# Patient Record
Sex: FEMALE | Race: BLACK OR AFRICAN AMERICAN | NOT HISPANIC OR LATINO | Employment: OTHER | URBAN - METROPOLITAN AREA
[De-identification: names, ages, dates, MRNs, and addresses within clinical notes are randomized per-mention and may not be internally consistent; named-entity substitution may affect disease eponyms.]

---

## 2017-01-09 ENCOUNTER — OFFICE VISIT (OUTPATIENT)
Dept: FAMILY MEDICINE | Facility: CLINIC | Age: 47
End: 2017-01-09
Payer: MEDICARE

## 2017-01-09 VITALS
HEIGHT: 67 IN | WEIGHT: 240.88 LBS | HEART RATE: 78 BPM | RESPIRATION RATE: 18 BRPM | BODY MASS INDEX: 37.81 KG/M2 | DIASTOLIC BLOOD PRESSURE: 92 MMHG | TEMPERATURE: 98 F | SYSTOLIC BLOOD PRESSURE: 128 MMHG

## 2017-01-09 DIAGNOSIS — G89.29 CHRONIC MIDLINE LOW BACK PAIN WITHOUT SCIATICA: ICD-10-CM

## 2017-01-09 DIAGNOSIS — E11.9 TYPE 2 DIABETES MELLITUS WITHOUT COMPLICATION, WITHOUT LONG-TERM CURRENT USE OF INSULIN: Primary | ICD-10-CM

## 2017-01-09 DIAGNOSIS — E66.9 OBESITY (BMI 30-39.9): ICD-10-CM

## 2017-01-09 DIAGNOSIS — M54.50 CHRONIC MIDLINE LOW BACK PAIN WITHOUT SCIATICA: ICD-10-CM

## 2017-01-09 DIAGNOSIS — I10 ESSENTIAL HYPERTENSION: ICD-10-CM

## 2017-01-09 DIAGNOSIS — F32.A DEPRESSION, UNSPECIFIED DEPRESSION TYPE: ICD-10-CM

## 2017-01-09 PROCEDURE — 99214 OFFICE O/P EST MOD 30 MIN: CPT | Mod: S$PBB,,, | Performed by: FAMILY MEDICINE

## 2017-01-09 PROCEDURE — 99213 OFFICE O/P EST LOW 20 MIN: CPT | Mod: PBBFAC,PO | Performed by: FAMILY MEDICINE

## 2017-01-09 PROCEDURE — 99999 PR PBB SHADOW E&M-EST. PATIENT-LVL III: CPT | Mod: PBBFAC,,, | Performed by: FAMILY MEDICINE

## 2017-01-09 RX ORDER — HYDROCODONE BITARTRATE AND ACETAMINOPHEN 7.5; 325 MG/1; MG/1
1 TABLET ORAL EVERY 12 HOURS PRN
Qty: 60 TABLET | Refills: 0 | Status: SHIPPED | OUTPATIENT
Start: 2017-02-26 | End: 2017-03-09 | Stop reason: SDUPTHER

## 2017-01-09 RX ORDER — DULOXETIN HYDROCHLORIDE 60 MG/1
60 CAPSULE, DELAYED RELEASE ORAL DAILY
Qty: 90 CAPSULE | Refills: 1 | Status: SHIPPED | OUTPATIENT
Start: 2017-01-09 | End: 2017-10-18

## 2017-01-09 RX ORDER — HYDROCODONE BITARTRATE AND ACETAMINOPHEN 7.5; 325 MG/1; MG/1
1 TABLET ORAL EVERY 12 HOURS PRN
Qty: 60 TABLET | Refills: 0 | Status: SHIPPED | OUTPATIENT
Start: 2017-01-26 | End: 2017-01-09 | Stop reason: SDUPTHER

## 2017-01-09 NOTE — MR AVS SNAPSHOT
[facility]  [address]  [place] [address]  Phone: [phone]  Fax: [phone]                  Liza Mesa   2017 9:00 AM   Office Visit    Description:  Female : 1970   Provider:  Rayo Arrieta MD   Department:  [facility]           Reason for Visit     Follow-up     Diabetes     Medication Problem                To Do List           Future Appointments        Provider Department Dept Phone    2017 3:00 PM Meem Bolden MD [place] [facility] [phone]      Goals (5 Years of Data)     None      Follow-Up and Disposition     Return in about 2 months (around 3/9/2017).       These Medications        Disp Refills Start End    duloxetine (CYMBALTA) 60 MG capsule 90 capsule 1 2017    Take 1 capsule (60 mg total) by mouth once daily. - Oral    Pharmacy: [facility] [address] [place] [address] Ph #: [phone]       hydrocodone-acetaminophen 7.5-325mg (NORCO) 7.5-325 mg per tablet 60 tablet 0 2017     Take 1 tablet by mouth every 12 (twelve) hours as needed for Pain. - Oral    Pharmacy: [facility] [address] [place] [address] Ph #: [phone]       Notes to Pharmacy: Fill the 7.5 mg instead of 5mg.      [facility] On Call     [facility] On Call Nurse Care Line -  Assistance  Registered nurses in the [facility] provide clinical advisement, health education, appointment booking, and other advisory services.  Call for this free service at [phone].             Medications           Message regarding Medications     Verify the changes and/or additions to your medication regime listed below are the same as discussed with your clinician today.  If any of these changes or additions are incorrect, please notify your healthcare provider.        START taking these NEW medications         "Refills    duloxetine (CYMBALTA) 60 MG capsule 1    Sig: Take 1 capsule (60 mg total) by mouth once daily.    Class: Normal    Route: Oral      CHANGE how you are taking these medications     Start Taking Instead of    hydrocodone-acetaminophen 7.5-325mg (NORCO) 7.5-325 mg per tablet hydrocodone-acetaminophen 7.5-325mg (NORCO) 7.5-325 mg per tablet    Dosage:  Take 1 tablet by mouth every 12 (twelve) hours as needed for Pain. Dosage:  Take 1 tablet by mouth every 6 (six) hours as needed for Pain.    Reason for Change:  Reorder     Starting on: 2/26/2017       STOP taking these medications     buPROPion (WELLBUTRIN SR) 150 MG TBSR 12 hr tablet Take 1 tablet (150 mg total) by mouth 2 (two) times daily.           Verify that the below list of medications is an accurate representation of the medications you are currently taking.  If none reported, the list may be blank. If incorrect, please contact your healthcare provider. Carry this list with you in case of emergency.           Current Medications     ACCU-CHEK FASTCLIX Misc USE TO TEST BLOOD GLUCOSE TWICE DAILY    ACCU-CHEK JEFFERSON Misc USE TO TEST BLOOD SUGAR BID    ACCU-CHEK SMARTVIEW TEST STRIP Strp USE TO TEST BLOOD SUGAR TWICE DAILY    acetaZOLAMIDE (DIAMOX) 250 MG tablet Take 2 tablets (500 mg total) by mouth 2 (two) times daily.    albuterol (ACCUNEB) 0.63 mg/3 mL Nebu Take 3 mLs (0.63 mg total) by nebulization every 6 (six) hours as needed.    albuterol (PROVENTIL HFA) 90 mcg/actuation inhaler Inhale 2 puffs into the lungs every 6 (six) hours as needed.    amlodipine (NORVASC) 5 MG tablet Take 1 tablet (5 mg total) by mouth once daily.    BD ULTRA-FINE JEFFERSON PEN NEEDLES 32 gauge x 5/32" Ndle Uses 1 daily, on Victoza    blood-glucose meter kit Please supply patient with a blood glucose meter, strips and lancets that is covered by her insurance. Patient tests blood sugar twice daily.    chlorzoxazone (PARAFON FORTE) 500 mg Tab Take 1 tablet (500 mg total) by mouth " "4 (four) times daily as needed.    fexofenadine (ALLEGRA) 180 MG tablet Take 1 tablet (180 mg total) by mouth once daily.    fluticasone-salmeterol 250-50 mcg/dose (ADVAIR) 250-50 mcg/dose diskus inhaler Inhale 1 puff into the lungs 2 (two) times daily.    glimepiride (AMARYL) 4 MG tablet Take 1 tablet (4 mg total) by mouth daily with breakfast.    hydrocodone-acetaminophen 7.5-325mg (NORCO) 7.5-325 mg per tablet Starting on Feb 26, 2017. Take 1 tablet by mouth every 12 (twelve) hours as needed for Pain.    hydrocortisone (ANUSOL-HC) 25 mg suppository Place 1 suppository (25 mg total) rectally 2 (two) times daily as needed.    lancets 30 gauge Misc 1 lancet by Misc.(Non-Drug; Combo Route) route 2 (two) times daily. RELI ON CONFIRM LANCETS 30guage TO CHECK BLOOD SUGAR TWICE DAILY: Dx Code: E11.65    liraglutide 0.6 mg/0.1 mL, 18 mg/3 mL, subq PNIJ (VICTOZA 3-AYALA) 0.6 mg/0.1 mL (18 mg/3 mL) PnIj Inject 1.8 mg into the skin once daily.    metformin (GLUCOPHAGE) 1000 MG tablet Take 1 tablet (1,000 mg total) by mouth 2 (two) times daily with meals.    oxybutynin (DITROPAN) 5 MG Tab Take 1 tablet (5 mg total) by mouth 2 (two) times daily.    promethazine-codeine 6.25-10 mg/5 ml (PHENERGAN WITH CODEINE) 6.25-10 mg/5 mL syrup TAKE 5 MLS BY MOUTH EVERY 6 HOURS AS NEEDED FOR COUGH    simvastatin (ZOCOR) 10 MG tablet Take 1 tablet (10 mg total) by mouth every morning.    duloxetine (CYMBALTA) 60 MG capsule Take 1 capsule (60 mg total) by mouth once daily.           Clinical Reference Information           Vital Signs - Last Recorded  Most recent update: 1/9/2017  9:26 AM by Tere Antonio LPN    BP Pulse Temp Resp Ht Wt    (!) 128/92 (BP Location: Left arm, Patient Position: Sitting) 78 98 °F (36.7 °C) (Oral) 18 5' 7" (1.702 m) 109.2 kg (240 lb 13.6 oz)    LMP BMI             01/07/2017 37.72 kg/m2         Blood Pressure          Most Recent Value    BP  (!)  128/92      Allergies as of 1/9/2017     No Known Allergies    "   Immunizations Administered on Date of Encounter - 1/9/2017     None      [facility] Sign-Up     Activating your [facility] account is as easy as 1-2-3!     1) Visit [email or web address], select Sign Up Now, enter this activation code and your date of birth, then select Next.  Activation code not generated  Current Patient Portal Status: Account disabled      2) Create a username and password to use when you visit [facility] in the future and select a security question in case you lose your password and select Next.    3) Enter your e-mail address and click Sign Up!    Additional Information  If you have questions, please e-mail [email or web address] or call [phone] to talk to our [facility] staff. Remember, [facility] is NOT to be used for urgent needs. For medical emergencies, dial 911.

## 2017-01-11 ENCOUNTER — OFFICE VISIT (OUTPATIENT)
Dept: ALLERGY | Facility: CLINIC | Age: 47
End: 2017-01-11
Payer: MEDICARE

## 2017-01-11 ENCOUNTER — LAB VISIT (OUTPATIENT)
Dept: LAB | Facility: HOSPITAL | Age: 47
End: 2017-01-11
Attending: ALLERGY & IMMUNOLOGY
Payer: MEDICARE

## 2017-01-11 VITALS
DIASTOLIC BLOOD PRESSURE: 76 MMHG | BODY MASS INDEX: 38.2 KG/M2 | SYSTOLIC BLOOD PRESSURE: 138 MMHG | HEIGHT: 67 IN | WEIGHT: 243.38 LBS

## 2017-01-11 DIAGNOSIS — R05.3 CHRONIC COUGH: ICD-10-CM

## 2017-01-11 DIAGNOSIS — J31.0 RHINITIS, CHRONIC: Primary | ICD-10-CM

## 2017-01-11 DIAGNOSIS — J45.40 MODERATE PERSISTENT ASTHMA WITHOUT COMPLICATION: ICD-10-CM

## 2017-01-11 DIAGNOSIS — I10 ESSENTIAL HYPERTENSION: ICD-10-CM

## 2017-01-11 DIAGNOSIS — J31.0 RHINITIS, CHRONIC: ICD-10-CM

## 2017-01-11 LAB — IGE SERPL-ACNC: 43 IU/ML

## 2017-01-11 PROCEDURE — 86003 ALLG SPEC IGE CRUDE XTRC EA: CPT | Mod: 59

## 2017-01-11 PROCEDURE — 82785 ASSAY OF IGE: CPT

## 2017-01-11 PROCEDURE — 99999 PR PBB SHADOW E&M-EST. PATIENT-LVL II: CPT | Mod: PBBFAC,,, | Performed by: ALLERGY & IMMUNOLOGY

## 2017-01-11 PROCEDURE — 86003 ALLG SPEC IGE CRUDE XTRC EA: CPT

## 2017-01-11 PROCEDURE — 99204 OFFICE O/P NEW MOD 45 MIN: CPT | Mod: S$PBB,,, | Performed by: ALLERGY & IMMUNOLOGY

## 2017-01-11 NOTE — PROGRESS NOTES
Subjective:       Patient ID: Liza Spain Cousin is a 46 y.o. female.    Chief Complaint:  Allergies (pcp referred)      HPI Comments: 47 yo woman presents for consult from Dr Arrieta for asthma. She states she has adult asthma/COPD. She is on Advair BID and albuterol neb usually BID. She is also on an OTC nose spray not sure what and allegra daily. She has stuffy nose all the time, no smell, runny nose, occ sneeze, itchy runny eyes, cough and chest tightness. She is worse in spring and summer. Worse outside then but some buildings she is worse inside. no time of day worse. No other triggers she can tel. She had bad exacerbation in November and PCP advised allergy testing. She has many other medical issues including DM, HTN, cholesterol, see history. Never had sinus or other ENT surgery. No eczema. No known food, insect or latex allergy.       Environmental History: see history section for home environment  Review of Systems   Constitutional: Negative for appetite change, chills, fatigue and fever.   HENT: Positive for congestion, postnasal drip, rhinorrhea, sinus pressure and sneezing. Negative for ear discharge, ear pain, facial swelling, nosebleeds, sore throat, trouble swallowing and voice change.    Eyes: Negative for discharge, redness, itching and visual disturbance.   Respiratory: Positive for cough, chest tightness, shortness of breath and wheezing. Negative for choking.    Cardiovascular: Negative for chest pain, palpitations and leg swelling.   Gastrointestinal: Negative for abdominal distention, abdominal pain, constipation, diarrhea, nausea and vomiting.   Genitourinary: Negative for difficulty urinating.   Musculoskeletal: Negative for arthralgias, gait problem, joint swelling and myalgias.   Skin: Negative for color change and rash.   Neurological: Positive for headaches. Negative for dizziness, syncope, weakness and light-headedness.   Hematological: Negative for adenopathy. Does not bruise/bleed easily.    Psychiatric/Behavioral: Negative for agitation, behavioral problems, confusion and sleep disturbance. The patient is not nervous/anxious.         Objective:    Physical Exam   Constitutional: She is oriented to person, place, and time. She appears well-developed and well-nourished. No distress.   HENT:   Head: Normocephalic and atraumatic.   Right Ear: Hearing, tympanic membrane, external ear and ear canal normal.   Left Ear: Hearing, tympanic membrane, external ear and ear canal normal.   Nose: No mucosal edema, rhinorrhea, sinus tenderness or septal deviation. No epistaxis. Right sinus exhibits no maxillary sinus tenderness and no frontal sinus tenderness. Left sinus exhibits no maxillary sinus tenderness and no frontal sinus tenderness.   Mouth/Throat: Uvula is midline, oropharynx is clear and moist and mucous membranes are normal. No uvula swelling.   Eyes: Conjunctivae are normal. Right eye exhibits no discharge. Left eye exhibits no discharge.   Neck: Normal range of motion. No thyromegaly present.   Cardiovascular: Normal rate, regular rhythm and normal heart sounds.    No murmur heard.  Pulmonary/Chest: Effort normal and breath sounds normal. No respiratory distress. She has no wheezes.   Abdominal: Soft. She exhibits no distension. There is no tenderness.   Musculoskeletal: Normal range of motion. She exhibits no edema or tenderness.   Lymphadenopathy:     She has no cervical adenopathy.   Neurological: She is alert and oriented to person, place, and time.   Skin: Skin is warm and dry. No rash noted. No erythema.   Psychiatric: She has a normal mood and affect. Her behavior is normal. Judgment and thought content normal.   Nursing note and vitals reviewed.      Laboratory:   none performed   Assessment:       1. Rhinitis, chronic    2. Moderate persistent asthma without complication    3. Chronic cough    4. Essential hypertension         Plan:       1. Immunocaps today  2. Continue Advair 250 BID, may  need to increase to 500 but also may need pulmonary eval  3. Continue allegra daily, check nasal spray and let us know name,. Needs to be on nasal steroid daily  4. Phone review

## 2017-01-11 NOTE — LETTER
January 11, 2017      Rayo Arrieta MD  [address]  [place] [address]           [place] [facility]  [address] [facility] [address] [place] [address]  Phone: [phone]          Patient: Liza Mesa   MR Number: [id number]   YOB: 1970   Date of Visit: 1/11/2017       Dear Dr. Rayo Arrieta:    Thank you for referring Liza Mesa to me for evaluation. Attached you will find relevant portions of my assessment and plan of care.    If you have questions, please do not hesitate to call me. I look forward to following Liza Mesa along with you.    Sincerely,    Meme Bolden MD    Enclosure  CC:  No Recipients    If you would like to receive this communication electronically, please contact [email or web address] or [phone] to request more information on [employer] access.    For providers and/or their staff who would like to refer a patient to [facility], please contact us through our one-stop-shop provider referral line, [facility] , at [phone].    If you feel you have received this communication in error or would no longer like to receive these types of communications, please e-mail [email or web address]

## 2017-01-13 LAB
A ALTERNATA IGE QN: <0.35 KU/L
A FUMIGATUS IGE QN: <0.35 KU/L
ALLERGEN MAPLE/SYCAMORE IGE: <0.35 KU/L
ALLERGEN PENICILLIUM IGE: <0.35 KU/L
ALLERGEN WALNUT TREE IGE: <0.35 KU/L
ALLERGEN WHEAT IGE: <0.35 KU/L
ALLERGEN WHITE PINE TREE IGE: <0.35 KU/L
ALLERGEN WILLOW IGE: <0.35 KU/L
BAHIA GRASS IGE QN: <0.35 KU/L
BALD CYPRESS IGE QN: <0.35 KU/L
BERMUDA GRASS IGE QN: <0.35 KU/L
C GLOBOSUM IGE QN: <0.35 KU/L
C HERBARUM IGE QN: <0.35 KU/L
C LUNATA IGE QN: <0.35 KU/L
CAT DANDER IGE QN: <0.35 KU/L
COMMON RAGWEED IGE QN: <0.35 KU/L
COTTONWOOD IGE QN: <0.35 KU/L
COW MILK IGE QN: <0.35 KU/L
D FARINAE IGE QN: <0.35 KU/L
D PTERONYSS IGE QN: <0.35 KU/L
DEPRECATED A ALTERNATA IGE RAST QL: NORMAL
DEPRECATED A FUMIGATUS IGE RAST QL: NORMAL
DEPRECATED BAHIA GRASS IGE RAST QL: NORMAL
DEPRECATED BALD CYPRESS IGE RAST QL: NORMAL
DEPRECATED BERMUDA GRASS IGE RAST QL: NORMAL
DEPRECATED C GLOBOSUM IGE RAST QL: NORMAL
DEPRECATED C HERBARUM IGE RAST QL: NORMAL
DEPRECATED C LUNATA IGE RAST QL: NORMAL
DEPRECATED CAT DANDER IGE RAST QL: NORMAL
DEPRECATED COMMON RAGWEED IGE RAST QL: NORMAL
DEPRECATED COTTONWOOD IGE RAST QL: NORMAL
DEPRECATED COW MILK IGE RAST QL: NORMAL
DEPRECATED D FARINAE IGE RAST QL: NORMAL
DEPRECATED D PTERONYSS IGE RAST QL: NORMAL
DEPRECATED DOG DANDER IGE RAST QL: NORMAL
DEPRECATED EGG WHITE IGE RAST QL: NORMAL
DEPRECATED ENGL PLANTAIN IGE RAST QL: NORMAL
DEPRECATED HORSE DANDER IGE RAST QL: NORMAL
DEPRECATED JOHNSON GRASS IGE RAST QL: NORMAL
DEPRECATED MARSH ELDER IGE RAST QL: NORMAL
DEPRECATED MUGWORT IGE RAST QL: NORMAL
DEPRECATED PECAN/HICK TREE IGE RAST QL: NORMAL
DEPRECATED ROACH IGE RAST QL: NORMAL
DEPRECATED S ROSTRATA IGE RAST QL: NORMAL
DEPRECATED SALTWORT IGE RAST QL: NORMAL
DEPRECATED SILVER BIRCH IGE RAST QL: NORMAL
DEPRECATED SOYBEAN IGE RAST QL: NORMAL
DEPRECATED TIMOTHY IGE RAST QL: NORMAL
DEPRECATED WHITE OAK IGE RAST QL: NORMAL
DOG DANDER IGE QN: <0.35 KU/L
EGG WHITE IGE QN: <0.35 KU/L
ENGL PLANTAIN IGE QN: <0.35 KU/L
HORSE DANDER IGE QN: <0.35 KU/L
JOHNSON GRASS IGE QN: <0.35 KU/L
MAPLE/SYCAMORE CLASS: NORMAL
MARSH ELDER IGE QN: <0.35 KU/L
MUGWORT IGE QN: <0.35 KU/L
PECAN/HICK TREE IGE QN: <0.35 KU/L
PENICILLIUM CLASS: NORMAL
ROACH IGE QN: <0.35 KU/L
S ROSTRATA IGE QN: <0.35 KU/L
SALTWORT IGE QN: <0.35 KU/L
SILVER BIRCH IGE QN: <0.35 KU/L
SOYBEAN IGE QN: <0.35 KU/L
TIMOTHY IGE QN: <0.35 KU/L
WALNUT TREE CLASS: NORMAL
WHEAT CLASS: NORMAL
WHITE OAK IGE QN: <0.35 KU/L
WHITE PINE CLASS: NORMAL
WILLOW CLASS: NORMAL

## 2017-01-18 ENCOUNTER — TELEPHONE (OUTPATIENT)
Dept: ALLERGY | Facility: CLINIC | Age: 47
End: 2017-01-18

## 2017-01-18 NOTE — TELEPHONE ENCOUNTER
"Spoke to pt, Told her Dr Bolden said:     "Please let her know all allergy tests are negative. She should still be on nasal steroid for congestion daily. If does not help may need to consider CT scan sinuses to eval.  Also may want to consider pulmonary eval for asthma."        Pt said "okay", told her to let me know if she wants to do the test and we would schedule. Pt said "thank you."  "

## 2017-01-18 NOTE — TELEPHONE ENCOUNTER
Please let her know all allergy tests are negative. She should still be on nasal steroid for congestion daily. If does not help may need to consider CT scan sinuses to eval.  Also may want to consider pulmonary eval for asthma

## 2017-01-25 RX ORDER — PROMETHAZINE HYDROCHLORIDE AND DEXTROMETHORPHAN HYDROBROMIDE 6.25; 15 MG/5ML; MG/5ML
SYRUP ORAL
Qty: 240 ML | Refills: 0 | Status: SHIPPED | OUTPATIENT
Start: 2017-01-25 | End: 2017-10-03 | Stop reason: ALTCHOICE

## 2017-02-06 ENCOUNTER — TELEPHONE (OUTPATIENT)
Dept: FAMILY MEDICINE | Facility: CLINIC | Age: 47
End: 2017-02-06

## 2017-02-06 NOTE — TELEPHONE ENCOUNTER
Tried to reach [facility]. Number just rings. No answering device to leave msg. Face-to-face appt is required to eval for medical necessity. Will advise [facility] if they call back.

## 2017-02-06 NOTE — TELEPHONE ENCOUNTER
----- Message from RT Kailash sent at 2/2/2017  2:26 PM CST -----  Contact: [facility]YA, [phone] [facility]  AYA,  [facility], requesting to check the status of a fax that was sent to your office for the pt's back brace and Lt wrist brace, thanks.

## 2017-02-20 DIAGNOSIS — K64.1 SECOND DEGREE HEMORRHOIDS: ICD-10-CM

## 2017-02-20 RX ORDER — PROMETHAZINE HYDROCHLORIDE AND CODEINE PHOSPHATE 6.25; 1 MG/5ML; MG/5ML
SOLUTION ORAL
Qty: 240 ML | Refills: 0 | Status: SHIPPED | OUTPATIENT
Start: 2017-02-20 | End: 2017-03-09 | Stop reason: SDUPTHER

## 2017-02-20 RX ORDER — HYDROCORTISONE ACETATE 25 MG/1
25 SUPPOSITORY RECTAL 2 TIMES DAILY PRN
Qty: 24 SUPPOSITORY | Refills: 2 | Status: SHIPPED | OUTPATIENT
Start: 2017-02-20 | End: 2020-05-28 | Stop reason: SDUPTHER

## 2017-02-20 NOTE — TELEPHONE ENCOUNTER
Pt requested refill on suppositories due to rectal bleeding from constipation. Please advise.  Pt has appt 03/09/2017.

## 2017-02-20 NOTE — TELEPHONE ENCOUNTER
----- Message from Lashell Desai sent at 2/20/2017 10:20 AM CST -----  Contact: PAtient  Patient needs a refill of her cough medicine, Promethazine with Hydrocodone and her suppositories sent [facility] on [address]. Patient has been having coughing spells. Any questions call patient at [phone].

## 2017-02-22 ENCOUNTER — PATIENT OUTREACH (OUTPATIENT)
Dept: ADMINISTRATIVE | Facility: HOSPITAL | Age: 47
End: 2017-02-22

## 2017-02-22 NOTE — LETTER
February 22, 2017    Liza Mesa  [address], [place] [address]             [facility]  [address]  [facility] [address]  Phone: [phone] Dear Mrs. Mesa:    [facility] is committed to your overall health.  To help you get the most out of each of your visits, we will review your information to make sure you are up to date on all of your recommended tests and/or procedures.      Dr. Rayo Arrieta has found that you may be due for some labs for your diabetes, your annual diabetic eye exam, pap smear, and possibly flu and pneumonia immunizations.     If you have had any of the above done at another facility, please bring the records or information with you so that your record at [facility] will be complete.    If you are currently taking medication, please bring it with you to your appointment for review.    If you have any questions or concerns, please don't hesitate to call.    Thank you for letting us care for you,  Zamzam Buchanan LPN Clinical Care Coordinator  [facility]  [phone]

## 2017-02-27 ENCOUNTER — TELEPHONE (OUTPATIENT)
Dept: FAMILY MEDICINE | Facility: CLINIC | Age: 47
End: 2017-02-27

## 2017-02-27 NOTE — TELEPHONE ENCOUNTER
----- Message from Gisel Marquez sent at 2/27/2017 11:28 AM CST -----  Patient states  That liraglutide 0.6 mg/0.1 mL, 18 mg/3 mL, subq PNIJ (VICTOZA 3-AYALA) 0.6 mg/0.1 mL (18 mg/3 mL) PnIj isnt lasting her the entire month please call [phone] (home)

## 2017-03-03 ENCOUNTER — TELEPHONE (OUTPATIENT)
Dept: FAMILY MEDICINE | Facility: CLINIC | Age: 47
End: 2017-03-03

## 2017-03-03 NOTE — TELEPHONE ENCOUNTER
----- Message from Aubrey Agustin sent at 3/2/2017  4:53 PM CST -----  Contact: Patient  Patient states that her insurance doesn't cover the enema that was called into her pharmacy. Also, she would like to speak with a nurse regarding her insulin. Please call the patient back at [phone]. Thanks.

## 2017-03-03 NOTE — TELEPHONE ENCOUNTER
Spoke to pt re: suppositories no longer covered under insurance needs prior auth. Pharmacy faxing.  Clarified 3 sohail victoza order with pharmacy, they will fill. Spoke to pt and advised. She verbalized understanding.

## 2017-03-09 ENCOUNTER — OFFICE VISIT (OUTPATIENT)
Dept: FAMILY MEDICINE | Facility: CLINIC | Age: 47
End: 2017-03-09
Payer: MEDICARE

## 2017-03-09 VITALS
SYSTOLIC BLOOD PRESSURE: 130 MMHG | RESPIRATION RATE: 16 BRPM | HEIGHT: 67 IN | TEMPERATURE: 98 F | HEART RATE: 84 BPM | WEIGHT: 234 LBS | BODY MASS INDEX: 36.73 KG/M2 | DIASTOLIC BLOOD PRESSURE: 80 MMHG

## 2017-03-09 DIAGNOSIS — M54.50 CHRONIC MIDLINE LOW BACK PAIN WITHOUT SCIATICA: ICD-10-CM

## 2017-03-09 DIAGNOSIS — E11.9 TYPE 2 DIABETES MELLITUS WITHOUT COMPLICATION, WITHOUT LONG-TERM CURRENT USE OF INSULIN: Primary | ICD-10-CM

## 2017-03-09 DIAGNOSIS — J45.41 MODERATE PERSISTENT ASTHMA WITH ACUTE EXACERBATION: ICD-10-CM

## 2017-03-09 DIAGNOSIS — G89.29 CHRONIC MIDLINE LOW BACK PAIN WITHOUT SCIATICA: ICD-10-CM

## 2017-03-09 PROCEDURE — 99214 OFFICE O/P EST MOD 30 MIN: CPT | Mod: S$PBB,,, | Performed by: FAMILY MEDICINE

## 2017-03-09 PROCEDURE — 99999 PR PBB SHADOW E&M-EST. PATIENT-LVL III: CPT | Mod: PBBFAC,,, | Performed by: FAMILY MEDICINE

## 2017-03-09 PROCEDURE — 99213 OFFICE O/P EST LOW 20 MIN: CPT | Mod: PBBFAC,PO | Performed by: FAMILY MEDICINE

## 2017-03-09 RX ORDER — HYDROCODONE BITARTRATE AND ACETAMINOPHEN 7.5; 325 MG/1; MG/1
1 TABLET ORAL EVERY 12 HOURS PRN
Qty: 60 TABLET | Refills: 0 | Status: SHIPPED | OUTPATIENT
Start: 2017-05-26 | End: 2017-06-12 | Stop reason: SDUPTHER

## 2017-03-09 RX ORDER — MUPIROCIN 20 MG/G
OINTMENT TOPICAL 3 TIMES DAILY
Qty: 15 G | Refills: 0 | Status: SHIPPED | OUTPATIENT
Start: 2017-03-09 | End: 2017-03-19

## 2017-03-09 RX ORDER — HYDROCODONE BITARTRATE AND ACETAMINOPHEN 7.5; 325 MG/1; MG/1
1 TABLET ORAL EVERY 12 HOURS PRN
Qty: 60 TABLET | Refills: 0 | Status: SHIPPED | OUTPATIENT
Start: 2017-04-26 | End: 2017-03-09 | Stop reason: SDUPTHER

## 2017-03-09 RX ORDER — PROMETHAZINE HYDROCHLORIDE AND CODEINE PHOSPHATE 6.25; 1 MG/5ML; MG/5ML
5 SOLUTION ORAL EVERY 6 HOURS PRN
Qty: 240 ML | Refills: 2 | Status: SHIPPED | OUTPATIENT
Start: 2017-03-09 | End: 2017-05-10 | Stop reason: SDUPTHER

## 2017-03-09 RX ORDER — HYDROCODONE BITARTRATE AND ACETAMINOPHEN 7.5; 325 MG/1; MG/1
1 TABLET ORAL EVERY 12 HOURS PRN
Qty: 60 TABLET | Refills: 0 | Status: SHIPPED | OUTPATIENT
Start: 2017-03-26 | End: 2017-03-09 | Stop reason: SDUPTHER
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
## 2017-03-09 NOTE — MR AVS SNAPSHOT
[facility]  [address]  [place] [address]  Phone: [phone]  Fax: [phone]                  Liza Mesa   3/9/2017 9:30 AM   Office Visit    Description:  Female : 1970   Provider:  Rayo Arrieta MD   Department:  [facility]           Reason for Visit     Follow-up           Diagnoses this Visit        Comments    Type 2 diabetes mellitus without complication, without long-term current use of insulin    -  Primary     Moderate persistent asthma with acute exacerbation         Chronic midline low back pain without sciatica                To Do List           Goals (5 Years of Data)     None      Follow-Up and Disposition     Return in about 3 months (around 2017).       These Medications        Disp Refills Start End    hydrocodone-acetaminophen 7.5-325mg (NORCO) 7.5-325 mg per tablet 60 tablet 0 2017     Take 1 tablet by mouth every 12 (twelve) hours as needed for Pain. - Oral    Pharmacy: [facility] [address] AT [facility] Ph #: [phone]       promethazine-codeine 6.25-10 mg/5 ml (PHENERGAN WITH CODEINE) 6.25-10 mg/5 mL syrup 240 mL 2 3/9/2017     Take 5 mLs by mouth every 6 (six) hours as needed. AS NEEDED FOR COUGH - Oral    Pharmacy: [facility] [address] Ph #: [phone]       mupirocin (BACTROBAN) 2 % ointment 15 g 0 3/9/2017 3/19/2017    Apply topically 3 (three) times daily. - Topical (Top)    Pharmacy: [facility] [address] Ph #: [phone]         [facility]     [facility] On [facility] Nurse Care Line -  Assistance  Registered nurses in the [facility] On Call Center provide clinical advisement, health education, appointment booking, and other advisory services.  Call for this free service at [phone].       "       Medications           Message regarding Medications     Verify the changes and/or additions to your medication regime listed below are the same as discussed with your clinician today.  If any of these changes or additions are incorrect, please notify your healthcare provider.        START taking these NEW medications        Refills    mupirocin (BACTROBAN) 2 % ointment 0    Sig: Apply topically 3 (three) times daily.    Class: Normal    Route: Topical (Top)      CHANGE how you are taking these medications     Start Taking Instead of    promethazine-codeine 6.25-10 mg/5 ml (PHENERGAN WITH CODEINE) 6.25-10 mg/5 mL syrup promethazine-codeine 6.25-10 mg/5 ml (PHENERGAN WITH CODEINE) 6.25-10 mg/5 mL syrup    Dosage:  Take 5 mLs by mouth every 6 (six) hours as needed. AS NEEDED FOR COUGH Dosage:  TAKE 5 ML BY MOUTH EVERY 6 HOURS AS NEEDED FOR COUGH    Reason for Change:  Reorder            Verify that the below list of medications is an accurate representation of the medications you are currently taking.  If none reported, the list may be blank. If incorrect, please contact your healthcare provider. Carry this list with you in case of emergency.           Current Medications     ACCU-CHEK FASTCLIX Misc USE TO TEST BLOOD GLUCOSE TWICE DAILY    ACCU-CHEK JEFFERSON Misc USE TO TEST BLOOD SUGAR BID    ACCU-CHEK SMARTVIEW TEST STRIP Strp USE TO TEST BLOOD SUGAR TWICE DAILY    acetaZOLAMIDE (DIAMOX) 250 MG tablet Take 2 tablets (500 mg total) by mouth 2 (two) times daily.    albuterol (ACCUNEB) 0.63 mg/3 mL Nebu Take 3 mLs (0.63 mg total) by nebulization every 6 (six) hours as needed.    albuterol (PROVENTIL HFA) 90 mcg/actuation inhaler Inhale 2 puffs into the lungs every 6 (six) hours as needed.    amlodipine (NORVASC) 5 MG tablet Take 1 tablet (5 mg total) by mouth once daily.    BD ULTRA-FINE JEFFERSON PEN NEEDLES 32 gauge x 5/32" Ndle Uses 1 daily, on Victoza    blood-glucose meter kit Please supply patient with a blood " glucose meter, strips and lancets that is covered by her insurance. Patient tests blood sugar twice daily.    chlorzoxazone (PARAFON FORTE) 500 mg Tab Take 1 tablet (500 mg total) by mouth 4 (four) times daily as needed.    duloxetine (CYMBALTA) 60 MG capsule Take 1 capsule (60 mg total) by mouth once daily.    fexofenadine (ALLEGRA) 180 MG tablet Take 1 tablet (180 mg total) by mouth once daily.    fluticasone-salmeterol 250-50 mcg/dose (ADVAIR) 250-50 mcg/dose diskus inhaler Inhale 1 puff into the lungs 2 (two) times daily.    hydrocodone-acetaminophen 7.5-325mg (NORCO) 7.5-325 mg per tablet Starting on May 26, 2017. Take 1 tablet by mouth every 12 (twelve) hours as needed for Pain.    hydrocortisone (ANUSOL-HC) 25 mg suppository Place 1 suppository (25 mg total) rectally 2 (two) times daily as needed.    lancets 30 gauge Misc 1 lancet by Misc.(Non-Drug; Combo Route) route 2 (two) times daily. RELI ON CONFIRM LANCETS 30guage TO CHECK BLOOD SUGAR TWICE DAILY: Dx Code: E11.65    liraglutide 0.6 mg/0.1 mL, 18 mg/3 mL, subq PNIJ (VICTOZA 3-AYALA) 0.6 mg/0.1 mL (18 mg/3 mL) PnIj Inject 1.8 mg into the skin once daily.    metformin (GLUCOPHAGE) 1000 MG tablet Take 1 tablet (1,000 mg total) by mouth 2 (two) times daily with meals.    oxybutynin (DITROPAN) 5 MG Tab Take 1 tablet (5 mg total) by mouth 2 (two) times daily.    promethazine-codeine 6.25-10 mg/5 ml (PHENERGAN WITH CODEINE) 6.25-10 mg/5 mL syrup Take 5 mLs by mouth every 6 (six) hours as needed. AS NEEDED FOR COUGH    promethazine-dextromethorphan (PROMETHAZINE-DM) 6.25-15 mg/5 mL Syrp TAKE 5 ML BY MOUTH TWICE DAILY AS NEEDED    simvastatin (ZOCOR) 10 MG tablet Take 1 tablet (10 mg total) by mouth every morning.    glimepiride (AMARYL) 4 MG tablet Take 1 tablet (4 mg total) by mouth daily with breakfast.    mupirocin (BACTROBAN) 2 % ointment Apply topically 3 (three) times daily.           Clinical Reference Information           Your Vitals Were     BP Pulse  "Temp Resp Height Weight    130/80 (BP Location: Right arm, Patient Position: Sitting) 84 98.4 °F (36.9 °C) (Oral) 16 5' 7" (1.702 m) 106.1 kg (234 lb 0.3 oz)    Last Period BMI             03/01/2017 36.65 kg/m2         Blood Pressure          Most Recent Value    BP  130/80      Allergies as of 3/9/2017     No Known Allergies      Immunizations Administered on Date of Encounter - 3/9/2017     None      Instructions    See your eye doctor. Make sure they send us a copy of the exam.        Language Assistance Services     ATTENTION: Language assistance services are available, free of charge. Please call [phone].      ATENCIÓN: Si habla yuan, tiene a carlos disposición servicios gratuitos de asistencia lingüística. Llame al [phone].     TOM Ý: N?u b?n nói Ti?ng Vi?t, có các d?ch v? h? tr? ngôn ng? mi?n phí dành cho b?n. G?i s? [phone].         [facility] complies with applicable Federal civil rights laws and does not discriminate on the basis of race, color, national origin, age, disability, or sex.        "

## 2017-05-09 DIAGNOSIS — R39.15 URGENCY OF URINATION: ICD-10-CM

## 2017-05-09 RX ORDER — OXYBUTYNIN CHLORIDE 5 MG/1
5 TABLET ORAL 2 TIMES DAILY
Qty: 180 TABLET | Refills: 3 | Status: SHIPPED | OUTPATIENT
Start: 2017-05-09 | End: 2017-05-10 | Stop reason: SDUPTHER

## 2017-05-09 NOTE — TELEPHONE ENCOUNTER
----- Message from Roya Damon sent at 5/9/2017 12:22 PM CDT -----  Contact: self  Needs refill on bladder meds.  Please call back at [phone] (home)     [facility] [address] [place] [address] [address] [place] [address] AT [facility]  [address] [place] [address]  Phone: [phone] Fax: [phone]

## 2017-05-09 NOTE — TELEPHONE ENCOUNTER
Pt requesting oxybutynin 5 mg tab, BID, # 60    Last office visit- 03/09/17  Last refill- 03/09/17    [facility]   Please advise*

## 2017-05-10 DIAGNOSIS — R39.15 URGENCY OF URINATION: ICD-10-CM

## 2017-05-10 RX ORDER — ZOLPIDEM TARTRATE 10 MG/1
10 TABLET ORAL NIGHTLY PRN
Refills: 1 | COMMUNITY
Start: 2017-05-03

## 2017-05-10 RX ORDER — OXYBUTYNIN CHLORIDE 5 MG/1
5 TABLET ORAL 2 TIMES DAILY
Qty: 180 TABLET | Refills: 3 | Status: SHIPPED | OUTPATIENT
Start: 2017-05-10 | End: 2017-06-12 | Stop reason: SDUPTHER

## 2017-05-10 RX ORDER — PROMETHAZINE HYDROCHLORIDE AND CODEINE PHOSPHATE 6.25; 1 MG/5ML; MG/5ML
5 SOLUTION ORAL EVERY 6 HOURS PRN
Qty: 240 ML | Refills: 2 | Status: SHIPPED | OUTPATIENT
Start: 2017-05-10 | End: 2017-06-12 | Stop reason: SDUPTHER

## 2017-05-10 NOTE — TELEPHONE ENCOUNTER
----- Message from Roya Damon sent at 5/10/2017  3:30 PM CDT -----  Contact: self  Out of cough meds, uncomfortable sleeping, would like a refill.  Please call back at [phone] (home)       [facility] [id number] [place] [address] [address] [place] [address] AT [facility]  [address] [facility] [address]  Phone: [phone] Fax: [phone]       This is an Initial Medicare Annual Wellness Exam (AWV) (Performed 12 months after IPPE or effective date of Medicare Part B enrollment, Once in a lifetime)    I have reviewed the patient's medical history in detail and updated the computerized patient record. History     Past Medical History   Diagnosis Date    Asbestosis ([facility] [address]     Benign essential hypertension     Hyperplasia of prostate with urinary obstruction     Memory loss     Pure hypercholesterolemia       Past Surgical History   Procedure Laterality Date    Hx appendectomy      Hx cholecystectomy  7/8/1997    Hx other surgical  10/1999     repair of abdominal aortic aneursym     Current Outpatient Prescriptions   Medication Sig Dispense Refill    amLODIPine (NORVASC) 5 mg tablet take 1 tablet by mouth once daily 30 Tab 2    levothyroxine (SYNTHROID) 25 mcg tablet take 1 tablet by mouth every morning ON AN EMPTY STOMACH 30 Tab 2    FLUZONE HIGH-DOSE 2015-16, PF, syrg injection   0     Allergies   Allergen Reactions    Avelox [Moxifloxacin] Rash    Clindamycin Rash    Penicillin V Potassium Rash     Family History   Problem Relation Age of Onset    No Known Problems Mother     No Known Problems Father      Social History   Substance Use Topics    Smoking status: Former Smoker    Smokeless tobacco: Never Used    Alcohol use No     Patient Active Problem List   Diagnosis Code    Memory loss R41.3    Hyperplasia of prostate with urinary obstruction N40.1, N13.8    Asbestosis ([facility] [address] J61    Benign essential hypertension I10    Pure hypercholesterolemia E78.00    Acquired hypothyroidism E03.9         Depression Risk Factor Screening:     PHQ 2 / 9, over the last two weeks 6/3/2016   Little interest or pleasure in doing things Not at all   Feeling down, depressed or hopeless Not at all   Total Score PHQ 2 0     Alcohol Risk Factor Screening: On any occasion during the past 3 months, have you had more than 4 drinks containing alcohol? No    Do you average more than 14 drinks per week? No    Functional Ability and Level of Safety:     Hearing Loss   mild-to-moderate    Activities of Daily Living   Partial assistance. Requires assistance with: bathing and hygiene, dressing and food preparation    Fall Risk     Fall Risk Assessment, last 12 mths 6/3/2016   Able to walk? Yes   Fall in past 12 months? No   Fall with injury? -   Number of falls in past 12 months -   Fall Risk Score -     Abuse Screen   Patient is not abused    Review of Systems   A comprehensive review of systems was negative except for that written in the HPI. Physical Examination     No exam data present    Evaluation of Cognitive Function:  Mood/affect:  neutral  Appearance: age appropriate and slightly unkept  Family member/caregiver input: son    There were no vitals taken for this visit. General appearance: alert, cooperative, no distress, appears stated age  Neck: supple, symmetrical, trachea midline, no adenopathy, thyroid: not enlarged, symmetric, no tenderness/mass/nodules, no carotid bruit and no JVD  Back: symmetric, no curvature. ROM normal. No CVA tenderness. Lungs: clear to auscultation bilaterally  Chest wall: no tenderness  Heart: regular rate and rhythm, S1, S2 normal, no murmur, click, rub or gallop  Abdomen: soft, non-tender. Bowel sounds normal. No masses,  no organomegaly  Extremities: extremities normal, atraumatic, no cyanosis or edema  MMSE 18/30    Patient Care Team:  Victoria Alvarez MD as PCP - General (Internal Medicine)    Advice/Referrals/Counseling   Education and counseling provided:  Are appropriate based on today's review and evaluation    Assessment/Plan       ICD-10-CM ICD-9-CM    1. Memory loss R41.3 780.93 CBC WITH AUTOMATED DIFF      METABOLIC PANEL, COMPREHENSIVE      TSH 3RD GENERATION   2. Benign essential hypertension I10 401.1 CBC WITH AUTOMATED DIFF      METABOLIC PANEL, COMPREHENSIVE      TSH 3RD GENERATION   3.  Pure hypercholesterolemia E78.00 272.0 CBC WITH AUTOMATED DIFF      METABOLIC PANEL, COMPREHENSIVE      TSH 3RD GENERATION   4. Acquired hypothyroidism E03.9 244.9 CBC WITH AUTOMATED DIFF      METABOLIC PANEL, COMPREHENSIVE      TSH 3RD GENERATION   .

## 2017-05-18 ENCOUNTER — PATIENT OUTREACH (OUTPATIENT)
Dept: ADMINISTRATIVE | Facility: HOSPITAL | Age: 47
End: 2017-05-18

## 2017-05-18 NOTE — LETTER
May 18, 2017    Liza Mesa  [address] [facility] [place] [address]             [facility]  [address]  [facility] [address]  Phone: [phone] Dear Mrs. Mesa:    [facility] is committed to your overall health.  To help you get the most out of each of your visits, we will review your information to make sure you are up to date on all of your recommended tests and/or procedures.      Dr. Rayo Arrieta has found that you may be due for some labs for your diabetes, your annual diabetic eye exam, pap smear, and possibly a pneumonia immunization.     Medicare does not cover all immunizations to be given in the clinic.  Check your benefits to ensure that you do not need to receive your immunizations at the pharmacy.    If you have had any of the above done at another facility, please bring the records or information with you so that your record at [facility] will be complete.  If you would like to schedule any of these, please contact me.    If you are currently taking medication, please bring it with you to your appointment for review.    If you have any questions or concerns, please don't hesitate to call.    Thank you for letting us care for you,  Zamzam Buchanan LPN Clinical Care Coordinator  [facility]  [phone]

## 2017-05-30 ENCOUNTER — HOSPITAL ENCOUNTER (EMERGENCY)
Facility: HOSPITAL | Age: 47
Discharge: HOME | End: 2017-05-30
Attending: EMERGENCY MEDICINE
Payer: MEDICARE

## 2017-05-30 VITALS
WEIGHT: 234 LBS | OXYGEN SATURATION: 100 % | RESPIRATION RATE: 18 BRPM | TEMPERATURE: 99 F | SYSTOLIC BLOOD PRESSURE: 147 MMHG | HEART RATE: 107 BPM | BODY MASS INDEX: 36.65 KG/M2 | DIASTOLIC BLOOD PRESSURE: 80 MMHG

## 2017-05-30 DIAGNOSIS — M67.40 GANGLION CYST: ICD-10-CM

## 2017-05-30 DIAGNOSIS — S60.222A CONTUSION OF LEFT HAND, INITIAL ENCOUNTER: Primary | ICD-10-CM

## 2017-05-30 PROCEDURE — 25000003 PHARM REV CODE 250: Performed by: EMERGENCY MEDICINE

## 2017-05-30 PROCEDURE — 99283 EMERGENCY DEPT VISIT LOW MDM: CPT

## 2017-05-30 RX ORDER — HYDROCODONE BITARTRATE AND ACETAMINOPHEN 5; 325 MG/1; MG/1
1 TABLET ORAL
Status: COMPLETED | OUTPATIENT
Start: 2017-05-30 | End: 2017-05-30

## 2017-05-30 RX ADMIN — HYDROCODONE BITARTRATE AND ACETAMINOPHEN 1 TABLET: 5; 325 TABLET ORAL at 06:05

## 2017-05-30 NOTE — ED NOTES
Left hand wrapped with ace wrap per MD order. Given written and verbal DC instructions questions answered per MD aware to follow up with PCP encouraged to return if needed.
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
## 2017-06-12 ENCOUNTER — OFFICE VISIT (OUTPATIENT)
Dept: FAMILY MEDICINE | Facility: CLINIC | Age: 47
End: 2017-06-12
Payer: MEDICARE

## 2017-06-12 VITALS
WEIGHT: 226.94 LBS | HEIGHT: 67 IN | BODY MASS INDEX: 35.62 KG/M2 | SYSTOLIC BLOOD PRESSURE: 130 MMHG | TEMPERATURE: 98 F | DIASTOLIC BLOOD PRESSURE: 72 MMHG

## 2017-06-12 DIAGNOSIS — J45.41 MODERATE PERSISTENT ASTHMA WITH ACUTE EXACERBATION: ICD-10-CM

## 2017-06-12 DIAGNOSIS — E66.9 OBESITY (BMI 30-39.9): ICD-10-CM

## 2017-06-12 DIAGNOSIS — R39.15 URGENCY OF URINATION: ICD-10-CM

## 2017-06-12 DIAGNOSIS — E11.9 TYPE 2 DIABETES MELLITUS WITHOUT COMPLICATION, WITHOUT LONG-TERM CURRENT USE OF INSULIN: ICD-10-CM

## 2017-06-12 DIAGNOSIS — M54.50 CHRONIC MIDLINE LOW BACK PAIN WITHOUT SCIATICA: ICD-10-CM

## 2017-06-12 DIAGNOSIS — G89.29 CHRONIC MIDLINE LOW BACK PAIN WITHOUT SCIATICA: ICD-10-CM

## 2017-06-12 DIAGNOSIS — I10 ESSENTIAL HYPERTENSION: Primary | ICD-10-CM

## 2017-06-12 PROCEDURE — 3046F HEMOGLOBIN A1C LEVEL >9.0%: CPT | Mod: ,,, | Performed by: FAMILY MEDICINE

## 2017-06-12 PROCEDURE — 90670 PCV13 VACCINE IM: CPT | Mod: PBBFAC,PO

## 2017-06-12 PROCEDURE — 99213 OFFICE O/P EST LOW 20 MIN: CPT | Mod: PBBFAC,PO,25 | Performed by: FAMILY MEDICINE

## 2017-06-12 PROCEDURE — 99214 OFFICE O/P EST MOD 30 MIN: CPT | Mod: S$PBB,,, | Performed by: FAMILY MEDICINE

## 2017-06-12 PROCEDURE — 99999 PR PBB SHADOW E&M-EST. PATIENT-LVL III: CPT | Mod: PBBFAC,,, | Performed by: FAMILY MEDICINE

## 2017-06-12 RX ORDER — PROMETHAZINE HYDROCHLORIDE AND CODEINE PHOSPHATE 6.25; 1 MG/5ML; MG/5ML
5 SOLUTION ORAL EVERY 6 HOURS PRN
Qty: 240 ML | Refills: 2 | Status: SHIPPED | OUTPATIENT
Start: 2017-06-12 | End: 2017-10-03 | Stop reason: SDUPTHER

## 2017-06-12 RX ORDER — HYDROCODONE BITARTRATE AND ACETAMINOPHEN 7.5; 325 MG/1; MG/1
1 TABLET ORAL EVERY 12 HOURS PRN
Qty: 60 TABLET | Refills: 0 | Status: SHIPPED | OUTPATIENT
Start: 2017-06-26 | End: 2017-06-12 | Stop reason: SDUPTHER

## 2017-06-12 RX ORDER — OXYBUTYNIN CHLORIDE 5 MG/1
5 TABLET ORAL 2 TIMES DAILY
Qty: 180 TABLET | Refills: 3 | Status: SHIPPED | OUTPATIENT
Start: 2017-06-12 | End: 2019-01-28

## 2017-06-12 RX ORDER — HYDROCODONE BITARTRATE AND ACETAMINOPHEN 7.5; 325 MG/1; MG/1
1 TABLET ORAL EVERY 12 HOURS PRN
Qty: 60 TABLET | Refills: 0 | Status: SHIPPED | OUTPATIENT
Start: 2017-07-26 | End: 2017-06-12 | Stop reason: SDUPTHER

## 2017-06-12 RX ORDER — HYDROCODONE BITARTRATE AND ACETAMINOPHEN 7.5; 325 MG/1; MG/1
1 TABLET ORAL EVERY 12 HOURS PRN
Qty: 60 TABLET | Refills: 0 | Status: SHIPPED | OUTPATIENT
Start: 2017-08-26 | End: 2017-09-07 | Stop reason: SDUPTHER

## 2017-06-12 RX ORDER — METFORMIN HYDROCHLORIDE 500 MG/1
1000 TABLET, EXTENDED RELEASE ORAL 2 TIMES DAILY
Qty: 360 TABLET | Refills: 11 | Status: SHIPPED | OUTPATIENT
Start: 2017-06-12 | End: 2019-01-28 | Stop reason: SDUPTHER

## 2017-06-12 NOTE — PROGRESS NOTES
"Subjective:       Patient ID: Liza Spain Cousin is a 46 y.o. female.    Chief Complaint: Follow-up (3 mo med f/u)    She is here for follow-up of diabetes, hypertension, asthma, chronic pain.  She has low back pain and takes 1 or 2 Lortab a day.  She will be due for refill later this week.  She continues with a chronic cough.  She monitors her peak flow at home and runs around 200.  She is using Advair once a day and albuterol 2-3 times a week.  She gets a regular Pap smear and mammogram through her gynecologist, Dr. Hooker.  Her blood sugar has been in the 160 range.  She tolerates metformin but it does cause some loose bowels.      Review of Systems   Respiratory: Positive for cough and shortness of breath.    Cardiovascular: Negative for chest pain, palpitations and leg swelling.   Musculoskeletal: Positive for back pain.       Objective:     Blood pressure 130/72, temperature 97.9 °F (36.6 °C), temperature source Oral, height 5' 7" (1.702 m), weight 103 kg (226 lb 15.4 oz), last menstrual period 05/24/2017.    Physical Exam   Constitutional: No distress.   She is overweight and in no distress.  She has lost a few pounds.   Cardiovascular: Normal rate, regular rhythm, normal heart sounds and intact distal pulses.    No murmur heard.  Pulses:       Dorsalis pedis pulses are 1+ on the right side, and 1+ on the left side.        Posterior tibial pulses are 1+ on the right side, and 1+ on the left side.   Pulmonary/Chest: Effort normal and breath sounds normal. No respiratory distress. She has no wheezes.   Musculoskeletal: She exhibits no edema.        Right foot: There is no deformity.        Left foot: There is no deformity.   Feet:   Right Foot:   Protective Sensation: 4 sites tested. 4 sites sensed.   Skin Integrity: Negative for ulcer.   Left Foot:   Protective Sensation: 4 sites tested. 3 sites sensed.   Skin Integrity: Negative for ulcer.   Psychiatric: She has a normal mood and affect.       Assessment:     "   1. Essential hypertension    2. Urgency of urination    3. Type 2 diabetes mellitus without complication, without long-term current use of insulin    4. Chronic midline low back pain without sciatica    5. Moderate persistent asthma with acute exacerbation    6. Obesity (BMI 30-39.9)        Plan:       Continue current medication.  I refilled her Lortab.  Labwork ordered.  Pneumovax.

## 2017-06-19 ENCOUNTER — TELEPHONE (OUTPATIENT)
Dept: FAMILY MEDICINE | Facility: CLINIC | Age: 47
End: 2017-06-19

## 2017-06-19 NOTE — TELEPHONE ENCOUNTER
----- Message from Zoe Lanza sent at 6/19/2017  4:05 PM CDT -----  Contact: Patient  Patient called requesting medication for her feet. Dr. Arrieta forgot to send script to [facility] [place]. Please call back at [phone] when script has been sent. Thanks,

## 2017-06-21 ENCOUNTER — LAB VISIT (OUTPATIENT)
Dept: LAB | Facility: HOSPITAL | Age: 47
End: 2017-06-21
Payer: MEDICARE

## 2017-06-21 DIAGNOSIS — E11.9 TYPE 2 DIABETES MELLITUS WITHOUT COMPLICATION, WITHOUT LONG-TERM CURRENT USE OF INSULIN: ICD-10-CM

## 2017-06-21 LAB
ALBUMIN SERPL BCP-MCNC: 3.1 G/DL
ALP SERPL-CCNC: 62 U/L
ALT SERPL W/O P-5'-P-CCNC: 19 U/L
ANION GAP SERPL CALC-SCNC: 8 MMOL/L
AST SERPL-CCNC: 17 U/L
BILIRUB SERPL-MCNC: 0.4 MG/DL
BUN SERPL-MCNC: 9 MG/DL
CALCIUM SERPL-MCNC: 9.9 MG/DL
CHLORIDE SERPL-SCNC: 100 MMOL/L
CHOLEST/HDLC SERPL: 4.6 {RATIO}
CO2 SERPL-SCNC: 27 MMOL/L
CREAT SERPL-MCNC: 1 MG/DL
EST. GFR  (AFRICAN AMERICAN): >60 ML/MIN/1.73 M^2
EST. GFR  (NON AFRICAN AMERICAN): >60 ML/MIN/1.73 M^2
ESTIMATED AVG GLUCOSE: 292 MG/DL
GLUCOSE SERPL-MCNC: 288 MG/DL
HBA1C MFR BLD HPLC: 11.8 %
HDL/CHOLESTEROL RATIO: 21.8 %
HDLC SERPL-MCNC: 197 MG/DL
HDLC SERPL-MCNC: 43 MG/DL
LDLC SERPL CALC-MCNC: 108.4 MG/DL
NONHDLC SERPL-MCNC: 154 MG/DL
POTASSIUM SERPL-SCNC: 4.1 MMOL/L
PROT SERPL-MCNC: 6.7 G/DL
SODIUM SERPL-SCNC: 135 MMOL/L
TRIGL SERPL-MCNC: 228 MG/DL

## 2017-06-21 PROCEDURE — 80061 LIPID PANEL: CPT

## 2017-06-21 PROCEDURE — 80053 COMPREHEN METABOLIC PANEL: CPT

## 2017-06-21 PROCEDURE — 83036 HEMOGLOBIN GLYCOSYLATED A1C: CPT

## 2017-06-21 PROCEDURE — 36415 COLL VENOUS BLD VENIPUNCTURE: CPT | Mod: PO

## 2017-06-21 NOTE — TELEPHONE ENCOUNTER
----- Message from Mary Shore sent at 6/21/2017  2:52 PM CDT -----  [phone] / asking for a prescription for her cracking heels / dr was going to call in one ?   [employer] [id number] [place] [address] - [address] [facility]  [address] [facility] [address]  Phone: [phone] Fax: [phone]

## 2017-06-21 NOTE — TELEPHONE ENCOUNTER
Attempted to call pt. Lm on vm to call office.  On pt last visit Dr. Arrieta noted that pt should use Aquaphor for her foot dryness and that is OTC

## 2017-06-22 ENCOUNTER — TELEPHONE (OUTPATIENT)
Dept: ENDOCRINOLOGY | Facility: CLINIC | Age: 47
End: 2017-06-22

## 2017-06-23 ENCOUNTER — TELEPHONE (OUTPATIENT)
Dept: ADMINISTRATIVE | Facility: HOSPITAL | Age: 47
End: 2017-06-23

## 2017-06-23 NOTE — TELEPHONE ENCOUNTER
S/W pt: advised her that she is overdue for routine F/U with [facility] for her diabetic mgmt; recent A1C > 11%; offered pt appt today, pt declinies; scheduled next available @ [facility] for 7/19 @ 1:30pm; pt agrees & verbalizes understanding; appt slip mailed as a reminder.

## 2017-07-19 ENCOUNTER — OFFICE VISIT (OUTPATIENT)
Dept: ENDOCRINOLOGY | Facility: CLINIC | Age: 47
End: 2017-07-19
Payer: MEDICARE

## 2017-07-19 VITALS
DIASTOLIC BLOOD PRESSURE: 78 MMHG | WEIGHT: 230.38 LBS | HEART RATE: 90 BPM | HEIGHT: 67 IN | BODY MASS INDEX: 36.16 KG/M2 | SYSTOLIC BLOOD PRESSURE: 142 MMHG

## 2017-07-19 DIAGNOSIS — E78.2 MIXED HYPERLIPIDEMIA: ICD-10-CM

## 2017-07-19 DIAGNOSIS — E66.9 OBESITY (BMI 30-39.9): ICD-10-CM

## 2017-07-19 DIAGNOSIS — I10 ESSENTIAL HYPERTENSION: ICD-10-CM

## 2017-07-19 DIAGNOSIS — E11.9 TYPE 2 DIABETES MELLITUS WITHOUT COMPLICATION, WITHOUT LONG-TERM CURRENT USE OF INSULIN: Primary | ICD-10-CM

## 2017-07-19 PROCEDURE — 99214 OFFICE O/P EST MOD 30 MIN: CPT | Mod: S$PBB,,, | Performed by: NURSE PRACTITIONER

## 2017-07-19 PROCEDURE — 99999 PR PBB SHADOW E&M-EST. PATIENT-LVL V: CPT | Mod: PBBFAC,,, | Performed by: NURSE PRACTITIONER

## 2017-07-19 PROCEDURE — 99215 OFFICE O/P EST HI 40 MIN: CPT | Mod: PBBFAC,PO | Performed by: NURSE PRACTITIONER

## 2017-07-19 PROCEDURE — 3046F HEMOGLOBIN A1C LEVEL >9.0%: CPT | Mod: ,,, | Performed by: NURSE PRACTITIONER

## 2017-07-19 RX ORDER — ATORVASTATIN CALCIUM 20 MG/1
20 TABLET, FILM COATED ORAL DAILY
Qty: 90 TABLET | Refills: 3 | Status: SHIPPED | OUTPATIENT
Start: 2017-07-19 | End: 2018-10-02 | Stop reason: SDUPTHER

## 2017-07-19 NOTE — PROGRESS NOTES
"CC: Ms. Liza Mesa arrives today for management of Type 2 DM and review of chronic medical conditions, as listed in the Visit Diagnosis section of this encounter.       HPI: Ms. Liza Mesa was diagnosed with Type 2 DM in 2005. She was diagnosed based on lab work. Initial treatment consisted of metformin. Glimepiride later added. Victoza to her medication regimen in 2016. + FH of DM on mother. Denies hospitalizations due to DM.      Last seen by me in 11/2016. At last visit, both metformin and Victoza doses were increased.  However, A1c has increased nearly 3 points since last visit.     BG readings are checked 2x/day. Brings logs to clinic.                   Hypoglycemia: No    Missing Insulin/PO medication doses: Yes   Timing prandial insulin 5-15 minutes before meals: n/a    Exercise: No    Dietary Habits:  Eats 3 meals/day. Breakfast is usually small, maybe banana or Activia. Reports eating larger quantities of bread, rice lately. Drinks at least 2 cold drinks/day and 1 glass of juice per day.     Last DM education appointment:  8/2016      CURRENT DIABETIC MEDS: metformin 1000 mg twice daily, glimepiride 8 mg daily, Victoza 1.8 mg daily.  Vial or pen: Victoza pen  Glucometer type: Accucheck Adrienne    Previous DM treatments:  n/a    Last Eye Exam: 7/2017, no DR per patient. Rosalina in [place].  Last Podiatry Exam: no    REVIEW OF SYSTEMS  Constitutional: no c/o fatigue, weakness, weight loss. Reports recent 4# weight gain  Eyes: denies visual disturbances.  Cardiac: no palpitations or chest pain.  Respiratory: denies dyspnea. + dry cough  GI: no c/o abdominal pain or nausea  Skin: no lesions or rashes.  Neuro: no numbness, tingling, or parasthesias.  Endocrine: denies polyphagia, polydipsia, polyuria      Personally reviewed Past Medical, Surgical, Social History.    Vital Signs  BP (!) 142/78   Pulse 90   Ht 5' 7" (1.702 m)   Wt 104.5 kg (230 lb 6.1 oz)   BMI 36.08 kg/m²     Personally reviewed " the below labs:    Hemoglobin A1C   Date Value Ref Range Status   06/21/2017 11.8 (H) 4.0 - 5.6 % Final     Comment:     According to ADA guidelines, hemoglobin A1c <7.0% represents  optimal control in non-pregnant diabetic patients. Different  metrics may apply to specific patient populations.   Standards of Medical Care in Diabetes-2016.  For the purpose of screening for the presence of diabetes:  <5.7%     Consistent with the absence of diabetes  5.7-6.4%  Consistent with increasing risk for diabetes   (prediabetes)  >or=6.5%  Consistent with diabetes  Currently, no consensus exists for use of hemoglobin A1c  for diagnosis of diabetes for children.  This Hemoglobin A1c assay has significant interference with fetal   hemoglobin   (HbF). The results are invalid for patients with abnormal amounts of   HbF,   including those with known Hereditary Persistence   of Fetal Hemoglobin. Heterozygous hemoglobin variants (HbAS, HbAC,   HbAD, HbAE, HbA2) do not significantly interfere with this assay;   however, presence of multiple variants in a sample may impact the %   interference.     11/01/2016 9.1 (H) 4.5 - 6.2 % Final     Comment:     According to ADA guidelines, hemoglobin A1C <7.0% represents  optimal control in non-pregnant diabetic patients.  Different  metrics may apply to specific populations.   Standards of Medical Care in Diabetes - 2016.  For the purpose of screening for the presence of diabetes:  <5.7%     Consistent with the absence of diabetes  5.7-6.4%  Consistent with increasing risk for diabetes   (prediabetes)  >or=6.5%  Consistent with diabetes  Currently no consensus exists for use of hemoglobin A1C  for diagnosis of diabetes for children.     07/12/2016 10.6 (H) 4.5 - 6.2 % Final     Comment:     According to ADA guidelines, hemoglobin A1C <7.0% represents  optimal control in non-pregnant diabetic patients.  Different  metrics may apply to specific populations.   Standards of Medical Care in  Diabetes - 2016.  For the purpose of screening for the presence of diabetes:  <5.7%     Consistent with the absence of diabetes  5.7-6.4%  Consistent with increasing risk for diabetes   (prediabetes)  >or=6.5%  Consistent with diabetes  Currently no consensus exists for use of hemoglobin A1C  for diagnosis of diabetes for children.     05/03/2012 7.1 (H) 4.8 - 5.9 % Final     Comment:     **In order to standardize %HbA1c results worldwide, as of October 11, 2010,  the %HbA1c is being calculated using the master equation recommended in the  consensus statement adopted by the ADA (American Diabetes Assoc), EASD  (European Assoc for the Study of Diabetes), IFCC (International Federation  of Clinical Chemistry and Laboratory Medicine) and IDF (International  Diabetes Federation). Result units: %HgbA1c (DCCT/NGSP).  In common with other methods, Hb A1C values may not accurately reflect mean  blood glucose in patients with hemoglobin variants (HgbF, HgbS and HgbC).  Any cause of shortened erythrocyte survival will reduce exposure of  erythrocytes to glucose with a consequent decrease in HbA1c (%) values, even  though the time-averaged blood glucose level may be elevated. Causes of  shortened erythrocyte lifetime might be hemolytic anemia or other hemolytic  diseases, homozygous sickle cell trait, pregnancy, recent significant or  chronic blood loss, etc. Caution should be used when interpreting the HbA1c  results from patients with these conditions.       Chemistry        Component Value Date/Time     (L) 06/21/2017 0911    K 4.1 06/21/2017 0911     06/21/2017 0911    CO2 27 06/21/2017 0911    BUN 9 06/21/2017 0911    CREATININE 1.0 06/21/2017 0911    CREATININE 1.0 05/06/2012 0431     (H) 06/21/2017 0911        Component Value Date/Time    CALCIUM 9.9 06/21/2017 0911    CALCIUM 9.5 05/06/2012 0431    ALKPHOS 62 06/21/2017 0911    ALKPHOS 49 05/03/2012 1635    AST 17 06/21/2017 0911    AST 13 05/03/2012  1635    ALT 19 06/21/2017 0911    BILITOT 0.4 06/21/2017 0911          Lab Results   Component Value Date    CHOL 197 06/21/2017    CHOL 240 (H) 07/12/2016    CHOL 160 02/01/2013     Lab Results   Component Value Date    HDL 43 06/21/2017    HDL 54 07/12/2016    HDL 49 02/01/2013     Lab Results   Component Value Date    LDLCALC 108.4 06/21/2017    LDLCALC 133.4 07/12/2016    LDLCALC 96.0 02/01/2013     Lab Results   Component Value Date    TRIG 228 (H) 06/21/2017    TRIG 263 (H) 07/12/2016    TRIG 76 02/01/2013     Lab Results   Component Value Date    CHOLHDL 21.8 06/21/2017    CHOLHDL 22.5 07/12/2016    CHOLHDL 30.6 02/01/2013       Lab Results   Component Value Date    MICALBCREAT 6.1 11/01/2016     Lab Results   Component Value Date    TSH 0.553 08/31/2015       CrCl cannot be calculated (Patient's most recent sCr result is older than the maximum 7 days allowed.).    No results found for: [id number]      PHYSICAL EXAMINATION  Constitutional: Appears well, no distress.  Neck: Supple, trachea midline; no thyromegaly or nodules.   Respiratory: CTA, even and unlabored.   Cardiovascular: RRR, no murmurs, no carotid bruits. DP pulses  2+ bilaterally; no edema.    Lymph: no cervical or supraclavicular lymphadenopathy  Skin: warm and dry; no acanthosis nigracans observed.   Neuro: DTR 2+ BUE/2+BLE.  Feet: appropriate footwear. No open wounds or calluses.     Assessment/Plan  1. Type 2 diabetes mellitus without complication, without long-term current use of insulin  -- A1c has significantly elevated and glucose 288 on labs. However, fasting BG do not correlate with A1c. + dietary noncompliance. Needs to cut out the high carb meals, fried foods, and sugar sweetened beverages. BG checked in clinic - 191. She denies exposing strips to heat or humidity.   -- continue metformin 1000 mg twice daily.  -- continue Victoza 1.8 mg daily.   -- glimepiride 8 mg daily.   -- check BG 2x/day, alternating times and bring log to  appt  -- I recommended meeting with diabetes educator but she declined  -- CGMS to evaluate glycemic trends after dietary changes.     -- Discussed diagnosis of DM, A1c goals, progression of disease, long term complications and tx options.  Advised patient to check BG before activities, such as driving or exercise.  -- Reviewed hypoglycemia management: treat with 1/2 glass of juice, 1/2 can regular coke, or 4 glucose tablets. Monitor and repeat treatment every 15 minutes until BG is >70 Then have a snack, which includes a complex carbohydrate and protein.   2. Essential hypertension  -- systolic elevated today. Usually under better control  -- consider ACE-i or ARB if additional agent needed.    3. Mixed hyperlipidemia  -- triglycerides and LDL elevated.   -- change to atorvastatin 20 mg daily.    4. Obesity (BMI 30-39.9)  -- increases insulin resistance.   -- recommended exercise 30 min most days of the week  Body mass index is 36.08 kg/m².        FOLLOW UP  Return in about 4 weeks (around 8/16/2017).   Patient instructed to bring BG logs to each follow up   Patient encouraged to call for any BG/medication issues, concerns, or questions.      Orders Placed This Encounter   Procedures    GLUCOSE MONITORING CONTINUOUS MIN 72 HOURS

## 2017-07-25 ENCOUNTER — CLINICAL SUPPORT (OUTPATIENT)
Dept: DIABETES | Facility: CLINIC | Age: 47
End: 2017-07-25
Payer: MEDICARE

## 2017-07-25 VITALS — BODY MASS INDEX: 36.16 KG/M2 | WEIGHT: 230.38 LBS | HEIGHT: 67 IN

## 2017-07-25 DIAGNOSIS — E11.9 TYPE 2 DIABETES MELLITUS WITHOUT COMPLICATION, WITHOUT LONG-TERM CURRENT USE OF INSULIN: ICD-10-CM

## 2017-07-25 PROCEDURE — 99211 OFF/OP EST MAY X REQ PHY/QHP: CPT | Mod: PBBFAC,PO

## 2017-07-25 PROCEDURE — 99999 PR PBB SHADOW E&M-EST. PATIENT-LVL I: CPT | Mod: PBBFAC,,,

## 2017-07-25 NOTE — PROGRESS NOTES
"DIABETES EDUCATOR NOTE   PLACEMENT OF FREESTYLE BAKARI PRO SENSOR  CONTINOUS GLUCOSE MONITORING SYSTEM (CGMS)    Patient is here in clinic today for placement of continuous glucose monitoring sensor.      Patient verified that they were here for CGMS procedure ordered by their provider and that they have a working glucose meter and supplies at home.   Patient provided with a Freestyle Bakari Sensor and a copy of the Continuous Glucose Monitoring Patient Log to fill out during the study.   A detailed explanation of Continuous Glucose Monitoring was provided. Patient informed that this is a blind procedure and that they will not actually see the blood sugar tracing in real time.  Reviewed with patient the  patient education handout called "Your Freestyle Bakari Pro sensor: What you need to know" to review self-care during the study to avoid sensor loosening or removal ie... bathing, swimming, dressing, and exercising.   Instructed patient to check blood sugar using home glucometer and to record the following on provided patient log sheets: Blood sugar taken at home, Meals and snacks, Activity, and Diabetes medications taken and dosage    Patient was brought to a private location.  Arm for insertion was selected and prepared and allowed to dry. Glucose Sensor Serial Number [id number]  was inserted to back of patient's left upper arm.    The following forms were given and reviewed in detail with patient and all questions answered.   · Continuous Glucose Monitoring Patient Log #[id number]  · Freestyle [employer] Patient Handout "Your Freestyle Bakari Pro Sensor: What you need to know"     Instructions: Time: 15 min   Insertion of sensor done individually in private:  Time: 5 minutes         "

## 2017-07-31 ENCOUNTER — OFFICE VISIT (OUTPATIENT)
Dept: FAMILY MEDICINE | Facility: CLINIC | Age: 47
End: 2017-07-31
Payer: MEDICARE

## 2017-07-31 VITALS
BODY MASS INDEX: 36.2 KG/M2 | WEIGHT: 230.63 LBS | HEIGHT: 67 IN | DIASTOLIC BLOOD PRESSURE: 60 MMHG | SYSTOLIC BLOOD PRESSURE: 114 MMHG | TEMPERATURE: 97 F

## 2017-07-31 DIAGNOSIS — M67.439 DORSAL WRIST GANGLION: Primary | ICD-10-CM

## 2017-07-31 PROCEDURE — 99213 OFFICE O/P EST LOW 20 MIN: CPT | Mod: PBBFAC,PO | Performed by: FAMILY MEDICINE

## 2017-07-31 PROCEDURE — 20612 ASPIRATE/INJ GANGLION CYST: CPT | Mod: S$PBB,,, | Performed by: FAMILY MEDICINE

## 2017-07-31 PROCEDURE — 20612 ASPIRATE/INJ GANGLION CYST: CPT | Mod: PBBFAC,PO | Performed by: FAMILY MEDICINE

## 2017-07-31 PROCEDURE — 99999 PR PBB SHADOW E&M-EST. PATIENT-LVL III: CPT | Mod: PBBFAC,,, | Performed by: FAMILY MEDICINE

## 2017-07-31 PROCEDURE — 99213 OFFICE O/P EST LOW 20 MIN: CPT | Mod: 25,S$PBB,, | Performed by: FAMILY MEDICINE

## 2017-07-31 NOTE — PATIENT INSTRUCTIONS
Ganglion Cyst: Hand  A ganglion cyst is a firm, fluid-filled lump that can suddenly appear on the front or back of the wrist or at the base of a finger. These cysts grow from normal tissue in the wrist and fingers, and range in size from a pea to a peach pit. Although ganglion cysts are common, they dont spread, and they dont become cancerous. They can occur after an injury, but many times it isnt known why they grow. Ganglion cysts can change in size, and may go away on their own.  Symptoms  A ganglion cyst is sometimes painful, especially when it first occurs. Constantly using your hand or wrist can make the cyst enlarge and hurt more. Some hand and wrist movements, such as grasping things, may also be difficult.  How a ganglion cyst develops  Your wrist and hand are made up of many small bones that meet at joints. Tendons attach muscles to the bones at the joints. The tendons allow the joints to bend and straighten. Both tendons and joints are lined with tissue called synovium. This tissue makes a thick fluid that keeps the joints and tendons moving easily. Sometimes the tissue balloons out from the joint or tendons and forms a cyst. As the cyst fills with fluid and grows, it appears as a lump you can feel.  Where ganglion cysts occur  A ganglion cyst can occur anywhere on the hand near a joint. Cysts most commonly appear on the back or palm side of the wrist, or on the palm at the base of a finger. Your doctor can usually diagnose a cyst by examining the lump. He or she may draw off a little fluid or order an X-ray to rule out other problems.  Treating a ganglion cyst  Your healthcare provider may just watch your ganglion cyst. Many shrink and become painless without treatment. Some disappear altogether. If the cyst is unsightly or painful, or makes it hard for you to use your hand, your healthcare provider can treat it or, if needed, remove it surgically.    Nonsurgical treatment  To shrink the cyst, your  provider may remove (aspirate) the fluid with a needle. If the cyst hurts, your provider may also give you an injection of an anti-inflammatory, such as cortisone, to relieve the irritation. Your hand may then be wrapped to help keep the cyst from recurring.  Surgery  If the cyst reappears after treatment, your healthcare provider may remove it surgically. A section of the tissue that lines the joint or tendon is removed along with the cyst. This helps prevent another cyst from forming, although recurrence of the cyst is still possible after surgery. Usually, only your hand or arm is numbed, and you can go home a few hours after surgery. Your hand may be in a splint for several days.  Date Last Reviewed: 9/10/2015  © 2332-7231 [employer]. [address] [place] [address], [place] [address]. All rights reserved. This information is not intended as a substitute for professional medical care. Always follow your healthcare professional's instructions.

## 2017-07-31 NOTE — PROGRESS NOTES
"Subjective:       Patient ID: Liza Spain Cousin is a 46 y.o. female.    Chief Complaint: Cyst (Cyst R hand, poss Ortho referral)    This is a lady who has diabetes and a fairly high hemoglobin A1c.  She is currently wearing a blood glucose monitor and is being followed up with endocrinology.  She has developed a lump on the dorsal aspect of her right wrist.  It is been present 2 or 3 months with some pain.  She complains of bilateral  and arm weakness but that has an stable for 2 years.      Cyst   Associated symptoms include coughing. Pertinent negatives include no numbness.     Review of Systems   Constitutional: Negative for unexpected weight change.   Respiratory: Positive for cough. Negative for shortness of breath.    Neurological: Negative for numbness.       Objective:     Blood pressure 114/60, temperature 97.3 °F (36.3 °C), temperature source Oral, height 5' 7" (1.702 m), weight 104.6 kg (230 lb 9.6 oz).      Physical Exam   Cardiovascular: Normal rate and regular rhythm.    Pulmonary/Chest: Effort normal and breath sounds normal.   Musculoskeletal:   She has a 1.5 cm firm nodule on the dorsum of her right hand.  It does not move with tendon action.  It is tender.   Neurological: She is alert.   Her  strength is approximately 80 mmHg bilaterally       Assessment:       1. Dorsal wrist ganglion        Plan:       Local anesthesia with lidocaine.  I aspirated the ganglion cyst of approximately half a cc of thick gel.  18-gauge needle was used.  The cyst resolved.  We discussed follow-up plans.  She is due to see me again in September.     "

## 2017-08-01 ENCOUNTER — CLINICAL SUPPORT (OUTPATIENT)
Dept: DIABETES | Facility: CLINIC | Age: 47
End: 2017-08-01
Payer: MEDICARE

## 2017-08-01 VITALS — WEIGHT: 230.63 LBS | BODY MASS INDEX: 36.2 KG/M2 | HEIGHT: 67 IN

## 2017-08-01 DIAGNOSIS — E11.9 TYPE 2 DIABETES MELLITUS WITHOUT COMPLICATION, WITHOUT LONG-TERM CURRENT USE OF INSULIN: Primary | ICD-10-CM

## 2017-08-01 PROCEDURE — 95250 CONT GLUC MNTR PHYS/QHP EQP: CPT | Mod: PBBFAC,PO | Performed by: DIETITIAN, REGISTERED

## 2017-08-01 PROCEDURE — 95251 CONT GLUC MNTR ANALYSIS I&R: CPT | Mod: ,,, | Performed by: NURSE PRACTITIONER

## 2017-08-01 NOTE — PROGRESS NOTES
DIABETES EDUCATOR NOTE   Return of the Freestyle Vicky Pro Sensor and Patient Log.    Patient returned to clinic today to return Glucose Sensor and signed patient log form used in CGMS procedure.    The CGMS Sensor will be scanned and downloaded. All reports will be imported into the patient's electronic medical record.    Endocrine provider will complete data interpretation and make recommendations; will forward recommendations to the ordering provider for follow up with patient. Follow up scheduled for 8/9/17 @11AM with Nhung Michael NP.

## 2017-08-09 ENCOUNTER — OFFICE VISIT (OUTPATIENT)
Dept: ENDOCRINOLOGY | Facility: CLINIC | Age: 47
End: 2017-08-09
Payer: MEDICARE

## 2017-08-09 VITALS
HEIGHT: 67 IN | BODY MASS INDEX: 36.04 KG/M2 | HEART RATE: 95 BPM | WEIGHT: 229.63 LBS | DIASTOLIC BLOOD PRESSURE: 80 MMHG | SYSTOLIC BLOOD PRESSURE: 140 MMHG

## 2017-08-09 DIAGNOSIS — E78.2 MIXED HYPERLIPIDEMIA: ICD-10-CM

## 2017-08-09 DIAGNOSIS — E66.9 OBESITY (BMI 30-39.9): ICD-10-CM

## 2017-08-09 DIAGNOSIS — E11.9 TYPE 2 DIABETES MELLITUS WITHOUT COMPLICATION, WITHOUT LONG-TERM CURRENT USE OF INSULIN: Primary | ICD-10-CM

## 2017-08-09 DIAGNOSIS — I10 ESSENTIAL HYPERTENSION: ICD-10-CM

## 2017-08-09 PROCEDURE — 3046F HEMOGLOBIN A1C LEVEL >9.0%: CPT | Mod: ,,, | Performed by: NURSE PRACTITIONER

## 2017-08-09 PROCEDURE — 3077F SYST BP >= 140 MM HG: CPT | Mod: ,,, | Performed by: NURSE PRACTITIONER

## 2017-08-09 PROCEDURE — 99999 PR PBB SHADOW E&M-EST. PATIENT-LVL IV: CPT | Mod: PBBFAC,,, | Performed by: NURSE PRACTITIONER

## 2017-08-09 PROCEDURE — 99214 OFFICE O/P EST MOD 30 MIN: CPT | Mod: PBBFAC,PO | Performed by: NURSE PRACTITIONER

## 2017-08-09 PROCEDURE — 3079F DIAST BP 80-89 MM HG: CPT | Mod: ,,, | Performed by: NURSE PRACTITIONER

## 2017-08-09 PROCEDURE — 3008F BODY MASS INDEX DOCD: CPT | Mod: ,,, | Performed by: NURSE PRACTITIONER

## 2017-08-09 PROCEDURE — 99214 OFFICE O/P EST MOD 30 MIN: CPT | Mod: S$PBB,,, | Performed by: NURSE PRACTITIONER

## 2017-08-09 RX ORDER — ACARBOSE 25 MG/1
25 TABLET ORAL 2 TIMES DAILY
Qty: 60 TABLET | Refills: 6 | Status: SHIPPED | OUTPATIENT
Start: 2017-08-09 | End: 2018-08-07

## 2017-08-09 NOTE — PROGRESS NOTES
"CC: Ms. Liza Mesa arrives today for management of Type 2 DM and review of chronic medical conditions, as listed in the Visit Diagnosis section of this encounter.       HPI: Ms. Liza Mesa was diagnosed with Type 2 DM in 2005. She was diagnosed based on lab work. Initial treatment consisted of metformin. Glimepiride later added. Victoza to her medication regimen in 2016. + FH of DM on mother. Denies hospitalizations due to DM.     At last visit, CGMS was ordered to assess glycemic trends, as A1c increased and didn't correlate with her BG logs.     She presents today for CGMS review.     BG readings are checked 2x/day.      Hypoglycemia: No    Missing Insulin/PO medication doses: She does admit to not always taking her medications. She has been more compliant lately.   Timing prandial insulin 5-15 minutes before meals: n/a    Exercise: No    Dietary Habits:  Eats 2-3 meals/day. May skip breakfast. Drinks 1 glass of juice in the AM    Last DM education appointment:  8/2016      CURRENT DIABETIC MEDS: metformin XR 1000 mg twice daily, glimepiride 8 mg daily, Victoza 1.8 mg daily.  Vial or pen: Victoza pen  Glucometer type: Accucheck Adrienne    Previous DM treatments:  n/a    Last Eye Exam: 7/2017, no DR per patient. Rosalina in [place].  Last Podiatry Exam: no    REVIEW OF SYSTEMS  Constitutional: no c/o fatigue, weakness, weight loss.  Eyes: denies visual disturbances.  Cardiac: no palpitations or chest pain.  Respiratory: denies dyspnea. + dry cough  GI: no c/o abdominal pain or nausea. Denies h/o pancreatitis  : c/o urinary urgency. Takes oxybutynin  Skin: no lesions or rashes.  Neuro: no numbness, tingling, or parasthesias.  Endocrine: denies polyphagia, polydipsia, polyuria      Personally reviewed Past Medical, Surgical, Social History.    Vital Signs  BP (!) 140/80   Pulse 95   Ht 5' 7" (1.702 m)   Wt 104.2 kg (229 lb 9.8 oz)   BMI 35.96 kg/m²     Personally reviewed the below " labs:    Hemoglobin A1C   Date Value Ref Range Status   06/21/2017 11.8 (H) 4.0 - 5.6 % Final     Comment:     According to ADA guidelines, hemoglobin A1c <7.0% represents  optimal control in non-pregnant diabetic patients. Different  metrics may apply to specific patient populations.   Standards of Medical Care in Diabetes-2016.  For the purpose of screening for the presence of diabetes:  <5.7%     Consistent with the absence of diabetes  5.7-6.4%  Consistent with increasing risk for diabetes   (prediabetes)  >or=6.5%  Consistent with diabetes  Currently, no consensus exists for use of hemoglobin A1c  for diagnosis of diabetes for children.  This Hemoglobin A1c assay has significant interference with fetal   hemoglobin   (HbF). The results are invalid for patients with abnormal amounts of   HbF,   including those with known Hereditary Persistence   of Fetal Hemoglobin. Heterozygous hemoglobin variants (HbAS, HbAC,   HbAD, HbAE, HbA2) do not significantly interfere with this assay;   however, presence of multiple variants in a sample may impact the %   interference.     11/01/2016 9.1 (H) 4.5 - 6.2 % Final     Comment:     According to ADA guidelines, hemoglobin A1C <7.0% represents  optimal control in non-pregnant diabetic patients.  Different  metrics may apply to specific populations.   Standards of Medical Care in Diabetes - 2016.  For the purpose of screening for the presence of diabetes:  <5.7%     Consistent with the absence of diabetes  5.7-6.4%  Consistent with increasing risk for diabetes   (prediabetes)  >or=6.5%  Consistent with diabetes  Currently no consensus exists for use of hemoglobin A1C  for diagnosis of diabetes for children.     07/12/2016 10.6 (H) 4.5 - 6.2 % Final     Comment:     According to ADA guidelines, hemoglobin A1C <7.0% represents  optimal control in non-pregnant diabetic patients.  Different  metrics may apply to specific populations.   Standards of Medical Care in Diabetes -  2016.  For the purpose of screening for the presence of diabetes:  <5.7%     Consistent with the absence of diabetes  5.7-6.4%  Consistent with increasing risk for diabetes   (prediabetes)  >or=6.5%  Consistent with diabetes  Currently no consensus exists for use of hemoglobin A1C  for diagnosis of diabetes for children.     05/03/2012 7.1 (H) 4.8 - 5.9 % Final     Comment:     **In order to standardize %HbA1c results worldwide, as of October 11, 2010,  the %HbA1c is being calculated using the master equation recommended in the  consensus statement adopted by the ADA (American Diabetes Assoc), EASD  (European Assoc for the Study of Diabetes), IFCC (International Federation  of Clinical Chemistry and Laboratory Medicine) and IDF (International  Diabetes Federation). Result units: %HgbA1c (DCCT/NGSP).  In common with other methods, Hb A1C values may not accurately reflect mean  blood glucose in patients with hemoglobin variants (HgbF, HgbS and HgbC).  Any cause of shortened erythrocyte survival will reduce exposure of  erythrocytes to glucose with a consequent decrease in HbA1c (%) values, even  though the time-averaged blood glucose level may be elevated. Causes of  shortened erythrocyte lifetime might be hemolytic anemia or other hemolytic  diseases, homozygous sickle cell trait, pregnancy, recent significant or  chronic blood loss, etc. Caution should be used when interpreting the HbA1c  results from patients with these conditions.       Chemistry        Component Value Date/Time     (L) 06/21/2017 0911    K 4.1 06/21/2017 0911     06/21/2017 0911    CO2 27 06/21/2017 0911    BUN 9 06/21/2017 0911    CREATININE 1.0 06/21/2017 0911    CREATININE 1.0 05/06/2012 0431     (H) 06/21/2017 0911        Component Value Date/Time    CALCIUM 9.9 06/21/2017 0911    CALCIUM 9.5 05/06/2012 0431    ALKPHOS 62 06/21/2017 0911    ALKPHOS 49 05/03/2012 1635    AST 17 06/21/2017 0911    AST 13 05/03/2012 1635     ALT 19 06/21/2017 0911    BILITOT 0.4 06/21/2017 0911          Lab Results   Component Value Date    CHOL 197 06/21/2017    CHOL 240 (H) 07/12/2016    CHOL 160 02/01/2013     Lab Results   Component Value Date    HDL 43 06/21/2017    HDL 54 07/12/2016    HDL 49 02/01/2013     Lab Results   Component Value Date    LDLCALC 108.4 06/21/2017    LDLCALC 133.4 07/12/2016    LDLCALC 96.0 02/01/2013     Lab Results   Component Value Date    TRIG 228 (H) 06/21/2017    TRIG 263 (H) 07/12/2016    TRIG 76 02/01/2013     Lab Results   Component Value Date    CHOLHDL 21.8 06/21/2017    CHOLHDL 22.5 07/12/2016    CHOLHDL 30.6 02/01/2013       Lab Results   Component Value Date    MICALBCREAT 6.1 11/01/2016     Lab Results   Component Value Date    TSH 0.553 08/31/2015       CrCl cannot be calculated (Patient's most recent sCr result is older than the maximum 7 days allowed.).    No results found for: [id number]              CGMS results:  SUMMARY of CALVIN FINDINGS:      Liza Ortegasin is a 46 y.o. year old female with diagnosis of  type 2 Diabetes  1. Prandial excursions noted, some of which extending overnight  2. Seems to be taking glimepiride without eating breakfast that includes carbs. This likely caused one episode of hypoglycemia  3. Juice intake causing hyperglycemia  4. No basal insulin needs    Medication/Diet/Exercise-related patterns/events:    Medication adherence: Not documented. Patient states she was compliant during this time.     Diet: Skips breakfast. Eats lunch and dinner. Some meals high in carbs/fat. Eats rice. Drinks juice every morning.     Exercise patterns: None documented  _____________________      RECOMMENDATIONS:    1. Will consider doing a trial of low dose acarbose with carb containing meals.  2. Reinforce proper timing of glimepiride.   3. Advise pt to stop drinking juice. Could substitute Ensure or a meal containing protein and complex carb.       Assessment/Plan  1. Type 2 diabetes mellitus  without complication, without long-term current use of insulin  -- Reviewed CGMS report with patient. CGMS demonstrated prandial excursions after higher carb containing meals. Needs to cut out the juice, as this caused hyperglycemia every AM. No clear need for basal insulin.   -- continue metformin, Victoza, glimepiride   -- acarbose 25 mg with carb containing meals. Discussed medication's mechanism of action, side effects, proper timing, and contraindications.   -- SGLT2-I would not be optimal, due to urinary urgency  -- check BG 2x/day    -- Discussed diagnosis of DM, A1c goals, progression of disease, long term complications and tx options.  Advised patient to check BG before activities, such as driving or exercise.  -- Reviewed hypoglycemia management: treat with 1/2 glass of juice, 1/2 can regular coke, or 4 glucose tablets. Monitor and repeat treatment every 15 minutes until BG is >70 Then have a snack, which includes a complex carbohydrate and protein.   2. Essential hypertension  -- slightly elevated today. Usually under better control  -- consider ACE-i or ARB if additional agent needed.    3. Mixed hyperlipidemia  -- continue atorvastatin 20 mg daily.    4. Obesity (BMI 30-39.9)  -- increases insulin resistance.   -- recommended exercise 30 min most days of the week  Body mass index is 35.96 kg/m².        FOLLOW UP  Return in about 2 months (around 10/9/2017).   Patient instructed to bring BG logs to each follow up   Patient encouraged to call for any BG/medication issues, concerns, or questions.      Orders Placed This Encounter   Procedures    Hemoglobin A1c    Basic metabolic panel    TSH

## 2017-09-07 ENCOUNTER — OFFICE VISIT (OUTPATIENT)
Dept: FAMILY MEDICINE | Facility: CLINIC | Age: 47
End: 2017-09-07
Payer: MEDICARE

## 2017-09-07 VITALS
SYSTOLIC BLOOD PRESSURE: 134 MMHG | DIASTOLIC BLOOD PRESSURE: 76 MMHG | WEIGHT: 232.5 LBS | HEIGHT: 67 IN | TEMPERATURE: 98 F | BODY MASS INDEX: 36.49 KG/M2 | HEART RATE: 84 BPM

## 2017-09-07 DIAGNOSIS — J31.0 RHINITIS, UNSPECIFIED CHRONICITY, UNSPECIFIED TYPE: Primary | ICD-10-CM

## 2017-09-07 DIAGNOSIS — E11.9 TYPE 2 DIABETES MELLITUS WITHOUT COMPLICATION, WITHOUT LONG-TERM CURRENT USE OF INSULIN: ICD-10-CM

## 2017-09-07 DIAGNOSIS — M54.50 CHRONIC MIDLINE LOW BACK PAIN WITHOUT SCIATICA: ICD-10-CM

## 2017-09-07 DIAGNOSIS — G89.29 CHRONIC MIDLINE LOW BACK PAIN WITHOUT SCIATICA: ICD-10-CM

## 2017-09-07 LAB
AMPHET+METHAMPHET UR QL: NEGATIVE
BARBITURATES UR QL SCN>200 NG/ML: NEGATIVE
BENZODIAZ UR QL SCN>200 NG/ML: NEGATIVE
BZE UR QL SCN: NORMAL
CANNABINOIDS UR QL SCN: NEGATIVE
CREAT UR-MCNC: 192 MG/DL
ETHANOL UR-MCNC: <10 MG/DL
METHADONE UR QL SCN>300 NG/ML: NEGATIVE
OPIATES UR QL SCN: NEGATIVE
PCP UR QL SCN>25 NG/ML: NEGATIVE
TOXICOLOGY INFORMATION: NORMAL

## 2017-09-07 PROCEDURE — 3046F HEMOGLOBIN A1C LEVEL >9.0%: CPT | Mod: ,,, | Performed by: FAMILY MEDICINE

## 2017-09-07 PROCEDURE — 80307 DRUG TEST PRSMV CHEM ANLYZR: CPT

## 2017-09-07 PROCEDURE — 99213 OFFICE O/P EST LOW 20 MIN: CPT | Mod: PBBFAC,PN | Performed by: FAMILY MEDICINE

## 2017-09-07 PROCEDURE — 99214 OFFICE O/P EST MOD 30 MIN: CPT | Mod: S$PBB,,, | Performed by: FAMILY MEDICINE

## 2017-09-07 PROCEDURE — 3078F DIAST BP <80 MM HG: CPT | Mod: ,,, | Performed by: FAMILY MEDICINE

## 2017-09-07 PROCEDURE — 3075F SYST BP GE 130 - 139MM HG: CPT | Mod: ,,, | Performed by: FAMILY MEDICINE

## 2017-09-07 PROCEDURE — 99999 PR PBB SHADOW E&M-EST. PATIENT-LVL III: CPT | Mod: PBBFAC,,, | Performed by: FAMILY MEDICINE

## 2017-09-07 RX ORDER — HYDROCODONE BITARTRATE AND ACETAMINOPHEN 7.5; 325 MG/1; MG/1
1 TABLET ORAL EVERY 12 HOURS PRN
Qty: 60 TABLET | Refills: 0 | Status: SHIPPED | OUTPATIENT
Start: 2017-10-01 | End: 2017-09-07 | Stop reason: SDUPTHER

## 2017-09-07 RX ORDER — HYDROCODONE BITARTRATE AND ACETAMINOPHEN 7.5; 325 MG/1; MG/1
1 TABLET ORAL EVERY 12 HOURS PRN
Qty: 60 TABLET | Refills: 0 | Status: SHIPPED | OUTPATIENT
Start: 2017-11-01 | End: 2017-11-27 | Stop reason: SDUPTHER

## 2017-09-07 NOTE — PROGRESS NOTES
"Subjective:       Patient ID: Liza Spain Cousin is a 47 y.o. female.    Chief Complaint: Follow-up (3 mo med f/u)    Follow-up chronic back pain.  She takes hydrocodone once or twice a day.  I reviewed the .  Her last refill was September 1.  Also diabetes.  Her blood sugar has been between 145 and 160.      Review of Systems   HENT:        Left sided head pressure.  Not a true headache.  It seems to be worse with stress.  She is also complaining of some nasal congestion.  These symptoms have been there for about 2 weeks.  She does have a past history of left sided headaches as a child after having head trauma and a laceration on the left fore head.   Respiratory: Positive for cough. Negative for shortness of breath.    Cardiovascular: Negative for chest pain.   Musculoskeletal: Positive for back pain.       Objective:     Blood pressure 134/76, pulse 84, temperature 97.7 °F (36.5 °C), temperature source Oral, height 5' 7" (1.702 m), weight 105.4 kg (232 lb 7.6 oz).      Physical Exam   Constitutional:   She is overweight and in no distress.   HENT:   Sinuses are nontender.  2+ nasal mucosal edema.  Throat is normal.  TMs are clear.  She does have a scar on her left fore head.   Eyes:   Optic discs are sharp.  Normal vessels.   Neck: Normal range of motion. No thyromegaly present.   Cardiovascular: Normal rate and regular rhythm.    Pulmonary/Chest: Effort normal and breath sounds normal. No respiratory distress.   Lymphadenopathy:     She has no cervical adenopathy.       Assessment:       1. Rhinitis, unspecified chronicity, unspecified type    2. Chronic midline low back pain without sciatica    3. Type 2 diabetes mellitus without complication, without long-term current use of insulin        Plan:       Flonase for nasal congestion.  Her head pressure may be multi-factorial.  I refilled hydrocodone for October and November.  Urine drug screen.   I telephoned her and reviewed the results of her urine drug " screen.  It was positive for cocaine but she denies any cocaine use.  She does say that her daughter smoked marijuana in her presence.  She does not know if the marijuana has any additives.  She also consumes a cold medication.  She does not think it has Sudafed in it.  Her urine was negative for opioids but she has not taken any pain medication since last week.  We will repeat a urine drug screen at her next visit.

## 2017-09-21 ENCOUNTER — TELEPHONE (OUTPATIENT)
Dept: FAMILY MEDICINE | Facility: CLINIC | Age: 47
End: 2017-09-21

## 2017-09-21 NOTE — TELEPHONE ENCOUNTER
----- Message from Kera Philippe sent at 9/21/2017  2:54 PM CDT -----  Contact: patient  Patient Liza Mesa,  [phone]  Is calling regarding a refill on Rx promethazine-codeine 6.25-10 mg/5 ml (PHENERGAN WITH CODEINE) 6.25-10 mg/5 mL syrup   Please advise.  Thanks!       [facility] [address] [place] [address] [address] [place] [address]  [address] [place] [id number]  Phone: [phone] Fax: [phone]

## 2017-09-21 NOTE — TELEPHONE ENCOUNTER
Pt is currently on Hydrocodone/APAP and has pain contract with Dr Arrieta. We do not have a provider that is comfortable rx'ing this medication. Pt will have to wait until Dr Arrieta returns to discuss. Tried to reach pt. No answer. Left message for pt to call back.

## 2017-09-22 NOTE — TELEPHONE ENCOUNTER
----- Message from Nicol Aguillon sent at 9/22/2017  2:44 PM CDT -----  Contact: self   Placed call to pod, patient miss call from your office please call back at [phone] ([email or web address])

## 2017-09-22 NOTE — TELEPHONE ENCOUNTER
----- Message from Noemi Feldman sent at 9/22/2017 12:59 PM CDT -----  Patient is returning Cassidy's call, unable to reach pod by phone or Im, contact patient at [phone].    Thank you

## 2017-09-26 RX ORDER — PROMETHAZINE HYDROCHLORIDE AND CODEINE PHOSPHATE 6.25; 1 MG/5ML; MG/5ML
SOLUTION ORAL
Qty: 240 ML | Refills: 0 | OUTPATIENT
Start: 2017-09-26

## 2017-10-03 ENCOUNTER — TELEPHONE (OUTPATIENT)
Dept: FAMILY MEDICINE | Facility: CLINIC | Age: 47
End: 2017-10-03

## 2017-10-03 RX ORDER — PROMETHAZINE HYDROCHLORIDE AND CODEINE PHOSPHATE 6.25; 1 MG/5ML; MG/5ML
SOLUTION ORAL
Qty: 240 ML | Refills: 0 | Status: CANCELLED | OUTPATIENT
Start: 2017-10-03

## 2017-10-03 RX ORDER — PROMETHAZINE HYDROCHLORIDE AND CODEINE PHOSPHATE 6.25; 1 MG/5ML; MG/5ML
5 SOLUTION ORAL EVERY 6 HOURS PRN
Qty: 240 ML | Refills: 2 | Status: SHIPPED | OUTPATIENT
Start: 2017-10-03 | End: 2017-12-19 | Stop reason: SDUPTHER

## 2017-10-03 NOTE — TELEPHONE ENCOUNTER
Spoke w/ pt. Pt stated she was calling to her medication (promethazine HCL/ Codeine 6.25 mg-10mg/5ml) refill now the dr. arrieta is back. PT states she has been coughing up a storm.     Pt has a pain contract w/ dr. Arrieta.     Please advise.

## 2017-10-03 NOTE — TELEPHONE ENCOUNTER
----- Message from Scotty Collins sent at 10/3/2017  7:17 AM CDT -----  Contact: same  Patient called in and is requesting a refill on her Rx for Phenergan w/Codeine 6.25/10 mg/5 ml syrup.  Also, patient would like a call back from nurse.        [facility] [address] [place] [id number]  Phone: [phone] Fax: [phone]    Patient call back number is [phone]

## 2017-10-11 ENCOUNTER — LAB VISIT (OUTPATIENT)
Dept: LAB | Facility: HOSPITAL | Age: 47
End: 2017-10-11
Payer: MEDICARE

## 2017-10-11 DIAGNOSIS — E11.9 TYPE 2 DIABETES MELLITUS WITHOUT COMPLICATION, WITHOUT LONG-TERM CURRENT USE OF INSULIN: ICD-10-CM

## 2017-10-11 LAB
ANION GAP SERPL CALC-SCNC: 9 MMOL/L
BUN SERPL-MCNC: 9 MG/DL
CALCIUM SERPL-MCNC: 9.4 MG/DL
CHLORIDE SERPL-SCNC: 102 MMOL/L
CO2 SERPL-SCNC: 28 MMOL/L
CREAT SERPL-MCNC: 0.9 MG/DL
EST. GFR  (AFRICAN AMERICAN): >60 ML/MIN/1.73 M^2
EST. GFR  (NON AFRICAN AMERICAN): >60 ML/MIN/1.73 M^2
ESTIMATED AVG GLUCOSE: 229 MG/DL
GLUCOSE SERPL-MCNC: 167 MG/DL
HBA1C MFR BLD HPLC: 9.6 %
POTASSIUM SERPL-SCNC: 4.2 MMOL/L
SODIUM SERPL-SCNC: 139 MMOL/L
TSH SERPL DL<=0.005 MIU/L-ACNC: 0.92 UIU/ML

## 2017-10-11 PROCEDURE — 80048 BASIC METABOLIC PNL TOTAL CA: CPT

## 2017-10-11 PROCEDURE — 84443 ASSAY THYROID STIM HORMONE: CPT

## 2017-10-11 PROCEDURE — 83036 HEMOGLOBIN GLYCOSYLATED A1C: CPT

## 2017-10-11 PROCEDURE — 36415 COLL VENOUS BLD VENIPUNCTURE: CPT | Mod: PO

## 2017-10-17 ENCOUNTER — TELEPHONE (OUTPATIENT)
Dept: FAMILY MEDICINE | Facility: CLINIC | Age: 47
End: 2017-10-17

## 2017-10-17 NOTE — TELEPHONE ENCOUNTER
Spoke w/ pt. She is going to see Dr Hooker this week and have him order her mammo for Nov. as he has ordered the lest couple. Dr Arrieta ordered a tdap at her last visit and we did not have it. She would like to get this tomorrow when she comes in to see TEMITOPE Michael. Nurse appt scheduled. Pt will come in at 115pm tomorrow.

## 2017-10-17 NOTE — TELEPHONE ENCOUNTER
----- Message from Stevenmelina Jarrod sent at 10/17/2017 12:40 PM CDT -----  Contact: Patient  Patient states that she is due for her yearly mammogram in November and is requesting for an order to be put in the system so she can schedule.  Can you please call her back at [phone].  Thank you

## 2017-10-18 ENCOUNTER — CLINICAL SUPPORT (OUTPATIENT)
Dept: FAMILY MEDICINE | Facility: CLINIC | Age: 47
End: 2017-10-18
Payer: MEDICARE

## 2017-10-18 ENCOUNTER — OFFICE VISIT (OUTPATIENT)
Dept: ENDOCRINOLOGY | Facility: CLINIC | Age: 47
End: 2017-10-18
Payer: MEDICARE

## 2017-10-18 VITALS
HEIGHT: 67 IN | WEIGHT: 235.44 LBS | HEART RATE: 94 BPM | BODY MASS INDEX: 36.95 KG/M2 | SYSTOLIC BLOOD PRESSURE: 122 MMHG | DIASTOLIC BLOOD PRESSURE: 82 MMHG

## 2017-10-18 DIAGNOSIS — E66.9 OBESITY (BMI 30-39.9): ICD-10-CM

## 2017-10-18 DIAGNOSIS — E11.9 TYPE 2 DIABETES MELLITUS WITHOUT COMPLICATION, WITHOUT LONG-TERM CURRENT USE OF INSULIN: Primary | ICD-10-CM

## 2017-10-18 DIAGNOSIS — I10 ESSENTIAL HYPERTENSION: ICD-10-CM

## 2017-10-18 DIAGNOSIS — E78.2 MIXED HYPERLIPIDEMIA: ICD-10-CM

## 2017-10-18 PROCEDURE — 99999 PR PBB SHADOW E&M-EST. PATIENT-LVL V: CPT | Mod: PBBFAC,,, | Performed by: NURSE PRACTITIONER

## 2017-10-18 PROCEDURE — 90715 TDAP VACCINE 7 YRS/> IM: CPT | Mod: PBBFAC,PN

## 2017-10-18 PROCEDURE — 99215 OFFICE O/P EST HI 40 MIN: CPT | Mod: PBBFAC,PN,25 | Performed by: NURSE PRACTITIONER

## 2017-10-18 PROCEDURE — 90471 IMMUNIZATION ADMIN: CPT | Mod: PBBFAC,PN

## 2017-10-18 PROCEDURE — 99214 OFFICE O/P EST MOD 30 MIN: CPT | Mod: S$PBB,,, | Performed by: NURSE PRACTITIONER

## 2017-10-18 RX ORDER — DIAZEPAM 2 MG/1
2 TABLET ORAL
COMMUNITY
End: 2019-01-28

## 2017-10-18 NOTE — PROGRESS NOTES
CC: Ms. Liza Mesa arrives today for management of Type 2 DM and review of chronic medical conditions, as listed in the Visit Diagnosis section of this encounter.       HPI: Ms. Liza Mesa was diagnosed with Type 2 DM in 2005. She was diagnosed based on lab work. Initial treatment consisted of metformin. Glimepiride later added. Victoza to her medication regimen in 2016. + FH of DM on mother. Denies hospitalizations due to DM.     At last visit, CGMS results were discussed with pt. Acarbose was added. She admits to not always taking this.   CGMS results:  SUMMARY of KEY FINDINGS:    1. Prandial excursions noted, some of which extending overnight  2. Seems to be taking glimepiride without eating breakfast that includes carbs. This likely caused one episode of hypoglycemia  3. Juice intake causing hyperglycemia  4. No basal insulin needs      BG readings are checked 1x/day.                  Hypoglycemia: No    Missing Insulin/PO medication doses: Yes, missing acarbose often.   Timing prandial insulin 5-15 minutes before meals: n/a    Exercise: stationary bike ~1 hour/day    Dietary Habits: Eats 2-3 meals/day. May skip either breakfast or lunch. She stopped drinking juice.     Last DM education appointment:  8/2016      CURRENT DIABETIC MEDS: metformin XR 1000 mg twice daily, glimepiride 8 mg daily, Victoza 1.8 mg daily, acarbose 25 mg with carb containing meals.   Glucometer type: Accucheck Adrienne    Previous DM treatments:  n/a    Last Eye Exam: 7/2017, no DR per patient. Rosalina in [place].  Last Podiatry Exam: no    REVIEW OF SYSTEMS  Constitutional: no c/o fatigue, weakness, weight loss. + weight gain.  Eyes: denies visual disturbances.  Cardiac: no palpitations or chest pain.  Respiratory: denies cough, dyspnea.  GI: no c/o abdominal pain or nausea. Denies h/o pancreatitis  : reports urinary urgency has improved. Takes oxybutynin  Skin: no lesions or rashes. Reports acne on her upper back. Used  "OTC wash and it has improved.   Neuro: no numbness, tingling, or parasthesias.  Endocrine: denies polyphagia, polydipsia, polyuria      Personally reviewed Past Medical, Surgical, Social History.    Vital Signs  /82   Pulse 94   Ht 5' 7" (1.702 m)   Wt 106.8 kg (235 lb 7.2 oz)   BMI 36.88 kg/m²     Personally reviewed the below labs:    Hemoglobin A1C   Date Value Ref Range Status   10/11/2017 9.6 (H) 4.0 - 5.6 % Final     Comment:     According to ADA guidelines, hemoglobin A1c <7.0% represents  optimal control in non-pregnant diabetic patients. Different  metrics may apply to specific patient populations.   Standards of Medical Care in Diabetes-2016.  For the purpose of screening for the presence of diabetes:  <5.7%     Consistent with the absence of diabetes  5.7-6.4%  Consistent with increasing risk for diabetes   (prediabetes)  >or=6.5%  Consistent with diabetes  Currently, no consensus exists for use of hemoglobin A1c  for diagnosis of diabetes for children.  This Hemoglobin A1c assay has significant interference with fetal   hemoglobin   (HbF). The results are invalid for patients with abnormal amounts of   HbF,   including those with known Hereditary Persistence   of Fetal Hemoglobin. Heterozygous hemoglobin variants (HbAS, HbAC,   HbAD, HbAE, HbA2) do not significantly interfere with this assay;   however, presence of multiple variants in a sample may impact the %   interference.     06/21/2017 11.8 (H) 4.0 - 5.6 % Final     Comment:     According to ADA guidelines, hemoglobin A1c <7.0% represents  optimal control in non-pregnant diabetic patients. Different  metrics may apply to specific patient populations.   Standards of Medical Care in Diabetes-2016.  For the purpose of screening for the presence of diabetes:  <5.7%     Consistent with the absence of diabetes  5.7-6.4%  Consistent with increasing risk for diabetes   (prediabetes)  >or=6.5%  Consistent with diabetes  Currently, no consensus " exists for use of hemoglobin A1c  for diagnosis of diabetes for children.  This Hemoglobin A1c assay has significant interference with fetal   hemoglobin   (HbF). The results are invalid for patients with abnormal amounts of   HbF,   including those with known Hereditary Persistence   of Fetal Hemoglobin. Heterozygous hemoglobin variants (HbAS, HbAC,   HbAD, HbAE, HbA2) do not significantly interfere with this assay;   however, presence of multiple variants in a sample may impact the %   interference.     11/01/2016 9.1 (H) 4.5 - 6.2 % Final     Comment:     According to ADA guidelines, hemoglobin A1C <7.0% represents  optimal control in non-pregnant diabetic patients.  Different  metrics may apply to specific populations.   Standards of Medical Care in Diabetes - 2016.  For the purpose of screening for the presence of diabetes:  <5.7%     Consistent with the absence of diabetes  5.7-6.4%  Consistent with increasing risk for diabetes   (prediabetes)  >or=6.5%  Consistent with diabetes  Currently no consensus exists for use of hemoglobin A1C  for diagnosis of diabetes for children.     05/03/2012 7.1 (H) 4.8 - 5.9 % Final     Comment:     **In order to standardize %HbA1c results worldwide, as of October 11, 2010,  the %HbA1c is being calculated using the master equation recommended in the  consensus statement adopted by the ADA (American Diabetes Assoc), EASD  (European Assoc for the Study of Diabetes), IFCC (International Federation  of Clinical Chemistry and Laboratory Medicine) and IDF (International  Diabetes Federation). Result units: %HgbA1c (DCCT/NGSP).  In common with other methods, Hb A1C values may not accurately reflect mean  blood glucose in patients with hemoglobin variants (HgbF, HgbS and HgbC).  Any cause of shortened erythrocyte survival will reduce exposure of  erythrocytes to glucose with a consequent decrease in HbA1c (%) values, even  though the time-averaged blood glucose level may be elevated.  Causes of  shortened erythrocyte lifetime might be hemolytic anemia or other hemolytic  diseases, homozygous sickle cell trait, pregnancy, recent significant or  chronic blood loss, etc. Caution should be used when interpreting the HbA1c  results from patients with these conditions.       Chemistry        Component Value Date/Time     10/11/2017 0819    K 4.2 10/11/2017 0819     10/11/2017 0819    CO2 28 10/11/2017 0819    BUN 9 10/11/2017 0819    CREATININE 0.9 10/11/2017 0819    CREATININE 1.0 05/06/2012 0431     (H) 10/11/2017 0819        Component Value Date/Time    CALCIUM 9.4 10/11/2017 0819    CALCIUM 9.5 05/06/2012 0431    ALKPHOS 62 06/21/2017 0911    ALKPHOS 49 05/03/2012 1635    AST 17 06/21/2017 0911    AST 13 05/03/2012 1635    ALT 19 06/21/2017 0911    BILITOT 0.4 06/21/2017 0911          Lab Results   Component Value Date    CHOL 197 06/21/2017    CHOL 240 (H) 07/12/2016    CHOL 160 02/01/2013     Lab Results   Component Value Date    HDL 43 06/21/2017    HDL 54 07/12/2016    HDL 49 02/01/2013     Lab Results   Component Value Date    LDLCALC 108.4 06/21/2017    LDLCALC 133.4 07/12/2016    LDLCALC 96.0 02/01/2013     Lab Results   Component Value Date    TRIG 228 (H) 06/21/2017    TRIG 263 (H) 07/12/2016    TRIG 76 02/01/2013     Lab Results   Component Value Date    CHOLHDL 21.8 06/21/2017    CHOLHDL 22.5 07/12/2016    CHOLHDL 30.6 02/01/2013       Lab Results   Component Value Date    MICALBCREAT 6.1 11/01/2016     Lab Results   Component Value Date    TSH 0.919 10/11/2017       CrCl cannot be calculated (Unknown ideal weight.).    No results found for: [id number]      PHYSICAL EXAMINATION  Constitutional: Appears well, no distress.  Neck: Supple, trachea midline; no thyromegaly or nodules.   Respiratory: CTA, even and unlabored.   Cardiovascular: RRR, no murmurs, no carotid bruits. DP pulses  2+ bilaterally; no edema.    Lymph: no cervical or supraclavicular  lymphadenopathy  Skin: warm and dry; no acanthosis nigracans observed.   Neuro: DTR 2+ BUE/2+BLE.  Feet: appropriate footwear.      Assessment/Plan  1. Type 2 diabetes mellitus without complication, without long-term current use of insulin  -- A1c has decreased over 2 points but remains elevated. When she is compliant, glucoses improved. Will check fructosamine in the future since logs don't correlate with A1c.   -- will continue to evaluate need for basal insulin but per CGM, this was not an evident need.   -- I advised her to take acarbose with first bite of carb containing meals  -- continue metformin, Victoza, glimepiride   -- SGLT2-I would not be optimal, due to urinary urgency  -- check BG 2x/day    -- Discussed diagnosis of DM, A1c goals, progression of disease, long term complications and tx options.  Advised patient to check BG before activities, such as driving or exercise.  -- Reviewed hypoglycemia management: treat with 1/2 glass of juice, 1/2 can regular coke, or 4 glucose tablets. Monitor and repeat treatment every 15 minutes until BG is >70 Then have a snack, which includes a complex carbohydrate and protein.   2. Essential hypertension  -- acceptable  -- consider ACE-i or ARB if additional agent needed.    3. Mixed hyperlipidemia  -- TRG elevated. Continue to improve glycemic control  -- continue atorvastatin 20 mg daily.    4. Obesity (BMI 30-39.9)  -- increases insulin resistance.   -- recommended exercise 30 min most days of the week  Body mass index is 36.88 kg/m².        FOLLOW UP  Return in about 3 months (around 1/18/2018).   Patient instructed to bring BG logs to each follow up   Patient encouraged to call for any BG/medication issues, concerns, or questions.      Orders Placed This Encounter   Procedures    Hemoglobin A1c    Basic metabolic panel    Microalbumin/creatinine urine ratio    Fructosamine

## 2017-10-20 ENCOUNTER — TELEPHONE (OUTPATIENT)
Dept: FAMILY MEDICINE | Facility: CLINIC | Age: 47
End: 2017-10-20

## 2017-10-20 NOTE — TELEPHONE ENCOUNTER
Spoke w/ pt. Informed pt about not being able to order a refill, due to provider not ordering the medication. Pt verbalized understanding.

## 2017-10-20 NOTE — TELEPHONE ENCOUNTER
----- Message from Yecenia Murphy sent at 10/20/2017 10:58 AM CDT -----  Contact: [phone]  Patient requesting a refill on ambien.    Patient will be using   [facility] [id number] [place] [address] [address] [place] [address] AT [facility]  [address] [facility] [address]  Phone: [phone] Fax: [phone]    Please call patient at [phone]. Thanks!

## 2017-10-24 RX ORDER — BLOOD SUGAR DIAGNOSTIC
STRIP MISCELLANEOUS
Qty: 100 STRIP | Refills: 0 | Status: SHIPPED | OUTPATIENT
Start: 2017-10-24 | End: 2017-12-01 | Stop reason: SDUPTHER

## 2017-10-30 NOTE — TELEPHONE ENCOUNTER
Spoke w/ pt. Informed pt about refill sent on 10-24-17. Pt verbalized understanding, but stated that when she called the pharmacy, they said it wasn't ready. Informed pt that I will call pharmacy and see what going on, and will call her back.

## 2017-10-30 NOTE — TELEPHONE ENCOUNTER
Spoke w/ pravin at [facility], he stated that the meds are ready for  she just needs to update her insurance. Will call pt and advise.

## 2017-10-30 NOTE — TELEPHONE ENCOUNTER
Spoke w/ pt. Informed pt about the need update her insurance information. Pt stated that her insurance has not changed, and that she will call the pharmacy and see what is going on.

## 2017-10-30 NOTE — TELEPHONE ENCOUNTER
----- Message from Chyna Spencer sent at 10/30/2017  8:29 AM CDT -----  Contact: patient  Patient calling to speak with the Nurse about her strips. Please advise.  Call back   Thanks!

## 2017-11-13 ENCOUNTER — PATIENT OUTREACH (OUTPATIENT)
Dept: ADMINISTRATIVE | Facility: HOSPITAL | Age: 47
End: 2017-11-13

## 2017-11-13 NOTE — LETTER
November 13, 2017    Liza Mesa  [address] [facility] [place] [address]             [facility]  [address]  [facility] [address]  Phone: [phone] Dear Mrs. Mesa:    [facility] is committed to your overall health.  To help you get the most out of each of your visits, we will review your information to make sure you are up to date on all of your recommended tests and/or procedures.      Dr. Rayo Arrieta has found that your chart shows you may be due for pneumonia and flu immunizations.     Medicare does not cover all immunizations to be given in the clinic.  Check your benefits to ensure that you do not need to receive your immunizations at the pharmacy.    If you have had any of the above done at another facility, please bring the records or information with you so that your record at [facility] will be complete.  If you would like to schedule any of these, please contact me.    If you are currently taking medication, please bring it with you to your appointment for review.    If you have any questions or concerns, please don't hesitate to call.    Thank you for letting us care for you,  Zamzam Buchanan LPN Clinical Care Coordinator  [facility]  [phone]

## 2017-11-20 ENCOUNTER — HOSPITAL ENCOUNTER (OUTPATIENT)
Dept: RADIOLOGY | Facility: HOSPITAL | Age: 47
Discharge: HOME | End: 2017-11-20
Attending: OBSTETRICS & GYNECOLOGY
Payer: MEDICARE

## 2017-11-20 DIAGNOSIS — E11.9 TYPE 2 DIABETES MELLITUS WITHOUT COMPLICATION: ICD-10-CM

## 2017-11-20 DIAGNOSIS — Z12.39 BREAST CANCER SCREENING: ICD-10-CM

## 2017-11-20 DIAGNOSIS — Z13.820 ENCOUNTER FOR IMAGING TO ASSESS OSTEOPOROSIS CHANGE: ICD-10-CM

## 2017-11-20 PROCEDURE — 77067 SCR MAMMO BI INCL CAD: CPT | Mod: 26,,, | Performed by: RADIOLOGY

## 2017-11-20 PROCEDURE — 77063 BREAST TOMOSYNTHESIS BI: CPT | Mod: 26,,, | Performed by: RADIOLOGY

## 2017-11-20 PROCEDURE — 77067 SCR MAMMO BI INCL CAD: CPT | Mod: TC

## 2017-11-20 PROCEDURE — 77080 DXA BONE DENSITY AXIAL: CPT | Mod: TC,PO

## 2017-11-20 PROCEDURE — 77080 DXA BONE DENSITY AXIAL: CPT | Mod: 26,,, | Performed by: RADIOLOGY

## 2017-11-20 RX ORDER — GLIMEPIRIDE 4 MG/1
TABLET ORAL
Qty: 180 TABLET | Refills: 0 | Status: SHIPPED | OUTPATIENT
Start: 2017-11-20 | End: 2018-03-19 | Stop reason: SDUPTHER

## 2017-11-27 ENCOUNTER — OFFICE VISIT (OUTPATIENT)
Dept: FAMILY MEDICINE | Facility: CLINIC | Age: 47
End: 2017-11-27
Payer: MEDICARE

## 2017-11-27 VITALS
BODY MASS INDEX: 38.7 KG/M2 | SYSTOLIC BLOOD PRESSURE: 146 MMHG | HEIGHT: 67 IN | WEIGHT: 246.56 LBS | TEMPERATURE: 98 F | DIASTOLIC BLOOD PRESSURE: 90 MMHG

## 2017-11-27 DIAGNOSIS — E66.9 OBESITY (BMI 30-39.9): ICD-10-CM

## 2017-11-27 DIAGNOSIS — E11.9 TYPE 2 DIABETES MELLITUS WITHOUT COMPLICATION, WITHOUT LONG-TERM CURRENT USE OF INSULIN: ICD-10-CM

## 2017-11-27 DIAGNOSIS — J30.0 CHRONIC VASOMOTOR RHINITIS: Primary | ICD-10-CM

## 2017-11-27 DIAGNOSIS — G89.29 OTHER CHRONIC PAIN: ICD-10-CM

## 2017-11-27 DIAGNOSIS — G91.2 NORMAL PRESSURE HYDROCEPHALUS: ICD-10-CM

## 2017-11-27 LAB
AMPHET+METHAMPHET UR QL: NEGATIVE
BARBITURATES UR QL SCN>200 NG/ML: NEGATIVE
BENZODIAZ UR QL SCN>200 NG/ML: NEGATIVE
BZE UR QL SCN: NEGATIVE
CANNABINOIDS UR QL SCN: NEGATIVE
CREAT UR-MCNC: 91 MG/DL
ETHANOL UR-MCNC: <10 MG/DL
METHADONE UR QL SCN>300 NG/ML: NEGATIVE
OPIATES UR QL SCN: NORMAL
PCP UR QL SCN>25 NG/ML: NEGATIVE
TOXICOLOGY INFORMATION: NORMAL

## 2017-11-27 PROCEDURE — 99213 OFFICE O/P EST LOW 20 MIN: CPT | Mod: PBBFAC,PN | Performed by: FAMILY MEDICINE

## 2017-11-27 PROCEDURE — 80307 DRUG TEST PRSMV CHEM ANLYZR: CPT

## 2017-11-27 PROCEDURE — 99214 OFFICE O/P EST MOD 30 MIN: CPT | Mod: S$PBB,,, | Performed by: FAMILY MEDICINE

## 2017-11-27 PROCEDURE — 99999 PR PBB SHADOW E&M-EST. PATIENT-LVL III: CPT | Mod: PBBFAC,,, | Performed by: FAMILY MEDICINE

## 2017-11-27 RX ORDER — HYDROCODONE BITARTRATE AND ACETAMINOPHEN 7.5; 325 MG/1; MG/1
1 TABLET ORAL EVERY 12 HOURS PRN
Qty: 60 TABLET | Refills: 0 | Status: SHIPPED | OUTPATIENT
Start: 2018-02-01 | End: 2018-01-29 | Stop reason: SDUPTHER

## 2017-11-27 RX ORDER — SUMATRIPTAN SUCCINATE 100 MG/1
100 TABLET ORAL ONCE AS NEEDED
Qty: 6 TABLET | Refills: 5 | Status: SHIPPED | OUTPATIENT
Start: 2017-11-27 | End: 2020-08-24 | Stop reason: SDUPTHER

## 2017-11-27 RX ORDER — ACETAZOLAMIDE 250 MG/1
250 TABLET ORAL 2 TIMES DAILY
Qty: 360 TABLET | Refills: 3
Start: 2017-11-27 | End: 2019-11-13 | Stop reason: SDUPTHER

## 2017-11-27 RX ORDER — FLUTICASONE PROPIONATE 50 MCG
2 SPRAY, SUSPENSION (ML) NASAL DAILY
Qty: 16 G | Refills: 12 | Status: SHIPPED | OUTPATIENT
Start: 2017-11-27 | End: 2018-11-27

## 2017-11-27 RX ORDER — HYDROCODONE BITARTRATE AND ACETAMINOPHEN 7.5; 325 MG/1; MG/1
1 TABLET ORAL EVERY 12 HOURS PRN
Qty: 60 TABLET | Refills: 0 | Status: SHIPPED | OUTPATIENT
Start: 2018-01-01 | End: 2017-11-27 | Stop reason: SDUPTHER

## 2017-11-27 RX ORDER — HYDROCODONE BITARTRATE AND ACETAMINOPHEN 7.5; 325 MG/1; MG/1
1 TABLET ORAL EVERY 12 HOURS PRN
Qty: 60 TABLET | Refills: 0 | Status: SHIPPED | OUTPATIENT
Start: 2017-12-01 | End: 2017-11-27 | Stop reason: SDUPTHER
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
## 2017-11-28 ENCOUNTER — TELEPHONE (OUTPATIENT)
Dept: FAMILY MEDICINE | Facility: CLINIC | Age: 47
End: 2017-11-28

## 2017-11-28 NOTE — TELEPHONE ENCOUNTER
----- Message from Noemi Feldman sent at 11/28/2017  7:43 AM CST -----  Contact: Patient  Patient is requesting a call back concerning her appointment yesterday, she states she was given no information, contact patient at [phone].    Thank you

## 2017-11-28 NOTE — TELEPHONE ENCOUNTER
Spoke w/ pt. She wanted to know when to f/u. Advised she will be due for f/u in 3 mo. She will call back to cassidy that appt. Pt is going to bring all meds to next OV to update med list.

## 2017-11-30 ENCOUNTER — TELEPHONE (OUTPATIENT)
Dept: FAMILY MEDICINE | Facility: CLINIC | Age: 47
End: 2017-11-30

## 2017-11-30 NOTE — TELEPHONE ENCOUNTER
Spoke w/ pt and advised that we have been in contact via fax w/ [facility] and are awaiting her Podiatry notes so Dr Arrieta can fill out the paperwork. [facility] will be in touch with her once they have what they need from us. Pt agreed.

## 2017-11-30 NOTE — TELEPHONE ENCOUNTER
----- Message from Billy Boles sent at 11/30/2017 12:26 PM CST -----  Contact: pt  Pt is calling to see if her paperwork is ready for   Call Back#[phone]  Thanks

## 2017-12-01 DIAGNOSIS — E11.9 TYPE 2 DIABETES MELLITUS WITHOUT COMPLICATION, WITHOUT LONG-TERM CURRENT USE OF INSULIN: ICD-10-CM

## 2017-12-04 RX ORDER — LIRAGLUTIDE 6 MG/ML
1.8 INJECTION SUBCUTANEOUS DAILY
Qty: 27 ML | Refills: 3 | Status: SHIPPED | OUTPATIENT
Start: 2017-12-04 | End: 2019-01-04 | Stop reason: SDUPTHER

## 2017-12-04 RX ORDER — ALBUTEROL SULFATE 90 UG/1
AEROSOL, METERED RESPIRATORY (INHALATION)
Qty: 54 G | Refills: 3 | Status: SHIPPED | OUTPATIENT
Start: 2017-12-04 | End: 2019-01-28 | Stop reason: SDUPTHER

## 2017-12-04 RX ORDER — PEN NEEDLE, DIABETIC 31 GX5/16"
NEEDLE, DISPOSABLE MISCELLANEOUS
Qty: 100 EACH | Refills: 3 | Status: SHIPPED | OUTPATIENT
Start: 2017-12-04 | End: 2018-01-23 | Stop reason: SDUPTHER

## 2017-12-04 RX ORDER — FLUTICASONE PROPIONATE AND SALMETEROL 50; 250 UG/1; UG/1
POWDER RESPIRATORY (INHALATION)
Qty: 60 EACH | Refills: 3 | Status: SHIPPED | OUTPATIENT
Start: 2017-12-04 | End: 2019-01-28 | Stop reason: SDUPTHER

## 2017-12-04 RX ORDER — BLOOD SUGAR DIAGNOSTIC
STRIP MISCELLANEOUS
Qty: 100 STRIP | Refills: 3 | Status: SHIPPED | OUTPATIENT
Start: 2017-12-04 | End: 2018-08-07 | Stop reason: SDUPTHER

## 2017-12-13 RX ORDER — ALBUTEROL SULFATE 0.63 MG/3ML
1 SOLUTION RESPIRATORY (INHALATION) EVERY 6 HOURS PRN
Qty: 180 ML | Refills: 3 | Status: SHIPPED | OUTPATIENT
Start: 2017-12-13 | End: 2017-12-13 | Stop reason: SDUPTHER

## 2017-12-13 RX ORDER — ALBUTEROL SULFATE 0.63 MG/3ML
1 SOLUTION RESPIRATORY (INHALATION) EVERY 6 HOURS PRN
Qty: 180 ML | Refills: 3 | Status: SHIPPED | OUTPATIENT
Start: 2017-12-13 | End: 2019-11-13 | Stop reason: SDUPTHER

## 2017-12-13 NOTE — TELEPHONE ENCOUNTER
----- Message from Chyna Tj sent at 12/13/2017  8:58 AM CST -----  Contact: Olivia urbina/ Mukul Gilmna calling to request Albuterol solution for the patient's nebulizer. Please advise.  Call back    [facility] [address] [facility] [address] [place] [address]  [address] [place] [address]  Phone: [phone] Fax: [phone]

## 2017-12-19 RX ORDER — PROMETHAZINE HYDROCHLORIDE AND CODEINE PHOSPHATE 6.25; 1 MG/5ML; MG/5ML
SOLUTION ORAL
Qty: 240 ML | Refills: 0 | Status: SHIPPED | OUTPATIENT
Start: 2017-12-19 | End: 2018-01-29 | Stop reason: SDUPTHER

## 2017-12-29 ENCOUNTER — TELEPHONE (OUTPATIENT)
Dept: FAMILY MEDICINE | Facility: CLINIC | Age: 47
End: 2017-12-29

## 2017-12-29 NOTE — TELEPHONE ENCOUNTER
Attempted to contact pharmacy, rec'd no answer after being on hold for 10 minutes. Will need to contact at a later time. Forms sent twice. refaxed again.

## 2017-12-29 NOTE — TELEPHONE ENCOUNTER
----- Message from Yecenia Murphy sent at 12/29/2017  3:50 PM CST -----  Contact: levar/brad/[phone]   levar/brad/[phone], requesting a call from nurse want to know was medical necessity form received

## 2018-01-15 ENCOUNTER — LAB VISIT (OUTPATIENT)
Dept: LAB | Facility: HOSPITAL | Age: 48
End: 2018-01-15
Attending: NURSE PRACTITIONER
Payer: MEDICARE

## 2018-01-15 DIAGNOSIS — E11.9 TYPE 2 DIABETES MELLITUS WITHOUT COMPLICATION, WITHOUT LONG-TERM CURRENT USE OF INSULIN: ICD-10-CM

## 2018-01-15 LAB
ANION GAP SERPL CALC-SCNC: 7 MMOL/L
BUN SERPL-MCNC: 10 MG/DL
CALCIUM SERPL-MCNC: 9.3 MG/DL
CHLORIDE SERPL-SCNC: 98 MMOL/L
CO2 SERPL-SCNC: 30 MMOL/L
CREAT SERPL-MCNC: 0.8 MG/DL
EST. GFR  (AFRICAN AMERICAN): >60 ML/MIN/1.73 M^2
EST. GFR  (NON AFRICAN AMERICAN): >60 ML/MIN/1.73 M^2
ESTIMATED AVG GLUCOSE: 189 MG/DL
GLUCOSE SERPL-MCNC: 204 MG/DL
HBA1C MFR BLD HPLC: 8.2 %
POTASSIUM SERPL-SCNC: 4.1 MMOL/L
SODIUM SERPL-SCNC: 135 MMOL/L

## 2018-01-15 PROCEDURE — 82985 ASSAY OF GLYCATED PROTEIN: CPT

## 2018-01-15 PROCEDURE — 83036 HEMOGLOBIN GLYCOSYLATED A1C: CPT

## 2018-01-15 PROCEDURE — 80048 BASIC METABOLIC PNL TOTAL CA: CPT

## 2018-01-17 LAB — FRUCTOSAMINE SERPL-SCNC: 277 UMOL /L (ref 0–285)

## 2018-01-23 ENCOUNTER — OFFICE VISIT (OUTPATIENT)
Dept: ENDOCRINOLOGY | Facility: CLINIC | Age: 48
End: 2018-01-23
Payer: MEDICARE

## 2018-01-23 VITALS
HEART RATE: 87 BPM | BODY MASS INDEX: 39.12 KG/M2 | HEIGHT: 67 IN | DIASTOLIC BLOOD PRESSURE: 88 MMHG | WEIGHT: 249.25 LBS | SYSTOLIC BLOOD PRESSURE: 132 MMHG

## 2018-01-23 DIAGNOSIS — L98.9 SKIN LESION: ICD-10-CM

## 2018-01-23 DIAGNOSIS — I10 ESSENTIAL HYPERTENSION: ICD-10-CM

## 2018-01-23 DIAGNOSIS — E66.9 OBESITY (BMI 30-39.9): ICD-10-CM

## 2018-01-23 DIAGNOSIS — F32.A DEPRESSION, UNSPECIFIED DEPRESSION TYPE: ICD-10-CM

## 2018-01-23 DIAGNOSIS — E11.9 TYPE 2 DIABETES MELLITUS WITHOUT COMPLICATION, WITHOUT LONG-TERM CURRENT USE OF INSULIN: Primary | ICD-10-CM

## 2018-01-23 DIAGNOSIS — E78.2 MIXED HYPERLIPIDEMIA: ICD-10-CM

## 2018-01-23 PROCEDURE — 99214 OFFICE O/P EST MOD 30 MIN: CPT | Mod: S$PBB,,, | Performed by: NURSE PRACTITIONER

## 2018-01-23 PROCEDURE — 99999 PR PBB SHADOW E&M-EST. PATIENT-LVL V: CPT | Mod: PBBFAC,,, | Performed by: NURSE PRACTITIONER

## 2018-01-23 PROCEDURE — 99215 OFFICE O/P EST HI 40 MIN: CPT | Mod: PBBFAC,PN | Performed by: NURSE PRACTITIONER

## 2018-01-23 RX ORDER — INSULIN GLARGINE 100 [IU]/ML
10 INJECTION, SOLUTION SUBCUTANEOUS NIGHTLY
Qty: 15 ML | Refills: 6 | Status: SHIPPED | OUTPATIENT
Start: 2018-01-23 | End: 2018-01-24 | Stop reason: CLARIF

## 2018-01-23 RX ORDER — PEN NEEDLE, DIABETIC 31 GX5/16"
NEEDLE, DISPOSABLE MISCELLANEOUS
Qty: 200 EACH | Refills: 3 | Status: SHIPPED | OUTPATIENT
Start: 2018-01-23 | End: 2018-08-07 | Stop reason: SDUPTHER

## 2018-01-23 NOTE — PATIENT INSTRUCTIONS
Keep unopened insulin pens in the fridge. Once you open a pen, it is good to use at room temp for 28 days.       Hypoglycemia (Low Blood Sugar)     Fast-acting sugar includes a cup of nonfat milk.     Too little sugar (glucose) in your blood is called hypoglycemia or low blood sugar. Low blood sugar usually means anything lower than 70 mg/dL. Talk with your healthcare provider about your target range and what level is too low for you. Diabetes itself doesnt cause low blood sugar. But some of the treatments for diabetes, such as pills or insulin, may raise your risk for it. Low blood sugar may cause you to pass out or have a seizure. So always treat low blood sugar right away, but don't overeat.  Special note: Always carry a source of fast-acting sugar and a snack in case of hypoglycemia.   What you may notice  If you have low blood sugar, you may have one or more of these symptoms:  · Shakiness or dizziness  · Cold, clammy skin or sweating  · Feelings of hunger  · Headache  · Nervousness  · A hard, fast heartbeat  · Weakness  · Confusion or irritability  · Blurred vision  · Having nightmares or waking up confused or sweating  · Numbness or tingling in the lips or tongue  What you should do  Here are tips to follow if you have hypoglycemia:   · First check your blood sugar. If it is too low (out of your target range), eat or drink 15 to 20 grams of fast-acting sugar. This may be 3 to 4 glucose tablets, 4 ounces (half a cup) of fruit juice or regular (nondiet) soda, 8 ounces (1 cup) of fat-free milk, or 1 tablespoon of honey. Dont take more than this, or your blood sugar may go too high.  · Wait 15 minutes. Then recheck your blood sugar if you can.  · If your blood sugar is still too low, repeat the steps above and check your blood sugar again. If your blood sugar still has not returned to your target range, contact your healthcare provider or seek emergency care.  · Once your blood sugar returns to target range,  eat a snack or meal.  Preventing low blood sugar  Things you can do include the following:   · If your condition needs a strict treatment plan, eat your meals and snacks at the same times each day. Dont skip meals!  · If your treatment plan lets you change when you eat and what you eat, learn how to change the time and dose of your rapid-acting insulin to match this.   · Ask your healthcare provider if it is safe for you to drink alcohol. Never drink on an empty stomach.  · Take your medicine at the prescribed times.  · Always carry a source of fast-acting sugar and a snack when youre away from home.  Other things to do  Additional tips include the following:  · Carry a medical ID card, a compact USB drive, or wear a medical alert bracelet or necklace. It should say that you have diabetes. It should also say what to do if you pass out or have a seizure.  · Make sure your family, friends, and coworkers know the signs of low blood sugar. Tell them what to do if your blood sugar falls very low and you cant treat yourself.  · Keep a glucagon emergency kit handy. Be sure your family, friends, and coworkers know how and when to use it. Check it regularly and replace the glucagon before it expires.  · Talk with your health care team about other things you can do to prevent low blood sugar.     If you have unexplained hypoglycemia or hypoglycemia several times, call your healthcare provider.   Date Last Reviewed: 5/1/2016  © 6548-4811 [employer]. [address] [place] [address], [place] [address]. All rights reserved. This information is not intended as a substitute for professional medical care. Always follow your healthcare professional's instructions.

## 2018-01-23 NOTE — PROGRESS NOTES
CC: Ms. Liza Mesa arrives today for management of Type 2 DM and review of chronic medical conditions, as listed in the Visit Diagnosis section of this encounter.       HPI: Ms. Liza Mesa was diagnosed with Type 2 DM in 2005. She was diagnosed based on lab work. Initial treatment consisted of metformin. Glimepiride later added. Victoza to her medication regimen in 2016. + FH of DM on mother. Denies hospitalizations due to DM.   August 2017 CGMS revealed:  1. Prandial excursions noted, some of which extending overnight  2. Seems to be taking glimepiride without eating breakfast that includes carbs. This likely caused one episode of hypoglycemia  3. Juice intake causing hyperglycemia  4. No basal insulin needs at this time      BG readings are checked 1x/day. Pt reports most readings are fasting.               Hypoglycemia: No    Missing Insulin/PO medication doses: Rare    Exercise: No, has not had access to stationary bike since she has been staying with her mother.    Dietary Habits: She reports that she is binge eating on leftovers even when not hungry. Also binging overnight. Eats 2-3 meals/day. May skip breakfast. Has cut out Cokes but drinks occasional juice.     Last DM education appointment:  8/2016    She states that she is taking something for depression but can't recall what it is.      CURRENT DIABETIC MEDS: metformin XR 1000 mg twice daily, glimepiride 8 mg daily, Victoza 1.8 mg daily, acarbose 25 mg with carb containing meals (usually BID).   Glucometer type: Accucheck Adrienne    Previous DM treatments:  n/a    Last Eye Exam: 7/2017, no DR per patient. Rosalina in [place].  Last Podiatry Exam: no    REVIEW OF SYSTEMS  Constitutional: no c/o fatigue, weakness, weight loss. + 14 # weight gain.   Eyes: denies visual disturbances.  Cardiac: no palpitations or chest pain.  Respiratory: denies cough, dyspnea.  GI: no c/o abdominal pain or nausea. Denies h/o pancreatitis  : reports urinary  "urgency has improved. No longer with incontinence. Takes oxybutynin  Skin: no rashes. + recent abscess under breast, which she has taken antibiotics. Reports nodule under L breast that developed 2 days ago. No associated symptoms. No drainage.   Psych: reports depression, lost interest in things she used to enjoy, denies HI/SI. + binge eating  Neuro: no numbness, tingling, or parasthesias.  Endocrine: denies polyphagia, polydipsia, polyuria      Personally reviewed Past Medical, Surgical, Social History.    Vital Signs  /88   Pulse 87   Ht 5' 7" (1.702 m)   Wt 113 kg (249 lb 3.7 oz)   BMI 39.03 kg/m²     Personally reviewed the below labs:    Hemoglobin A1C   Date Value Ref Range Status   01/15/2018 8.2 (H) 4.0 - 5.6 % Final     Comment:     According to ADA guidelines, hemoglobin A1c <7.0% represents  optimal control in non-pregnant diabetic patients. Different  metrics may apply to specific patient populations.   Standards of Medical Care in Diabetes-2016.  For the purpose of screening for the presence of diabetes:  <5.7%     Consistent with the absence of diabetes  5.7-6.4%  Consistent with increasing risk for diabetes   (prediabetes)  >or=6.5%  Consistent with diabetes  Currently, no consensus exists for use of hemoglobin A1c  for diagnosis of diabetes for children.  This Hemoglobin A1c assay has significant interference with fetal   hemoglobin   (HbF). The results are invalid for patients with abnormal amounts of   HbF,   including those with known Hereditary Persistence   of Fetal Hemoglobin. Heterozygous hemoglobin variants (HbAS, HbAC,   HbAD, HbAE, HbA2) do not significantly interfere with this assay;   however, presence of multiple variants in a sample may impact the %   interference.     10/11/2017 9.6 (H) 4.0 - 5.6 % Final     Comment:     According to ADA guidelines, hemoglobin A1c <7.0% represents  optimal control in non-pregnant diabetic patients. Different  metrics may apply to specific " patient populations.   Standards of Medical Care in Diabetes-2016.  For the purpose of screening for the presence of diabetes:  <5.7%     Consistent with the absence of diabetes  5.7-6.4%  Consistent with increasing risk for diabetes   (prediabetes)  >or=6.5%  Consistent with diabetes  Currently, no consensus exists for use of hemoglobin A1c  for diagnosis of diabetes for children.  This Hemoglobin A1c assay has significant interference with fetal   hemoglobin   (HbF). The results are invalid for patients with abnormal amounts of   HbF,   including those with known Hereditary Persistence   of Fetal Hemoglobin. Heterozygous hemoglobin variants (HbAS, HbAC,   HbAD, HbAE, HbA2) do not significantly interfere with this assay;   however, presence of multiple variants in a sample may impact the %   interference.     06/21/2017 11.8 (H) 4.0 - 5.6 % Final     Comment:     According to ADA guidelines, hemoglobin A1c <7.0% represents  optimal control in non-pregnant diabetic patients. Different  metrics may apply to specific patient populations.   Standards of Medical Care in Diabetes-2016.  For the purpose of screening for the presence of diabetes:  <5.7%     Consistent with the absence of diabetes  5.7-6.4%  Consistent with increasing risk for diabetes   (prediabetes)  >or=6.5%  Consistent with diabetes  Currently, no consensus exists for use of hemoglobin A1c  for diagnosis of diabetes for children.  This Hemoglobin A1c assay has significant interference with fetal   hemoglobin   (HbF). The results are invalid for patients with abnormal amounts of   HbF,   including those with known Hereditary Persistence   of Fetal Hemoglobin. Heterozygous hemoglobin variants (HbAS, HbAC,   HbAD, HbAE, HbA2) do not significantly interfere with this assay;   however, presence of multiple variants in a sample may impact the %   interference.     05/03/2012 7.1 (H) 4.8 - 5.9 % Final     Comment:     **In order to standardize %HbA1c results  worldwide, as of October 11, 2010,  the %HbA1c is being calculated using the master equation recommended in the  consensus statement adopted by the ADA (American Diabetes Assoc), EASD  (European Assoc for the Study of Diabetes), IFCC (International Federation  of Clinical Chemistry and Laboratory Medicine) and IDF (International  Diabetes Federation). Result units: %HgbA1c (DCCT/NGSP).  In common with other methods, Hb A1C values may not accurately reflect mean  blood glucose in patients with hemoglobin variants (HgbF, HgbS and HgbC).  Any cause of shortened erythrocyte survival will reduce exposure of  erythrocytes to glucose with a consequent decrease in HbA1c (%) values, even  though the time-averaged blood glucose level may be elevated. Causes of  shortened erythrocyte lifetime might be hemolytic anemia or other hemolytic  diseases, homozygous sickle cell trait, pregnancy, recent significant or  chronic blood loss, etc. Caution should be used when interpreting the HbA1c  results from patients with these conditions.       Chemistry        Component Value Date/Time     (L) 01/15/2018 0848    K 4.1 01/15/2018 0848    CL 98 01/15/2018 0848    CO2 30 (H) 01/15/2018 0848    BUN 10 01/15/2018 0848    CREATININE 0.8 01/15/2018 0848    CREATININE 1.0 05/06/2012 0431     (H) 01/15/2018 0848        Component Value Date/Time    CALCIUM 9.3 01/15/2018 0848    CALCIUM 9.5 05/06/2012 0431    ALKPHOS 62 06/21/2017 0911    ALKPHOS 49 05/03/2012 1635    AST 17 06/21/2017 0911    AST 13 05/03/2012 1635    ALT 19 06/21/2017 0911    BILITOT 0.4 06/21/2017 0911          Lab Results   Component Value Date    CHOL 197 06/21/2017    CHOL 240 (H) 07/12/2016    CHOL 160 02/01/2013     Lab Results   Component Value Date    HDL 43 06/21/2017    HDL 54 07/12/2016    HDL 49 02/01/2013     Lab Results   Component Value Date    LDLCALC 108.4 06/21/2017    LDLCALC 133.4 07/12/2016    LDLCALC 96.0 02/01/2013     Lab Results    Component Value Date    TRIG 228 (H) 06/21/2017    TRIG 263 (H) 07/12/2016    TRIG 76 02/01/2013     Lab Results   Component Value Date    CHOLHDL 21.8 06/21/2017    CHOLHDL 22.5 07/12/2016    CHOLHDL 30.6 02/01/2013       Lab Results   Component Value Date    MICALBCREAT 3.3 01/15/2018     Lab Results   Component Value Date    TSH 0.919 10/11/2017       CrCl cannot be calculated (Patient's most recent lab result is older than the maximum 7 days allowed.).    No results found for: [id number]      PHYSICAL EXAMINATION  Constitutional: Appears well, no distress.  Neck: Supple, trachea midline; no thyromegaly or nodules.   Respiratory: CTA, even and unlabored.   Cardiovascular: RRR, no murmurs, no carotid bruits. DP pulses  2+ bilaterally; no edema.    Lymph: no cervical or supraclavicular lymphadenopathy  Skin: warm and dry; no acanthosis nigracans observed. + hyperpigmented nodule noted to L upper abdomen. No drainage, no erythema. Non-tender upon palpation.   Neuro: DTR 2+ BUE/2+BLE.  Feet: appropriate footwear.      Assessment/Plan  1. Type 2 diabetes mellitus without complication, without long-term current use of insulin  -- A1c has decreased but remains elevated. + dietary indiscretion, possibly due to depression.  Fasting glucoses now elevated.   -- start Lantus 10 units QHS. Discussed pen use/storage, proper timing, administration technique.   -- continue metformin, Victoza, glimepiride, acarbose  -- SGLT2-I would not be ideal, due to past urinary urgency/incontinence  -- check BG 2x/day    -- Discussed diagnosis of DM, A1c goals, progression of disease, long term complications and tx options.  Advised patient to check BG before activities, such as driving or exercise.  -- Reviewed hypoglycemia management: treat with 1/2 glass of juice, 1/2 can regular coke, or 4 glucose tablets. Monitor and repeat treatment every 15 minutes until BG is >70 Then have a snack, which includes a complex carbohydrate and  protein.   2. Essential hypertension  -- diastolic elevated  -- consider ACE-i or ARB if persistent   3. Mixed hyperlipidemia  -- TRG elevated. Continue to improve glycemic control  -- continue atorvastatin 20 mg daily.    4. Obesity (BMI 30-39.9)  -- increases insulin resistance.   -- recommended exercise 30 min most days of the week  Body mass index is 39.03 kg/m².   5. Depression -- likely needs med adjustment but she is unsure which antidepressant she is taking.   -- I asked her to bring medication to appt next week with Dr. Arrieta.     6. Skin lesion -- will notify Dr. Arrieta for possible I&D       FOLLOW UP  Follow-up in about 3 months (around 4/23/2018).   Patient instructed to bring BG logs to each follow up   Patient encouraged to call for any BG/medication issues, concerns, or questions.      Orders Placed This Encounter   Procedures    Hemoglobin A1c    Comprehensive metabolic panel

## 2018-01-24 ENCOUNTER — TELEPHONE (OUTPATIENT)
Dept: ENDOCRINOLOGY | Facility: CLINIC | Age: 48
End: 2018-01-24

## 2018-01-24 RX ORDER — INSULIN GLARGINE 100 [IU]/ML
18 INJECTION, SOLUTION SUBCUTANEOUS NIGHTLY
COMMUNITY
End: 2018-12-07 | Stop reason: SDUPTHER

## 2018-01-29 ENCOUNTER — OFFICE VISIT (OUTPATIENT)
Dept: FAMILY MEDICINE | Facility: CLINIC | Age: 48
End: 2018-01-29
Payer: MEDICARE

## 2018-01-29 VITALS
DIASTOLIC BLOOD PRESSURE: 84 MMHG | HEART RATE: 68 BPM | TEMPERATURE: 98 F | BODY MASS INDEX: 39.19 KG/M2 | WEIGHT: 249.69 LBS | SYSTOLIC BLOOD PRESSURE: 136 MMHG | HEIGHT: 67 IN

## 2018-01-29 DIAGNOSIS — J45.41 MODERATE PERSISTENT ASTHMA WITH ACUTE EXACERBATION: ICD-10-CM

## 2018-01-29 DIAGNOSIS — M54.50 CHRONIC MIDLINE LOW BACK PAIN WITHOUT SCIATICA: Primary | ICD-10-CM

## 2018-01-29 DIAGNOSIS — I10 ESSENTIAL HYPERTENSION: ICD-10-CM

## 2018-01-29 DIAGNOSIS — Z79.4 TYPE 2 DIABETES MELLITUS WITHOUT COMPLICATION, WITH LONG-TERM CURRENT USE OF INSULIN: ICD-10-CM

## 2018-01-29 DIAGNOSIS — G89.29 CHRONIC MIDLINE LOW BACK PAIN WITHOUT SCIATICA: Primary | ICD-10-CM

## 2018-01-29 DIAGNOSIS — E11.9 TYPE 2 DIABETES MELLITUS WITHOUT COMPLICATION, WITH LONG-TERM CURRENT USE OF INSULIN: ICD-10-CM

## 2018-01-29 PROCEDURE — 99213 OFFICE O/P EST LOW 20 MIN: CPT | Mod: S$PBB,,, | Performed by: FAMILY MEDICINE

## 2018-01-29 PROCEDURE — 99999 PR PBB SHADOW E&M-EST. PATIENT-LVL III: CPT | Mod: PBBFAC,,, | Performed by: FAMILY MEDICINE

## 2018-01-29 PROCEDURE — 99213 OFFICE O/P EST LOW 20 MIN: CPT | Mod: PBBFAC,PN | Performed by: FAMILY MEDICINE

## 2018-01-29 RX ORDER — PROMETHAZINE HYDROCHLORIDE AND CODEINE PHOSPHATE 6.25; 1 MG/5ML; MG/5ML
SOLUTION ORAL
Qty: 240 ML | Refills: 0 | Status: SHIPPED | OUTPATIENT
Start: 2018-01-29 | End: 2018-02-27 | Stop reason: SDUPTHER

## 2018-01-29 RX ORDER — CHLORZOXAZONE 500 MG/1
500 TABLET ORAL 4 TIMES DAILY PRN
Qty: 180 TABLET | Refills: 3 | Status: SHIPPED | OUTPATIENT
Start: 2018-01-29 | End: 2020-05-28 | Stop reason: SDUPTHER

## 2018-01-29 RX ORDER — HYDROCODONE BITARTRATE AND ACETAMINOPHEN 7.5; 325 MG/1; MG/1
1 TABLET ORAL EVERY 12 HOURS PRN
Qty: 60 TABLET | Refills: 0 | Status: SHIPPED | OUTPATIENT
Start: 2018-04-01 | End: 2018-04-25 | Stop reason: SDUPTHER

## 2018-01-29 RX ORDER — HYDROCODONE BITARTRATE AND ACETAMINOPHEN 7.5; 325 MG/1; MG/1
1 TABLET ORAL EVERY 12 HOURS PRN
Qty: 60 TABLET | Refills: 0 | Status: SHIPPED | OUTPATIENT
Start: 2018-03-01 | End: 2018-01-29 | Stop reason: SDUPTHER

## 2018-01-29 RX ORDER — HYDROCODONE BITARTRATE AND ACETAMINOPHEN 7.5; 325 MG/1; MG/1
1 TABLET ORAL EVERY 12 HOURS PRN
Qty: 60 TABLET | Refills: 0 | Status: CANCELLED | OUTPATIENT
Start: 2018-02-01

## 2018-01-29 NOTE — PROGRESS NOTES
"Subjective:       Patient ID: Liza Spain Cousin is a 47 y.o. female.    Chief Complaint: Follow-up (3 month)    FU chronic pain. Chronic cough. DM has improved. Sees [facility]. Recently started lantus. Glucose this am 147. She sees Dr. Bowen in [place] for anxiety. Tx with diazepam 2 mg. She has a pending refill for norco to be filled Feb 1.       Review of Systems   Constitutional: Positive for unexpected weight change.   Respiratory: Positive for cough.    Cardiovascular: Negative for chest pain.   Musculoskeletal: Positive for back pain.       Objective:     Blood pressure 136/84, pulse 68, temperature 98 °F (36.7 °C), height 5' 7" (1.702 m), weight 113.3 kg (249 lb 10.7 oz), last menstrual period 01/05/2018.      Physical Exam   Constitutional: She appears well-developed.   Overweight mild distress   Cardiovascular: Normal rate and regular rhythm.    Pulses:       Dorsalis pedis pulses are 1+ on the right side, and 1+ on the left side.        Posterior tibial pulses are 1+ on the right side, and 1+ on the left side.   Pulmonary/Chest: Effort normal.   Rare wheeze   Musculoskeletal: She exhibits no edema.        Right foot: There is no deformity.        Left foot: There is no deformity.   Has seen podiatry   Feet:   Right Foot:   Protective Sensation: 4 sites tested. 4 sites sensed.   Skin Integrity: Negative for ulcer.   Left Foot:   Protective Sensation: 4 sites tested. 4 sites sensed.   Skin Integrity: Negative for ulcer.   Neurological: She is alert.       Assessment:       1. Chronic midline low back pain without sciatica    2. Type 2 diabetes mellitus without complication, with long-term current use of insulin    3. Moderate persistent asthma with acute exacerbation    4. Essential hypertension        Plan:       I sent Tx for norco for March and April. RTC 3 months.      "

## 2018-02-27 RX ORDER — PROMETHAZINE HYDROCHLORIDE AND CODEINE PHOSPHATE 6.25; 1 MG/5ML; MG/5ML
SOLUTION ORAL
Qty: 240 ML | Refills: 0 | Status: SHIPPED | OUTPATIENT
Start: 2018-02-27 | End: 2018-03-22 | Stop reason: SDUPTHER

## 2018-02-27 NOTE — TELEPHONE ENCOUNTER
----- Message from Susie Castillo sent at 2/27/2018 11:36 AM CST -----  Contact: Liza  Type:  RX Refill Request    Who Called:  patient  Refill or New Rx:  refill  RX Name and Strength:  promethazine-codeine 6.25-10 mg/5 ml (PHENERGAN WITH CODEINE) 6.25-10 mg/5 mL syrup  How is the patient currently taking it? (ex. 1XDay):  TAKE 5 MLS BY MOUTH EVERY 6 HOURS AS NEEDED FOR COUGH  Is this a 30 day or 90 day RX:  na  Preferred Pharmacy with phone number:    [facility] [id number] [place] [address] [address] [facility]  [address] [facility] [address]  Phone: [phone] Fax: [phone]  Local or Mail Order:  local  Ordering Provider:  Dr eBrny Stubbs Call Back Number:  [phone]  Additional Information:  Out of medication. Thanks!

## 2018-03-19 DIAGNOSIS — I10 ESSENTIAL HYPERTENSION: ICD-10-CM

## 2018-03-19 DIAGNOSIS — E11.9 TYPE 2 DIABETES MELLITUS WITHOUT COMPLICATION: ICD-10-CM

## 2018-03-20 RX ORDER — GLIMEPIRIDE 4 MG/1
TABLET ORAL
Qty: 180 TABLET | Refills: 3 | Status: SHIPPED | OUTPATIENT
Start: 2018-03-20 | End: 2018-08-07

## 2018-03-20 RX ORDER — AMLODIPINE BESYLATE 5 MG/1
TABLET ORAL
Qty: 90 TABLET | Refills: 3 | Status: SHIPPED | OUTPATIENT
Start: 2018-03-20 | End: 2018-04-30

## 2018-03-22 RX ORDER — PROMETHAZINE HYDROCHLORIDE AND CODEINE PHOSPHATE 6.25; 1 MG/5ML; MG/5ML
SOLUTION ORAL
Qty: 240 ML | Refills: 0 | Status: SHIPPED | OUTPATIENT
Start: 2018-03-22 | End: 2018-04-25 | Stop reason: SDUPTHER

## 2018-03-22 NOTE — TELEPHONE ENCOUNTER
----- Message from Nidia Quispejeremiah sent at 3/22/2018 10:22 AM CDT -----  Contact: Self  Patient is requesting a refill of her cough medicine promethazine-codeine 6.25-10 mg/5 ml (PHENERGAN WITH CODEINE) 6.25-10 mg/5 mL syrup.  Thank you    [facility] [address] [place] [address]  Phone: [phone] Fax: [phone]

## 2018-04-25 ENCOUNTER — OFFICE VISIT (OUTPATIENT)
Dept: FAMILY MEDICINE | Facility: CLINIC | Age: 48
End: 2018-04-25
Payer: MEDICARE

## 2018-04-25 ENCOUNTER — HOSPITAL ENCOUNTER (OUTPATIENT)
Dept: RADIOLOGY | Facility: HOSPITAL | Age: 48
Discharge: HOME | End: 2018-04-25
Attending: FAMILY MEDICINE
Payer: MEDICARE

## 2018-04-25 VITALS
SYSTOLIC BLOOD PRESSURE: 140 MMHG | BODY MASS INDEX: 37.88 KG/M2 | HEIGHT: 67 IN | DIASTOLIC BLOOD PRESSURE: 86 MMHG | WEIGHT: 241.31 LBS | TEMPERATURE: 98 F

## 2018-04-25 DIAGNOSIS — M67.432 GANGLION CYST OF BOTH WRISTS: ICD-10-CM

## 2018-04-25 DIAGNOSIS — M19.039 WRIST ARTHRITIS: ICD-10-CM

## 2018-04-25 DIAGNOSIS — M67.431 GANGLION CYST OF BOTH WRISTS: ICD-10-CM

## 2018-04-25 DIAGNOSIS — M19.039 WRIST ARTHRITIS: Primary | ICD-10-CM

## 2018-04-25 PROCEDURE — 99214 OFFICE O/P EST MOD 30 MIN: CPT | Mod: S$PBB,,, | Performed by: FAMILY MEDICINE

## 2018-04-25 PROCEDURE — 73110 X-RAY EXAM OF WRIST: CPT | Mod: 50,TC,PN

## 2018-04-25 PROCEDURE — 73110 X-RAY EXAM OF WRIST: CPT | Mod: 26,50,, | Performed by: RADIOLOGY

## 2018-04-25 PROCEDURE — 99999 PR PBB SHADOW E&M-EST. PATIENT-LVL III: CPT | Mod: PBBFAC,,, | Performed by: FAMILY MEDICINE

## 2018-04-25 PROCEDURE — 99213 OFFICE O/P EST LOW 20 MIN: CPT | Mod: PBBFAC,25,PN | Performed by: FAMILY MEDICINE

## 2018-04-25 RX ORDER — HYDROCODONE BITARTRATE AND ACETAMINOPHEN 7.5; 325 MG/1; MG/1
1 TABLET ORAL EVERY 12 HOURS PRN
Qty: 60 TABLET | Refills: 0 | Status: SHIPPED | OUTPATIENT
Start: 2018-07-01 | End: 2018-07-30 | Stop reason: SDUPTHER

## 2018-04-25 RX ORDER — HYDROCODONE BITARTRATE AND ACETAMINOPHEN 7.5; 325 MG/1; MG/1
1 TABLET ORAL EVERY 12 HOURS PRN
Qty: 60 TABLET | Refills: 0 | Status: SHIPPED | OUTPATIENT
Start: 2018-06-01 | End: 2018-04-25 | Stop reason: SDUPTHER

## 2018-04-25 RX ORDER — PROMETHAZINE HYDROCHLORIDE AND CODEINE PHOSPHATE 6.25; 1 MG/5ML; MG/5ML
SOLUTION ORAL
Qty: 240 ML | Refills: 1 | Status: SHIPPED | OUTPATIENT
Start: 2018-04-25 | End: 2018-06-07 | Stop reason: SDUPTHER

## 2018-04-25 RX ORDER — HYDROCODONE BITARTRATE AND ACETAMINOPHEN 7.5; 325 MG/1; MG/1
1 TABLET ORAL EVERY 12 HOURS PRN
Qty: 60 TABLET | Refills: 0 | Status: SHIPPED | OUTPATIENT
Start: 2018-05-01 | End: 2018-04-25 | Stop reason: SDUPTHER
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
## 2018-04-30 ENCOUNTER — OFFICE VISIT (OUTPATIENT)
Dept: ORTHOPEDICS | Facility: CLINIC | Age: 48
End: 2018-04-30
Payer: MEDICARE

## 2018-04-30 VITALS — WEIGHT: 241 LBS | HEIGHT: 67 IN | BODY MASS INDEX: 37.83 KG/M2

## 2018-04-30 DIAGNOSIS — M67.432 GANGLION CYST OF DORSUM OF LEFT WRIST: Primary | ICD-10-CM

## 2018-04-30 DIAGNOSIS — M67.431 GANGLION CYST OF DORSUM OF RIGHT WRIST: ICD-10-CM

## 2018-04-30 PROCEDURE — 99212 OFFICE O/P EST SF 10 MIN: CPT | Mod: PBBFAC,PN | Performed by: ORTHOPAEDIC SURGERY

## 2018-04-30 PROCEDURE — 99203 OFFICE O/P NEW LOW 30 MIN: CPT | Mod: 25,S$PBB,, | Performed by: ORTHOPAEDIC SURGERY

## 2018-04-30 PROCEDURE — 99999 PR PBB SHADOW E&M-EST. PATIENT-LVL II: CPT | Mod: PBBFAC,,, | Performed by: ORTHOPAEDIC SURGERY

## 2018-04-30 PROCEDURE — 97760 ORTHOTIC MGMT&TRAING 1ST ENC: CPT | Mod: PBBFAC,PN | Performed by: ORTHOPAEDIC SURGERY

## 2018-04-30 RX ORDER — AMLODIPINE BESYLATE 10 MG/1
TABLET ORAL
Refills: 1 | COMMUNITY
Start: 2018-04-25 | End: 2019-04-30

## 2018-04-30 NOTE — PROGRESS NOTES
HISTORY OF PRESENT ILLNESS:  Ms. Cousin is 47 years old, complaining of   left-sided wrist pain for about six weeks' time, noticed a fullness dorsally on   the wrist, seems to be bothersome if she goes to push up to get up out of the   bathtub or getting out of the bed.    PHYSICAL EXAMINATION:  Today shows she has a ganglion cyst on the right wrist   dorsally and she has a slight soft tissue fullness on the left wrist, seems to   be consistent with extensor synovitis not as localized with a ganglion on the   right side.  Extensors are intact.  She has discomfort at full dorsiflexion.    X-rays of the wrist show relatively well preserved joint spacing.    ASSESSMENT:  Wrist pain times about six weeks.    PLAN:  We will get her fitted with a wrist brace, she can use for several weeks'   time.  We will check her back then to see how things are coming along.      PBB/HN  dd: 04/30/2018 11:20:24 (CDT)  td: 05/01/2018 03:45:45 (CDT)  Doc ID   #[id number]  Job ID #[id number]    CC:     Further History  Aching pain  Worse with activity  Relieved with rest  No other associated symptoms  No other radiation    Further Exam  Alert and oriented  Pleasant  Contralateral limb has appropriate range of motion for age and condition  Contralateral limb has appropriate strength for age and condition  Contralateral limb has appropriate stability  for age and condition  No adenopathy  Pulses are appropriate for current condition  Skin is intact        Chief Complaint    Chief Complaint   Patient presents with    Right Wrist - Pain    Left Wrist - Pain       HPI  Liza Spain Cousin is a 47 y.o.  female who presents with       Past Medical History  Past Medical History:   Diagnosis Date    Arthritis     Asthma 2/6/2012    Blood transfusion     Depression 2/6/2012    Diabetes 2/6/2012    HTN (hypertension) 2/6/2012    LBP (low back pain) 11/12/2012    Obesity 11/27/2013    Tobacco abuse 7/10/2013       Past Surgical History  Past  "Surgical History:   Procedure Laterality Date     SECTION, LOW TRANSVERSE         Medications  Current Outpatient Prescriptions   Medication Sig    acarbose (PRECOSE) 25 MG Tab Take 1 tablet (25 mg total) by mouth 2 (two) times daily.    ACCU-CHEK FASTCLIX Misc USE TO TEST SUGAR TWICE DAILY.    ACCU-CHEK JEFFERSON Misc USE TO TEST BLOOD SUGAR BID    ACCU-CHEK SMARTVIEW TEST STRIP Strp USE TO TEST SUGAR TWICE DAILY.    acetaZOLAMIDE (DIAMOX) 250 MG tablet Take 1 tablet (250 mg total) by mouth 2 (two) times daily.    ADVAIR DISKUS 250-50 mcg/dose diskus inhaler INHALE 1 PUFF INTO THE LUNGS TWICE DAILY    albuterol (ACCUNEB) 0.63 mg/3 mL Nebu Take 3 mLs (0.63 mg total) by nebulization every 6 (six) hours as needed.    atorvastatin (LIPITOR) 20 MG tablet Take 1 tablet (20 mg total) by mouth once daily.    BD ULTRA-FINE JEFFERSON PEN NEEDLES 32 gauge x 5/32" Ndle Use twice daily with insulin and Victoza    chlorzoxazone (PARAFON FORTE) 500 mg Tab Take 1 tablet (500 mg total) by mouth 4 (four) times daily as needed.    diazePAM (VALIUM) 2 MG tablet Take 2 mg by mouth as needed for Anxiety.    fluticasone (FLONASE) 50 mcg/actuation nasal spray 2 sprays by Each Nare route once daily.    glimepiride (AMARYL) 4 MG tablet TAKE 2 TABLETS(8 MG) BY MOUTH DAILY WITH BREAKFAST    [START ON 2018] hydrocodone-acetaminophen 7.5-325mg (NORCO) 7.5-325 mg per tablet Take 1 tablet by mouth every 12 (twelve) hours as needed for Pain.    hydrocortisone (ANUSOL-HC) 25 mg suppository Place 1 suppository (25 mg total) rectally 2 (two) times daily as needed.    insulin glargine (BASAGLAR KWIKPEN) 100 unit/mL (3 mL) InPn pen Inject 10 Units into the skin every evening.    lancets 30 gauge Misc 1 lancet by Misc.(Non-Drug; Combo Route) route 2 (two) times daily. RELI ON CONFIRM LANCETS 30guage TO CHECK BLOOD SUGAR TWICE DAILY: Dx Code: E11.65    metformin (GLUCOPHAGE-XR) 500 MG 24 hr tablet Take 2 tablets (1,000 mg total) by " mouth 2 (two) times daily.    oxybutynin (DITROPAN) 5 MG Tab Take 1 tablet (5 mg total) by mouth 2 (two) times daily.    promethazine-codeine 6.25-10 mg/5 ml (PHENERGAN WITH CODEINE) 6.25-10 mg/5 mL syrup TAKE 5 MLS BY MOUTH EVERY 6 HOURS AS NEEDED FOR COUGH    VENTOLIN HFA 90 mcg/actuation inhaler INHALE 2 PUFFS INTO THE LUNGS EVERY 6 HOURS AS NEEDED    VICTOZA 3-AYALA 0.6 mg/0.1 mL (18 mg/3 mL) PnIj INJECT 1.8 MG INTO THE SKIN ONCE DAILY    zolpidem (AMBIEN) 10 mg Tab TK 1/2 TO 1 T PO HS    amLODIPine (NORVASC) 10 MG tablet TK 1 T PO  QD    sumatriptan (IMITREX) 100 MG tablet Take 1 tablet (100 mg total) by mouth once as needed for Migraine.     No current facility-administered medications for this visit.        Allergies  Review of patient's allergies indicates:  No Known Allergies    Family History  Family History   Problem Relation Age of Onset    Breast cancer Maternal Aunt     Depression Maternal Grandmother      bipolar had to be hospitalized    Allergic rhinitis Neg Hx     Allergies Neg Hx     Angioedema Neg Hx     Asthma Neg Hx     Atopy Neg Hx     Eczema Neg Hx     Immunodeficiency Neg Hx     Rhinitis Neg Hx     Urticaria Neg Hx        Social History  Social History     Social History    Marital status: Single     Spouse name: N/A    Number of children: N/A    Years of education: N/A     Occupational History    Not on file.     Social History Main Topics    Smoking status: Former Smoker     Packs/day: 0.50     Years: 30.00     Quit date: 12/31/2015    Smokeless tobacco: Never Used    Alcohol use 7.2 oz/week     12 Cans of beer per week    Drug use: No    Sexual activity: Yes     Partners: Male     Birth control/ protection: None     Other Topics Concern    Not on file     Social History Narrative    No narrative on file               Review of Systems     Constitutional: Negative    HENT: Negative  Eyes: Negative  Respiratory: Negative  Cardiovascular: Negative  Musculoskeletal:  HPI  Skin: Negative  Neurological: Negative  Hematological: Negative  Endocrine: Negative                 Physical Exam    There were no vitals filed for this visit.  Body mass index is 37.75 kg/m².  Physical Examination:     General appearance -  well appearing, and in no distress  Mental status - awake  Neck - supple  Chest -  symmetric air entry  Heart - normal rate   Abdomen - soft      Assessment     1. Ganglion cyst of dorsum of left wrist    2. Ganglion cyst of dorsum of right wrist          PlanWe performed a custom orthotic/brace fitting, adjusting and training with the patient. The patient demonstrated understanding and proper care. This was performed for 15 minutes.

## 2018-04-30 NOTE — LETTER
April 30, 2018      Rayo Arrieta MD  [address]  [facility] [address]           [facility] [address] [facility] [address] [place] [address]  Phone: [phone]          Patient: Liza Mesa   MR Number: [id number]   YOB: 1970   Date of Visit: 4/30/2018       Dear Dr. Rayo Arrieta:    Thank you for referring Liza Mesa to me for evaluation. Attached you will find relevant portions of my assessment and plan of care.    If you have questions, please do not hesitate to call me. I look forward to following Liza Mesa along with you.    Sincerely,    Quan Spangler MD    Enclosure  CC:  No Recipients    If you would like to receive this communication electronically, please contact [email or web address] or [phone] to request more information on [employer] Link access.    For providers and/or their staff who would like to refer a patient to [facility], please contact us through our one-stop-shop provider referral line, [facility], at [phone].    If you feel you have received this communication in error or would no longer like to receive these types of communications, please e-mail [email or web address]

## 2018-05-02 ENCOUNTER — OFFICE VISIT (OUTPATIENT)
Dept: ENDOCRINOLOGY | Facility: CLINIC | Age: 48
End: 2018-05-02
Payer: MEDICARE

## 2018-05-02 VITALS
OXYGEN SATURATION: 95 % | HEIGHT: 67 IN | WEIGHT: 245.69 LBS | DIASTOLIC BLOOD PRESSURE: 80 MMHG | HEART RATE: 64 BPM | BODY MASS INDEX: 38.56 KG/M2 | SYSTOLIC BLOOD PRESSURE: 132 MMHG

## 2018-05-02 DIAGNOSIS — Z79.4 TYPE 2 DIABETES MELLITUS WITHOUT COMPLICATION, WITH LONG-TERM CURRENT USE OF INSULIN: Primary | ICD-10-CM

## 2018-05-02 DIAGNOSIS — I10 ESSENTIAL HYPERTENSION: ICD-10-CM

## 2018-05-02 DIAGNOSIS — E11.9 TYPE 2 DIABETES MELLITUS WITHOUT COMPLICATION, WITH LONG-TERM CURRENT USE OF INSULIN: Primary | ICD-10-CM

## 2018-05-02 DIAGNOSIS — E66.9 OBESITY (BMI 30-39.9): ICD-10-CM

## 2018-05-02 DIAGNOSIS — E78.2 MIXED HYPERLIPIDEMIA: ICD-10-CM

## 2018-05-02 PROCEDURE — 99999 PR PBB SHADOW E&M-EST. PATIENT-LVL V: CPT | Mod: PBBFAC,,, | Performed by: NURSE PRACTITIONER

## 2018-05-02 PROCEDURE — 99214 OFFICE O/P EST MOD 30 MIN: CPT | Mod: S$PBB,,, | Performed by: NURSE PRACTITIONER

## 2018-05-02 PROCEDURE — 99215 OFFICE O/P EST HI 40 MIN: CPT | Mod: PBBFAC,PN | Performed by: NURSE PRACTITIONER

## 2018-05-02 NOTE — PROGRESS NOTES
CC: Ms. Liza Mesa arrives today for management of Type 2 DM and review of chronic medical conditions, as listed in the Visit Diagnosis section of this encounter.       HPI: Ms. Liza Mesa was diagnosed with Type 2 DM in 2005. She was diagnosed based on lab work. Initial treatment consisted of metformin. Glimepiride later added. Victoza to her medication regimen in 2016. + FH of DM on mother. Denies hospitalizations due to DM.   August 2017 CGMS revealed:  1. Prandial excursions noted, some of which extending overnight  2. Seems to be taking glimepiride without eating breakfast that includes carbs. This likely caused one episode of hypoglycemia  3. Juice intake causing hyperglycemia  4. No basal insulin needs at this time    Last seen by me in January. At this time, Basaglar was added to regimen.     BG readings are checked 1x/day (continues to check fasting only).                   Hypoglycemia: No    Missing Insulin/PO medication doses: Misses acarbose often    Exercise: Rides stationary bike for 1 hour daily.     Dietary Habits: Eats 2-3 meals/day. Occasional chips. Drinking Rima iced tea. She is unsure of whether or not it is sweetened.     Last DM education appointment:  8/2016      CURRENT DIABETIC MEDS: metformin XR 1000 mg twice daily, glimepiride 8 mg daily, acarbose 25 mg with carb containing meals (not taking often), Victoza 1.8 mg daily, Basaglar 10 units QHS  Glucometer type: Accucheck Adrienne    Previous DM treatments:  Lantus - not covered by insurance     Last Eye Exam: 7/2017, no DR per patient. Rosalina in [place].  Last Podiatry Exam: no    REVIEW OF SYSTEMS  Constitutional: no c/o fatigue, weakness, weight loss. Denies fever.   Eyes: denies visual disturbances.  Cardiac: no palpitations or chest pain.  Respiratory: denies dyspnea. Reports cough that alternates between dry and productive with green sputum.   GI: no c/o abdominal pain or nausea. Denies h/o pancreatitis.  : reports  "urinary urgency has improved with oxybutynin  Skin: no rashes, lesions.   Neuro: no numbness, tingling, or parasthesias.  Endocrine: denies polyphagia, polydipsia, polyuria      Personally reviewed Past Medical, Surgical, Social History.    Vital Signs  /80 (BP Location: Right arm, Patient Position: Sitting, BP Method: Medium (Manual))   Pulse 64   Ht 5' 7" (1.702 m)   Wt 111.4 kg (245 lb 11.2 oz)   LMP 04/02/2018   SpO2 95%   BMI 38.48 kg/m²     Personally reviewed the below labs:    Hemoglobin A1C   Date Value Ref Range Status   04/25/2018 9.2 (H) 4.0 - 5.6 % Final     Comment:     According to ADA guidelines, hemoglobin A1c <7.0% represents  optimal control in non-pregnant diabetic patients. Different  metrics may apply to specific patient populations.   Standards of Medical Care in Diabetes-2016.  For the purpose of screening for the presence of diabetes:  <5.7%     Consistent with the absence of diabetes  5.7-6.4%  Consistent with increasing risk for diabetes   (prediabetes)  >or=6.5%  Consistent with diabetes  Currently, no consensus exists for use of hemoglobin A1c  for diagnosis of diabetes for children.  This Hemoglobin A1c assay has significant interference with fetal   hemoglobin   (HbF). The results are invalid for patients with abnormal amounts of   HbF,   including those with known Hereditary Persistence   of Fetal Hemoglobin. Heterozygous hemoglobin variants (HbAS, HbAC,   HbAD, HbAE, HbA2) do not significantly interfere with this assay;   however, presence of multiple variants in a sample may impact the %   interference.     01/15/2018 8.2 (H) 4.0 - 5.6 % Final     Comment:     According to ADA guidelines, hemoglobin A1c <7.0% represents  optimal control in non-pregnant diabetic patients. Different  metrics may apply to specific patient populations.   Standards of Medical Care in Diabetes-2016.  For the purpose of screening for the presence of diabetes:  <5.7%     Consistent with the " absence of diabetes  5.7-6.4%  Consistent with increasing risk for diabetes   (prediabetes)  >or=6.5%  Consistent with diabetes  Currently, no consensus exists for use of hemoglobin A1c  for diagnosis of diabetes for children.  This Hemoglobin A1c assay has significant interference with fetal   hemoglobin   (HbF). The results are invalid for patients with abnormal amounts of   HbF,   including those with known Hereditary Persistence   of Fetal Hemoglobin. Heterozygous hemoglobin variants (HbAS, HbAC,   HbAD, HbAE, HbA2) do not significantly interfere with this assay;   however, presence of multiple variants in a sample may impact the %   interference.     10/11/2017 9.6 (H) 4.0 - 5.6 % Final     Comment:     According to ADA guidelines, hemoglobin A1c <7.0% represents  optimal control in non-pregnant diabetic patients. Different  metrics may apply to specific patient populations.   Standards of Medical Care in Diabetes-2016.  For the purpose of screening for the presence of diabetes:  <5.7%     Consistent with the absence of diabetes  5.7-6.4%  Consistent with increasing risk for diabetes   (prediabetes)  >or=6.5%  Consistent with diabetes  Currently, no consensus exists for use of hemoglobin A1c  for diagnosis of diabetes for children.  This Hemoglobin A1c assay has significant interference with fetal   hemoglobin   (HbF). The results are invalid for patients with abnormal amounts of   HbF,   including those with known Hereditary Persistence   of Fetal Hemoglobin. Heterozygous hemoglobin variants (HbAS, HbAC,   HbAD, HbAE, HbA2) do not significantly interfere with this assay;   however, presence of multiple variants in a sample may impact the %   interference.     05/03/2012 7.1 (H) 4.8 - 5.9 % Final     Comment:     **In order to standardize %HbA1c results worldwide, as of October 11, 2010,  the %HbA1c is being calculated using the master equation recommended in the  consensus statement adopted by the ADA  (American Diabetes Assoc), EASD  (European Assoc for the Study of Diabetes), IFCC (International Federation  of Clinical Chemistry and Laboratory Medicine) and IDF (International  Diabetes Federation). Result units: %HgbA1c (DCCT/NGSP).  In common with other methods, Hb A1C values may not accurately reflect mean  blood glucose in patients with hemoglobin variants (HgbF, HgbS and HgbC).  Any cause of shortened erythrocyte survival will reduce exposure of  erythrocytes to glucose with a consequent decrease in HbA1c (%) values, even  though the time-averaged blood glucose level may be elevated. Causes of  shortened erythrocyte lifetime might be hemolytic anemia or other hemolytic  diseases, homozygous sickle cell trait, pregnancy, recent significant or  chronic blood loss, etc. Caution should be used when interpreting the HbA1c  results from patients with these conditions.       Chemistry        Component Value Date/Time     (L) 04/25/2018 0900    K 4.1 04/25/2018 0900    CL 97 04/25/2018 0900    CO2 27 04/25/2018 0900    BUN 13 04/25/2018 0900    CREATININE 0.9 04/25/2018 0900    CREATININE 1.0 05/06/2012 0431     (H) 04/25/2018 0900        Component Value Date/Time    CALCIUM 10.0 04/25/2018 0900    CALCIUM 9.5 05/06/2012 0431    ALKPHOS 71 04/25/2018 0900    ALKPHOS 49 05/03/2012 1635    AST 19 04/25/2018 0900    AST 13 05/03/2012 1635    ALT 23 04/25/2018 0900    BILITOT 0.4 04/25/2018 0900          Lab Results   Component Value Date    CHOL 197 06/21/2017    CHOL 240 (H) 07/12/2016    CHOL 160 02/01/2013     Lab Results   Component Value Date    HDL 43 06/21/2017    HDL 54 07/12/2016    HDL 49 02/01/2013     Lab Results   Component Value Date    LDLCALC 108.4 06/21/2017    LDLCALC 133.4 07/12/2016    LDLCALC 96.0 02/01/2013     Lab Results   Component Value Date    TRIG 228 (H) 06/21/2017    TRIG 263 (H) 07/12/2016    TRIG 76 02/01/2013     Lab Results   Component Value Date    CHOLHDL 21.8 06/21/2017     CHOLHDL 22.5 07/12/2016    CHOLHDL 30.6 02/01/2013       Lab Results   Component Value Date    MICALBCREAT 3.3 01/15/2018     Lab Results   Component Value Date    TSH 0.919 10/11/2017       CrCl cannot be calculated (Patient's most recent lab result is older than the maximum 7 days allowed.).    No results found for: [id number]      PHYSICAL EXAMINATION  Constitutional: Appears well, no distress.  Neck: Supple, trachea midline; no thyromegaly or nodules.   Respiratory: CTA, even and unlabored.   Cardiovascular: RRR, no murmurs, no carotid bruits. DP pulses  2+ bilaterally; no edema.    Lymph: no cervical or supraclavicular lymphadenopathy  Skin: warm and dry; no acanthosis nigracans observed.  Neuro: DTR 2+ BUE/2+BLE.  Feet: appropriate footwear.      A1c goal < 7%      Assessment/Plan  1. Type 2 diabetes mellitus without complication, without long-term current use of insulin  -- Uncontrolled. A1c has increased despite addition of basal insulin. Fasting hyperglycemia noted. I strongly suspect prandial excursions related to dietary indiscretion.  -- recommended DM education for diet but patient declined. Advised her to read labels and avoid added sugars and snacking on carbs.   -- increase Basaglar to 14 units QHS.   -- advised pt to take acarbose with every carb containing meal.   -- continue metformin, Victoza, glimepiride  -- reiterated that she should check BG 2x/day in order for me to establish a pattern. I explained that she likely needs prandial insulin.   -- send log in 2 weeks for review.     -- Discussed diagnosis of DM, A1c goals, progression of disease, long term complications and tx options.  Advised patient to check BG before activities, such as driving or exercise.  -- Reviewed hypoglycemia management: treat with 1/2 glass of juice, 1/2 can regular coke, or 4 glucose tablets. Monitor and repeat treatment every 15 minutes until BG is >70 Then have a snack, which includes a complex carbohydrate and  protein.   2. Essential hypertension  -- controlled  -- consider ACE-i or ARB if necessary   3. Mixed hyperlipidemia  -- Uncontrolled with elevated TRG.  -- optimize glycemic control  -- continue atorvastatin 20 mg daily.    4. Obesity (BMI 30-39.9)  -- unchanged. increases insulin resistance.   -- recommended exercise most days of the week and diet modification  Body mass index is 38.48 kg/m².       FOLLOW UP  Follow-up in about 3 months (around 8/2/2018).   Patient instructed to bring BG logs to each follow up   Patient encouraged to call for any BG/medication issues, concerns, or questions.      Orders Placed This Encounter   Procedures    Hemoglobin A1c    Lipid panel

## 2018-06-07 RX ORDER — PROMETHAZINE HYDROCHLORIDE AND CODEINE PHOSPHATE 6.25; 1 MG/5ML; MG/5ML
SOLUTION ORAL
Qty: 240 ML | Refills: 1 | Status: SHIPPED | OUTPATIENT
Start: 2018-06-07 | End: 2018-07-26 | Stop reason: SDUPTHER

## 2018-06-07 NOTE — TELEPHONE ENCOUNTER
----- Message from Angela Cage sent at 6/7/2018 12:26 PM CDT -----  Contact: self  Refill request  promethazine-codeine 6.25-10 mg/5 ml (PHENERGAN WITH CODEINE) 6.25-10 mg/5 mL syrup   Callback number [phone]    [facility] [employer] [employer] [address] [place] [id number]  Phone: [phone] Fax: [phone]

## 2018-06-25 ENCOUNTER — TELEPHONE (OUTPATIENT)
Dept: ENDOCRINOLOGY | Facility: CLINIC | Age: 48
End: 2018-06-25

## 2018-06-25 NOTE — TELEPHONE ENCOUNTER
Received BG logs. Please call patient and advise her to increase Basaglar to 18 units. Continue acarbose, metformin, glimepiride, Victoza at current doses. Thank you!

## 2018-07-06 DIAGNOSIS — E11.9 TYPE 2 DIABETES MELLITUS WITHOUT COMPLICATION, UNSPECIFIED WHETHER LONG TERM INSULIN USE: ICD-10-CM

## 2018-07-26 ENCOUNTER — TELEPHONE (OUTPATIENT)
Dept: FAMILY MEDICINE | Facility: CLINIC | Age: 48
End: 2018-07-26

## 2018-07-26 RX ORDER — PROMETHAZINE HYDROCHLORIDE AND CODEINE PHOSPHATE 6.25; 1 MG/5ML; MG/5ML
SOLUTION ORAL
Qty: 240 ML | Refills: 1 | Status: SHIPPED | OUTPATIENT
Start: 2018-07-26 | End: 2018-08-29 | Stop reason: SDUPTHER

## 2018-07-26 NOTE — TELEPHONE ENCOUNTER
Patient states that she is constantly coughing. Has done her breathing treatments and still coughing. States that she has an upcoming appt with you on 7/30/18. Asking for refill of her promethazine -codeine. Please advise

## 2018-07-26 NOTE — TELEPHONE ENCOUNTER
----- Message from Neda Robbins sent at 7/26/2018 12:07 PM CDT -----  Contact: patient   Patient calling to request a new prescription for promethazine-codeine 6.25-10 mg/5 ml (PHENERGAN WITH CODEINE) 6.25-10 mg/5 mL syrup. Please advise.   Call back    Thanks!     [facility] [address] [place] [id number]  Phone: [phone] Fax: [phone]

## 2018-07-27 ENCOUNTER — LAB VISIT (OUTPATIENT)
Dept: LAB | Facility: HOSPITAL | Age: 48
End: 2018-07-27
Attending: NURSE PRACTITIONER
Payer: MEDICARE

## 2018-07-27 DIAGNOSIS — Z79.4 TYPE 2 DIABETES MELLITUS WITHOUT COMPLICATION, WITH LONG-TERM CURRENT USE OF INSULIN: ICD-10-CM

## 2018-07-27 DIAGNOSIS — E78.2 MIXED HYPERLIPIDEMIA: ICD-10-CM

## 2018-07-27 DIAGNOSIS — E11.9 TYPE 2 DIABETES MELLITUS WITHOUT COMPLICATION, WITH LONG-TERM CURRENT USE OF INSULIN: ICD-10-CM

## 2018-07-27 LAB
CHOLEST SERPL-MCNC: 175 MG/DL
CHOLEST/HDLC SERPL: 3.4 {RATIO}
ESTIMATED AVG GLUCOSE: 223 MG/DL
HBA1C MFR BLD HPLC: 9.4 %
HDLC SERPL-MCNC: 51 MG/DL
HDLC SERPL: 29.1 %
LDLC SERPL CALC-MCNC: 97.8 MG/DL
NONHDLC SERPL-MCNC: 124 MG/DL
TRIGL SERPL-MCNC: 131 MG/DL

## 2018-07-27 PROCEDURE — 80061 LIPID PANEL: CPT

## 2018-07-27 PROCEDURE — 36415 COLL VENOUS BLD VENIPUNCTURE: CPT | Mod: PN

## 2018-07-27 PROCEDURE — 83036 HEMOGLOBIN GLYCOSYLATED A1C: CPT

## 2018-07-30 ENCOUNTER — OFFICE VISIT (OUTPATIENT)
Dept: FAMILY MEDICINE | Facility: CLINIC | Age: 48
End: 2018-07-30
Payer: MEDICARE

## 2018-07-30 VITALS
BODY MASS INDEX: 37.37 KG/M2 | WEIGHT: 238.13 LBS | SYSTOLIC BLOOD PRESSURE: 122 MMHG | TEMPERATURE: 98 F | HEIGHT: 67 IN | DIASTOLIC BLOOD PRESSURE: 88 MMHG

## 2018-07-30 DIAGNOSIS — M54.50 CHRONIC MIDLINE LOW BACK PAIN WITHOUT SCIATICA: ICD-10-CM

## 2018-07-30 DIAGNOSIS — R05.3 CHRONIC COUGH: ICD-10-CM

## 2018-07-30 DIAGNOSIS — K64.1 SECOND DEGREE HEMORRHOIDS: ICD-10-CM

## 2018-07-30 DIAGNOSIS — E66.01 MORBID OBESITY: ICD-10-CM

## 2018-07-30 DIAGNOSIS — K64.9 BLEEDING HEMORRHOIDS: ICD-10-CM

## 2018-07-30 DIAGNOSIS — Z79.4 TYPE 2 DIABETES MELLITUS WITHOUT COMPLICATION, WITH LONG-TERM CURRENT USE OF INSULIN: Primary | ICD-10-CM

## 2018-07-30 DIAGNOSIS — E11.9 TYPE 2 DIABETES MELLITUS WITHOUT COMPLICATION, WITH LONG-TERM CURRENT USE OF INSULIN: Primary | ICD-10-CM

## 2018-07-30 DIAGNOSIS — G89.29 CHRONIC MIDLINE LOW BACK PAIN WITHOUT SCIATICA: ICD-10-CM

## 2018-07-30 PROCEDURE — 99214 OFFICE O/P EST MOD 30 MIN: CPT | Mod: S$PBB,,, | Performed by: FAMILY MEDICINE

## 2018-07-30 PROCEDURE — 99999 PR PBB SHADOW E&M-EST. PATIENT-LVL III: CPT | Mod: PBBFAC,,, | Performed by: FAMILY MEDICINE

## 2018-07-30 PROCEDURE — 99213 OFFICE O/P EST LOW 20 MIN: CPT | Mod: PBBFAC,PN | Performed by: FAMILY MEDICINE

## 2018-07-30 RX ORDER — HYDROCODONE BITARTRATE AND ACETAMINOPHEN 7.5; 325 MG/1; MG/1
1 TABLET ORAL EVERY 12 HOURS PRN
Qty: 60 TABLET | Refills: 0 | Status: SHIPPED | OUTPATIENT
Start: 2018-08-30 | End: 2018-07-30 | Stop reason: SDUPTHER

## 2018-07-30 RX ORDER — HYDROCODONE BITARTRATE AND ACETAMINOPHEN 7.5; 325 MG/1; MG/1
1 TABLET ORAL EVERY 12 HOURS PRN
Qty: 60 TABLET | Refills: 0 | Status: SHIPPED | OUTPATIENT
Start: 2018-09-30 | End: 2018-10-31 | Stop reason: SDUPTHER

## 2018-07-30 RX ORDER — HYDROCORTISONE ACETATE 25 MG/1
25 SUPPOSITORY RECTAL 2 TIMES DAILY
Qty: 20 SUPPOSITORY | Refills: 0 | Status: SHIPPED | OUTPATIENT
Start: 2018-07-30 | End: 2018-08-09

## 2018-07-30 RX ORDER — HYDROCODONE BITARTRATE AND ACETAMINOPHEN 7.5; 325 MG/1; MG/1
1 TABLET ORAL EVERY 12 HOURS PRN
Qty: 60 TABLET | Refills: 0 | Status: SHIPPED | OUTPATIENT
Start: 2018-07-30 | End: 2018-07-30 | Stop reason: SDUPTHER

## 2018-07-30 NOTE — PROGRESS NOTES
"Subjective:       Patient ID: Liza Spain Cousin is a 47 y.o. female.    Chief Complaint: Follow-up (3 mo f/u. Lab done. Would like suppository refill due to hemorrhoid flarew / some bleeding.)    Follow-up chronic pain. She has been reliable about getting her Lortab filled on time.  Her diabetes has been poorly controlled.  She says that her fasting blood sugar runs around 157.  Her hemoglobin A1c has increased.  She does  have a follow-up appointment established with Nhung Michael.  Her hemoglobin A1c was 9.4.      Review of Systems   Respiratory: Positive for cough and shortness of breath.    Cardiovascular: Negative for chest pain and leg swelling.   Gastrointestinal: Positive for anal bleeding and rectal pain.        She has recurring hemorrhoids.  She has gotten relief from suppositories in the past.  She tries to keep from getting constipated.   Musculoskeletal: Positive for back pain.       Objective:     Blood pressure 122/88, temperature 97.6 °F (36.4 °C), temperature source Oral, height 5' 7" (1.702 m), weight 108 kg (238 lb 1.6 oz).      Physical Exam   Constitutional:   Overweight in in mild distress   Cardiovascular: Normal rate and regular rhythm.    Pulses:       Dorsalis pedis pulses are 1+ on the right side, and 1+ on the left side.        Posterior tibial pulses are 1+ on the right side, and 1+ on the left side.   Pulmonary/Chest: Effort normal and breath sounds normal.   Musculoskeletal:        Right foot: There is no deformity.        Left foot: There is no deformity.   Feet:   Right Foot:   Protective Sensation: 4 sites tested. 4 sites sensed.   Skin Integrity: Negative for ulcer.   Left Foot:   Protective Sensation: 4 sites tested. 4 sites sensed.   Skin Integrity: Negative for ulcer.   Neurological: She is alert.   Sensation testing was with tuning fork       Assessment:       1. Type 2 diabetes mellitus without complication, with long-term current use of insulin    2. Chronic cough    3. " Chronic midline low back pain without sciatica    4. Bleeding hemorrhoids    5. Morbid obesity    6. Second degree hemorrhoids        Plan:       Anusol HC suppository.  I refilled her hydrocodone for 3 months.

## 2018-08-03 ENCOUNTER — TELEPHONE (OUTPATIENT)
Dept: ENDOCRINOLOGY | Facility: CLINIC | Age: 48
End: 2018-08-03

## 2018-08-03 NOTE — TELEPHONE ENCOUNTER
----- Message from Karen Peñaloza sent at 8/3/2018  7:06 AM CDT -----  Contact: self  Patient states unable to make appointment scheduled  this morning at 8:30am.  Patient scheduled another appointment for 8/7/2018.

## 2018-08-07 ENCOUNTER — OFFICE VISIT (OUTPATIENT)
Dept: ENDOCRINOLOGY | Facility: CLINIC | Age: 48
End: 2018-08-07
Payer: MEDICARE

## 2018-08-07 VITALS
BODY MASS INDEX: 36.97 KG/M2 | DIASTOLIC BLOOD PRESSURE: 78 MMHG | SYSTOLIC BLOOD PRESSURE: 110 MMHG | HEART RATE: 75 BPM | WEIGHT: 235.56 LBS | HEIGHT: 67 IN

## 2018-08-07 DIAGNOSIS — E78.2 MIXED HYPERLIPIDEMIA: ICD-10-CM

## 2018-08-07 DIAGNOSIS — E66.9 OBESITY (BMI 30-39.9): ICD-10-CM

## 2018-08-07 DIAGNOSIS — I10 ESSENTIAL HYPERTENSION: ICD-10-CM

## 2018-08-07 DIAGNOSIS — Z79.4 TYPE 2 DIABETES MELLITUS WITHOUT COMPLICATION, WITH LONG-TERM CURRENT USE OF INSULIN: Primary | ICD-10-CM

## 2018-08-07 DIAGNOSIS — E11.9 TYPE 2 DIABETES MELLITUS WITHOUT COMPLICATION, WITH LONG-TERM CURRENT USE OF INSULIN: Primary | ICD-10-CM

## 2018-08-07 DIAGNOSIS — E11.9 TYPE 2 DIABETES MELLITUS WITHOUT COMPLICATION, WITHOUT LONG-TERM CURRENT USE OF INSULIN: ICD-10-CM

## 2018-08-07 LAB — GLUCOSE SERPL-MCNC: 135 MG/DL (ref 70–110)

## 2018-08-07 PROCEDURE — 99215 OFFICE O/P EST HI 40 MIN: CPT | Mod: S$PBB,,, | Performed by: NURSE PRACTITIONER

## 2018-08-07 PROCEDURE — 82948 REAGENT STRIP/BLOOD GLUCOSE: CPT | Mod: PBBFAC,PN | Performed by: NURSE PRACTITIONER

## 2018-08-07 PROCEDURE — 99215 OFFICE O/P EST HI 40 MIN: CPT | Mod: PBBFAC,PN | Performed by: NURSE PRACTITIONER

## 2018-08-07 PROCEDURE — 99999 PR PBB SHADOW E&M-EST. PATIENT-LVL V: CPT | Mod: PBBFAC,,, | Performed by: NURSE PRACTITIONER

## 2018-08-07 RX ORDER — INSULIN ASPART 100 [IU]/ML
5 INJECTION, SOLUTION INTRAVENOUS; SUBCUTANEOUS
Qty: 15 ML | Refills: 6 | Status: SHIPPED | OUTPATIENT
Start: 2018-08-07 | End: 2018-08-17

## 2018-08-07 RX ORDER — PEN NEEDLE, DIABETIC 31 GX5/16"
NEEDLE, DISPOSABLE MISCELLANEOUS
Qty: 450 EACH | Refills: 3
Start: 2018-08-07 | End: 2019-04-30 | Stop reason: SDUPTHER

## 2018-08-07 RX ORDER — BLOOD-GLUCOSE CONTROL, NORMAL
1 EACH MISCELLANEOUS 4 TIMES DAILY
Qty: 400 EACH | Refills: 3 | Status: SHIPPED | OUTPATIENT
Start: 2018-08-07 | End: 2020-05-28

## 2018-08-07 NOTE — PROGRESS NOTES
CC: Ms. Liza Mesa arrives today for management of Type 2 DM and review of chronic medical conditions, as listed in the Visit Diagnosis section of this encounter.       HPI: Ms. Liza Mesa was diagnosed with Type 2 DM in 2005. She was diagnosed based on lab work. Initial treatment consisted of metformin. Glimepiride later added. Victoza to her medication regimen in 2016. + FH of DM on mother. Denies hospitalizations due to DM.     Last seen by me in May.  At this time, Basaglar dose was increased.     Ongoing issue of not checking BG twice daily, as repeatedly recommended.     BG readings are checked 1x/day (continues to check fasting only).               Hypoglycemia: No    Missing Insulin/PO medication doses: Yes, misses Basaglar and might take in AM. She thinks she is taking acarbose with breakfast only.      Exercise: Rides stationary bike or walks.     Dietary Habits: Eats 2-3 meals/day. Eating fruit only for breakfast. Occasional snacking on chips. Drinks occasional soda.      Last DM education appointment:  8/2016      CURRENT DIABETIC MEDS: metformin XR 1000 mg twice daily, glimepiride 8 mg daily, acarbose 25 mg with breakfast only, Victoza 1.8 mg daily, Basaglar 14 units QHS  Glucometer type: Accucheck Adrienne    Previous DM treatments:  Lantus - not covered by insurance     Last Eye Exam: 7/2018, no DR per patient. Rosalina in [place]. She can't recall provider's name  Last Podiatry Exam: no    REVIEW OF SYSTEMS  Constitutional: no c/o fatigue, weakness. + 10 # intentional weight loss.   Eyes: denies visual disturbances.  Cardiac: no palpitations or chest pain.  Respiratory: denies dyspnea. Reports dry cough that she attributes to asthma. Uses Advair.  GI: no c/o abdominal pain or nausea. Denies h/o pancreatitis.  Skin: no rashes, lesions.   Neuro: no numbness, tingling, or parasthesias.  Endocrine: denies polyphagia, polydipsia, polyuria.      Personally reviewed Past Medical, Surgical, Social  "History.    Vital Signs  /78   Pulse 75   Ht 5' 7" (1.702 m)   Wt 106.8 kg (235 lb 9 oz)   BMI 36.89 kg/m²     Personally reviewed the below labs:    Hemoglobin A1C   Date Value Ref Range Status   07/27/2018 9.4 (H) 4.0 - 5.6 % Final     Comment:     ADA Screening Guidelines:  5.7-6.4%  Consistent with prediabetes  >or=6.5%  Consistent with diabetes  High levels of fetal hemoglobin interfere with the HbA1C  assay. Heterozygous hemoglobin variants (HbS, HgC, etc)do  not significantly interfere with this assay.   However, presence of multiple variants may affect accuracy.     04/25/2018 9.2 (H) 4.0 - 5.6 % Final     Comment:     According to ADA guidelines, hemoglobin A1c <7.0% represents  optimal control in non-pregnant diabetic patients. Different  metrics may apply to specific patient populations.   Standards of Medical Care in Diabetes-2016.  For the purpose of screening for the presence of diabetes:  <5.7%     Consistent with the absence of diabetes  5.7-6.4%  Consistent with increasing risk for diabetes   (prediabetes)  >or=6.5%  Consistent with diabetes  Currently, no consensus exists for use of hemoglobin A1c  for diagnosis of diabetes for children.  This Hemoglobin A1c assay has significant interference with fetal   hemoglobin   (HbF). The results are invalid for patients with abnormal amounts of   HbF,   including those with known Hereditary Persistence   of Fetal Hemoglobin. Heterozygous hemoglobin variants (HbAS, HbAC,   HbAD, HbAE, HbA2) do not significantly interfere with this assay;   however, presence of multiple variants in a sample may impact the %   interference.     01/15/2018 8.2 (H) 4.0 - 5.6 % Final     Comment:     According to ADA guidelines, hemoglobin A1c <7.0% represents  optimal control in non-pregnant diabetic patients. Different  metrics may apply to specific patient populations.   Standards of Medical Care in Diabetes-2016.  For the purpose of screening for the presence of " diabetes:  <5.7%     Consistent with the absence of diabetes  5.7-6.4%  Consistent with increasing risk for diabetes   (prediabetes)  >or=6.5%  Consistent with diabetes  Currently, no consensus exists for use of hemoglobin A1c  for diagnosis of diabetes for children.  This Hemoglobin A1c assay has significant interference with fetal   hemoglobin   (HbF). The results are invalid for patients with abnormal amounts of   HbF,   including those with known Hereditary Persistence   of Fetal Hemoglobin. Heterozygous hemoglobin variants (HbAS, HbAC,   HbAD, HbAE, HbA2) do not significantly interfere with this assay;   however, presence of multiple variants in a sample may impact the %   interference.     05/03/2012 7.1 (H) 4.8 - 5.9 % Final     Comment:     **In order to standardize %HbA1c results worldwide, as of October 11, 2010,  the %HbA1c is being calculated using the master equation recommended in the  consensus statement adopted by the ADA (American Diabetes Assoc), EASD  (European Assoc for the Study of Diabetes), IFCC (International Federation  of Clinical Chemistry and Laboratory Medicine) and IDF (International  Diabetes Federation). Result units: %HgbA1c (DCCT/NGSP).  In common with other methods, Hb A1C values may not accurately reflect mean  blood glucose in patients with hemoglobin variants (HgbF, HgbS and HgbC).  Any cause of shortened erythrocyte survival will reduce exposure of  erythrocytes to glucose with a consequent decrease in HbA1c (%) values, even  though the time-averaged blood glucose level may be elevated. Causes of  shortened erythrocyte lifetime might be hemolytic anemia or other hemolytic  diseases, homozygous sickle cell trait, pregnancy, recent significant or  chronic blood loss, etc. Caution should be used when interpreting the HbA1c  results from patients with these conditions.       Chemistry        Component Value Date/Time     (L) 04/25/2018 0900    K 4.1 04/25/2018 0900    CL 97  04/25/2018 0900    CO2 27 04/25/2018 0900    BUN 13 04/25/2018 0900    CREATININE 0.9 04/25/2018 0900    CREATININE 1.0 05/06/2012 0431     (H) 04/25/2018 0900        Component Value Date/Time    CALCIUM 10.0 04/25/2018 0900    CALCIUM 9.5 05/06/2012 0431    ALKPHOS 71 04/25/2018 0900    ALKPHOS 49 05/03/2012 1635    AST 19 04/25/2018 0900    AST 13 05/03/2012 1635    ALT 23 04/25/2018 0900    BILITOT 0.4 04/25/2018 0900          Lab Results   Component Value Date    CHOL 175 07/27/2018    CHOL 197 06/21/2017    CHOL 240 (H) 07/12/2016     Lab Results   Component Value Date    HDL 51 07/27/2018    HDL 43 06/21/2017    HDL 54 07/12/2016     Lab Results   Component Value Date    LDLCALC 97.8 07/27/2018    LDLCALC 108.4 06/21/2017    LDLCALC 133.4 07/12/2016     Lab Results   Component Value Date    TRIG 131 07/27/2018    TRIG 228 (H) 06/21/2017    TRIG 263 (H) 07/12/2016     Lab Results   Component Value Date    CHOLHDL 29.1 07/27/2018    CHOLHDL 21.8 06/21/2017    CHOLHDL 22.5 07/12/2016       Lab Results   Component Value Date    MICALBCREAT 3.3 01/15/2018     Lab Results   Component Value Date    TSH 0.919 10/11/2017       CrCl cannot be calculated (Patient's most recent lab result is older than the maximum 7 days allowed.).    No results found for: [id number]     August 2017 CGMS revealed:  1. Prandial excursions noted, some of which extending overnight  2. Seems to be taking glimepiride without eating breakfast that includes carbs. This likely caused one episode of hypoglycemia  3. Juice intake causing hyperglycemia  4. No basal insulin needs at this time      PHYSICAL EXAMINATION  Constitutional: Appears well, no distress.  Neck: Supple, trachea midline; no thyromegaly or nodules.   Respiratory: CTA, even and unlabored.   Cardiovascular: RRR, no murmurs, no carotid bruits. DP pulses  2+ bilaterally; no edema.    Lymph: no cervical or supraclavicular lymphadenopathy  Skin: warm and dry; no acanthosis  nigracans observed.  Neuro: DTR 2+ BUE/2+BLE.  Feet: appropriate footwear.      A1c goal < 7%      Assessment/Plan  1. Type 2 diabetes mellitus without complication, without long-term current use of insulin  -- Worsening. A1c remains elevated. High dose glimepiride and acarbose do not seem to be effective. Past CGMS revealed prandial excursions, which I believe to be the cause of her elevated A1c.   -- Start Novolog conservatively at 5 units AC + correction scale, target 150, ISF 50. Discussed insulin action and proper timing.   -- Basaglar 15 units QHS  -- continue Victoza 1.8 mg daily for now.    -- discontinue glimepiride and acarbose.   -- intensive BG monitoring 4x/day. Send log in 1 week --- I highly anticipate insulin doses will need to be increased.   -- advised pt to include carb and protein with each meal. Has not been interested in returning to Diabetes Education.     -- Discussed diagnosis of DM, A1c goals, progression of disease, long term complications and tx options.  Advised patient to check BG before activities, such as driving or exercise.  -- Reviewed hypoglycemia management: treat with 1/2 glass of juice, 1/2 can regular coke, or 4 glucose tablets. Monitor and repeat treatment every 15 minutes until BG is >70 Then have a snack, which includes a complex carbohydrate and protein.   2. Essential hypertension  -- controlled  -- consider ACE-i or ARB if necessary   3. Mixed hyperlipidemia  -- Controlled  -- continue atorvastatin 20 mg daily.    4. Obesity (BMI 30-39.9)  -- improving  -- increases insulin resistance.   Body mass index is 36.89 kg/m².       Total Time and Counselin minutes, >50% time spent counseling as noted above in #1 A/P.       FOLLOW UP  Follow-up in about 4 months (around 2018).   Patient instructed to bring BG logs to each follow up   Patient encouraged to call for any BG/medication issues, concerns, or questions.      Orders Placed This Encounter   Procedures     Hemoglobin A1c    Comprehensive metabolic panel    POCT glucose

## 2018-08-17 DIAGNOSIS — Z79.4 TYPE 2 DIABETES MELLITUS WITHOUT COMPLICATION, WITH LONG-TERM CURRENT USE OF INSULIN: ICD-10-CM

## 2018-08-17 DIAGNOSIS — E11.9 TYPE 2 DIABETES MELLITUS WITHOUT COMPLICATION, WITH LONG-TERM CURRENT USE OF INSULIN: ICD-10-CM

## 2018-08-17 RX ORDER — INSULIN ASPART 100 [IU]/ML
6 INJECTION, SOLUTION INTRAVENOUS; SUBCUTANEOUS
Qty: 15 ML | Refills: 6
Start: 2018-08-17 | End: 2018-12-07

## 2018-08-17 NOTE — TELEPHONE ENCOUNTER
Received BG logs. Starting to show some improvement but needs slight adjustment. Please call patient and advise her to increase Basaglar to 18 units and Novolog to 6 units. Thank you!

## 2018-08-29 RX ORDER — PROMETHAZINE HYDROCHLORIDE AND CODEINE PHOSPHATE 6.25; 1 MG/5ML; MG/5ML
SOLUTION ORAL
Qty: 240 ML | Refills: 1 | Status: SHIPPED | OUTPATIENT
Start: 2018-08-29 | End: 2018-10-15 | Stop reason: SDUPTHER

## 2018-08-29 RX ORDER — PROMETHAZINE HYDROCHLORIDE AND CODEINE PHOSPHATE 6.25; 1 MG/5ML; MG/5ML
SOLUTION ORAL
Qty: 240 ML | Refills: 0 | Status: CANCELLED | OUTPATIENT
Start: 2018-08-29

## 2018-08-29 NOTE — TELEPHONE ENCOUNTER
----- Message from Maria Del Rosario Suarez sent at 8/29/2018 10:13 AM CDT -----  Type:  RX Refill Request    Who Called:  Liza  Refill or New Rx:  refill  RX Name and Strength:  promethazine-codeine 6.25-10 mg/5 ml   How is the patient currently taking it? (ex. 1XDay):  5 ml by mouth every 6 hours  Is this a 30 day or 90 day RX:  30  Preferred Pharmacy with phone number:    [facility] [id number] [place] [address] [address] [facility]  [address] [facility] [address]  Phone: [phone] Fax: [phone]  Local or Mail Order:  local  Ordering Provider:  Dr Berny Stubbs Call Back Number:  [phone]  Additional Information:  Thank you!

## 2018-08-29 NOTE — TELEPHONE ENCOUNTER
Patient advised still having bad cough previous rx helped a great deal,  but patient advised she's still not better order pend.

## 2018-10-02 DIAGNOSIS — E78.2 MIXED HYPERLIPIDEMIA: ICD-10-CM

## 2018-10-04 RX ORDER — ATORVASTATIN CALCIUM 20 MG/1
TABLET, FILM COATED ORAL
Qty: 90 TABLET | Refills: 0 | Status: SHIPPED | OUTPATIENT
Start: 2018-10-04 | End: 2019-04-25 | Stop reason: SDUPTHER

## 2018-10-15 RX ORDER — PROMETHAZINE HYDROCHLORIDE AND CODEINE PHOSPHATE 6.25; 1 MG/5ML; MG/5ML
SOLUTION ORAL
Qty: 240 ML | Refills: 1 | Status: SHIPPED | OUTPATIENT
Start: 2018-10-15 | End: 2018-11-07 | Stop reason: SDUPTHER

## 2018-10-15 NOTE — TELEPHONE ENCOUNTER
----- Message from Susie Hurt sent at 10/15/2018 11:10 AM CDT -----  Contact: self  Patient [phone] is calling/please send a refill for cough medication to [facility] [place]/please call patient     [facility] [address] [place], [place] - [address]50 [facility] AT [facility]  [address] [facility] [address]  Phone: [phone] Fax: [phone]

## 2018-10-15 NOTE — TELEPHONE ENCOUNTER
Patient states that she has a cough. It is dry and states that she is doing inhaler and breathing treatment. Asking for refill of cough medication. Please advise. See that she has hx of chronic cough

## 2018-10-24 ENCOUNTER — TELEPHONE (OUTPATIENT)
Dept: ENDOCRINOLOGY | Facility: CLINIC | Age: 48
End: 2018-10-24

## 2018-10-24 NOTE — TELEPHONE ENCOUNTER
----- Message from Devin Benavides sent at 10/24/2018 10:40 AM CDT -----  Contact: pt  She's calling in regards to adding orders for STD & HIV testing, pls call pt when orders have been added, [phone] (home)

## 2018-10-24 NOTE — TELEPHONE ENCOUNTER
Left message informing pt EDMAR Case is out on maternity leave and that PCP would be better informed to guide pt through required labs that pt is requesting

## 2018-10-31 ENCOUNTER — LAB VISIT (OUTPATIENT)
Dept: LAB | Facility: HOSPITAL | Age: 48
End: 2018-10-31
Attending: FAMILY MEDICINE
Payer: MEDICARE

## 2018-10-31 ENCOUNTER — OFFICE VISIT (OUTPATIENT)
Dept: FAMILY MEDICINE | Facility: CLINIC | Age: 48
End: 2018-10-31
Payer: MEDICARE

## 2018-10-31 VITALS
HEIGHT: 67 IN | SYSTOLIC BLOOD PRESSURE: 132 MMHG | HEART RATE: 78 BPM | TEMPERATURE: 98 F | WEIGHT: 219.25 LBS | DIASTOLIC BLOOD PRESSURE: 88 MMHG | BODY MASS INDEX: 34.41 KG/M2

## 2018-10-31 DIAGNOSIS — Z20.2 EXPOSURE TO SEXUALLY TRANSMITTED DISEASE (STD): ICD-10-CM

## 2018-10-31 DIAGNOSIS — E11.9 TYPE 2 DIABETES MELLITUS WITHOUT COMPLICATION, WITH LONG-TERM CURRENT USE OF INSULIN: ICD-10-CM

## 2018-10-31 DIAGNOSIS — J45.41 MODERATE PERSISTENT ASTHMA WITH ACUTE EXACERBATION: ICD-10-CM

## 2018-10-31 DIAGNOSIS — I10 ESSENTIAL HYPERTENSION: ICD-10-CM

## 2018-10-31 DIAGNOSIS — M54.50 CHRONIC MIDLINE LOW BACK PAIN WITHOUT SCIATICA: ICD-10-CM

## 2018-10-31 DIAGNOSIS — Z11.4 ENCOUNTER FOR SCREENING FOR HIV: ICD-10-CM

## 2018-10-31 DIAGNOSIS — Z20.2 EXPOSURE TO SEXUALLY TRANSMITTED DISEASE (STD): Primary | ICD-10-CM

## 2018-10-31 DIAGNOSIS — G89.29 CHRONIC MIDLINE LOW BACK PAIN WITHOUT SCIATICA: ICD-10-CM

## 2018-10-31 DIAGNOSIS — Z79.4 TYPE 2 DIABETES MELLITUS WITHOUT COMPLICATION, WITH LONG-TERM CURRENT USE OF INSULIN: ICD-10-CM

## 2018-10-31 PROCEDURE — 36415 COLL VENOUS BLD VENIPUNCTURE: CPT | Mod: PN

## 2018-10-31 PROCEDURE — 90686 IIV4 VACC NO PRSV 0.5 ML IM: CPT | Mod: PBBFAC,PN

## 2018-10-31 PROCEDURE — 99213 OFFICE O/P EST LOW 20 MIN: CPT | Mod: PBBFAC,PN | Performed by: FAMILY MEDICINE

## 2018-10-31 PROCEDURE — 86703 HIV-1/HIV-2 1 RESULT ANTBDY: CPT

## 2018-10-31 PROCEDURE — 87340 HEPATITIS B SURFACE AG IA: CPT

## 2018-10-31 PROCEDURE — 99214 OFFICE O/P EST MOD 30 MIN: CPT | Mod: S$PBB,,, | Performed by: FAMILY MEDICINE

## 2018-10-31 PROCEDURE — 99999 PR PBB SHADOW E&M-EST. PATIENT-LVL III: CPT | Mod: PBBFAC,,, | Performed by: FAMILY MEDICINE

## 2018-10-31 PROCEDURE — 86803 HEPATITIS C AB TEST: CPT

## 2018-10-31 PROCEDURE — 86592 SYPHILIS TEST NON-TREP QUAL: CPT

## 2018-10-31 RX ORDER — HYDROCODONE BITARTRATE AND ACETAMINOPHEN 7.5; 325 MG/1; MG/1
1 TABLET ORAL EVERY 12 HOURS PRN
Qty: 60 TABLET | Refills: 0 | Status: SHIPPED | OUTPATIENT
Start: 2018-11-30 | End: 2018-10-31 | Stop reason: SDUPTHER

## 2018-10-31 RX ORDER — HYDROCODONE BITARTRATE AND ACETAMINOPHEN 7.5; 325 MG/1; MG/1
1 TABLET ORAL EVERY 12 HOURS PRN
Qty: 60 TABLET | Refills: 0 | Status: SHIPPED | OUTPATIENT
Start: 2018-12-30 | End: 2019-01-28 | Stop reason: SDUPTHER

## 2018-10-31 RX ORDER — HYDROCODONE BITARTRATE AND ACETAMINOPHEN 7.5; 325 MG/1; MG/1
1 TABLET ORAL EVERY 12 HOURS PRN
Qty: 60 TABLET | Refills: 0 | Status: SHIPPED | OUTPATIENT
Start: 2018-10-31 | End: 2018-10-31 | Stop reason: SDUPTHER

## 2018-10-31 NOTE — PROGRESS NOTES
"Subjective:       Patient ID: Liza Spain Cousin is a 48 y.o. female.    Chief Complaint: Follow-up (DM f/u. Pt has lab for Nhung mai for next mo. Pt would like to also have HIV and STD testing.)    Follow-up chronic pain. She takes Norco 7.5 mg b.i.d..  It helps her manage her pain. She has diabetes.  She has a follow-up appointment with Nhung Michael next month.  She would like to be checked for sexually transmitted disease because previous exposure.  She has no symptoms from her diabetes.  She does come in on tingling in her hands.  Also on the tops of her knees.  She is seeing a mental health professional.  Dr. Alissa Bowen.  She was started on Adderall for compulsive eating disorder.  She has been taking it at least since February.  She is now losing some weight.  She has a chronic cough and persistent asthma.  She has had some cough induced syncope in the past.      Review of Systems   Constitutional: Positive for unexpected weight change. Negative for fever.   Respiratory: Positive for cough, shortness of breath and wheezing.    Cardiovascular: Negative for chest pain, palpitations and leg swelling.   Neurological: Positive for numbness.       Objective:     Blood pressure 132/88, pulse 78, temperature 98 °F (36.7 °C), temperature source Oral, height 5' 7" (1.702 m), weight 99.5 kg (219 lb 4 oz).      Physical Exam   Constitutional:   She is overweight and in mild distress frequent coughing.   Cardiovascular: Normal rate and regular rhythm.   Pulses:       Dorsalis pedis pulses are 1+ on the right side, and 1+ on the left side.        Posterior tibial pulses are 1+ on the right side, and 1+ on the left side.   No murmur and no carotid bruits.   Pulmonary/Chest: Effort normal and breath sounds normal. No respiratory distress. She has no wheezes.   Musculoskeletal: She exhibits no edema.        Right foot: There is no deformity.        Left foot: There is no deformity.   Feet:   Right Foot:   Protective " Sensation: 4 sites tested. 4 sites sensed.   Skin Integrity: Negative for ulcer.   Left Foot:   Protective Sensation: 4 sites tested. 4 sites sensed.   Skin Integrity: Negative for ulcer.   Neurological: She is alert.   Tinel's and Phalen's are negative.  Sensation to light touch intact. Ft sensation is intact to vibration.       Assessment:       1. Exposure to sexually transmitted disease (STD)    2. Encounter for screening for HIV     3. Chronic midline low back pain without sciatica    4. Type 2 diabetes mellitus without complication, with long-term current use of insulin    5. Moderate persistent asthma with acute exacerbation    6. Essential hypertension        Plan:       STD lab work ordered for today.  Follow-up with diabetes as planned.  I refilled her Norco for the next 3 months.  Flu shot today.

## 2018-11-01 LAB
HBV SURFACE AG SERPL QL IA: NEGATIVE
HCV AB SERPL QL IA: POSITIVE
HIV 1+2 AB+HIV1 P24 AG SERPL QL IA: NEGATIVE
RPR SER QL: NORMAL

## 2018-11-07 ENCOUNTER — TELEPHONE (OUTPATIENT)
Dept: FAMILY MEDICINE | Facility: CLINIC | Age: 48
End: 2018-11-07

## 2018-11-07 NOTE — TELEPHONE ENCOUNTER
----- Message from Soco Sher sent at 11/7/2018  3:35 PM CST -----  Type:  RX Refill Request    Who Called: Patient  Refill or New Rx:  refill  RX Name and Strength:  promethazine-codeine 6.25-10 mg/5 ml (PHENERGAN WITH CODEINE) 6.25-10 mg/5 mL syrup  How is the patient currently taking it? (ex. 1XDay):  As needed  Preferred Pharmacy with phone number:    [facility] [id number] [place] [address] [address]50 [facility]  [address] [place] [address]  Phone: [phone] Fax: [phone]    Local or Mail Order:  Local  Ordering Provider:  same  Best Call Back Number: [phone]    Additional Information:  Please call when completed.

## 2018-11-08 RX ORDER — PROMETHAZINE HYDROCHLORIDE AND CODEINE PHOSPHATE 6.25; 1 MG/5ML; MG/5ML
SOLUTION ORAL
Qty: 240 ML | Refills: 0 | Status: SHIPPED | OUTPATIENT
Start: 2018-11-08 | End: 2018-11-27 | Stop reason: SDUPTHER

## 2018-11-12 ENCOUNTER — TELEPHONE (OUTPATIENT)
Dept: FAMILY MEDICINE | Facility: CLINIC | Age: 48
End: 2018-11-12

## 2018-11-12 NOTE — TELEPHONE ENCOUNTER
----- Message from Sherlyn Perrin sent at 11/12/2018 12:33 PM CST -----  Contact: self  Patient need refill on following medication promethazine-codeine 6.25-10 mg/5 ml (PHENERGAN WITH CODEINE) 6.25-10 mg/5 mL syrup       [facility] [address] [place] [address] 2050 [facility] AT [facility]  2050 [facility] [address]  Phone: [phone] Fax: [phone]    Please call to advice [phone] (home)

## 2018-11-27 NOTE — TELEPHONE ENCOUNTER
----- Message from Mary Shore sent at 11/27/2018 10:28 AM CST -----  Please call pt at [phone] (home) request a refill for a constant cough /   promethazine-codeine 6.25-10 mg/5 ml   [facility] [address] [place] [address] - 2050 [facility] AT [facility]  2050 [facility] [address]  Phone: [phone] Fax: [phone]

## 2018-11-28 ENCOUNTER — TELEPHONE (OUTPATIENT)
Dept: FAMILY MEDICINE | Facility: CLINIC | Age: 48
End: 2018-11-28

## 2018-11-28 RX ORDER — PROMETHAZINE HYDROCHLORIDE AND CODEINE PHOSPHATE 6.25; 1 MG/5ML; MG/5ML
SOLUTION ORAL
Qty: 240 ML | Refills: 0 | Status: SHIPPED | OUTPATIENT
Start: 2018-11-28 | End: 2018-12-14 | Stop reason: SDUPTHER

## 2018-11-28 NOTE — TELEPHONE ENCOUNTER
----- Message from Sandra Casper sent at 11/28/2018  2:10 PM CST -----  Type: Needs Medical Advice    Who Called: self   Symptoms (please be specific):  NA   How long has patient had these symptoms:   Pharmacy name and phone #:  [facility] [place]   Best Call Back Number: [phone]  Additional Information: Patient called asking for the status for rx refill promethazine-codeine 6.25-10 mg/5 ml (PHENERGAN WITH CODEINE) 6.25-10 mg/5 mL syrup

## 2018-12-05 ENCOUNTER — LAB VISIT (OUTPATIENT)
Dept: LAB | Facility: HOSPITAL | Age: 48
End: 2018-12-05
Attending: NURSE PRACTITIONER
Payer: MEDICARE

## 2018-12-05 DIAGNOSIS — E11.9 TYPE 2 DIABETES MELLITUS WITHOUT COMPLICATION, WITH LONG-TERM CURRENT USE OF INSULIN: ICD-10-CM

## 2018-12-05 DIAGNOSIS — Z79.4 TYPE 2 DIABETES MELLITUS WITHOUT COMPLICATION, WITH LONG-TERM CURRENT USE OF INSULIN: ICD-10-CM

## 2018-12-05 LAB
ALBUMIN SERPL BCP-MCNC: 3.6 G/DL
ALP SERPL-CCNC: 69 U/L
ALT SERPL W/O P-5'-P-CCNC: 17 U/L
ANION GAP SERPL CALC-SCNC: 8 MMOL/L
AST SERPL-CCNC: 15 U/L
BILIRUB SERPL-MCNC: 0.7 MG/DL
BUN SERPL-MCNC: 10 MG/DL
CALCIUM SERPL-MCNC: 9.8 MG/DL
CHLORIDE SERPL-SCNC: 94 MMOL/L
CO2 SERPL-SCNC: 30 MMOL/L
CREAT SERPL-MCNC: 1 MG/DL
EST. GFR  (AFRICAN AMERICAN): >60 ML/MIN/1.73 M^2
EST. GFR  (NON AFRICAN AMERICAN): >60 ML/MIN/1.73 M^2
ESTIMATED AVG GLUCOSE: 329 MG/DL
GLUCOSE SERPL-MCNC: 288 MG/DL
HBA1C MFR BLD HPLC: 13.1 %
POTASSIUM SERPL-SCNC: 4.2 MMOL/L
PROT SERPL-MCNC: 6.8 G/DL
SODIUM SERPL-SCNC: 132 MMOL/L

## 2018-12-05 PROCEDURE — 83036 HEMOGLOBIN GLYCOSYLATED A1C: CPT

## 2018-12-05 PROCEDURE — 80053 COMPREHEN METABOLIC PANEL: CPT

## 2018-12-05 PROCEDURE — 36415 COLL VENOUS BLD VENIPUNCTURE: CPT | Mod: PN

## 2018-12-07 ENCOUNTER — OFFICE VISIT (OUTPATIENT)
Dept: ENDOCRINOLOGY | Facility: CLINIC | Age: 48
End: 2018-12-07
Payer: MEDICARE

## 2018-12-07 VITALS
WEIGHT: 208.56 LBS | SYSTOLIC BLOOD PRESSURE: 130 MMHG | BODY MASS INDEX: 32.73 KG/M2 | HEART RATE: 111 BPM | DIASTOLIC BLOOD PRESSURE: 80 MMHG | HEIGHT: 67 IN

## 2018-12-07 DIAGNOSIS — E11.65 TYPE 2 DIABETES MELLITUS WITH HYPERGLYCEMIA, WITH LONG-TERM CURRENT USE OF INSULIN: Primary | ICD-10-CM

## 2018-12-07 DIAGNOSIS — E11.9 TYPE 2 DIABETES MELLITUS WITHOUT COMPLICATION, WITH LONG-TERM CURRENT USE OF INSULIN: ICD-10-CM

## 2018-12-07 DIAGNOSIS — Z79.4 TYPE 2 DIABETES MELLITUS WITHOUT COMPLICATION, WITH LONG-TERM CURRENT USE OF INSULIN: ICD-10-CM

## 2018-12-07 DIAGNOSIS — I10 ESSENTIAL HYPERTENSION: ICD-10-CM

## 2018-12-07 DIAGNOSIS — E78.2 MIXED HYPERLIPIDEMIA: ICD-10-CM

## 2018-12-07 DIAGNOSIS — E66.9 OBESITY (BMI 30-39.9): ICD-10-CM

## 2018-12-07 DIAGNOSIS — Z79.4 TYPE 2 DIABETES MELLITUS WITH HYPERGLYCEMIA, WITH LONG-TERM CURRENT USE OF INSULIN: Primary | ICD-10-CM

## 2018-12-07 LAB — GLUCOSE SERPL-MCNC: 272 MG/DL (ref 70–110)

## 2018-12-07 PROCEDURE — 99215 OFFICE O/P EST HI 40 MIN: CPT | Mod: PBBFAC,PN | Performed by: NURSE PRACTITIONER

## 2018-12-07 PROCEDURE — 82962 GLUCOSE BLOOD TEST: CPT | Mod: PBBFAC,PN | Performed by: NURSE PRACTITIONER

## 2018-12-07 PROCEDURE — 99214 OFFICE O/P EST MOD 30 MIN: CPT | Mod: S$PBB,,, | Performed by: NURSE PRACTITIONER

## 2018-12-07 PROCEDURE — 99999 PR PBB SHADOW E&M-EST. PATIENT-LVL V: CPT | Mod: PBBFAC,,, | Performed by: NURSE PRACTITIONER

## 2018-12-07 RX ORDER — INSULIN GLARGINE 100 [IU]/ML
22 INJECTION, SOLUTION SUBCUTANEOUS NIGHTLY
Start: 2018-12-07 | End: 2019-01-17

## 2018-12-07 RX ORDER — DEXTROAMPHETAMINE SACCHARATE, AMPHETAMINE ASPARTATE MONOHYDRATE, DEXTROAMPHETAMINE SULFATE AND AMPHETAMINE SULFATE 7.5; 7.5; 7.5; 7.5 MG/1; MG/1; MG/1; MG/1
30 CAPSULE, EXTENDED RELEASE ORAL 2 TIMES DAILY
COMMUNITY
End: 2020-08-24

## 2018-12-07 RX ORDER — INSULIN ASPART 100 [IU]/ML
8 INJECTION, SOLUTION INTRAVENOUS; SUBCUTANEOUS
Qty: 15 ML | Refills: 6
Start: 2018-12-07 | End: 2019-07-31

## 2018-12-07 NOTE — PROGRESS NOTES
CC: Ms. Jose Mesa arrives today for management of Type 2 DM and review of chronic medical conditions, as listed in the Visit Diagnosis section of this encounter.       HPI: Ms. Jose Mesa was diagnosed with Type 2 DM in 2005. She was diagnosed based on lab work. Initial treatment consisted of metformin. Glimepiride later added. Victoza to her medication regimen in 2016. Insulin added in 2018. + FH of DM on mother. Denies hospitalizations due to DM.     Last seen by me in August. At this time, Novolog was added with meals.     Today, she reports that she is interested in Freestyle Vicky.    BG readings are checked 1x/day (continues to check fasting only).  Ongoing issue of not checking BG as frequently as recommended.           Hypoglycemia: No    Missing Insulin/PO medication doses: Yes, misses Basaglar, Victoza, and lunch Novolog.     Dietary Habits: Eats 2-3 meals/day. Eats only egg for breakfast. Rarely snacks but had Snickers today. Drinks 1-2 sodas/week.   B: egg  L: soup and sandwich  D: same     Last DM education appointment:  8/2016    She states that she is taking adderall for weight loss. This is being prescribed by a provider out of [place]?     CURRENT DIABETIC MEDS: metformin XR 1000 mg twice daily, Victoza 1.8 mg daily, Basaglar 18 units QHS, Novolog 6 units AC  Glucometer type: Accucheck Adrienne    Previous DM treatments:  Lantus - not covered by insurance   Glimepiride   acarbose    Last Eye Exam: 7/2018, no DR per patient. Rosalina in [place]. She can't recall provider's name  Last Podiatry Exam: no    REVIEW OF SYSTEMS  Constitutional: no c/o fatigue, weakness. + 27# weight loss.   Eyes: denies visual disturbances.  Cardiac: no palpitations or chest pain.  Respiratory: denies dyspnea, cough  GI: no c/o abdominal pain or nausea. Denies h/o pancreatitis. + occasional constipation.   Skin: no rashes, lesions.   Neuro: no numbness, tingling, or parasthesias.  Endocrine: denies polyphagia,  "polydipsia, polyuria. Reports dry mouth.       Personally reviewed Past Medical, Surgical, Social History.    Vital Signs  /80   Pulse (!) 111   Ht 5' 7" (1.702 m)   Wt 94.6 kg (208 lb 8.9 oz)   BMI 32.66 kg/m²     Personally reviewed the below labs:    Hemoglobin A1C   Date Value Ref Range Status   12/05/2018 13.1 (H) 4.0 - 5.6 % Final     Comment:     ADA Screening Guidelines:  5.7-6.4%  Consistent with prediabetes  >or=6.5%  Consistent with diabetes  High levels of fetal hemoglobin interfere with the HbA1C  assay. Heterozygous hemoglobin variants (HbS, HgC, etc)do  not significantly interfere with this assay.   However, presence of multiple variants may affect accuracy.     07/27/2018 9.4 (H) 4.0 - 5.6 % Final     Comment:     ADA Screening Guidelines:  5.7-6.4%  Consistent with prediabetes  >or=6.5%  Consistent with diabetes  High levels of fetal hemoglobin interfere with the HbA1C  assay. Heterozygous hemoglobin variants (HbS, HgC, etc)do  not significantly interfere with this assay.   However, presence of multiple variants may affect accuracy.     04/25/2018 9.2 (H) 4.0 - 5.6 % Final     Comment:     According to ADA guidelines, hemoglobin A1c <7.0% represents  optimal control in non-pregnant diabetic patients. Different  metrics may apply to specific patient populations.   Standards of Medical Care in Diabetes-2016.  For the purpose of screening for the presence of diabetes:  <5.7%     Consistent with the absence of diabetes  5.7-6.4%  Consistent with increasing risk for diabetes   (prediabetes)  >or=6.5%  Consistent with diabetes  Currently, no consensus exists for use of hemoglobin A1c  for diagnosis of diabetes for children.  This Hemoglobin A1c assay has significant interference with fetal   hemoglobin   (HbF). The results are invalid for patients with abnormal amounts of   HbF,   including those with known Hereditary Persistence   of Fetal Hemoglobin. Heterozygous hemoglobin variants (HbAS, " HbAC,   HbAD, HbAE, HbA2) do not significantly interfere with this assay;   however, presence of multiple variants in a sample may impact the %   interference.     05/03/2012 7.1 (H) 4.8 - 5.9 % Final     Comment:     **In order to standardize %HbA1c results worldwide, as of October 11, 2010,  the %HbA1c is being calculated using the master equation recommended in the  consensus statement adopted by the ADA (American Diabetes Assoc), EASD  (European Assoc for the Study of Diabetes), IFCC (International Federation  of Clinical Chemistry and Laboratory Medicine) and IDF (International  Diabetes Federation). Result units: %HgbA1c (DCCT/NGSP).  In common with other methods, Hb A1C values may not accurately reflect mean  blood glucose in patients with hemoglobin variants (HgbF, HgbS and HgbC).  Any cause of shortened erythrocyte survival will reduce exposure of  erythrocytes to glucose with a consequent decrease in HbA1c (%) values, even  though the time-averaged blood glucose level may be elevated. Causes of  shortened erythrocyte lifetime might be hemolytic anemia or other hemolytic  diseases, homozygous sickle cell trait, pregnancy, recent significant or  chronic blood loss, etc. Caution should be used when interpreting the HbA1c  results from patients with these conditions.       Chemistry        Component Value Date/Time     (L) 12/05/2018 0919    K 4.2 12/05/2018 0919    CL 94 (L) 12/05/2018 0919    CO2 30 (H) 12/05/2018 0919    BUN 10 12/05/2018 0919    CREATININE 1.0 12/05/2018 0919    CREATININE 1.0 05/06/2012 0431     (H) 12/05/2018 0919        Component Value Date/Time    CALCIUM 9.8 12/05/2018 0919    CALCIUM 9.5 05/06/2012 0431    ALKPHOS 69 12/05/2018 0919    ALKPHOS 49 05/03/2012 1635    AST 15 12/05/2018 0919    AST 13 05/03/2012 1635    ALT 17 12/05/2018 0919    BILITOT 0.7 12/05/2018 0919          Lab Results   Component Value Date    CHOL 175 07/27/2018    CHOL 197 06/21/2017    CHOL 240  (H) 07/12/2016     Lab Results   Component Value Date    HDL 51 07/27/2018    HDL 43 06/21/2017    HDL 54 07/12/2016     Lab Results   Component Value Date    LDLCALC 97.8 07/27/2018    LDLCALC 108.4 06/21/2017    LDLCALC 133.4 07/12/2016     Lab Results   Component Value Date    TRIG 131 07/27/2018    TRIG 228 (H) 06/21/2017    TRIG 263 (H) 07/12/2016     Lab Results   Component Value Date    CHOLHDL 29.1 07/27/2018    CHOLHDL 21.8 06/21/2017    CHOLHDL 22.5 07/12/2016       Lab Results   Component Value Date    MICALBCREAT 3.3 01/15/2018     Lab Results   Component Value Date    TSH 0.919 10/11/2017       Estimated Creatinine Clearance: 81.2 mL/min (based on SCr of 1 mg/dL).    No results found for: [id number]     August 2017 CGMS revealed:  1. Prandial excursions noted, some of which extending overnight  2. Seems to be taking glimepiride without eating breakfast that includes carbs. This likely caused one episode of hypoglycemia  3. Juice intake causing hyperglycemia  4. No basal insulin needs at this time      PHYSICAL EXAMINATION  Constitutional: Appears well, no distress.  Neck: Supple, trachea midline; no thyromegaly or nodules.   Respiratory: CTA, even and unlabored.   Cardiovascular: RRR, no murmurs, no carotid bruits. DP pulses  2+ bilaterally; no edema.    Lymph: no cervical or supraclavicular lymphadenopathy  Skin: warm and dry; no acanthosis nigracans observed.  Neuro: DTR 1+ BUE/1+BLE.  Feet: appropriate footwear.      A1c goal < 7%      Assessment/Plan  1. Type 2 diabetes mellitus with hyperglycemia, without long-term current use of insulin  -- Worsening. I suspect SMBG are not actually correct since A1c is >13%. Her Fasting BG on lab work was 288 mg/dL, which doesn't correlate with her log. Medication noncompliance worsening the situation. I am unable to adjust insulin doses without complete BG logs.   -- POCT glucose 272 mg/dL. .   -- increase Novolog to 8 units w lunch and dinner only since she  eats low carb breakfast. Correction scale, target 150, ISF 25. Discussed proper timing.   -- increase Basaglar to  22 units QHS  -- continue Victoza 1.8 mg daily.  -- continue metformin    -- intensive BG monitoring 4x/day.   -- discussed Medicare criteria for Freestyle Vicky, which she doesn't meet. If she checks BG 4x/day as recommended, she would qualify.       -- Discussed diagnosis of DM, A1c goals, progression of disease, long term complications and tx options.  Advised patient to check BG before activities, such as driving or exercise.  -- Reviewed hypoglycemia management: treat with 1/2 glass of juice, 1/2 can regular coke, or 4 glucose tablets. Monitor and repeat treatment every 15 minutes until BG is >70 Then have a snack, which includes a complex carbohydrate and protein.   2. Essential hypertension  -- controlled  -- consider ACE-i or ARB if necessary   3. Mixed hyperlipidemia  -- Controlled  -- continue atorvastatin 20 mg daily.    4. Obesity (BMI 30-39.9)  -- improving, however, uncontrolled diabetes may be contributing  -- increases insulin resistance.   Body mass index is 32.66 kg/m².           FOLLOW UP  Follow-up in about 4 weeks (around 1/4/2019).   Patient instructed to bring BG logs to each follow up   Patient encouraged to call for any BG/medication issues, concerns, or questions.      Orders Placed This Encounter   Procedures    Hemoglobin A1c    Basic metabolic panel    Microalbumin/creatinine urine ratio    POCT Glucose, Hand-Held Device       1/17/2019 Addendum: Pt now checking BG 4x/day and continues on Novolog with meals and Basaglar.

## 2018-12-10 ENCOUNTER — TELEPHONE (OUTPATIENT)
Dept: FAMILY MEDICINE | Facility: CLINIC | Age: 48
End: 2018-12-10

## 2018-12-10 NOTE — TELEPHONE ENCOUNTER
----- Message from Annamarie Pantoja sent at 12/10/2018  3:08 PM CST -----  Contact: Liza  Type: Needs Medical Advice    Who Called:  patient  Best Call Back Number: [phone]  Additional Information: patient requesting annual mammogram--checked in system & there isn't any orders listed--please contact patient when she can schedule the mammogram from orders--thank you

## 2018-12-14 ENCOUNTER — HOSPITAL ENCOUNTER (OUTPATIENT)
Dept: RADIOLOGY | Facility: HOSPITAL | Age: 48
Discharge: HOME | End: 2018-12-14
Attending: FAMILY MEDICINE
Payer: MEDICARE

## 2018-12-14 DIAGNOSIS — Z12.31 VISIT FOR SCREENING MAMMOGRAM: ICD-10-CM

## 2018-12-14 PROCEDURE — 77063 BREAST TOMOSYNTHESIS BI: CPT | Mod: TC,PO

## 2018-12-14 PROCEDURE — 77067 SCR MAMMO BI INCL CAD: CPT | Mod: TC,PO

## 2018-12-14 PROCEDURE — 77063 BREAST TOMOSYNTHESIS BI: CPT | Mod: 26,,, | Performed by: RADIOLOGY

## 2018-12-14 PROCEDURE — 77067 SCR MAMMO BI INCL CAD: CPT | Mod: 26,,, | Performed by: RADIOLOGY

## 2018-12-14 NOTE — TELEPHONE ENCOUNTER
----- Message from Brooke Faulkner sent at 12/14/2018  3:04 PM CST -----   Type:  RX Refill Request    Who Called: pt  Refill  RX Name and Strength: PHENERGAN WITH CODEINE) 6.25-10 mg/5 mL syrup  How is the patient currently taking it? (ex. 1XDay): twice a day  Two   Weeks of  med  Preferred Pharmacy with phone number:    [facility] [address] [facility] [address] [place] [address]  [address] [place] [address]  Phone: [phone] Fax: [phone]  Local /  Best Call Back Number:  [phone]

## 2018-12-15 RX ORDER — PROMETHAZINE HYDROCHLORIDE AND CODEINE PHOSPHATE 6.25; 1 MG/5ML; MG/5ML
SOLUTION ORAL
Qty: 240 ML | Refills: 0 | Status: SHIPPED | OUTPATIENT
Start: 2018-12-15 | End: 2019-01-02 | Stop reason: SDUPTHER

## 2019-01-02 RX ORDER — PROMETHAZINE HYDROCHLORIDE AND CODEINE PHOSPHATE 6.25; 1 MG/5ML; MG/5ML
SOLUTION ORAL
Qty: 240 ML | Refills: 0 | Status: SHIPPED | OUTPATIENT
Start: 2019-01-02 | End: 2019-01-28 | Stop reason: SDUPTHER

## 2019-01-02 NOTE — TELEPHONE ENCOUNTER
----- Message from Katerine Rodriguez sent at 1/2/2019 12:12 PM CST -----  Contact: self   Patient need a refill on her cough medicine, please call back at [phone] (home)            [facility] [address] [place] [address] [address] [place] [address] AT [facility]  [address] [place] [address]  Phone: [phone] Fax: [phone]

## 2019-01-04 ENCOUNTER — LAB VISIT (OUTPATIENT)
Dept: LAB | Facility: HOSPITAL | Age: 49
End: 2019-01-04
Payer: MEDICARE

## 2019-01-04 DIAGNOSIS — E11.9 TYPE 2 DIABETES MELLITUS WITHOUT COMPLICATION, WITHOUT LONG-TERM CURRENT USE OF INSULIN: ICD-10-CM

## 2019-01-04 DIAGNOSIS — Z79.4 TYPE 2 DIABETES MELLITUS WITH HYPERGLYCEMIA, WITH LONG-TERM CURRENT USE OF INSULIN: ICD-10-CM

## 2019-01-04 DIAGNOSIS — E11.65 TYPE 2 DIABETES MELLITUS WITH HYPERGLYCEMIA, WITH LONG-TERM CURRENT USE OF INSULIN: ICD-10-CM

## 2019-01-04 LAB
ANION GAP SERPL CALC-SCNC: 8 MMOL/L
BUN SERPL-MCNC: 9 MG/DL
CALCIUM SERPL-MCNC: 9.1 MG/DL
CHLORIDE SERPL-SCNC: 98 MMOL/L
CO2 SERPL-SCNC: 28 MMOL/L
CREAT SERPL-MCNC: 1 MG/DL
EST. GFR  (AFRICAN AMERICAN): >60 ML/MIN/1.73 M^2
EST. GFR  (NON AFRICAN AMERICAN): >60 ML/MIN/1.73 M^2
ESTIMATED AVG GLUCOSE: 266 MG/DL
GLUCOSE SERPL-MCNC: 149 MG/DL
HBA1C MFR BLD HPLC: 10.9 %
POTASSIUM SERPL-SCNC: 3.4 MMOL/L
SODIUM SERPL-SCNC: 134 MMOL/L

## 2019-01-04 PROCEDURE — 80048 BASIC METABOLIC PNL TOTAL CA: CPT

## 2019-01-04 PROCEDURE — 83036 HEMOGLOBIN GLYCOSYLATED A1C: CPT

## 2019-01-04 PROCEDURE — 36415 COLL VENOUS BLD VENIPUNCTURE: CPT | Mod: PN

## 2019-01-04 RX ORDER — LIRAGLUTIDE 6 MG/ML
1.8 INJECTION SUBCUTANEOUS DAILY
Qty: 27 ML | Refills: 3 | Status: SHIPPED | OUTPATIENT
Start: 2019-01-04 | End: 2020-11-13 | Stop reason: SDUPTHER

## 2019-01-07 ENCOUNTER — TELEPHONE (OUTPATIENT)
Dept: ENDOCRINOLOGY | Facility: CLINIC | Age: 49
End: 2019-01-07

## 2019-01-07 NOTE — TELEPHONE ENCOUNTER
Spoke with pt, advised to bring logs, states her meter reads good numbers, states she does not having readings in the 200's, will bring meter as well as logs to the appt on wed for comparison

## 2019-01-07 NOTE — TELEPHONE ENCOUNTER
Reviewed lab results. Please call patient and notify her that A1c remains very elevated at 10.9%.  That's an average glucose of 266 over the past 3 months. Urine test is normal. Please advise her to bring BG log to upcoming appt.

## 2019-01-17 ENCOUNTER — TELEPHONE (OUTPATIENT)
Dept: ENDOCRINOLOGY | Facility: CLINIC | Age: 49
End: 2019-01-17

## 2019-01-17 DIAGNOSIS — E11.65 TYPE 2 DIABETES MELLITUS WITH HYPERGLYCEMIA, WITH LONG-TERM CURRENT USE OF INSULIN: ICD-10-CM

## 2019-01-17 DIAGNOSIS — Z79.4 TYPE 2 DIABETES MELLITUS WITH HYPERGLYCEMIA, WITH LONG-TERM CURRENT USE OF INSULIN: ICD-10-CM

## 2019-01-17 RX ORDER — INSULIN GLARGINE 100 [IU]/ML
24 INJECTION, SOLUTION SUBCUTANEOUS NIGHTLY
Start: 2019-01-17 | End: 2019-04-30

## 2019-01-17 NOTE — TELEPHONE ENCOUNTER
Received BG logs. Please call patient and advise her to slightly increase Basaglar (grey pen) to 24 units. Continue same Novolog doses at mealtime. Thank you!    She now meets Medicare requirements for Vicky, if she is still interested. Will need to go through DME.

## 2019-01-18 NOTE — TELEPHONE ENCOUNTER
Spoke with pt, advised on dose increase of basaglar, advised paper work for nellie will be sent off Monday when  She returns  voiced understanding

## 2019-01-23 ENCOUNTER — TELEPHONE (OUTPATIENT)
Dept: ENDOCRINOLOGY | Facility: CLINIC | Age: 49
End: 2019-01-23

## 2019-01-23 NOTE — TELEPHONE ENCOUNTER
----- Message from Zoe Lanza sent at 1/23/2019  8:30 AM CST -----  Contact: Dennis  Type: Needs Medical Advice    Who Called: Dennis with [facility]  Symptoms (please be specific):    How long has patient had these symptoms:    Pharmacy name and phone #:    Best Call Back Number: [phone]  Additional Information: requesting [facility] order form patient insurance and demographic information fax [phone]

## 2019-01-28 ENCOUNTER — OFFICE VISIT (OUTPATIENT)
Dept: FAMILY MEDICINE | Facility: CLINIC | Age: 49
End: 2019-01-28
Payer: MEDICARE

## 2019-01-28 VITALS
TEMPERATURE: 97 F | RESPIRATION RATE: 17 BRPM | BODY MASS INDEX: 33.69 KG/M2 | SYSTOLIC BLOOD PRESSURE: 110 MMHG | OXYGEN SATURATION: 99 % | HEIGHT: 67 IN | HEART RATE: 97 BPM | WEIGHT: 214.63 LBS | DIASTOLIC BLOOD PRESSURE: 78 MMHG

## 2019-01-28 DIAGNOSIS — Z79.4 TYPE 2 DIABETES MELLITUS WITHOUT COMPLICATION, WITH LONG-TERM CURRENT USE OF INSULIN: ICD-10-CM

## 2019-01-28 DIAGNOSIS — E11.8 TYPE 2 DIABETES MELLITUS WITH COMPLICATION, WITH LONG-TERM CURRENT USE OF INSULIN: Primary | ICD-10-CM

## 2019-01-28 DIAGNOSIS — I10 ESSENTIAL HYPERTENSION: ICD-10-CM

## 2019-01-28 DIAGNOSIS — G89.29 CHRONIC MIDLINE LOW BACK PAIN WITHOUT SCIATICA: Primary | ICD-10-CM

## 2019-01-28 DIAGNOSIS — R05.3 CHRONIC COUGH: ICD-10-CM

## 2019-01-28 DIAGNOSIS — E11.9 TYPE 2 DIABETES MELLITUS WITHOUT COMPLICATION, WITH LONG-TERM CURRENT USE OF INSULIN: ICD-10-CM

## 2019-01-28 DIAGNOSIS — Z79.4 TYPE 2 DIABETES MELLITUS WITH COMPLICATION, WITH LONG-TERM CURRENT USE OF INSULIN: Primary | ICD-10-CM

## 2019-01-28 DIAGNOSIS — M54.50 CHRONIC MIDLINE LOW BACK PAIN WITHOUT SCIATICA: Primary | ICD-10-CM

## 2019-01-28 PROCEDURE — 99214 OFFICE O/P EST MOD 30 MIN: CPT | Mod: S$PBB,,, | Performed by: FAMILY MEDICINE

## 2019-01-28 PROCEDURE — 99999 PR PBB SHADOW E&M-EST. PATIENT-LVL IV: CPT | Mod: PBBFAC,,, | Performed by: FAMILY MEDICINE

## 2019-01-28 PROCEDURE — 99214 PR OFFICE/OUTPT VISIT, EST, LEVL IV, 30-39 MIN: ICD-10-PCS | Mod: S$PBB,,, | Performed by: FAMILY MEDICINE

## 2019-01-28 PROCEDURE — 99999 PR PBB SHADOW E&M-EST. PATIENT-LVL IV: ICD-10-PCS | Mod: PBBFAC,,, | Performed by: FAMILY MEDICINE

## 2019-01-28 PROCEDURE — 99214 OFFICE O/P EST MOD 30 MIN: CPT | Mod: PBBFAC,PN | Performed by: FAMILY MEDICINE

## 2019-01-28 RX ORDER — METRONIDAZOLE 250 MG/1
TABLET ORAL
COMMUNITY
Start: 2018-12-18 | End: 2019-04-30

## 2019-01-28 RX ORDER — ALBUTEROL SULFATE 90 UG/1
AEROSOL, METERED RESPIRATORY (INHALATION)
Qty: 54 G | Refills: 3 | Status: SHIPPED | OUTPATIENT
Start: 2019-01-28 | End: 2019-11-13 | Stop reason: SDUPTHER

## 2019-01-28 RX ORDER — HYDROCODONE BITARTRATE AND ACETAMINOPHEN 7.5; 325 MG/1; MG/1
1 TABLET ORAL EVERY 12 HOURS PRN
Qty: 60 TABLET | Refills: 0 | Status: SHIPPED | OUTPATIENT
Start: 2019-02-28 | End: 2019-01-28 | Stop reason: SDUPTHER

## 2019-01-28 RX ORDER — HYDROCHLOROTHIAZIDE 25 MG/1
TABLET ORAL
COMMUNITY
Start: 2019-01-02 | End: 2021-05-07

## 2019-01-28 RX ORDER — FLUTICASONE PROPIONATE AND SALMETEROL 250; 50 UG/1; UG/1
POWDER RESPIRATORY (INHALATION)
Qty: 60 EACH | Refills: 3 | Status: SHIPPED | OUTPATIENT
Start: 2019-01-28 | End: 2019-11-13 | Stop reason: SDUPTHER

## 2019-01-28 RX ORDER — PROMETHAZINE HYDROCHLORIDE AND CODEINE PHOSPHATE 6.25; 1 MG/5ML; MG/5ML
5 SOLUTION ORAL EVERY 6 HOURS PRN
Qty: 240 ML | Refills: 1 | Status: SHIPPED | OUTPATIENT
Start: 2019-01-28 | End: 2019-02-27 | Stop reason: SDUPTHER

## 2019-01-28 RX ORDER — METFORMIN HYDROCHLORIDE 500 MG/1
1000 TABLET, EXTENDED RELEASE ORAL 2 TIMES DAILY
Qty: 360 TABLET | Refills: 11 | Status: SHIPPED | OUTPATIENT
Start: 2019-01-28 | End: 2020-09-29

## 2019-01-28 RX ORDER — ALPRAZOLAM 1 MG/1
1 TABLET ORAL 2 TIMES DAILY PRN
COMMUNITY
Start: 2019-01-17

## 2019-01-28 RX ORDER — HYDROCODONE BITARTRATE AND ACETAMINOPHEN 7.5; 325 MG/1; MG/1
1 TABLET ORAL EVERY 12 HOURS PRN
Qty: 60 TABLET | Refills: 0 | Status: SHIPPED | OUTPATIENT
Start: 2019-01-30 | End: 2019-01-28 | Stop reason: SDUPTHER

## 2019-01-28 RX ORDER — HYDROCODONE BITARTRATE AND ACETAMINOPHEN 7.5; 325 MG/1; MG/1
1 TABLET ORAL EVERY 12 HOURS PRN
Qty: 60 TABLET | Refills: 0 | Status: SHIPPED | OUTPATIENT
Start: 2019-03-30 | End: 2019-04-29 | Stop reason: SDUPTHER

## 2019-01-28 NOTE — PROGRESS NOTES
"Subjective:       Patient ID: Jose Ortegasin is a 48 y.o. female.    Chief Complaint: Follow-up (3 moth follow up )    Follow-up chronic pain. She has back pain without radiation.  She takes Lortab twice a day.  She is due for refill in 2 days.  She also takes Hycodan syrup to control her chronic cough.  She says that her blood sugar at home has been better controlled.  She is hoping to get a transcutaneous monitor.  She plans to reschedule her appointment with Nhung Michael.  She slipped on the kitchen floor last week and fell onto her left side.  She has some left hip pain as well as some left upper arm pain.      Review of Systems   Constitutional: Negative for fever and unexpected weight change.        She has managed to lose some weight on Adderall.  She had a little bit of regression.   Respiratory: Positive for cough.         She has been using her Advair 2 or 3 times a week.   Cardiovascular: Negative for chest pain, palpitations and leg swelling.   Musculoskeletal: Positive for back pain.       Objective:     Blood pressure 110/78, pulse 97, temperature 97.4 °F (36.3 °C), temperature source Oral, resp. rate 17, height 5' 7" (1.702 m), weight 97.3 kg (214 lb 9.9 oz), last menstrual period 01/15/2019, SpO2 99 %.      Physical Exam   Musculoskeletal:   She is tender in the upper lumbar lower lumbar and sacroiliac areas.  Painful to bend forward about 70°.  Also pain was side been.  She has some mild left greater trochanteric tenderness.       Assessment:       1. Chronic midline low back pain without sciatica    2. Chronic cough    3. Essential hypertension        Plan:       I refilled 3 months of hydrocodone as well as a refill on cough syrup.  I reviewed her lab work with her.      "

## 2019-01-29 ENCOUNTER — TELEPHONE (OUTPATIENT)
Dept: ENDOCRINOLOGY | Facility: CLINIC | Age: 49
End: 2019-01-29

## 2019-01-29 NOTE — TELEPHONE ENCOUNTER
----- Message from Sandra Casper sent at 1/29/2019  1:44 PM CST -----  Type: Needs Medical Advice    Who Called:  [facility] - Tony  Symptoms (please be specific):  nA   How long has patient had these symptoms:  JESS  Pharmacy name and phone #:  [facility]   Best Call Back Number: [phone] gudelia Collins  Additional Information: The patient's clinical notes doesn't have electronic signature.

## 2019-02-01 DIAGNOSIS — E11.8 TYPE 2 DIABETES MELLITUS WITH COMPLICATION, WITH LONG-TERM CURRENT USE OF INSULIN: ICD-10-CM

## 2019-02-01 DIAGNOSIS — Z79.4 TYPE 2 DIABETES MELLITUS WITH COMPLICATION, WITH LONG-TERM CURRENT USE OF INSULIN: ICD-10-CM

## 2019-02-26 DIAGNOSIS — R39.15 URGENCY OF URINATION: ICD-10-CM

## 2019-02-26 RX ORDER — OXYBUTYNIN CHLORIDE 5 MG/1
TABLET ORAL
Qty: 180 TABLET | Refills: 0 | Status: SHIPPED | OUTPATIENT
Start: 2019-02-26 | End: 2021-04-07 | Stop reason: SDUPTHER

## 2019-02-26 RX ORDER — PROMETHAZINE HYDROCHLORIDE AND CODEINE PHOSPHATE 6.25; 1 MG/5ML; MG/5ML
5 SOLUTION ORAL EVERY 6 HOURS PRN
Qty: 240 ML | Refills: 1 | Status: CANCELLED | OUTPATIENT
Start: 2019-02-26

## 2019-02-26 NOTE — TELEPHONE ENCOUNTER
----- Message from Aniceto Erickson sent at 2/26/2019 12:45 PM CST -----  Contact: Patient  Type:  RX Refill Request    Who Called:  Patient  Refill or New Rx:  Refill  RX Name and Strength:  promethazine-codeine 6.25-10 mg/5 ml (PHENERGAN WITH CODEINE) 6.25-10 mg/5 mL syrup  How is the patient currently taking it? (ex. 1XDay):  Take 5 mLs by mouth every 6 (six) hours as needed. AS NEEDED FOR COUGH   Is this a 30 day or 90 day RX:  30  Preferred Pharmacy with phone number:    [facility] [id number] [place] [address] [address] [facility]  [address] [place] [address]  Phone: [phone] Fax: [phone]  Local or Mail Order:  Local  Ordering Provider:  Berny Stubbs Call Back Number:  [phone]

## 2019-02-27 RX ORDER — PROMETHAZINE HYDROCHLORIDE AND CODEINE PHOSPHATE 6.25; 1 MG/5ML; MG/5ML
5 SOLUTION ORAL EVERY 6 HOURS PRN
Qty: 240 ML | Refills: 1 | Status: SHIPPED | OUTPATIENT
Start: 2019-02-27 | End: 2019-03-07 | Stop reason: RX

## 2019-02-27 NOTE — TELEPHONE ENCOUNTER
----- Message from Chyna Tj sent at 2/27/2019 11:57 AM CST -----  Contact: Patient  Type:  RX Refill Request    Who Called:  Patient  Refill or New Rx:  Refill  RX Name and Strength:  promethazine-codeine 6.25-10 mg/5 ml (PHENERGAN WITH CODEINE) 6.25-10 mg/5 mL syrup  How is the patient currently taking it? (ex. 1XDay):  Take 5 mLs by mouth every 6 (six) hours as needed. AS NEEDED FOR COUGH - Oral  Is this a 30 day or 90 day RX: 240 mL   Preferred Pharmacy with phone number:    [facility] [address] [place] [address] [address] [place] [address]  [address] [place] [address]  Phone: [phone] Fax: [phone]  Local or Mail Order:  Local  Ordering Provider:  Dr Rayo Stbubs Call Back Number:  [phone]  Additional Information:  Calling to request a refill. Please advise.

## 2019-02-27 NOTE — TELEPHONE ENCOUNTER
Last seen on 1-    Pt states she still has a cough and that she has already gone through medications called in on the 28th of Jan. Please review and advise.

## 2019-02-27 NOTE — TELEPHONE ENCOUNTER
----- Message from Glo Carlin sent at 2/27/2019  9:28 AM CST -----  Contact: sanjay  Type: Needs Medical Advice    Who Called:  patient  Best Call Back Number: [phone]  Additional Information: calling about her refill request for promethazine-codeine 6.25-10 mg/5 ml (PHENERGAN WITH CODEINE) 6.25-10 mg/5 mL syrup that she sent yesterday. She wants to know why the medication has not been sent to the pharmacy yet. I did advise the patient that it may take up to 72 hours. Please give call back

## 2019-03-06 ENCOUNTER — TELEPHONE (OUTPATIENT)
Dept: FAMILY MEDICINE | Facility: CLINIC | Age: 49
End: 2019-03-06

## 2019-03-06 NOTE — TELEPHONE ENCOUNTER
Tried to reach [facility] to find out which alternatives are available. They are having phone problems and recording indicates unable to connect and to call back later.

## 2019-03-06 NOTE — TELEPHONE ENCOUNTER
----- Message from Elizabeth Wooten sent at 3/6/2019 11:47 AM CST -----  Contact: Olivia with [facility]  Type:  Pharmacy Calling to Clarify an RX    Name of Caller:  Olivia  Pharmacy Name:    [facility] [id number] [place] [address] [address] [facility]  [address] [facility] [address]  Phone: [phone] Fax: [phone]     Prescription Name:  promethazine-codeine 6.25-10 mg/5 ml (PHENERGAN WITH CODEINE) 6.25-10 mg/5 mL syrup  What do they need to clarify?:  Medication is on back order, needs a replacement  Best Call Back Number:  [phone]  Additional Information:  na

## 2019-03-07 RX ORDER — CODEINE PHOSPHATE AND GUAIFENESIN 10; 100 MG/5ML; MG/5ML
5 SOLUTION ORAL EVERY 8 HOURS PRN
Qty: 180 ML | Refills: 1 | Status: SHIPPED | OUTPATIENT
Start: 2019-03-07 | End: 2019-03-08

## 2019-03-07 NOTE — TELEPHONE ENCOUNTER
Spoke w/ Lauro. This Rx is on mfr backorder. The only thing they have equivalent is Cheratussin. Please review and advise if you would like to temporarily substitute.

## 2019-03-08 ENCOUNTER — TELEPHONE (OUTPATIENT)
Dept: FAMILY MEDICINE | Facility: CLINIC | Age: 49
End: 2019-03-08

## 2019-03-08 DIAGNOSIS — R05.3 CHRONIC COUGH: Primary | ICD-10-CM

## 2019-03-08 RX ORDER — PROMETHAZINE HYDROCHLORIDE AND CODEINE PHOSPHATE 6.25; 1 MG/5ML; MG/5ML
5 SOLUTION ORAL EVERY 4 HOURS PRN
Qty: 240 ML | Refills: 0 | Status: SHIPPED | OUTPATIENT
Start: 2019-03-08 | End: 2019-03-18

## 2019-03-08 RX ORDER — PROMETHAZINE HYDROCHLORIDE AND DEXTROMETHORPHAN HYDROBROMIDE 6.25; 15 MG/5ML; MG/5ML
5 SYRUP ORAL 2 TIMES DAILY PRN
Qty: 118 ML | Refills: 0 | OUTPATIENT
Start: 2019-03-08 | End: 2019-03-18

## 2019-03-08 NOTE — TELEPHONE ENCOUNTER
the following was sent in yesterday    Medication   guaifenesin-codeine 100-10 mg/5 ml (TUSSI-ORGANIDIN NR)  mg/5 mL syrup [[id number]]   Medication Detail      Disp Refills Start End MARKEL   guaifenesin-codeine 100-10 mg/5 ml (TUSSI-ORGANIDIN NR)  mg/5 mL syrup 180 mL 1 3/7/2019 3/17/2019 --   Sig - Route: Take 5 mLs by mouth every 8 (eight) hours as needed for Cough. - Oral   Sent to pharmacy as: guaifenesin-codeine 100-10 mg/5 ml (TUSSI-ORGANIDIN NR)  mg/5 mL syrup   Class: Normal   Order: [id number]   Date/Time Signed: 3/7/2019 16:33       E-Prescribing Status: Receipt confirmed by pharmacy (3/7/2019  4:33 PM CST)

## 2019-03-08 NOTE — TELEPHONE ENCOUNTER
----- Message from Noemi Feldman sent at 3/8/2019  1:36 PM CST -----  Type:  Patient Returning Call    Who Called:  Patient   Who Left Message for Patient:  Delta Erickson  Does the patient know what this is regarding?:  medication  Best Call Back Number:  [phone]  Additional Information:

## 2019-03-08 NOTE — TELEPHONE ENCOUNTER
Patient would like to remain on Promethazine-dm and has located in-stock at [facility] pharmacy. Order pended to pharmacy, Please advise

## 2019-03-08 NOTE — TELEPHONE ENCOUNTER
Pt does not want to take the cheratussin. Pt refuses to  script. Pt want the Promethazine with Codeine called in as she knows this work for her. Please review and advise.

## 2019-03-08 NOTE — TELEPHONE ENCOUNTER
Preventive Health Recommendations  Male Ages 18 - 25     Yearly exam:             See your health care provider every year in order to  o   Review health changes.   o   Discuss preventive care.    o   Review your medicines if your doctor has prescribed any.    You should be tested each year for STDs (sexually transmitted diseases).     Talk to your provider about cholesterol testing.      If you are at risk for diabetes, you should have a diabetes test (fasting glucose).    Shots: Get a flu shot each year. Get a tetanus shot every 10 years.     Nutrition:    Eat at least 5 servings of fruits and vegetables daily.     Eat whole-grain bread, whole-wheat pasta and brown rice instead of white grains and rice.     Talk to your provider about calcium and Vitamin D.     Lifestyle    Exercise for at least 150 minutes a week (30 minutes a day, 5 days a week). This will help you control your weight and prevent disease.     Limit alcohol to one drink per day.     No smoking.     Wear sunscreen to prevent skin cancer.     See your dentist every six months for an exam and cleaning.      ----- Message from Aminah Mary sent at 3/8/2019 10:25 AM CST -----  Contact: pt  ..Type: Needs Medical Advice    Who Called: pt  Best Call Back Number: [phone]  Additional Information: Pt would like to speak with a nurse regarding her medication(guaifenesin-codeine 100-10 mg/5 ml (TUSSI-ORGANIDIN NR)  mg/5 mL syrup), pt stated she is not familiar with medication. Pt called requesting for (Promethazine with Codine)  Please call to advise  Thanks

## 2019-03-25 RX ORDER — PROMETHAZINE HYDROCHLORIDE AND CODEINE PHOSPHATE 6.25; 1 MG/5ML; MG/5ML
5 SOLUTION ORAL EVERY 4 HOURS PRN
Qty: 1 BOTTLE | Refills: 0 | OUTPATIENT
Start: 2019-03-25 | End: 2019-04-04

## 2019-03-25 NOTE — TELEPHONE ENCOUNTER
This is typically not a long term medication, and per review of PDMP she has been filling this very frequently. She last filled this about 2 weeks ago, if she is already out, she is using too much. Can ask PCP when he returns.

## 2019-03-25 NOTE — TELEPHONE ENCOUNTER
----- Message from Nicol Aguillon sent at 3/25/2019  3:12 PM CDT -----  Contact: self   Placed call to pod, patient miss call from your office please call back at [phone] (bfke)

## 2019-03-25 NOTE — TELEPHONE ENCOUNTER
----- Message from Mia Luna sent at 3/25/2019 11:42 AM CDT -----  Contact: self  Type:  RX Refill Request    Who Called:  self  Refill or New Rx:  refill  RX Name and Strength:  Promethazine with Hydrocodone 5 mg  How is the patient currently taking it? (ex. 1XDay):    Is this a 30 day or 90 day RX:  30  Preferred Pharm[facility] with phone number:  [facility]  Local or Mail Order:  local  Ordering Provider:  Dr Berny Stubbs Call Back Number:  [phone]  Additional Information:  Patient still has the cough and needs a refill. Medication is in stock at this pharmacy. Please call patient if any questions. Thanks!     [facility] - [facility] [address] [address]  [address] [address] [address]  Phone: [phone] Fax: [phone]

## 2019-03-25 NOTE — TELEPHONE ENCOUNTER
Notified patient that she will need to wait for PCP to return to ask for refill of medication. Verbalized understanding that patient will return Thursday.

## 2019-03-25 NOTE — TELEPHONE ENCOUNTER
Pt Of Rayo Arrieta MD    Last seen on: 1/28/2019  Next appt: 4/29/2019    Allergies: Review of patient's allergies indicates:  No Known Allergies     Pharmacy:   [facility] - [facility] [address] [address]  [address] [address] [address]  Phone: [phone] Fax: [phone]    Please review! Thank you!

## 2019-03-28 ENCOUNTER — TELEPHONE (OUTPATIENT)
Dept: FAMILY MEDICINE | Facility: CLINIC | Age: 49
End: 2019-03-28

## 2019-03-28 RX ORDER — PROMETHAZINE HYDROCHLORIDE AND CODEINE PHOSPHATE 6.25; 1 MG/5ML; MG/5ML
5 SOLUTION ORAL EVERY 4 HOURS PRN
Qty: 120 ML | Refills: 1 | Status: SHIPPED | OUTPATIENT
Start: 2019-03-28 | End: 2019-04-07

## 2019-03-28 NOTE — TELEPHONE ENCOUNTER
----- Message from Reagan Caban sent at 3/28/2019 12:08 PM CDT -----  Type:  Patient Call Back    Who Called:  pt  Does the patient know what this is regarding?: medication questions pt did not disclose the names.  Best Call Back Number:  [phone]  Additional Information:  Please call pt and leave a detailed message if there is no answer.

## 2019-03-28 NOTE — TELEPHONE ENCOUNTER
----- Message from Sandra Casper sent at 3/28/2019  2:05 PM CDT -----  Type:  RX Refill Request    Who Called:  Self   Refill or New Rx:  Refill   RX Name and Strength: cough syrup  How is the patient currently taking it? (ex. 1XDay):  Is this a 30 day or 90 day RX:    Preferred Pharmacy with phone number:  [facility]   Local or Mail Order:  Ordering Provider:  Dr Berny Stubbs Call Back Number:  [phone]  Additional Information:  Patient states she is not able to get rx refill with [facility]. Patient requesting to get rx filled at [facility] pharmacy for cough syrup.

## 2019-04-11 ENCOUNTER — TELEPHONE (OUTPATIENT)
Dept: ENDOCRINOLOGY | Facility: CLINIC | Age: 49
End: 2019-04-11

## 2019-04-11 NOTE — TELEPHONE ENCOUNTER
Spoke with pt, states did not receive updated chart notes showing four times a day,     Nhung please update Dec chart notes with log showing four times a day

## 2019-04-11 NOTE — TELEPHONE ENCOUNTER
----- Message from Rayo Basilio sent at 4/11/2019 11:17 AM CDT -----  Contact: Patient  Type: Needs Medical Advice    Who Called:  Patient  Best Call Back Number: [phone]  Additional Information: Patient would like to talk with the office. Please call to advise. Thanks!

## 2019-04-11 NOTE — TELEPHONE ENCOUNTER
Pt states she heard from Vicky supplier that they do not have supporting documentation from this office that pt has checked bg 4x/day for 30days. Pt states she turned in this information. Asking for to call supplier to find out what the status 3   Pt would like a call back after this for an update

## 2019-04-23 ENCOUNTER — LAB VISIT (OUTPATIENT)
Dept: LAB | Facility: HOSPITAL | Age: 49
End: 2019-04-23
Attending: FAMILY MEDICINE
Payer: MEDICARE

## 2019-04-23 DIAGNOSIS — E11.8 TYPE 2 DIABETES MELLITUS WITH COMPLICATION, WITH LONG-TERM CURRENT USE OF INSULIN: ICD-10-CM

## 2019-04-23 DIAGNOSIS — Z79.4 TYPE 2 DIABETES MELLITUS WITH COMPLICATION, WITH LONG-TERM CURRENT USE OF INSULIN: ICD-10-CM

## 2019-04-23 LAB
ALBUMIN SERPL BCP-MCNC: 3.8 G/DL (ref 3.5–5.2)
ALP SERPL-CCNC: 75 U/L (ref 55–135)
ALT SERPL W/O P-5'-P-CCNC: 26 U/L (ref 10–44)
ANION GAP SERPL CALC-SCNC: 7 MMOL/L (ref 8–16)
AST SERPL-CCNC: 29 U/L (ref 10–40)
BILIRUB SERPL-MCNC: 0.4 MG/DL (ref 0.1–1)
BUN SERPL-MCNC: 10 MG/DL (ref 6–20)
CALCIUM SERPL-MCNC: 9.8 MG/DL (ref 8.7–10.5)
CHLORIDE SERPL-SCNC: 98 MMOL/L (ref 95–110)
CO2 SERPL-SCNC: 31 MMOL/L (ref 23–29)
CREAT SERPL-MCNC: 1 MG/DL (ref 0.5–1.4)
EST. GFR  (AFRICAN AMERICAN): >60 ML/MIN/1.73 M^2
EST. GFR  (NON AFRICAN AMERICAN): >60 ML/MIN/1.73 M^2
ESTIMATED AVG GLUCOSE: 212 MG/DL (ref 68–131)
GLUCOSE SERPL-MCNC: 169 MG/DL (ref 70–110)
HBA1C MFR BLD HPLC: 9 % (ref 4–5.6)
POTASSIUM SERPL-SCNC: 4.3 MMOL/L (ref 3.5–5.1)
PROT SERPL-MCNC: 7.4 G/DL (ref 6–8.4)
SODIUM SERPL-SCNC: 136 MMOL/L (ref 136–145)

## 2019-04-23 PROCEDURE — 80053 COMPREHEN METABOLIC PANEL: CPT

## 2019-04-23 PROCEDURE — 36415 COLL VENOUS BLD VENIPUNCTURE: CPT | Mod: PN

## 2019-04-23 PROCEDURE — 83036 HEMOGLOBIN GLYCOSYLATED A1C: CPT

## 2019-04-25 DIAGNOSIS — E78.2 MIXED HYPERLIPIDEMIA: ICD-10-CM

## 2019-04-25 RX ORDER — ATORVASTATIN CALCIUM 20 MG/1
TABLET, FILM COATED ORAL
Qty: 90 TABLET | Refills: 2 | Status: SHIPPED | OUTPATIENT
Start: 2019-04-25 | End: 2019-07-31

## 2019-04-29 ENCOUNTER — OFFICE VISIT (OUTPATIENT)
Dept: FAMILY MEDICINE | Facility: CLINIC | Age: 49
End: 2019-04-29
Payer: MEDICARE

## 2019-04-29 VITALS
DIASTOLIC BLOOD PRESSURE: 80 MMHG | WEIGHT: 210.56 LBS | BODY MASS INDEX: 33.05 KG/M2 | TEMPERATURE: 99 F | HEIGHT: 67 IN | SYSTOLIC BLOOD PRESSURE: 124 MMHG

## 2019-04-29 DIAGNOSIS — R05.3 CHRONIC COUGH: ICD-10-CM

## 2019-04-29 DIAGNOSIS — F32.A DEPRESSION, UNSPECIFIED DEPRESSION TYPE: ICD-10-CM

## 2019-04-29 DIAGNOSIS — G89.29 CHRONIC MIDLINE LOW BACK PAIN WITHOUT SCIATICA: Primary | ICD-10-CM

## 2019-04-29 DIAGNOSIS — M54.50 CHRONIC MIDLINE LOW BACK PAIN WITHOUT SCIATICA: Primary | ICD-10-CM

## 2019-04-29 DIAGNOSIS — F50.81 BINGE EATING DISORDER: ICD-10-CM

## 2019-04-29 PROBLEM — F50.819 BINGE EATING DISORDER: Status: ACTIVE | Noted: 2019-04-29

## 2019-04-29 PROCEDURE — 99999 PR PBB SHADOW E&M-EST. PATIENT-LVL III: ICD-10-PCS | Mod: PBBFAC,,, | Performed by: FAMILY MEDICINE

## 2019-04-29 PROCEDURE — 99999 PR PBB SHADOW E&M-EST. PATIENT-LVL III: CPT | Mod: PBBFAC,,, | Performed by: FAMILY MEDICINE

## 2019-04-29 PROCEDURE — 99214 OFFICE O/P EST MOD 30 MIN: CPT | Mod: S$PBB,,, | Performed by: FAMILY MEDICINE

## 2019-04-29 PROCEDURE — 99213 OFFICE O/P EST LOW 20 MIN: CPT | Mod: PBBFAC,PN | Performed by: FAMILY MEDICINE

## 2019-04-29 PROCEDURE — 99214 PR OFFICE/OUTPT VISIT, EST, LEVL IV, 30-39 MIN: ICD-10-PCS | Mod: S$PBB,,, | Performed by: FAMILY MEDICINE

## 2019-04-29 RX ORDER — HYDROCODONE BITARTRATE AND HOMATROPINE METHYLBROMIDE ORAL SOLUTION 5; 1.5 MG/5ML; MG/5ML
5 LIQUID ORAL EVERY 8 HOURS PRN
Qty: 240 ML | Refills: 0 | Status: SHIPPED | OUTPATIENT
Start: 2019-04-29 | End: 2019-04-29 | Stop reason: CLARIF

## 2019-04-29 RX ORDER — PROMETHAZINE HYDROCHLORIDE AND CODEINE PHOSPHATE 6.25; 1 MG/5ML; MG/5ML
5 SOLUTION ORAL EVERY 4 HOURS PRN
Qty: 240 ML | Refills: 2 | Status: SHIPPED | OUTPATIENT
Start: 2019-04-29 | End: 2019-05-09

## 2019-04-29 RX ORDER — HYDROCODONE BITARTRATE AND ACETAMINOPHEN 7.5; 325 MG/1; MG/1
1 TABLET ORAL EVERY 12 HOURS PRN
Qty: 60 TABLET | Refills: 0 | Status: SHIPPED | OUTPATIENT
Start: 2019-06-29 | End: 2019-07-31 | Stop reason: SDUPTHER

## 2019-04-29 RX ORDER — HYDROCODONE BITARTRATE AND ACETAMINOPHEN 7.5; 325 MG/1; MG/1
1 TABLET ORAL EVERY 12 HOURS PRN
Qty: 60 TABLET | Refills: 0 | Status: SHIPPED | OUTPATIENT
Start: 2019-04-29 | End: 2019-04-29 | Stop reason: SDUPTHER

## 2019-04-29 RX ORDER — HYDROCODONE BITARTRATE AND ACETAMINOPHEN 7.5; 325 MG/1; MG/1
1 TABLET ORAL EVERY 12 HOURS PRN
Qty: 60 TABLET | Refills: 0 | Status: SHIPPED | OUTPATIENT
Start: 2019-05-29 | End: 2019-04-29 | Stop reason: SDUPTHER

## 2019-04-29 NOTE — PROGRESS NOTES
"Subjective:       Patient ID: Jose Cousin is a 48 y.o. female.    Chief Complaint: Follow-up (3 mo med f/u)    Follow-up chronic low back pain. It is fairly stable with no radiation.  She is limited and is not able to reach down to tie shoes.  She has to wear slippers.  She has pain rolling over in bed.  She has gotten an adjustable bed to help her position.  She sees a psychiatrist, Alissa Bowen in [place].  She takes Xanax 1 mg once or twice a day.  She also takes Ambien at night.  She has recently started on Adderall twice a day for binge eating disorder.  She has been losing some weight.  She is due to see Nhung Michael tomorrow for follow-up of her diabetes.  She has asthma and a chronic cough.  She says that she likes promethazine with codeine.    Review of Systems   Constitutional: Positive for unexpected weight change. Negative for fever.   Respiratory: Positive for cough and shortness of breath.    Cardiovascular: Negative for chest pain.   Musculoskeletal: Positive for back pain.   Neurological: Negative for weakness and numbness.       Objective:     Blood pressure 124/80, temperature 98.7 °F (37.1 °C), temperature source Oral, height 5' 7" (1.702 m), weight 95.5 kg (210 lb 8.6 oz).      Physical Exam   Constitutional:   She is overweight and in mild distress.   Cardiovascular: Normal rate and regular rhythm.   Pulmonary/Chest: Effort normal and breath sounds normal.   Musculoskeletal:   She can only flex about 30° at the waist.  Decreased side bend with pain. She has some tenderness over the mid lumbar area.   Neurological: She is alert.       Assessment:       1. Chronic midline low back pain without sciatica    2. Binge eating disorder    3. Chronic cough    4. Depression, unspecified depression type        Plan:       I refilled Lortab 7.5 mg twice a day.  I told her about precautions since she is also taking a moderate dose of Xanax.  We discussed the possibility of physical therapy.  She " would not be able to afford that at this point.  I gave her some back exercises to do at home.  I refilled promethazine with codeine cough syrup.

## 2019-04-29 NOTE — PATIENT INSTRUCTIONS
Exercises to Strengthen Your Lower Back  Strong lower back and abdominal muscles work together to support your spine. The exercises below will help strengthen the lower back. It is important that you begin exercising slowly and increase levels gradually.  Always begin any exercise program with stretching. If you feel pain while doing any of these exercises, stop and talk to your doctor about a more specific exercise program that better suits your condition.   Low back stretch  The point of stretching is to make you more flexible and increase your range of motion. Stretch only as much as you are able. Stretch slowly. Do not push your stretch to the limit. If at any point you feel pain while stretching, this is your (temporary) limit.  · Lie on your back with your knees bent and both feet on the ground.  · Slowly raise your left knee to your chest as you flatten your lower back against the floor. Hold for 5 seconds.  · Relax and repeat the exercise with your right knee.  · Do 10 of these exercises for each leg.  · Repeat hugging both knees to your chest at the same time.  Building lower back strength  Start your exercise routine with 10 to 30 minutes a day, 1 to 3 times a day.  Initial exercises  Lying on your back:  1. Ankle pumps: Move your foot up and down, towards your head, and then away. Repeat 10 times with each foot.  2. Heel slides: Slowly bend your knee, drawing the heel of your foot towards you. Then slide your heel/foot from you, straightening your knee. Do not lift your foot off the floor (this is not a leg lift).  3. Abdominal contraction: Bend your knees and put your hands on your stomach. Tighten your stomach muscles. Hold for 5 seconds, then relax. Repeat 10 times.  4. Straight leg raise: Bend one leg at the knee and keep the other leg straight. Tighten your stomach muscles. Slowly lift your straight leg 6 to 12 inches off the floor and hold for up to 5 seconds. Repeat 10 times on each  side.  Standin. Wall squats: Stand with your back against the wall. Move your feet about 12 inches away from the wall. Tighten your stomach muscles, and slowly bend your knees until they are at about a 45 degree angle. Do not go down too far. Hold about 5 seconds. Then slowly return to your starting position. Repeat 10 times.  2. Heel raises: Stand facing the wall. Slowly raise the heels of your feet up and down, while keeping your toes on the floor. If you have trouble balancing, you can touch the wall with your hands. Repeat 10 times.  More advanced exercises  When you feel comfortable enough, try these exercises.  1. Kneeling lumbar extension: Begin on your hands and knees. At the same time, raise and straighten your right arm and left leg until they are parallel to the ground. Hold for 2 seconds and come back slowly to a starting position. Repeat with left arm and right leg, alternating 10 times.  2. Prone lumbar extension: Lie face down, arms extended overhead, palms on the floor. At the same time, raise your right arm and left leg as high as comfortably possible. Hold for 10 seconds and slowly return to start. Repeat with left arm and right leg, alternating 10 times. Gradually build up to 20 times. (Advanced: Repeat this exercise raising both arms and both legs a few inches off the floor at the same time. Hold for 5 seconds and release.)  3. Pelvic tilt: Lie on the floor on your back with your knees bent at 90 degrees. Your feet should be flat on the floor. Inhale, exhale, then slowly contract your abdominal muscles bringing your navel toward your spine. Let your pelvis rock back until your lower back is flat on the floor. Hold for 10 seconds while breathing smoothly.  4. Abdominal crunch: Perform a pelvic tilt (above) flattening your lower back against the floor. Holding the tension in your abdominal muscles, take another breath and raise your shoulder blades off the ground (this is not a full sit-up).  Keep your head in line with your body (dont bend your neck forward). Hold for 2 seconds, then slowly lower.  Date Last Reviewed: 6/1/2016  © 8117-3350 [employer]. [address] [place] [address], [place] [address]. All rights reserved. This information is not intended as a substitute for professional medical care. Always follow your healthcare professional's instructions.

## 2019-04-30 ENCOUNTER — OFFICE VISIT (OUTPATIENT)
Dept: ENDOCRINOLOGY | Facility: CLINIC | Age: 49
End: 2019-04-30
Payer: MEDICARE

## 2019-04-30 ENCOUNTER — TELEPHONE (OUTPATIENT)
Dept: ENDOCRINOLOGY | Facility: CLINIC | Age: 49
End: 2019-04-30

## 2019-04-30 VITALS
BODY MASS INDEX: 33.12 KG/M2 | DIASTOLIC BLOOD PRESSURE: 70 MMHG | HEIGHT: 67 IN | HEART RATE: 94 BPM | SYSTOLIC BLOOD PRESSURE: 122 MMHG | WEIGHT: 211 LBS

## 2019-04-30 DIAGNOSIS — Z79.4 TYPE 2 DIABETES MELLITUS WITHOUT COMPLICATION, WITH LONG-TERM CURRENT USE OF INSULIN: ICD-10-CM

## 2019-04-30 DIAGNOSIS — E11.9 TYPE 2 DIABETES MELLITUS WITHOUT COMPLICATION, WITH LONG-TERM CURRENT USE OF INSULIN: ICD-10-CM

## 2019-04-30 DIAGNOSIS — E78.2 MIXED HYPERLIPIDEMIA: ICD-10-CM

## 2019-04-30 DIAGNOSIS — I10 ESSENTIAL HYPERTENSION: ICD-10-CM

## 2019-04-30 DIAGNOSIS — Z79.4 TYPE 2 DIABETES MELLITUS WITH HYPERGLYCEMIA, WITH LONG-TERM CURRENT USE OF INSULIN: Primary | ICD-10-CM

## 2019-04-30 DIAGNOSIS — E11.65 TYPE 2 DIABETES MELLITUS WITH HYPERGLYCEMIA, WITH LONG-TERM CURRENT USE OF INSULIN: Primary | ICD-10-CM

## 2019-04-30 DIAGNOSIS — E66.9 OBESITY (BMI 30-39.9): ICD-10-CM

## 2019-04-30 PROCEDURE — 99214 OFFICE O/P EST MOD 30 MIN: CPT | Mod: PBBFAC,PN | Performed by: NURSE PRACTITIONER

## 2019-04-30 PROCEDURE — 99214 OFFICE O/P EST MOD 30 MIN: CPT | Mod: S$PBB,,, | Performed by: NURSE PRACTITIONER

## 2019-04-30 PROCEDURE — 99214 PR OFFICE/OUTPT VISIT, EST, LEVL IV, 30-39 MIN: ICD-10-PCS | Mod: S$PBB,,, | Performed by: NURSE PRACTITIONER

## 2019-04-30 PROCEDURE — 99999 PR PBB SHADOW E&M-EST. PATIENT-LVL IV: ICD-10-PCS | Mod: PBBFAC,,, | Performed by: NURSE PRACTITIONER

## 2019-04-30 PROCEDURE — 99999 PR PBB SHADOW E&M-EST. PATIENT-LVL IV: CPT | Mod: PBBFAC,,, | Performed by: NURSE PRACTITIONER

## 2019-04-30 RX ORDER — PEN NEEDLE, DIABETIC 31 GX5/16"
NEEDLE, DISPOSABLE MISCELLANEOUS
Qty: 450 EACH | Refills: 3 | Status: SHIPPED | OUTPATIENT
Start: 2019-04-30 | End: 2020-05-28

## 2019-04-30 RX ORDER — INSULIN GLARGINE 100 [IU]/ML
28 INJECTION, SOLUTION SUBCUTANEOUS NIGHTLY
Start: 2019-04-30 | End: 2019-06-05 | Stop reason: SDUPTHER

## 2019-05-03 ENCOUNTER — TELEPHONE (OUTPATIENT)
Dept: ENDOCRINOLOGY | Facility: CLINIC | Age: 49
End: 2019-05-03

## 2019-05-03 NOTE — TELEPHONE ENCOUNTER
Pt stated she wanted to come see Thuy Mahoney LPN to get her Freestyle Vicky set up. Wanted only to see Paxton in [place]. Thuy off this afternoon. Will let her know to contact pt on Monday

## 2019-05-06 ENCOUNTER — TELEPHONE (OUTPATIENT)
Dept: ENDOCRINOLOGY | Facility: CLINIC | Age: 49
End: 2019-05-06

## 2019-05-06 NOTE — TELEPHONE ENCOUNTER
----- Message from RT Kailash sent at 5/6/2019 11:52 AM CDT -----  Contact: pt    pt , returned Nurse Thuy's missed call, can bring glucometer machine for a demonstration, if possible, thanks.

## 2019-05-07 ENCOUNTER — CLINICAL SUPPORT (OUTPATIENT)
Dept: DIABETES | Facility: CLINIC | Age: 49
End: 2019-05-07
Payer: MEDICARE

## 2019-05-07 NOTE — PROGRESS NOTES
"PLACEMENT OF FREESTYLE BAKARI PERSONAL CONTINOUS GLUCOSE MONITORING SYSTEM (CGMS)     REFERRING PROVIDER: Nhung      Patient is here in clinic today for placement of personal continuous glucose monitoring system (CGMS).   Patient has Freestyle Bakari [place] and Sensor.  Pt verbalizes understanding to change sensor every 14 days. Patient is also aware that each time a new sensor is placed there is a 1 hour warm up period.  Calibration is not necessary for Freestyle Bakari system.     A detailed explanation of Freestyle Bakari Continuous Glucose Monitoring System was provided. Reviewed the Freestyle "Quick Start Guide"  and User Manual with patient and he has a written copy for home use.  Instructed patient on how to scan sensor with reader to obtain blood sugar reading. Patient was allowed to practice and time allowed to ask questions.     Patient encouraged to set up Freestyle Bakari account.  Once patient sets up personal Bakari account, patient can choose to share data with [facility] clinic. Pt will notify Endocrinology if glucose levels are above 200 mg/dL consistently or if episode of glucose less than 70 mg/dL presents.  Pt verbalizes understanding.        An appropriate site was selected and prepared. Patient inserted sensor into back of upper left arm. Sensor will be changed every 14 days.   All questions answered.  Pt encouraged to contact Endocrinology department with any further questions or concerns.    "

## 2019-06-05 DIAGNOSIS — E11.65 TYPE 2 DIABETES MELLITUS WITH HYPERGLYCEMIA, WITH LONG-TERM CURRENT USE OF INSULIN: ICD-10-CM

## 2019-06-05 DIAGNOSIS — Z79.4 TYPE 2 DIABETES MELLITUS WITH HYPERGLYCEMIA, WITH LONG-TERM CURRENT USE OF INSULIN: ICD-10-CM

## 2019-06-05 RX ORDER — INSULIN GLARGINE 100 [IU]/ML
28 INJECTION, SOLUTION SUBCUTANEOUS NIGHTLY
Qty: 9 ML | Refills: 11 | Status: SHIPPED | OUTPATIENT
Start: 2019-06-05 | End: 2019-07-31

## 2019-07-17 ENCOUNTER — PATIENT OUTREACH (OUTPATIENT)
Dept: ADMINISTRATIVE | Facility: HOSPITAL | Age: 49
End: 2019-07-17

## 2019-07-19 DIAGNOSIS — E11.9 TYPE 2 DIABETES MELLITUS WITHOUT COMPLICATION, UNSPECIFIED WHETHER LONG TERM INSULIN USE: ICD-10-CM

## 2019-07-25 ENCOUNTER — PATIENT OUTREACH (OUTPATIENT)
Dept: ADMINISTRATIVE | Facility: HOSPITAL | Age: 49
End: 2019-07-25

## 2019-07-26 ENCOUNTER — LAB VISIT (OUTPATIENT)
Dept: LAB | Facility: HOSPITAL | Age: 49
End: 2019-07-26
Payer: MEDICARE

## 2019-07-26 DIAGNOSIS — Z79.4 TYPE 2 DIABETES MELLITUS WITH HYPERGLYCEMIA, WITH LONG-TERM CURRENT USE OF INSULIN: ICD-10-CM

## 2019-07-26 DIAGNOSIS — E11.65 TYPE 2 DIABETES MELLITUS WITH HYPERGLYCEMIA, WITH LONG-TERM CURRENT USE OF INSULIN: ICD-10-CM

## 2019-07-26 LAB
CHOLEST SERPL-MCNC: 219 MG/DL (ref 120–199)
CHOLEST/HDLC SERPL: 3.2 {RATIO} (ref 2–5)
ESTIMATED AVG GLUCOSE: 177 MG/DL (ref 68–131)
HBA1C MFR BLD HPLC: 7.8 % (ref 4–5.6)
HDLC SERPL-MCNC: 68 MG/DL (ref 40–75)
HDLC SERPL: 31.1 % (ref 20–50)
LDLC SERPL CALC-MCNC: 131.2 MG/DL (ref 63–159)
NONHDLC SERPL-MCNC: 151 MG/DL
TRIGL SERPL-MCNC: 99 MG/DL (ref 30–150)
TSH SERPL DL<=0.005 MIU/L-ACNC: 1.63 UIU/ML (ref 0.4–4)

## 2019-07-26 PROCEDURE — 83036 HEMOGLOBIN GLYCOSYLATED A1C: CPT

## 2019-07-26 PROCEDURE — 80061 LIPID PANEL: CPT

## 2019-07-26 PROCEDURE — 84443 ASSAY THYROID STIM HORMONE: CPT

## 2019-07-26 PROCEDURE — 36415 COLL VENOUS BLD VENIPUNCTURE: CPT | Mod: PN

## 2019-07-31 ENCOUNTER — OFFICE VISIT (OUTPATIENT)
Dept: ENDOCRINOLOGY | Facility: CLINIC | Age: 49
End: 2019-07-31
Payer: MEDICARE

## 2019-07-31 ENCOUNTER — OFFICE VISIT (OUTPATIENT)
Dept: FAMILY MEDICINE | Facility: CLINIC | Age: 49
End: 2019-07-31
Payer: MEDICARE

## 2019-07-31 VITALS
SYSTOLIC BLOOD PRESSURE: 142 MMHG | HEIGHT: 67 IN | SYSTOLIC BLOOD PRESSURE: 142 MMHG | BODY MASS INDEX: 32.94 KG/M2 | HEART RATE: 80 BPM | BODY MASS INDEX: 32.99 KG/M2 | WEIGHT: 210.19 LBS | WEIGHT: 210.31 LBS | DIASTOLIC BLOOD PRESSURE: 92 MMHG | DIASTOLIC BLOOD PRESSURE: 92 MMHG | HEART RATE: 80 BPM

## 2019-07-31 DIAGNOSIS — E11.9 TYPE 2 DIABETES MELLITUS WITHOUT COMPLICATION, WITH LONG-TERM CURRENT USE OF INSULIN: Primary | ICD-10-CM

## 2019-07-31 DIAGNOSIS — R05.3 CHRONIC COUGH: ICD-10-CM

## 2019-07-31 DIAGNOSIS — I10 ESSENTIAL HYPERTENSION: ICD-10-CM

## 2019-07-31 DIAGNOSIS — G89.29 CHRONIC MIDLINE LOW BACK PAIN WITHOUT SCIATICA: Primary | ICD-10-CM

## 2019-07-31 DIAGNOSIS — E11.65 TYPE 2 DIABETES MELLITUS WITH HYPERGLYCEMIA, WITH LONG-TERM CURRENT USE OF INSULIN: ICD-10-CM

## 2019-07-31 DIAGNOSIS — E78.2 MIXED HYPERLIPIDEMIA: ICD-10-CM

## 2019-07-31 DIAGNOSIS — M54.50 CHRONIC MIDLINE LOW BACK PAIN WITHOUT SCIATICA: Primary | ICD-10-CM

## 2019-07-31 DIAGNOSIS — E66.9 OBESITY (BMI 30-39.9): ICD-10-CM

## 2019-07-31 DIAGNOSIS — Z79.4 TYPE 2 DIABETES MELLITUS WITHOUT COMPLICATION, WITH LONG-TERM CURRENT USE OF INSULIN: Primary | ICD-10-CM

## 2019-07-31 DIAGNOSIS — Z79.4 TYPE 2 DIABETES MELLITUS WITH HYPERGLYCEMIA, WITH LONG-TERM CURRENT USE OF INSULIN: ICD-10-CM

## 2019-07-31 PROCEDURE — 95251 PR GLUCOSE MONITOR, 72 HOUR, PHYS INTERP: ICD-10-PCS | Mod: ,,, | Performed by: NURSE PRACTITIONER

## 2019-07-31 PROCEDURE — 99214 PR OFFICE/OUTPT VISIT, EST, LEVL IV, 30-39 MIN: ICD-10-PCS | Mod: S$PBB,,, | Performed by: NURSE PRACTITIONER

## 2019-07-31 PROCEDURE — 99213 OFFICE O/P EST LOW 20 MIN: CPT | Mod: PBBFAC,PN | Performed by: FAMILY MEDICINE

## 2019-07-31 PROCEDURE — 99999 PR PBB SHADOW E&M-EST. PATIENT-LVL V: CPT | Mod: PBBFAC,,, | Performed by: NURSE PRACTITIONER

## 2019-07-31 PROCEDURE — 99213 PR OFFICE/OUTPT VISIT, EST, LEVL III, 20-29 MIN: ICD-10-PCS | Mod: S$PBB,,, | Performed by: FAMILY MEDICINE

## 2019-07-31 PROCEDURE — 99215 OFFICE O/P EST HI 40 MIN: CPT | Mod: PBBFAC,27,PN | Performed by: NURSE PRACTITIONER

## 2019-07-31 PROCEDURE — 95251 CONT GLUC MNTR ANALYSIS I&R: CPT | Mod: ,,, | Performed by: NURSE PRACTITIONER

## 2019-07-31 PROCEDURE — 99999 PR PBB SHADOW E&M-EST. PATIENT-LVL V: ICD-10-PCS | Mod: PBBFAC,,, | Performed by: NURSE PRACTITIONER

## 2019-07-31 PROCEDURE — 99999 PR PBB SHADOW E&M-EST. PATIENT-LVL III: ICD-10-PCS | Mod: PBBFAC,,, | Performed by: FAMILY MEDICINE

## 2019-07-31 PROCEDURE — 99213 OFFICE O/P EST LOW 20 MIN: CPT | Mod: S$PBB,,, | Performed by: FAMILY MEDICINE

## 2019-07-31 PROCEDURE — 99214 OFFICE O/P EST MOD 30 MIN: CPT | Mod: S$PBB,,, | Performed by: NURSE PRACTITIONER

## 2019-07-31 PROCEDURE — 99999 PR PBB SHADOW E&M-EST. PATIENT-LVL III: CPT | Mod: PBBFAC,,, | Performed by: FAMILY MEDICINE

## 2019-07-31 RX ORDER — PROMETHAZINE HYDROCHLORIDE AND CODEINE PHOSPHATE 6.25; 1 MG/5ML; MG/5ML
5 SOLUTION ORAL EVERY 4 HOURS PRN
Qty: 240 ML | Refills: 2 | Status: SHIPPED | OUTPATIENT
Start: 2019-07-31 | End: 2019-08-10

## 2019-07-31 RX ORDER — HYDROCODONE BITARTRATE AND ACETAMINOPHEN 7.5; 325 MG/1; MG/1
1 TABLET ORAL EVERY 12 HOURS PRN
Qty: 60 TABLET | Refills: 0 | Status: SHIPPED | OUTPATIENT
Start: 2019-09-30 | End: 2019-10-31 | Stop reason: SDUPTHER

## 2019-07-31 RX ORDER — HYDROCODONE BITARTRATE AND ACETAMINOPHEN 7.5; 325 MG/1; MG/1
1 TABLET ORAL EVERY 12 HOURS PRN
Qty: 60 TABLET | Refills: 0 | Status: SHIPPED | OUTPATIENT
Start: 2019-07-31 | End: 2019-07-31 | Stop reason: SDUPTHER

## 2019-07-31 RX ORDER — INSULIN GLARGINE 100 [IU]/ML
25 INJECTION, SOLUTION SUBCUTANEOUS NIGHTLY
Qty: 9 ML | Refills: 11
Start: 2019-07-31 | End: 2019-11-13

## 2019-07-31 RX ORDER — ATORVASTATIN CALCIUM 40 MG/1
40 TABLET, FILM COATED ORAL DAILY
Qty: 90 TABLET | Refills: 3 | Status: SHIPPED | OUTPATIENT
Start: 2019-07-31 | End: 2021-01-19 | Stop reason: SDUPTHER

## 2019-07-31 RX ORDER — HYDROCODONE BITARTRATE AND ACETAMINOPHEN 7.5; 325 MG/1; MG/1
1 TABLET ORAL EVERY 12 HOURS PRN
Qty: 60 TABLET | Refills: 0 | Status: SHIPPED | OUTPATIENT
Start: 2019-08-31 | End: 2019-07-31 | Stop reason: SDUPTHER

## 2019-07-31 RX ORDER — INSULIN ASPART 100 [IU]/ML
10 INJECTION, SOLUTION INTRAVENOUS; SUBCUTANEOUS
Qty: 15 ML | Refills: 6
Start: 2019-07-31 | End: 2019-11-13

## 2019-07-31 NOTE — PROGRESS NOTES
CC: Ms. Jose Mesa arrives today for management of Type 2 DM and review of chronic medical conditions, as listed in the Visit Diagnosis section of this encounter.       HPI: Ms. Jose Mesa was diagnosed with Type 2 DM in 2005. She was diagnosed based on lab work. Initial treatment consisted of metformin. Glimepiride later added. Victoza to her medication regimen in 2016. Insulin added in 2018. + FH of DM on mother. Denies hospitalizations due to DM.     Unfortunately she is 1 hour late for her appt today.     Last seen by me in April. At this time, Basaglar dose was increased.     BG monitoring per [employer] [facility].     Hypoglycemia: Occasionally  Symptoms: tingling in the mouth  Treatment: soda    Missing Insulin/PO medication doses: Rare     Dietary Habits: Eats 3 meals/day. Breakfast is eggs only. Lunch and dinner have carbs. Rare snacking. Avoiding sugary beverages.      Last DM education appointment:  8/2016    She is compliant with taking her atorvastatin.     CURRENT DIABETIC MEDS: metformin XR 1000 mg twice daily, Victoza 1.8 mg daily, Basaglar 28 units QHS, Novolog 8 units with lunch and dinner + correction scale, target 150, ISF 25  Glucometer type: Accucheck Adrienne    Previous DM treatments:  Lantus - not covered by insurance   Glimepiride   acarbose    Last Eye Exam: 6/2019, no DR per patient. Rosalina in [place].   Last Podiatry Exam: no    REVIEW OF SYSTEMS  Constitutional: no c/o fatigue, weakness, weight loss.  Eyes: denies visual disturbances.  Cardiac: no palpitations or chest pain.  Respiratory: denies dyspnea. Reports cough with sputum that she attributes to COPD.  GI: no c/o abdominal pain or nausea. Denies h/o pancreatitis. She reports diarrhea that started yesterday. She attributes this to Adderall and eating BBQ, which she rarely eats.   Skin: no rashes, lesions.   Neuro: no numbness, tingling, or parasthesias.  Endocrine: denies polyphagia, polydipsia, polyuria.      Personally  reviewed Past Medical, Surgical, Social History.    Vital Signs  BP (!) 142/92   Pulse 80   Wt 95.4 kg (210 lb 5.1 oz)   BMI 32.94 kg/m²     Personally reviewed the below labs:    Hemoglobin A1C   Date Value Ref Range Status   07/26/2019 7.8 (H) 4.0 - 5.6 % Final     Comment:     ADA Screening Guidelines:  5.7-6.4%  Consistent with prediabetes  >or=6.5%  Consistent with diabetes  High levels of fetal hemoglobin interfere with the HbA1C  assay. Heterozygous hemoglobin variants (HbS, HgC, etc)do  not significantly interfere with this assay.   However, presence of multiple variants may affect accuracy.     04/23/2019 9.0 (H) 4.0 - 5.6 % Final     Comment:     ADA Screening Guidelines:  5.7-6.4%  Consistent with prediabetes  >or=6.5%  Consistent with diabetes  High levels of fetal hemoglobin interfere with the HbA1C  assay. Heterozygous hemoglobin variants (HbS, HgC, etc)do  not significantly interfere with this assay.   However, presence of multiple variants may affect accuracy.     01/04/2019 10.9 (H) 4.0 - 5.6 % Final     Comment:     ADA Screening Guidelines:  5.7-6.4%  Consistent with prediabetes  >or=6.5%  Consistent with diabetes  High levels of fetal hemoglobin interfere with the HbA1C  assay. Heterozygous hemoglobin variants (HbS, HgC, etc)do  not significantly interfere with this assay.   However, presence of multiple variants may affect accuracy.     05/03/2012 7.1 (H) 4.8 - 5.9 % Final     Comment:     **In order to standardize %HbA1c results worldwide, as of October 11, 2010,  the %HbA1c is being calculated using the master equation recommended in the  consensus statement adopted by the ADA (American Diabetes Assoc), EASD  (European Assoc for the Study of Diabetes), IFCC (International Federation  of Clinical Chemistry and Laboratory Medicine) and IDF (International  Diabetes Federation). Result units: %HgbA1c (DCCT/NGSP).  In common with other methods, Hb A1C values may not accurately reflect  mean  blood glucose in patients with hemoglobin variants (HgbF, HgbS and HgbC).  Any cause of shortened erythrocyte survival will reduce exposure of  erythrocytes to glucose with a consequent decrease in HbA1c (%) values, even  though the time-averaged blood glucose level may be elevated. Causes of  shortened erythrocyte lifetime might be hemolytic anemia or other hemolytic  diseases, homozygous sickle cell trait, pregnancy, recent significant or  chronic blood loss, etc. Caution should be used when interpreting the HbA1c  results from patients with these conditions.       Chemistry        Component Value Date/Time     04/23/2019 0836    K 4.3 04/23/2019 0836    CL 98 04/23/2019 0836    CO2 31 (H) 04/23/2019 0836    BUN 10 04/23/2019 0836    CREATININE 1.0 04/23/2019 0836    CREATININE 1.0 05/06/2012 0431     (H) 04/23/2019 0836        Component Value Date/Time    CALCIUM 9.8 04/23/2019 0836    CALCIUM 9.5 05/06/2012 0431    ALKPHOS 75 04/23/2019 0836    ALKPHOS 49 05/03/2012 1635    AST 29 04/23/2019 0836    AST 13 05/03/2012 1635    ALT 26 04/23/2019 0836    BILITOT 0.4 04/23/2019 0836          Lab Results   Component Value Date    CHOL 219 (H) 07/26/2019    CHOL 175 07/27/2018    CHOL 197 06/21/2017     Lab Results   Component Value Date    HDL 68 07/26/2019    HDL 51 07/27/2018    HDL 43 06/21/2017     Lab Results   Component Value Date    LDLCALC 131.2 07/26/2019    LDLCALC 97.8 07/27/2018    LDLCALC 108.4 06/21/2017     Lab Results   Component Value Date    TRIG 99 07/26/2019    TRIG 131 07/27/2018    TRIG 228 (H) 06/21/2017     Lab Results   Component Value Date    CHOLHDL 31.1 07/26/2019    CHOLHDL 29.1 07/27/2018    CHOLHDL 21.8 06/21/2017       Lab Results   Component Value Date    MICALBCREAT 7.3 01/04/2019     Lab Results   Component Value Date    TSH 1.632 07/26/2019       CrCl cannot be calculated (Patient's most recent lab result is older than the maximum 7 days allowed.).    No results  found for: [id number]         PHYSICAL EXAMINATION  Constitutional: Appears well, no distress.  Neck: Supple, trachea midline; no thyromegaly or nodules.   Respiratory: CTA, even and unlabored.   Cardiovascular: RRR, no murmurs, no carotid bruits. DP pulses  2+ bilaterally; no edema.    Lymph: no cervical or supraclavicular lymphadenopathy  Skin: warm and dry; no acanthosis nigracans observed.   Neuro: DTR 1+ BUE/1+BLE.  Feet: appropriate footwear.    CGMS download: See media file for details. Occasional hypoglycemia noted - usually fasting. Prandial excursions following lunch and dinner. Glucose was very stable yesterday because she didn't eat much due to diarrhea. She did have a borderline hypo event yesterday afternoon.   Average glucose: 156  Above goal: 33%  Within goal: 63%  Below goal: 4%      A1c goal < 7%      Assessment/Plan  1. Type 2 diabetes mellitus with hyperglycemia, without long-term current use of insulin  -- Uncontrolled but improving. Some mild hypoglycemia noted, as well as prandial excursions.   -- decrease Basaglar to 25 units QHS  -- increase Novolog to 10 units AC + Correction scale, target 150, ISF 25. Take with all carb containing meals.   -- continue Victoza 1.8 mg daily.  -- continue metformin    -- BG monitoring per [employer] [facility]  -- will call for eye report    -- Discussed diagnosis of DM, A1c goals, progression of disease, long term complications and tx options.  Advised patient to check BG before activities, such as driving or exercise.  -- Reviewed hypoglycemia management: treat with 1/2 glass of juice, 1/2 can regular coke, or 4 glucose tablets. Monitor and repeat treatment every 15 minutes until BG is >70 Then have a snack, which includes a complex carbohydrate and protein.   2. Essential hypertension  -- elevated today. Previously controlled.   -- continue current meds   3. Mixed hyperlipidemia  -- Suboptimal  -- increase atorvastatin to 40 mg daily   4. Obesity (BMI 30-39.9)   -- unchanged  -- increases insulin resistance.   Body mass index is 32.94 kg/m².   5. Hypoglycemia  -- appears to need less basal insulin  -- notify me if this persists       FOLLOW UP  Follow up in about 3 months (around 10/31/2019).   Patient instructed to bring BG logs to each follow up   Patient encouraged to call for any BG/medication issues, concerns, or questions.      Orders Placed This Encounter   Procedures    Hemoglobin A1c    Comprehensive metabolic panel

## 2019-07-31 NOTE — PROGRESS NOTES
"Subjective:       Patient ID: Jose Ortegasin is a 48 y.o. female.    Chief Complaint: Follow-up (3 mo f/u. lab done)    She was more than 1 hr late for her appointment.  She has chronic low back pain and takes 1-2 Lortab daily.  Her last refill was late June.  Today she complains of diarrhea.  It started yesterday.  Her blood sugar is doing much better.  She currently has a continuous glucose monitor.    Review of Systems   Constitutional: Negative for unexpected weight change.   Respiratory: Positive for cough and shortness of breath.    Gastrointestinal: Positive for diarrhea.   Musculoskeletal: Positive for back pain.       Objective:     Blood pressure (!) 142/92, pulse 80, height 5' 7" (1.702 m), weight 95.4 kg (210 lb 3.3 oz).      Physical Exam   Constitutional:   She is overweight and in mild distress. She walks slowly.   Cardiovascular: Normal rate and regular rhythm.   Pulmonary/Chest: Breath sounds normal. No respiratory distress.   Musculoskeletal:   She can flex her back about 45° with pain.  Decreased side been.  She has some tenderness in the lumbosacral area.   Neurological: She is alert.       Assessment:       1. Chronic midline low back pain without sciatica    2. Chronic cough        Plan:       I refilled 3 months of Lortab.  I also refilled Phenergan with codeine.      "

## 2019-07-31 NOTE — PATIENT INSTRUCTIONS
Decrease Basaglar (night shot) to 25 units.   Increase Novolog (mealtime shot) to 10 units with lunch and dinner + sliding scale.  Continue metformin and Victoza.

## 2019-08-23 ENCOUNTER — CLINICAL SUPPORT (OUTPATIENT)
Dept: FAMILY MEDICINE | Facility: CLINIC | Age: 49
End: 2019-08-23
Attending: FAMILY MEDICINE
Payer: MEDICARE

## 2019-08-23 DIAGNOSIS — E11.9 TYPE 2 DIABETES MELLITUS WITHOUT COMPLICATION, UNSPECIFIED WHETHER LONG TERM INSULIN USE: ICD-10-CM

## 2019-08-23 DIAGNOSIS — H47.10 OPTIC DISC EDEMA: Primary | ICD-10-CM

## 2019-08-23 PROCEDURE — 92250 FUNDUS PHOTOGRAPHY W/I&R: CPT | Mod: PBBFAC,PO

## 2019-08-23 PROCEDURE — 92250 FUNDUS PHOTOGRAPHY W/I&R: CPT | Mod: 26,S$PBB,, | Performed by: OPHTHALMOLOGY

## 2019-08-23 PROCEDURE — 92250 DIABETIC EYE SCREENING PHOTO: ICD-10-PCS | Mod: 26,S$PBB,, | Performed by: OPHTHALMOLOGY

## 2019-08-23 NOTE — PROGRESS NOTES
Jose Cousin is a 48 y.o. female here for a diabetic eye screening with non-dilated fundus photos per Dr. Arrieta.    Patient cooperative?: Yes  Small pupils?: Yes  Last eye exam: 06/19/2017  Left eye taken twice.    For exam results, see Encounter Report.

## 2019-08-26 ENCOUNTER — TELEPHONE (OUTPATIENT)
Dept: OPHTHALMOLOGY | Facility: CLINIC | Age: 49
End: 2019-08-26

## 2019-09-10 DIAGNOSIS — E11.9 TYPE 2 DIABETES MELLITUS WITHOUT COMPLICATION, WITH LONG-TERM CURRENT USE OF INSULIN: ICD-10-CM

## 2019-09-10 DIAGNOSIS — Z79.4 TYPE 2 DIABETES MELLITUS WITHOUT COMPLICATION, WITH LONG-TERM CURRENT USE OF INSULIN: ICD-10-CM

## 2019-09-10 RX ORDER — INSULIN ASPART 100 [IU]/ML
10 INJECTION, SOLUTION INTRAVENOUS; SUBCUTANEOUS
Qty: 30 ML | Refills: 6 | Status: SHIPPED | OUTPATIENT
Start: 2019-09-10 | End: 2020-05-28

## 2019-09-23 RX ORDER — HYDROCODONE BITARTRATE AND HOMATROPINE METHYLBROMIDE ORAL SOLUTION 5; 1.5 MG/5ML; MG/5ML
5 LIQUID ORAL EVERY 8 HOURS PRN
Qty: 240 ML | Refills: 0 | Status: SHIPPED | OUTPATIENT
Start: 2019-09-23 | End: 2019-09-23 | Stop reason: SDUPTHER

## 2019-09-23 RX ORDER — HYDROCODONE BITARTRATE AND HOMATROPINE METHYLBROMIDE ORAL SOLUTION 5; 1.5 MG/5ML; MG/5ML
5 LIQUID ORAL EVERY 8 HOURS PRN
Qty: 240 ML | Refills: 0 | Status: SHIPPED | OUTPATIENT
Start: 2019-09-23 | End: 2019-11-13 | Stop reason: ALTCHOICE

## 2019-09-23 NOTE — TELEPHONE ENCOUNTER
----- Message from Gloria Arthur sent at 9/23/2019 11:38 AM CDT -----  Contact: patient  Patient called to request refill of Cough medicine with codeine. (did not see in chart)    Best call back number: [phone]

## 2019-09-23 NOTE — TELEPHONE ENCOUNTER
----- Message from Lakisha Del Rio sent at 9/23/2019  2:02 PM CDT -----  Contact: Pt  Type: Needs Medical Advice    Who Called:  PT  Best Call Back Number: [phone]  Additional Information: pt would like her cough medication to be sent to hydrocodone-homatropine 5-1.5 mg/5 ml (HYCODAN) 5-1.5 mg/5 mL Syrp to [facility] pharmacy   Please Advise ---Thank you

## 2019-09-24 ENCOUNTER — TELEPHONE (OUTPATIENT)
Dept: FAMILY MEDICINE | Facility: CLINIC | Age: 49
End: 2019-09-24

## 2019-09-24 RX ORDER — PROMETHAZINE HYDROCHLORIDE AND CODEINE PHOSPHATE 6.25; 1 MG/5ML; MG/5ML
SOLUTION ORAL
Qty: 240 ML | Refills: 2 | OUTPATIENT
Start: 2019-09-24

## 2019-09-24 NOTE — TELEPHONE ENCOUNTER
----- Message from Katerine Rodriguez sent at 9/24/2019  4:10 PM CDT -----  Contact: patient   Type:  Patient Returning Call    Who Called:  Patient   Who Left Message for Patient:  Sonia?  Does the patient know what this is regarding?: yes   Best Call Back Number:  [phone]

## 2019-09-24 NOTE — TELEPHONE ENCOUNTER
----- Message from Scotty WILKINSON Dennis sent at 9/24/2019 12:13 PM CDT -----  Contact: same  Patient called in fussing stating that her wrong pain medication was called in and she cannot afford $30.  And the right medication was sent to the wrong pharmacy.  Patient stated this is urgent that this be corrected.  No other information was given by patient before hanging up.    Patient call back number on file is [phone]

## 2019-09-24 NOTE — TELEPHONE ENCOUNTER
----- Message from Stella Ferguson sent at 9/24/2019  3:29 PM CDT -----  Contact: Patient  Type: Needs Medical Advice    Who Called:  Patient  Best Call Back Number:   Additional Information: Calling to speak to the nurse about having issues getting her prescription filled. Patient says she has called several times today. Pharmacy has sent a fax over and are waiting for a response.

## 2019-09-24 NOTE — TELEPHONE ENCOUNTER
Spoke with pharmacist, hycodan is not covered and pt would like to continue the promethazine-codeine Rx. Last filled 7/31/19

## 2019-09-25 RX ORDER — PROMETHAZINE HYDROCHLORIDE AND CODEINE PHOSPHATE 6.25; 1 MG/5ML; MG/5ML
5 SOLUTION ORAL EVERY 4 HOURS PRN
Qty: 240 ML | Refills: 0 | Status: SHIPPED | OUTPATIENT
Start: 2019-09-25 | End: 2019-10-21 | Stop reason: SDUPTHER

## 2019-10-21 RX ORDER — PROMETHAZINE HYDROCHLORIDE AND CODEINE PHOSPHATE 6.25; 1 MG/5ML; MG/5ML
SOLUTION ORAL
Qty: 240 ML | Refills: 0 | Status: SHIPPED | OUTPATIENT
Start: 2019-10-21 | End: 2019-11-13 | Stop reason: SDUPTHER

## 2019-10-31 RX ORDER — HYDROCODONE BITARTRATE AND ACETAMINOPHEN 7.5; 325 MG/1; MG/1
1 TABLET ORAL EVERY 12 HOURS PRN
Qty: 60 TABLET | Refills: 0 | Status: SHIPPED | OUTPATIENT
Start: 2019-10-31 | End: 2019-11-13 | Stop reason: SDUPTHER

## 2019-10-31 NOTE — TELEPHONE ENCOUNTER
----- Message from Scotty WILKINSON Dennis sent at 10/31/2019 11:11 AM CDT -----  Contact: same  Type:  RX Refill Request    Who Called:  patient  Refill or New Rx:  refill  RX Name and Strength:  HYDROcodone-acetaminophen (NORCO) 7.5-325 mg per tablet  How is the patient currently taking it? (ex. 1XDay):  Take 1 tablet by mouth every 12 (twelve) hours as needed for Pain. - Oral  Is this a 30 day or 90 day RX:  60 tablet 0 9/30/2019   Preferred Pharmacy with phone number:      [facility] [address] [address]  [address] [address] [address]  Phone: [phone] Fax: [phone]    Ordering Provider:  Berny Stubbs Call Back Number:  [phone]  Additional Information:  n/a

## 2019-11-08 ENCOUNTER — LAB VISIT (OUTPATIENT)
Dept: LAB | Facility: HOSPITAL | Age: 49
End: 2019-11-08
Payer: MEDICARE

## 2019-11-08 DIAGNOSIS — E11.9 TYPE 2 DIABETES MELLITUS WITHOUT COMPLICATION, WITH LONG-TERM CURRENT USE OF INSULIN: ICD-10-CM

## 2019-11-08 DIAGNOSIS — Z79.4 TYPE 2 DIABETES MELLITUS WITHOUT COMPLICATION, WITH LONG-TERM CURRENT USE OF INSULIN: ICD-10-CM

## 2019-11-08 LAB
ALBUMIN SERPL BCP-MCNC: 3.6 G/DL (ref 3.5–5.2)
ALP SERPL-CCNC: 63 U/L (ref 55–135)
ALT SERPL W/O P-5'-P-CCNC: 21 U/L (ref 10–44)
ANION GAP SERPL CALC-SCNC: 9 MMOL/L (ref 8–16)
AST SERPL-CCNC: 18 U/L (ref 10–40)
BILIRUB SERPL-MCNC: 0.2 MG/DL (ref 0.1–1)
BUN SERPL-MCNC: 14 MG/DL (ref 6–20)
CALCIUM SERPL-MCNC: 9 MG/DL (ref 8.7–10.5)
CHLORIDE SERPL-SCNC: 103 MMOL/L (ref 95–110)
CO2 SERPL-SCNC: 25 MMOL/L (ref 23–29)
CREAT SERPL-MCNC: 1 MG/DL (ref 0.5–1.4)
EST. GFR  (AFRICAN AMERICAN): >60 ML/MIN/1.73 M^2
EST. GFR  (NON AFRICAN AMERICAN): >60 ML/MIN/1.73 M^2
ESTIMATED AVG GLUCOSE: 197 MG/DL (ref 68–131)
GLUCOSE SERPL-MCNC: 244 MG/DL (ref 70–110)
HBA1C MFR BLD HPLC: 8.5 % (ref 4–5.6)
POTASSIUM SERPL-SCNC: 4.1 MMOL/L (ref 3.5–5.1)
PROT SERPL-MCNC: 6.8 G/DL (ref 6–8.4)
SODIUM SERPL-SCNC: 137 MMOL/L (ref 136–145)

## 2019-11-08 PROCEDURE — 80053 COMPREHEN METABOLIC PANEL: CPT

## 2019-11-08 PROCEDURE — 83036 HEMOGLOBIN GLYCOSYLATED A1C: CPT

## 2019-11-08 PROCEDURE — 36415 COLL VENOUS BLD VENIPUNCTURE: CPT | Mod: PN

## 2019-11-13 ENCOUNTER — OFFICE VISIT (OUTPATIENT)
Dept: FAMILY MEDICINE | Facility: CLINIC | Age: 49
End: 2019-11-13
Payer: MEDICARE

## 2019-11-13 ENCOUNTER — TELEPHONE (OUTPATIENT)
Dept: FAMILY MEDICINE | Facility: CLINIC | Age: 49
End: 2019-11-13

## 2019-11-13 ENCOUNTER — OFFICE VISIT (OUTPATIENT)
Dept: ENDOCRINOLOGY | Facility: CLINIC | Age: 49
End: 2019-11-13
Payer: MEDICARE

## 2019-11-13 VITALS
BODY MASS INDEX: 35.78 KG/M2 | TEMPERATURE: 98 F | WEIGHT: 227.88 LBS | RESPIRATION RATE: 18 BRPM | HEART RATE: 90 BPM | HEIGHT: 67 IN | DIASTOLIC BLOOD PRESSURE: 74 MMHG | SYSTOLIC BLOOD PRESSURE: 126 MMHG | SYSTOLIC BLOOD PRESSURE: 127 MMHG | BODY MASS INDEX: 35.76 KG/M2 | OXYGEN SATURATION: 95 % | HEART RATE: 90 BPM | HEIGHT: 67 IN | DIASTOLIC BLOOD PRESSURE: 74 MMHG | WEIGHT: 227.94 LBS

## 2019-11-13 DIAGNOSIS — G89.29 CHRONIC MIDLINE LOW BACK PAIN WITHOUT SCIATICA: Primary | ICD-10-CM

## 2019-11-13 DIAGNOSIS — R05.3 CHRONIC COUGH: ICD-10-CM

## 2019-11-13 DIAGNOSIS — E66.01 MORBID OBESITY: ICD-10-CM

## 2019-11-13 DIAGNOSIS — J45.41 MODERATE PERSISTENT ASTHMA WITH ACUTE EXACERBATION: ICD-10-CM

## 2019-11-13 DIAGNOSIS — E11.9 TYPE 2 DIABETES MELLITUS WITHOUT COMPLICATION, WITH LONG-TERM CURRENT USE OF INSULIN: ICD-10-CM

## 2019-11-13 DIAGNOSIS — E11.9 TYPE 2 DIABETES MELLITUS WITHOUT COMPLICATION, WITH LONG-TERM CURRENT USE OF INSULIN: Primary | ICD-10-CM

## 2019-11-13 DIAGNOSIS — E66.9 OBESITY (BMI 30-39.9): ICD-10-CM

## 2019-11-13 DIAGNOSIS — Z79.4 TYPE 2 DIABETES MELLITUS WITHOUT COMPLICATION, WITH LONG-TERM CURRENT USE OF INSULIN: Primary | ICD-10-CM

## 2019-11-13 DIAGNOSIS — I10 ESSENTIAL HYPERTENSION: ICD-10-CM

## 2019-11-13 DIAGNOSIS — Z79.4 TYPE 2 DIABETES MELLITUS WITHOUT COMPLICATION, WITH LONG-TERM CURRENT USE OF INSULIN: ICD-10-CM

## 2019-11-13 DIAGNOSIS — E78.2 MIXED HYPERLIPIDEMIA: ICD-10-CM

## 2019-11-13 DIAGNOSIS — M54.50 CHRONIC MIDLINE LOW BACK PAIN WITHOUT SCIATICA: Primary | ICD-10-CM

## 2019-11-13 DIAGNOSIS — G91.2 NORMAL PRESSURE HYDROCEPHALUS: ICD-10-CM

## 2019-11-13 PROCEDURE — 2024F PR 7 FIELD PHOTOS WITH INTERP/ REVIEW: ICD-10-PCS | Mod: ,,, | Performed by: FAMILY MEDICINE

## 2019-11-13 PROCEDURE — 99215 OFFICE O/P EST HI 40 MIN: CPT | Mod: PBBFAC,PN,25 | Performed by: NURSE PRACTITIONER

## 2019-11-13 PROCEDURE — G0009 ADMIN PNEUMOCOCCAL VACCINE: HCPCS | Mod: PBBFAC,PN

## 2019-11-13 PROCEDURE — 99214 PR OFFICE/OUTPT VISIT, EST, LEVL IV, 30-39 MIN: ICD-10-PCS | Mod: S$PBB,,, | Performed by: NURSE PRACTITIONER

## 2019-11-13 PROCEDURE — 99999 PR PBB SHADOW E&M-EST. PATIENT-LVL IV: CPT | Mod: PBBFAC,,, | Performed by: FAMILY MEDICINE

## 2019-11-13 PROCEDURE — 90686 IIV4 VACC NO PRSV 0.5 ML IM: CPT | Mod: PBBFAC,PN

## 2019-11-13 PROCEDURE — 99999 PR PBB SHADOW E&M-EST. PATIENT-LVL IV: ICD-10-PCS | Mod: PBBFAC,,, | Performed by: FAMILY MEDICINE

## 2019-11-13 PROCEDURE — 99214 PR OFFICE/OUTPT VISIT, EST, LEVL IV, 30-39 MIN: ICD-10-PCS | Mod: S$PBB,,, | Performed by: FAMILY MEDICINE

## 2019-11-13 PROCEDURE — 99999 PR PBB SHADOW E&M-EST. PATIENT-LVL V: ICD-10-PCS | Mod: PBBFAC,,, | Performed by: NURSE PRACTITIONER

## 2019-11-13 PROCEDURE — 99999 PR PBB SHADOW E&M-EST. PATIENT-LVL V: CPT | Mod: PBBFAC,,, | Performed by: NURSE PRACTITIONER

## 2019-11-13 PROCEDURE — 95251 PR GLUCOSE MONITOR, 72 HOUR, PHYS INTERP: ICD-10-PCS | Mod: ,,, | Performed by: NURSE PRACTITIONER

## 2019-11-13 PROCEDURE — 2024F 7 FLD RTA PHOTO EVC RTNOPTHY: CPT | Mod: ,,, | Performed by: NURSE PRACTITIONER

## 2019-11-13 PROCEDURE — 2024F 7 FLD RTA PHOTO EVC RTNOPTHY: CPT | Mod: ,,, | Performed by: FAMILY MEDICINE

## 2019-11-13 PROCEDURE — 99214 OFFICE O/P EST MOD 30 MIN: CPT | Mod: S$PBB,,, | Performed by: FAMILY MEDICINE

## 2019-11-13 PROCEDURE — 95251 CONT GLUC MNTR ANALYSIS I&R: CPT | Mod: ,,, | Performed by: NURSE PRACTITIONER

## 2019-11-13 PROCEDURE — 99214 OFFICE O/P EST MOD 30 MIN: CPT | Mod: S$PBB,,, | Performed by: NURSE PRACTITIONER

## 2019-11-13 PROCEDURE — 99214 OFFICE O/P EST MOD 30 MIN: CPT | Mod: PBBFAC,27,PN,25 | Performed by: FAMILY MEDICINE

## 2019-11-13 PROCEDURE — 2024F PR 7 FIELD PHOTOS WITH INTERP/ REVIEW: ICD-10-PCS | Mod: ,,, | Performed by: NURSE PRACTITIONER

## 2019-11-13 RX ORDER — FLUTICASONE PROPIONATE AND SALMETEROL 250; 50 UG/1; UG/1
POWDER RESPIRATORY (INHALATION)
Qty: 60 EACH | Refills: 3 | Status: SHIPPED | OUTPATIENT
Start: 2019-11-13 | End: 2021-09-16 | Stop reason: SDUPTHER

## 2019-11-13 RX ORDER — ALBUTEROL SULFATE 0.63 MG/3ML
1 SOLUTION RESPIRATORY (INHALATION) EVERY 6 HOURS PRN
Qty: 180 ML | Refills: 3 | Status: SHIPPED | OUTPATIENT
Start: 2019-11-13 | End: 2020-05-28 | Stop reason: SDUPTHER

## 2019-11-13 RX ORDER — PROMETHAZINE HYDROCHLORIDE AND CODEINE PHOSPHATE 6.25; 1 MG/5ML; MG/5ML
SOLUTION ORAL
Qty: 240 ML | Refills: 0 | Status: SHIPPED | OUTPATIENT
Start: 2019-11-13 | End: 2019-11-13 | Stop reason: SDUPTHER

## 2019-11-13 RX ORDER — PROMETHAZINE HYDROCHLORIDE AND CODEINE PHOSPHATE 6.25; 1 MG/5ML; MG/5ML
SOLUTION ORAL
Qty: 240 ML | Refills: 0 | Status: SHIPPED | OUTPATIENT
Start: 2019-12-13 | End: 2019-11-13 | Stop reason: SDUPTHER

## 2019-11-13 RX ORDER — PROMETHAZINE HYDROCHLORIDE AND CODEINE PHOSPHATE 6.25; 1 MG/5ML; MG/5ML
SOLUTION ORAL
Qty: 240 ML | Refills: 0 | Status: SHIPPED | OUTPATIENT
Start: 2020-01-13 | End: 2019-11-26 | Stop reason: SDUPTHER

## 2019-11-13 RX ORDER — ACETAZOLAMIDE 250 MG/1
250 TABLET ORAL 2 TIMES DAILY
Qty: 360 TABLET | Refills: 3
Start: 2019-11-13 | End: 2021-03-17

## 2019-11-13 RX ORDER — ALBUTEROL SULFATE 90 UG/1
AEROSOL, METERED RESPIRATORY (INHALATION)
Qty: 54 G | Refills: 3 | Status: SHIPPED | OUTPATIENT
Start: 2019-11-13 | End: 2021-09-16 | Stop reason: SDUPTHER

## 2019-11-13 RX ORDER — HYDROCODONE BITARTRATE AND ACETAMINOPHEN 7.5; 325 MG/1; MG/1
1 TABLET ORAL EVERY 12 HOURS PRN
Qty: 60 TABLET | Refills: 0 | Status: SHIPPED | OUTPATIENT
Start: 2019-11-30 | End: 2019-11-13 | Stop reason: SDUPTHER

## 2019-11-13 RX ORDER — INSULIN GLARGINE 100 [IU]/ML
28 INJECTION, SOLUTION SUBCUTANEOUS NIGHTLY
Qty: 9 ML | Refills: 11
Start: 2019-11-13 | End: 2020-02-11 | Stop reason: SDUPTHER

## 2019-11-13 RX ORDER — HYDROCODONE BITARTRATE AND ACETAMINOPHEN 7.5; 325 MG/1; MG/1
1 TABLET ORAL EVERY 12 HOURS PRN
Qty: 60 TABLET | Refills: 0 | Status: SHIPPED | OUTPATIENT
Start: 2019-12-30 | End: 2019-11-13 | Stop reason: SDUPTHER

## 2019-11-13 RX ORDER — HYDROCODONE BITARTRATE AND ACETAMINOPHEN 7.5; 325 MG/1; MG/1
1 TABLET ORAL EVERY 12 HOURS PRN
Qty: 60 TABLET | Refills: 0 | Status: SHIPPED | OUTPATIENT
Start: 2020-01-30 | End: 2020-02-14 | Stop reason: SDUPTHER

## 2019-11-13 NOTE — PROGRESS NOTES
CC: Ms. Jose Mesa arrives today for management of Type 2 DM and review of chronic medical conditions, as listed in the Visit Diagnosis section of this encounter.       HPI: Ms. Jose Mesa was diagnosed with Type 2 DM in 2005. She was diagnosed based on lab work. Initial treatment consisted of metformin. Glimepiride later added. Victoza to her medication regimen in 2016. Insulin added in 2018. + FH of DM on mother. Denies hospitalizations due to DM.     Patient was last seen by me in July. At this time, Basaglar dose was decreased, due to fasting hypoglycemia and Novolog dose was increased. However, she states that she forgot to make this change and is no longer having hypoglycemia.     BG monitoring per [employer] [facility].     She reports that her skin burns due to what she believes is the sensor adhesive. This leaves her skin irritated.     Hypoglycemia: Not recently  Symptoms: tingling in the mouth  Treatment: soda    Missing Insulin/PO medication doses: Yes - she was holding either Victoza or Basaglar if he blood sugar was normal at those administration times.  Also skipping Novolog at times. Usually occurring 3x/week.     Dietary Habits: Eats 2 meals/day. Snacking on chips.  Avoiding sugary beverages.      Last DM education appointment:  8/2016    CURRENT DIABETIC MEDS: metformin XR 1000 mg twice daily, Victoza 1.8 mg daily, Basaglar 28 units QHS, Novolog 10 units with lunch and dinner + correction scale, target 150, ISF 25  Glucometer type: Accucheck Adrienne    Previous DM treatments:  Lantus - not covered by insurance   Glimepiride   acarbose    Last Eye Exam: 6/2019, eye camera 8/23/2019, no DR per patient.  Last Podiatry Exam: 8/2019, has appt on 11/18. Dr. Olvera    REVIEW OF SYSTEMS  Constitutional: no c/o fatigue, weakness, weight loss. + 17# weight gain.  Eyes: denies visual disturbances.  Cardiac: no palpitations or chest pain.  Respiratory: denies dyspnea. Reports cough that she attributes to  "COPD.  GI: no c/o abdominal pain or nausea. Denies h/o pancreatitis.   Skin: no rashes. + lesions on back of arms at previous sensor sites.  Neuro: no numbness, tingling, or parasthesias.  Endocrine: denies polyphagia, polydipsia, polyuria.      Personally reviewed Past Medical, Surgical, Social History.    Vital Signs  /74   Pulse 90   Ht 5' 7" (1.702 m)   Wt 103.4 kg (227 lb 15.3 oz)   BMI 35.70 kg/m²     Personally reviewed the below labs:    Hemoglobin A1C   Date Value Ref Range Status   11/08/2019 8.5 (H) 4.0 - 5.6 % Final     Comment:     ADA Screening Guidelines:  5.7-6.4%  Consistent with prediabetes  >or=6.5%  Consistent with diabetes  High levels of fetal hemoglobin interfere with the HbA1C  assay. Heterozygous hemoglobin variants (HbS, HgC, etc)do  not significantly interfere with this assay.   However, presence of multiple variants may affect accuracy.     07/26/2019 7.8 (H) 4.0 - 5.6 % Final     Comment:     ADA Screening Guidelines:  5.7-6.4%  Consistent with prediabetes  >or=6.5%  Consistent with diabetes  High levels of fetal hemoglobin interfere with the HbA1C  assay. Heterozygous hemoglobin variants (HbS, HgC, etc)do  not significantly interfere with this assay.   However, presence of multiple variants may affect accuracy.     04/23/2019 9.0 (H) 4.0 - 5.6 % Final     Comment:     ADA Screening Guidelines:  5.7-6.4%  Consistent with prediabetes  >or=6.5%  Consistent with diabetes  High levels of fetal hemoglobin interfere with the HbA1C  assay. Heterozygous hemoglobin variants (HbS, HgC, etc)do  not significantly interfere with this assay.   However, presence of multiple variants may affect accuracy.     05/03/2012 7.1 (H) 4.8 - 5.9 % Final     Comment:     **In order to standardize %HbA1c results worldwide, as of October 11, 2010,  the %HbA1c is being calculated using the master equation recommended in the  consensus statement adopted by the ADA (American Diabetes Assoc), EASD  (European " Assoc for the Study of Diabetes), IFCC (International Federation  of Clinical Chemistry and Laboratory Medicine) and IDF (International  Diabetes Federation). Result units: %HgbA1c (DCCT/NGSP).  In common with other methods, Hb A1C values may not accurately reflect mean  blood glucose in patients with hemoglobin variants (HgbF, HgbS and HgbC).  Any cause of shortened erythrocyte survival will reduce exposure of  erythrocytes to glucose with a consequent decrease in HbA1c (%) values, even  though the time-averaged blood glucose level may be elevated. Causes of  shortened erythrocyte lifetime might be hemolytic anemia or other hemolytic  diseases, homozygous sickle cell trait, pregnancy, recent significant or  chronic blood loss, etc. Caution should be used when interpreting the HbA1c  results from patients with these conditions.       Chemistry        Component Value Date/Time     11/08/2019 0800    K 4.1 11/08/2019 0800     11/08/2019 0800    CO2 25 11/08/2019 0800    BUN 14 11/08/2019 0800    CREATININE 1.0 11/08/2019 0800    CREATININE 1.0 05/06/2012 0431     (H) 11/08/2019 0800        Component Value Date/Time    CALCIUM 9.0 11/08/2019 0800    CALCIUM 9.5 05/06/2012 0431    ALKPHOS 63 11/08/2019 0800    ALKPHOS 49 05/03/2012 1635    AST 18 11/08/2019 0800    AST 13 05/03/2012 1635    ALT 21 11/08/2019 0800    BILITOT 0.2 11/08/2019 0800          Lab Results   Component Value Date    CHOL 219 (H) 07/26/2019    CHOL 175 07/27/2018    CHOL 197 06/21/2017     Lab Results   Component Value Date    HDL 68 07/26/2019    HDL 51 07/27/2018    HDL 43 06/21/2017     Lab Results   Component Value Date    LDLCALC 131.2 07/26/2019    LDLCALC 97.8 07/27/2018    LDLCALC 108.4 06/21/2017     Lab Results   Component Value Date    TRIG 99 07/26/2019    TRIG 131 07/27/2018    TRIG 228 (H) 06/21/2017     Lab Results   Component Value Date    CHOLHDL 31.1 07/26/2019    CHOLHDL 29.1 07/27/2018    CHOLHDL 21.8  06/21/2017       Lab Results   Component Value Date    MICALBCREAT 7.3 01/04/2019     Lab Results   Component Value Date    TSH 1.632 07/26/2019       CrCl cannot be calculated (Unknown ideal weight.).    No results found for: [id number]         PHYSICAL EXAMINATION  Constitutional: Appears well, no distress.  Neck: Supple, trachea midline; no thyromegaly or nodules.   Respiratory: CTA, even and unlabored.   Cardiovascular: RRR, no murmurs, no carotid bruits. DP pulses  2+ bilaterally; no edema.    Lymph: no cervical or supraclavicular lymphadenopathy  Skin: warm and dry; no acanthosis nigracans observed. + hyperpigmented lesions noted to previous sensor sites. No open wounds.   Neuro: no loss of protective sensation via 10 gm monofilament or vibratory exam bilaterally, DTR 1+ BUE/1+BLE.  Feet: no open wounds; appropriate footwear. + calluses noted to back of heels, + dry skin      CGMS download: See media file for details. Prandial excursions noted. Many fasting glucoses are above goal. One episode of hypoglycemia noted, following a large excursion overnight.   Average glucose: 224  Above goal: 69%  Within goal: 30%  Below goal: 1%      A1c goal < 7%      Assessment/Plan  1. Type 2 diabetes mellitus without complication, with long-term current use of insulin  -- Worsening. Patient admits to medication noncompliance. Before adjusting her medications, I think it would be safest for her to resume all meds first so that we can evaluate the effect on her glucose patterns.   -- Basaglar to 28 units QHS  -- Novolog to 10 units AC (with carb-containing meals) + Correction scale, target 150, ISF 25.   -- continue Victoza 1.8 mg daily.  -- continue metformin    -- BG monitoring per [employer] [facility].      -- Use baby oil, olive oil, or petroleum jelly to dissolve the remaining adhesive residue after removing sensor.      -- If is it causing skin tears or rash, may need to go back to fingersticks.      -- We can consider  Dexcom G6 as an option to try but this has adhesive, also. This is latex free.    -- Discussed diagnosis of DM, A1c goals, progression of disease, long term complications and tx options.  Advised patient to check BG before activities, such as driving or exercise.  -- Reviewed hypoglycemia management: treat with 1/2 glass of juice, 1/2 can regular coke, or 4 glucose tablets. Monitor and repeat treatment every 15 minutes until BG is >70 Then have a snack, which includes a complex carbohydrate and protein.   2. Essential hypertension  -- controlled.   -- continue current meds   3. Mixed hyperlipidemia  -- Suboptimal  -- now taking atorvastatin 40 mg daily  -- check level with RTC   4. Obesity (BMI 30-39.9)  -- worsening   -- increases insulin resistance.   Body mass index is 35.7 kg/m².       FOLLOW UP  Follow up in about 3 months (around 2/13/2020).   Patient instructed to bring BG logs to each follow up   Patient encouraged to call for any BG/medication issues, concerns, or questions.      Orders Placed This Encounter   Procedures    Hemoglobin A1c    Lipid panel    Microalbumin/creatinine urine ratio     DIABETES FOOT EXAM

## 2019-11-13 NOTE — PROGRESS NOTES
"Subjective:       Patient ID: Jose Ortegasin is a 49 y.o. female.    Chief Complaint: Follow-up    Follow-up chronic low back pain. She is also seeing Nhung Michael for her diabetes today.  She has a chronic cough and is due for refill of her codeine cough medicine.  She has a history of cough induced syncope.  Chronic low back pain. She takes 2 Lortab daily.  She is also complaining of some right gluteal and hip pain as well as left knee pain and swelling. She see Psychiatry for binge eating which is helped by Adderall.    Review of Systems   Constitutional: Positive for unexpected weight change (She has regained some of the weight that she had previously lost.).   Respiratory: Positive for cough. Negative for wheezing.    Cardiovascular: Negative for chest pain.   Musculoskeletal: Positive for arthralgias, back pain and gait problem.       Objective:     Blood pressure 126/74, pulse 90, temperature 98.4 °F (36.9 °C), temperature source Oral, resp. rate 18, height 5' 7" (1.702 m), weight 103.4 kg (227 lb 13.5 oz), SpO2 95 %.      Physical Exam   Constitutional:   She is overweight and in mild distress.   Cardiovascular: Normal rate and regular rhythm.   Pulmonary/Chest:   Mild cough.  Lungs are clear.   Musculoskeletal:   Full range of motion of both hips.  Full range of motion of both knees.  There is a left knee effusion with medial joint line tenderness. No laxity.  Negative Nathan.  She has some right lateral gluteal and greater trochanteric tenderness.   Neurological: She is alert.       Assessment:       1. Chronic midline low back pain without sciatica    2. Normal pressure hydrocephalus    3. Essential hypertension    4. Type 2 diabetes mellitus without complication, with long-term current use of insulin    5. Moderate persistent asthma with acute exacerbation    6. Chronic cough    7. Morbid obesity        Plan:       I refilled her Lortab and Phenergan with codeine.  Follow-up in 3 months.      "

## 2019-11-13 NOTE — PATIENT INSTRUCTIONS
-- Use baby oil, olive oil, or petroleum jelly to dissolve the remaining adhesive residue after removing sensor.   -- If is it causing skin tears or rash, you may need to go back to fingersticks.   -- We can consider Dexcom G6 as an option to try but this has adhesive, also.

## 2019-11-14 ENCOUNTER — TELEPHONE (OUTPATIENT)
Dept: FAMILY MEDICINE | Facility: CLINIC | Age: 49
End: 2019-11-14

## 2019-11-14 NOTE — TELEPHONE ENCOUNTER
----- Message from Rayo Basilio sent at 11/14/2019 10:40 AM CST -----  Contact: Patient  Type: Needs Medical Advice    Who Called:  Willam Hoff  Best Call Back Number: [phone]  Additional Information: Patient states diabetic shoe request was denied by Dr. Arrieta' office and would like to know why. Please call to advise. Thanks!

## 2019-11-20 ENCOUNTER — TELEPHONE (OUTPATIENT)
Dept: ENDOCRINOLOGY | Facility: CLINIC | Age: 49
End: 2019-11-20

## 2019-11-20 NOTE — TELEPHONE ENCOUNTER
Attempted to contact pt. No answer. Informed we received fax regarding diabetic footwear. LVM advising pt that Nhung Michael as a nurse practioner cannot prescribe diabetic footwear and to call her podiatrist regarding footwear prescriptions and needs. Informed to call clinic back for any further questions and concerns.

## 2019-11-27 RX ORDER — PROMETHAZINE HYDROCHLORIDE AND CODEINE PHOSPHATE 6.25; 1 MG/5ML; MG/5ML
SOLUTION ORAL
Qty: 240 ML | Refills: 0 | Status: SHIPPED | OUTPATIENT
Start: 2019-11-27 | End: 2019-12-13 | Stop reason: SDUPTHER

## 2019-12-09 ENCOUNTER — TELEPHONE (OUTPATIENT)
Dept: FAMILY MEDICINE | Facility: CLINIC | Age: 49
End: 2019-12-09

## 2019-12-09 NOTE — TELEPHONE ENCOUNTER
----- Message from Kerrie Tamayo sent at 12/9/2019  2:00 PM CST -----  Contact: Patient  Type: Needs Medical Advice    Who Called:  Patient  Best Call Back Number: [phone]  Additional Information:Patient is due for her mammogram and is calling to get orders put in for her mammogram.Please call back and advise.

## 2019-12-10 NOTE — TELEPHONE ENCOUNTER
----- Message from Glo Carlin sent at 12/10/2019 10:10 AM CST -----  Contact: patient  Type: Needs Medical Advice    Who Called:  patient  Best Call Back Number: [phone]  Additional Information: patient states that she called yesterday requesting mammo orders. She states that she has not received a call back from the nurse. Please give patient call back today in regards to the orders

## 2019-12-12 ENCOUNTER — TELEPHONE (OUTPATIENT)
Dept: ENDOCRINOLOGY | Facility: CLINIC | Age: 49
End: 2019-12-12

## 2019-12-12 NOTE — TELEPHONE ENCOUNTER
----- Message from Karyn Balbuena sent at 12/12/2019  1:08 PM CST -----  Contact: pt  Pt calling states that she is needing to speak to the nurse or someone in the office ,please stop using the 14 day sensor cause eating her skin, had told about it the last visit that was uncomfortable and not gotten better so she is going back to pricking her finger..[phone] (home)

## 2019-12-12 NOTE — TELEPHONE ENCOUNTER
Pt states she wants to stop Vicky and go back to finger sticks. Attempted to contact pt, no answer. LVM asking if she had appropriate supplies.. Enough strips, lancets, etc. Advised to call clinic back if having any questions.     -Just sending to you as an JESSICA Hooker.

## 2019-12-13 RX ORDER — PROMETHAZINE HYDROCHLORIDE AND CODEINE PHOSPHATE 6.25; 1 MG/5ML; MG/5ML
SOLUTION ORAL
Qty: 240 ML | Refills: 0 | Status: SHIPPED | OUTPATIENT
Start: 2019-12-13 | End: 2020-01-15

## 2020-01-14 ENCOUNTER — HOSPITAL ENCOUNTER (OUTPATIENT)
Dept: RADIOLOGY | Facility: HOSPITAL | Age: 50
Discharge: HOME | End: 2020-01-14
Attending: OBSTETRICS & GYNECOLOGY
Payer: MEDICARE

## 2020-01-14 DIAGNOSIS — Z12.31 ENCOUNTER FOR SCREENING MAMMOGRAM FOR MALIGNANT NEOPLASM OF BREAST: ICD-10-CM

## 2020-01-14 PROCEDURE — 77063 MAMMO DIGITAL SCREENING BILAT WITH TOMOSYNTHESIS_CAD: ICD-10-PCS | Mod: 26,,, | Performed by: RADIOLOGY

## 2020-01-14 PROCEDURE — 77067 SCR MAMMO BI INCL CAD: CPT | Mod: 26,,, | Performed by: RADIOLOGY

## 2020-01-14 PROCEDURE — 77067 SCR MAMMO BI INCL CAD: CPT | Mod: TC,PO

## 2020-01-14 PROCEDURE — 77067 MAMMO DIGITAL SCREENING BILAT WITH TOMOSYNTHESIS_CAD: ICD-10-PCS | Mod: 26,,, | Performed by: RADIOLOGY

## 2020-01-14 PROCEDURE — 77063 BREAST TOMOSYNTHESIS BI: CPT | Mod: 26,,, | Performed by: RADIOLOGY

## 2020-01-15 RX ORDER — PROMETHAZINE HYDROCHLORIDE AND CODEINE PHOSPHATE 6.25; 1 MG/5ML; MG/5ML
SOLUTION ORAL
Qty: 240 ML | Refills: 0 | Status: SHIPPED | OUTPATIENT
Start: 2020-01-15 | End: 2020-02-14 | Stop reason: SDUPTHER

## 2020-02-07 ENCOUNTER — LAB VISIT (OUTPATIENT)
Dept: LAB | Facility: HOSPITAL | Age: 50
End: 2020-02-07
Payer: MEDICARE

## 2020-02-07 DIAGNOSIS — Z79.4 TYPE 2 DIABETES MELLITUS WITHOUT COMPLICATION, WITH LONG-TERM CURRENT USE OF INSULIN: ICD-10-CM

## 2020-02-07 DIAGNOSIS — E11.9 TYPE 2 DIABETES MELLITUS WITHOUT COMPLICATION, WITH LONG-TERM CURRENT USE OF INSULIN: ICD-10-CM

## 2020-02-07 LAB
CHOLEST SERPL-MCNC: 194 MG/DL (ref 120–199)
CHOLEST/HDLC SERPL: 3.3 {RATIO} (ref 2–5)
HDLC SERPL-MCNC: 58 MG/DL (ref 40–75)
HDLC SERPL: 29.9 % (ref 20–50)
LDLC SERPL CALC-MCNC: 110.2 MG/DL (ref 63–159)
NONHDLC SERPL-MCNC: 136 MG/DL
TRIGL SERPL-MCNC: 129 MG/DL (ref 30–150)

## 2020-02-07 PROCEDURE — 36415 COLL VENOUS BLD VENIPUNCTURE: CPT | Mod: PN

## 2020-02-07 PROCEDURE — 83036 HEMOGLOBIN GLYCOSYLATED A1C: CPT

## 2020-02-07 PROCEDURE — 80061 LIPID PANEL: CPT

## 2020-02-08 LAB
ESTIMATED AVG GLUCOSE: 289 MG/DL (ref 68–131)
HBA1C MFR BLD HPLC: 11.7 % (ref 4–5.6)

## 2020-02-10 ENCOUNTER — TELEPHONE (OUTPATIENT)
Dept: ENDOCRINOLOGY | Facility: CLINIC | Age: 50
End: 2020-02-10

## 2020-02-11 DIAGNOSIS — E11.9 TYPE 2 DIABETES MELLITUS WITHOUT COMPLICATION, WITH LONG-TERM CURRENT USE OF INSULIN: ICD-10-CM

## 2020-02-11 DIAGNOSIS — Z79.4 TYPE 2 DIABETES MELLITUS WITHOUT COMPLICATION, WITH LONG-TERM CURRENT USE OF INSULIN: ICD-10-CM

## 2020-02-11 RX ORDER — INSULIN GLARGINE 100 [IU]/ML
28 INJECTION, SOLUTION SUBCUTANEOUS NIGHTLY
Qty: 24 ML | Refills: 3 | Status: SHIPPED | OUTPATIENT
Start: 2020-02-11 | End: 2020-05-28

## 2020-02-14 ENCOUNTER — OFFICE VISIT (OUTPATIENT)
Dept: ENDOCRINOLOGY | Facility: CLINIC | Age: 50
End: 2020-02-14
Payer: MEDICARE

## 2020-02-14 ENCOUNTER — OFFICE VISIT (OUTPATIENT)
Dept: FAMILY MEDICINE | Facility: CLINIC | Age: 50
End: 2020-02-14
Payer: MEDICARE

## 2020-02-14 VITALS
TEMPERATURE: 98 F | WEIGHT: 219 LBS | SYSTOLIC BLOOD PRESSURE: 156 MMHG | HEART RATE: 92 BPM | BODY MASS INDEX: 34.37 KG/M2 | BODY MASS INDEX: 34.37 KG/M2 | OXYGEN SATURATION: 100 % | HEIGHT: 67 IN | HEART RATE: 92 BPM | DIASTOLIC BLOOD PRESSURE: 96 MMHG | HEIGHT: 67 IN | WEIGHT: 219 LBS | DIASTOLIC BLOOD PRESSURE: 96 MMHG | SYSTOLIC BLOOD PRESSURE: 156 MMHG

## 2020-02-14 DIAGNOSIS — R05.3 CHRONIC COUGH: ICD-10-CM

## 2020-02-14 DIAGNOSIS — E66.9 OBESITY (BMI 30-39.9): ICD-10-CM

## 2020-02-14 DIAGNOSIS — E11.9 TYPE 2 DIABETES MELLITUS WITHOUT COMPLICATION, WITH LONG-TERM CURRENT USE OF INSULIN: ICD-10-CM

## 2020-02-14 DIAGNOSIS — I10 ESSENTIAL HYPERTENSION: ICD-10-CM

## 2020-02-14 DIAGNOSIS — G89.29 CHRONIC MIDLINE LOW BACK PAIN WITHOUT SCIATICA: ICD-10-CM

## 2020-02-14 DIAGNOSIS — E78.2 MIXED HYPERLIPIDEMIA: ICD-10-CM

## 2020-02-14 DIAGNOSIS — Z91.199 NONCOMPLIANCE WITH DIABETES TREATMENT: ICD-10-CM

## 2020-02-14 DIAGNOSIS — E11.9 TYPE 2 DIABETES MELLITUS WITHOUT COMPLICATION, WITH LONG-TERM CURRENT USE OF INSULIN: Primary | ICD-10-CM

## 2020-02-14 DIAGNOSIS — M54.50 CHRONIC MIDLINE LOW BACK PAIN WITHOUT SCIATICA: ICD-10-CM

## 2020-02-14 DIAGNOSIS — Z79.4 TYPE 2 DIABETES MELLITUS WITHOUT COMPLICATION, WITH LONG-TERM CURRENT USE OF INSULIN: Primary | ICD-10-CM

## 2020-02-14 DIAGNOSIS — M17.32 POST-TRAUMATIC OSTEOARTHRITIS OF LEFT KNEE: ICD-10-CM

## 2020-02-14 DIAGNOSIS — Z79.4 TYPE 2 DIABETES MELLITUS WITHOUT COMPLICATION, WITH LONG-TERM CURRENT USE OF INSULIN: ICD-10-CM

## 2020-02-14 DIAGNOSIS — I10 ESSENTIAL HYPERTENSION: Primary | ICD-10-CM

## 2020-02-14 PROCEDURE — 99214 PR OFFICE/OUTPT VISIT, EST, LEVL IV, 30-39 MIN: ICD-10-PCS | Mod: S$PBB,,, | Performed by: NURSE PRACTITIONER

## 2020-02-14 PROCEDURE — 99215 OFFICE O/P EST HI 40 MIN: CPT | Mod: PBBFAC,27,PN | Performed by: FAMILY MEDICINE

## 2020-02-14 PROCEDURE — 99214 PR OFFICE/OUTPT VISIT, EST, LEVL IV, 30-39 MIN: ICD-10-PCS | Mod: S$PBB,,, | Performed by: FAMILY MEDICINE

## 2020-02-14 PROCEDURE — 2024F 7 FLD RTA PHOTO EVC RTNOPTHY: CPT | Mod: ,,, | Performed by: FAMILY MEDICINE

## 2020-02-14 PROCEDURE — 2024F 7 FLD RTA PHOTO EVC RTNOPTHY: CPT | Mod: ,,, | Performed by: NURSE PRACTITIONER

## 2020-02-14 PROCEDURE — 99213 OFFICE O/P EST LOW 20 MIN: CPT | Mod: PBBFAC,PN | Performed by: NURSE PRACTITIONER

## 2020-02-14 PROCEDURE — 99999 PR PBB SHADOW E&M-EST. PATIENT-LVL III: ICD-10-PCS | Mod: PBBFAC,,, | Performed by: NURSE PRACTITIONER

## 2020-02-14 PROCEDURE — 99214 OFFICE O/P EST MOD 30 MIN: CPT | Mod: S$PBB,,, | Performed by: FAMILY MEDICINE

## 2020-02-14 PROCEDURE — 2024F PR 7 FIELD PHOTOS WITH INTERP/ REVIEW: ICD-10-PCS | Mod: ,,, | Performed by: FAMILY MEDICINE

## 2020-02-14 PROCEDURE — 2024F PR 7 FIELD PHOTOS WITH INTERP/ REVIEW: ICD-10-PCS | Mod: ,,, | Performed by: NURSE PRACTITIONER

## 2020-02-14 PROCEDURE — 99999 PR PBB SHADOW E&M-EST. PATIENT-LVL V: ICD-10-PCS | Mod: PBBFAC,,, | Performed by: FAMILY MEDICINE

## 2020-02-14 PROCEDURE — 99214 OFFICE O/P EST MOD 30 MIN: CPT | Mod: S$PBB,,, | Performed by: NURSE PRACTITIONER

## 2020-02-14 PROCEDURE — 99999 PR PBB SHADOW E&M-EST. PATIENT-LVL III: CPT | Mod: PBBFAC,,, | Performed by: NURSE PRACTITIONER

## 2020-02-14 PROCEDURE — 99999 PR PBB SHADOW E&M-EST. PATIENT-LVL V: CPT | Mod: PBBFAC,,, | Performed by: FAMILY MEDICINE

## 2020-02-14 RX ORDER — LOSARTAN POTASSIUM 50 MG/1
50 TABLET ORAL DAILY
Qty: 90 TABLET | Refills: 3 | Status: SHIPPED | OUTPATIENT
Start: 2020-02-14 | End: 2021-02-13

## 2020-02-14 RX ORDER — HYDROCODONE BITARTRATE AND ACETAMINOPHEN 7.5; 325 MG/1; MG/1
1 TABLET ORAL EVERY 12 HOURS PRN
Qty: 60 TABLET | Refills: 0 | Status: SHIPPED | OUTPATIENT
Start: 2020-02-29 | End: 2020-02-14 | Stop reason: SDUPTHER

## 2020-02-14 RX ORDER — PROMETHAZINE HYDROCHLORIDE AND CODEINE PHOSPHATE 6.25; 1 MG/5ML; MG/5ML
5 SOLUTION ORAL EVERY 8 HOURS PRN
Qty: 240 ML | Refills: 0 | Status: SHIPPED | OUTPATIENT
Start: 2020-02-14 | End: 2020-03-10

## 2020-02-14 RX ORDER — HYDROCODONE BITARTRATE AND ACETAMINOPHEN 7.5; 325 MG/1; MG/1
1 TABLET ORAL EVERY 12 HOURS PRN
Qty: 60 TABLET | Refills: 0 | Status: SHIPPED | OUTPATIENT
Start: 2020-03-29 | End: 2020-02-14 | Stop reason: SDUPTHER

## 2020-02-14 RX ORDER — HYDROCODONE BITARTRATE AND ACETAMINOPHEN 7.5; 325 MG/1; MG/1
1 TABLET ORAL EVERY 12 HOURS PRN
Qty: 60 TABLET | Refills: 0 | Status: SHIPPED | OUTPATIENT
Start: 2020-04-29 | End: 2020-05-28 | Stop reason: SDUPTHER

## 2020-02-14 NOTE — PROGRESS NOTES
"Subjective:       Patient ID: Jose Cousin is a 49 y.o. female.    Chief Complaint: Follow-up (3 month)    Follow-up chronic low back pain. She also has left knee arthritis.  Chronic cough requiring codeine-containing cough medicine.  She has a history of cough induced syncope.  Her back is bothering her more because she has been caring around her 6-week-old granddaughter.  Her blood pressure is a bit higher today.  She took amlodipine in the past.  Currently she takes hydrochlorothiazide 25 mg.    Review of Systems   Constitutional: Negative for fever and unexpected weight change.   Respiratory: Negative for shortness of breath.    Cardiovascular: Negative for chest pain and leg swelling.   Endocrine:        I reviewed her recent hemoglobin A1c which had gone up to 11.  She is not sure why.  She sees Nhung Michael today.   Musculoskeletal: Positive for arthralgias and back pain.       Objective:     Blood pressure (!) 156/96, pulse 92, temperature 98 °F (36.7 °C), temperature source Oral, height 5' 7" (1.702 m), weight 99.3 kg (219 lb 0.4 oz), last menstrual period 11/21/2019, SpO2 100 %.      Physical Exam   Constitutional:   Overweight and in mild distress   Cardiovascular: Normal rate and regular rhythm.   Pulses:       Dorsalis pedis pulses are 1+ on the right side, and 1+ on the left side.        Posterior tibial pulses are 1+ on the right side, and 1+ on the left side.   Pulmonary/Chest: Effort normal and breath sounds normal.   Musculoskeletal: She exhibits no edema.        Right foot: There is no deformity.        Left foot: There is no deformity.   Tenderness in the lower lumbar spinous process and the right lumbar muscle area.  She also has tenderness in the left knee medial joint line area.  There is some swelling in the left knee.   Feet:   Right Foot:   Protective Sensation: 4 sites tested. 4 sites sensed.   Skin Integrity: Negative for ulcer.   Left Foot:   Protective Sensation: 4 sites tested. " 4 sites sensed.   Skin Integrity: Negative for ulcer.   Neurological: She is alert.       Assessment:       1. Essential hypertension    2. Chronic midline low back pain without sciatica    3. Post-traumatic osteoarthritis of left knee    4. Type 2 diabetes mellitus without complication, with long-term current use of insulin        Plan:       Add losartan 50 mg.  I refilled Lortab twice a day for 3 months.  I also refilled codeine cough syrup.  Follow-up in 3 months.

## 2020-02-14 NOTE — PATIENT INSTRUCTIONS
Take Basaglar 28 units every night.   Take Novolog 10 units with meals + sliding scale    We will try for the VGo insulin delivery device in order to replace the Basaglar and Novolog shots.    Continue metformin and Victoza

## 2020-02-14 NOTE — PROGRESS NOTES
CC: Ms. Jose Mesa arrives today for management of Type 2 DM and review of chronic medical conditions, as listed in the Visit Diagnosis section of this encounter.       HPI: Ms. Jose Mesa was diagnosed with Type 2 DM in 2005. She was diagnosed based on lab work. Initial treatment consisted of metformin. Glimepiride later added. Victoza to her medication regimen in 2016. Insulin added in 2018. + FH of DM on mother. Denies hospitalizations due to DM.     Patient was last seen by me in November.    She is taking care of her 1 month old granddaughter and cares for her between 3-7 AM.    She admits to often missing her Basaglar and not properly handling her diabetes. She is using lower insulin doses than prescribed.     BG monitoring 4x/day. No logs or meter to clinic. She had skin irritation issues with the adhesive on Freestyle Vicky so she took a break from using. She plans to resume.    Hypoglycemia: No    Missing Insulin/PO medication doses: Yes - often missing Basaglar     Dietary Habits: Eats 2 meals/day. Eats meals at her son's, which is where she watches the baby. Continues snacking on chips. Has been drinking more sugary beverages lately.      Last DM education appointment:  8/2016    CURRENT DIABETIC MEDS: metformin XR 1000 mg twice daily, Victoza 1.8 mg daily, Basaglar 22 units QHS, Novolog 8 units with lunch and dinner + correction scale, target 150, ISF   Glucometer type: Accucheck Adrienne    Previous DM treatments:  Lantus - not covered by insurance   Glimepiride   acarbose    Last Eye Exam: 6/2019, eye camera 8/23/2019, no DR per patient.  Last Podiatry Exam: 11/2019, Dr. Olvera    REVIEW OF SYSTEMS  Constitutional: no c/o fatigue, weakness. + 8# weight loss.   Eyes: denies visual disturbances.  Cardiac: no palpitations or chest pain.  Respiratory: denies cough, dyspnea.   GI: no c/o abdominal pain or nausea. Denies h/o pancreatitis.   Skin: no rashes, lesions   Neuro: no numbness, tingling, or  "parasthesias.  Endocrine: denies polyphagia, polydipsia, polyuria.      Personally reviewed Past Medical, Surgical, Social History.    Vital Signs  BP (!) 156/96   Pulse 92   Ht 5' 7" (1.702 m)   Wt 99.3 kg (219 lb)   BMI 34.30 kg/m²     Personally reviewed the below labs:    Hemoglobin A1C   Date Value Ref Range Status   02/07/2020 11.7 (H) 4.0 - 5.6 % Final     Comment:     ADA Screening Guidelines:  5.7-6.4%  Consistent with prediabetes  >or=6.5%  Consistent with diabetes  High levels of fetal hemoglobin interfere with the HbA1C  assay. Heterozygous hemoglobin variants (HbS, HgC, etc)do  not significantly interfere with this assay.   However, presence of multiple variants may affect accuracy.     11/08/2019 8.5 (H) 4.0 - 5.6 % Final     Comment:     ADA Screening Guidelines:  5.7-6.4%  Consistent with prediabetes  >or=6.5%  Consistent with diabetes  High levels of fetal hemoglobin interfere with the HbA1C  assay. Heterozygous hemoglobin variants (HbS, HgC, etc)do  not significantly interfere with this assay.   However, presence of multiple variants may affect accuracy.     07/26/2019 7.8 (H) 4.0 - 5.6 % Final     Comment:     ADA Screening Guidelines:  5.7-6.4%  Consistent with prediabetes  >or=6.5%  Consistent with diabetes  High levels of fetal hemoglobin interfere with the HbA1C  assay. Heterozygous hemoglobin variants (HbS, HgC, etc)do  not significantly interfere with this assay.   However, presence of multiple variants may affect accuracy.     05/03/2012 7.1 (H) 4.8 - 5.9 % Final     Comment:     **In order to standardize %HbA1c results worldwide, as of October 11, 2010,  the %HbA1c is being calculated using the master equation recommended in the  consensus statement adopted by the ADA (American Diabetes Assoc), EASD  (European Assoc for the Study of Diabetes), IFCC (International Federation  of Clinical Chemistry and Laboratory Medicine) and IDF (International  Diabetes Federation). Result units: " %HgbA1c (DCCT/NGSP).  In common with other methods, Hb A1C values may not accurately reflect mean  blood glucose in patients with hemoglobin variants (HgbF, HgbS and HgbC).  Any cause of shortened erythrocyte survival will reduce exposure of  erythrocytes to glucose with a consequent decrease in HbA1c (%) values, even  though the time-averaged blood glucose level may be elevated. Causes of  shortened erythrocyte lifetime might be hemolytic anemia or other hemolytic  diseases, homozygous sickle cell trait, pregnancy, recent significant or  chronic blood loss, etc. Caution should be used when interpreting the HbA1c  results from patients with these conditions.       Chemistry        Component Value Date/Time     11/08/2019 0800    K 4.1 11/08/2019 0800     11/08/2019 0800    CO2 25 11/08/2019 0800    BUN 14 11/08/2019 0800    CREATININE 1.0 11/08/2019 0800    CREATININE 1.0 05/06/2012 0431     (H) 11/08/2019 0800        Component Value Date/Time    CALCIUM 9.0 11/08/2019 0800    CALCIUM 9.5 05/06/2012 0431    ALKPHOS 63 11/08/2019 0800    ALKPHOS 49 05/03/2012 1635    AST 18 11/08/2019 0800    AST 13 05/03/2012 1635    ALT 21 11/08/2019 0800    BILITOT 0.2 11/08/2019 0800          Lab Results   Component Value Date    CHOL 194 02/07/2020    CHOL 219 (H) 07/26/2019    CHOL 175 07/27/2018     Lab Results   Component Value Date    HDL 58 02/07/2020    HDL 68 07/26/2019    HDL 51 07/27/2018     Lab Results   Component Value Date    LDLCALC 110.2 02/07/2020    LDLCALC 131.2 07/26/2019    LDLCALC 97.8 07/27/2018     Lab Results   Component Value Date    TRIG 129 02/07/2020    TRIG 99 07/26/2019    TRIG 131 07/27/2018     Lab Results   Component Value Date    CHOLHDL 29.9 02/07/2020    CHOLHDL 31.1 07/26/2019    CHOLHDL 29.1 07/27/2018       Lab Results   Component Value Date    MICALBCREAT 7.3 01/04/2019     Lab Results   Component Value Date    TSH 1.632 07/26/2019       CrCl cannot be calculated  (Patient's most recent lab result is older than the maximum 7 days allowed.).    No results found for: [id number]         PHYSICAL EXAMINATION  Constitutional: Appears well, no distress.  Neck: Supple, trachea midline; no thyromegaly or nodules.   Respiratory: CTA, even and unlabored.   Cardiovascular: RRR, no murmurs, no carotid bruits. DP pulses  2+ bilaterally; no edema.    GI: active bowel sounds, no hernia  Skin: warm and dry; no acanthosis nigracans observed. + hyperpigmented lesions noted to previous sensor sites. No open wounds.   Neuro: DTR 1+ BUE/1+BLE.Previously, no loss of protective sensation via 10 gm monofilament or vibratory exam bilaterally.  Feet: appropriate footwear.       CGMS download: patient isn't currently wearing  Average glucose:    Above goal:   Within goal:   Below goal:       A1c goal < 7%      Assessment/Plan  1. Type 2 diabetes mellitus without complication, with long-term current use of insulin  -- Worsening, due to insulin noncompliance and dietary indiscretion. She plans to resume Freestyle Vicky today but wants to work toward Dexcom G6 in the future. I think CGMS will help improve her control.   -- will send in paperwork for VGO. She will need VGo 30 and start with 4-5 clicks per meal.   -- If VGo approved, I will send in rx to her pharmacy. She will need DM Education for VGo start.  -- for now, increase Basaglar back to 28 units QHS  -- increase Novolog back to previously ordered dose of 10 units AC (with carb-containing meals) + Correction scale, target 150, ISF 25.   -- continue Victoza 1.8 mg daily.  -- continue metformin    -- I recommended limiting chips and sugary beverages.   -- BG monitoring per Freestyle Vicky.   -- will plan for Nurse Visit for CGMS upload 2 weeks after starting VGo, if possible.     -- Discussed diagnosis of DM, A1c goals, progression of disease, long term complications and tx options.  Advised patient to check BG before activities, such as driving  or exercise.  -- Reviewed hypoglycemia management: treat with 1/2 glass of juice, 1/2 can regular coke, or 4 glucose tablets. Monitor and repeat treatment every 15 minutes until BG is >70 Then have a snack, which includes a complex carbohydrate and protein.   2. Essential hypertension  -- uncontrolled.   -- she was prescribed losartan by PCP today   3. Mixed hyperlipidemia  -- acceptable  -- now taking atorvastatin 40 mg daily   4. Obesity (BMI 30-39.9)  -- recently improved (may be related to uncontrolled DM)  -- increases insulin resistance.   Body mass index is 34.3 kg/m².   5. Noncompliance with diabetes treatment -- Ideally would use VGo insulin delivery device if insurance covers and if she can tolerate the adhesive       FOLLOW UP  Follow up in about 3 months (around 5/14/2020).   Patient instructed to bring BG logs to each follow up   Patient encouraged to call for any BG/medication issues, concerns, or questions.      Orders Placed This Encounter   Procedures    Hemoglobin A1c    Microalbumin/creatinine urine ratio    Comprehensive metabolic panel

## 2020-02-19 ENCOUNTER — TELEPHONE (OUTPATIENT)
Dept: FAMILY MEDICINE | Facility: CLINIC | Age: 50
End: 2020-02-19

## 2020-02-19 ENCOUNTER — TELEPHONE (OUTPATIENT)
Dept: ENDOCRINOLOGY | Facility: CLINIC | Age: 50
End: 2020-02-19

## 2020-02-19 NOTE — TELEPHONE ENCOUNTER
----- Message from Glo Carlin sent at 2/19/2020  3:12 PM CST -----  Type: Needs Medical Advice    Who Called:  patient  Best Call Back Number: [phone]  Additional Information: Dr. Olvera faxed paperwork to the office on Monday in regards to her diabetic shoes. She would like for the paperwork to be filled out and faxed back to Dr. Olvera's office. Please give call back once complete

## 2020-02-19 NOTE — TELEPHONE ENCOUNTER
----- Message from Angel Rosario sent at 2/19/2020  3:46 PM CST -----  Contact: pt  Type: Needs Medical Advice    Who Called:  pt    Best Call Back Number: [phone]  Additional Information: A paper is being faxed over from Dr. Rawls (the pt's podiatrist) for the pt's diabetic shoes. Would like Dr. Arrieta to sign the paper asap, so the pt can get her shoes. Also, the pt would like a call back when it has been done.

## 2020-02-20 ENCOUNTER — TELEPHONE (OUTPATIENT)
Dept: ENDOCRINOLOGY | Facility: CLINIC | Age: 50
End: 2020-02-20

## 2020-02-20 DIAGNOSIS — E11.9 TYPE 2 DIABETES MELLITUS WITHOUT COMPLICATION, WITH LONG-TERM CURRENT USE OF INSULIN: Primary | ICD-10-CM

## 2020-02-20 DIAGNOSIS — Z79.4 TYPE 2 DIABETES MELLITUS WITHOUT COMPLICATION, WITH LONG-TERM CURRENT USE OF INSULIN: Primary | ICD-10-CM

## 2020-02-20 RX ORDER — INSULIN ASPART 100 [IU]/ML
INJECTION, SOLUTION INTRAVENOUS; SUBCUTANEOUS
Qty: 30 ML | Refills: 6 | Status: SHIPPED | OUTPATIENT
Start: 2020-02-20 | End: 2020-03-26 | Stop reason: SDUPTHER

## 2020-02-20 NOTE — TELEPHONE ENCOUNTER
Prescription sent in for VGO with 3 vials of Novolog to accompany it.     Will need DM education for VGO start. Please advise pt to bring VGo and a vial of Novolog insulin to her education appt. She should NOT give her Basaglar the night before this education appt.      Please delay her Vicky download appt until 2 weeks after

## 2020-02-20 NOTE — TELEPHONE ENCOUNTER
Patient's V-Go was approved. Pt. States she is seeing you on 2 weeks to download freestyle and if the freestyle nellie is working then you will start the V-go.

## 2020-02-20 NOTE — TELEPHONE ENCOUNTER
Pt. Notified of Nhung's previous message and verbalized understanding. She will call after she is scheduled for her [employer] appt. So we can scheduled her for a 2 wk f/u for P&R Labpak and VGO download

## 2020-03-10 RX ORDER — PROMETHAZINE HYDROCHLORIDE AND CODEINE PHOSPHATE 6.25; 1 MG/5ML; MG/5ML
SOLUTION ORAL
Qty: 240 ML | Refills: 0 | Status: SHIPPED | OUTPATIENT
Start: 2020-03-10 | End: 2020-04-13

## 2020-03-16 ENCOUNTER — TELEPHONE (OUTPATIENT)
Dept: ENDOCRINOLOGY | Facility: CLINIC | Age: 50
End: 2020-03-16

## 2020-03-16 ENCOUNTER — NUTRITION (OUTPATIENT)
Dept: DIABETES | Facility: CLINIC | Age: 50
End: 2020-03-16
Payer: MEDICARE

## 2020-03-16 VITALS — WEIGHT: 218.94 LBS | HEIGHT: 67 IN | BODY MASS INDEX: 34.36 KG/M2

## 2020-03-16 DIAGNOSIS — Z79.4 TYPE 2 DIABETES MELLITUS WITHOUT COMPLICATION, WITH LONG-TERM CURRENT USE OF INSULIN: ICD-10-CM

## 2020-03-16 DIAGNOSIS — E11.9 TYPE 2 DIABETES MELLITUS WITHOUT COMPLICATION, WITH LONG-TERM CURRENT USE OF INSULIN: ICD-10-CM

## 2020-03-16 PROCEDURE — 99212 OFFICE O/P EST SF 10 MIN: CPT | Mod: PBBFAC,PO | Performed by: DIETITIAN, REGISTERED

## 2020-03-16 PROCEDURE — 99999 PR PBB SHADOW E&M-EST. PATIENT-LVL II: ICD-10-PCS | Mod: PBBFAC,,, | Performed by: DIETITIAN, REGISTERED

## 2020-03-16 PROCEDURE — 99999 PR PBB SHADOW E&M-EST. PATIENT-LVL II: CPT | Mod: PBBFAC,,, | Performed by: DIETITIAN, REGISTERED

## 2020-03-16 PROCEDURE — G0108 DIAB MANAGE TRN  PER INDIV: HCPCS | Mod: PBBFAC,PO | Performed by: DIETITIAN, REGISTERED

## 2020-03-16 RX ORDER — INSULIN ASPART 100 [IU]/ML
INJECTION, SOLUTION INTRAVENOUS; SUBCUTANEOUS
Qty: 10 ML | Refills: 0 | COMMUNITY
Start: 2020-03-16 | End: 2020-05-28

## 2020-03-16 NOTE — PROGRESS NOTES
Diabetes Education  Author: Faustina Moreno RD, CDE  Date: 3/16/2020    Diabetes Care Management Summary  Diabetes Education Record Assessment/Progress: Comprehensive/Group  Current Diabetes Risk Level: High     Last A1c:   Lab Results   Component Value Date    HGBA1C 11.7 (H) 02/07/2020    HGBA1C 7.1 (H) 05/03/2012     Last Visit with Diabetes Educator: : 08/17/2016  Last OPCM Referral: : Not Found   Enrolled in OPCM: No    Diabetes Type  Diabetes Type : Type II    Diabetes History  Current Treatment: Injectable, Insulin, Oral Medication(metformin 1000 mg BID, Victoza 1.8 mg daily, VGo 30 device with Novolog insulin)    Health Maintenance was reviewed today with patient. Discussed with patient importance of routine eye exams, foot exams/foot care, blood work (i.e.: A1c, microalbumin, and lipid), dental visits, yearly flu vaccine, and pneumonia vaccine as indicated by PCP. Patient verbalized understanding.     Health Maintenance Topics with due status: Not Due       Topic Last Completion Date    TETANUS VACCINE 10/18/2017    Pap Smear with HPV Cotest 12/11/2018    Eye Exam 08/23/2019    Mammogram 01/14/2020    Lipid Panel 02/07/2020    Hemoglobin A1c 02/07/2020    Low Dose Statin 02/14/2020    Foot Exam 02/14/2020     There are no preventive care reminders to display for this patient.    Monitoring   Blood Glucose Logs: Yes(Freestyle Vicky downloaded while at visit today--attached in Media to visit)  Do you use a personal continuous glucose monitor?: Yes  What kind of glucose monitor do you use?: Freestyle Vicky Flash    Current Diabetes Treatment   Current Treatment: Injectable, Insulin, Oral Medication(metformin 1000 mg BID, Victoza 1.8 mg daily, VGo 30 device with Novolog insulin)       Diabetes Education Assessment/Progress  Medications (states correct name, dose, onset, peak, duration, side effects & timing of meds): Discussion, Demonstration, Return Demonstration, Demonstrates  Understanding/Competency(verbalizes/demonstrates), Written Materials Provided    DIABETES EDUCATOR NOTE   V-GO INSULIN DELIVERY INSTRUCTIONS      Patient is here for instructions on use of the V-GO 30 which will provide 30 units of basal insulin given over 24 hours (1.25 units/hr) and up to 36 units of on-demand bolus dosing in 2-Unit increments. Patient will be giving 8 units (4 clicks) at meals.  Patient had been instructed to hold Basaglar last night (Sunday) and confirms she did not take Basaglar.  Patient also advised that she will discontinue taking the Basaglar and Novolog flexpens and that she will only use the Novolog vial with the V-Go system.  Patient arrives today for VGo start without Novolog vial which she was reminded to bring on confirmation call--patient states she did not  vials of Novolog at pharmacy, only her VGo device.  Patient provided with a sample vial of Novolog today by Endocrine provider Jacinta Meyer DNP and asked to go to pharmacy to  vials of Novolog.     Patient was instructed on the following:   · Insert vial into EZ Fill and leave in place until vial is empty  · Remove plug and insert V-Go   · Draw insulin into EZ Fill and fill V-Go with insulin (check that V-Go is full of insulin)   · Remove V-GO and clean and replace plug (advised to wipe the circular opening with an alcohol swab before replacing)   · Select site for V-Go (site selection reviewed) and prepare infusion site with aseptic technique   · Remove button cover and adhesive liner   · Attach V-Go to site selected and insert needle by pushing needle button in to activate basal rate   · Instructed patient on how to activate the bolus ready button and to push the bolus delivery button into the V-Go to deliver 2 units of insulin (1 click = 2 units). Patient advised that once all 36 units of the on-demand bolus have been given, the bolus buttons will lock, but the basal insulin will continue for the remainder of  the 24 hours   · To remove, release needle by sliding and pressing the needle release button   · Remove the V-Go and discard (the V-Go is 100% disposable)  · Patient instructed that the V-Go needs to be changed every 24 hours.     Patient was able to perform successful return demonstration of all.      General precautions reviewed with the patient:   · V-Go needs to be removed before having an X-ray, MRI or CT scan (or any similar test or procedure). Replace with a new V-Go after the test or procedure is completed. Patient also advised to check with her doctor before any type of surgical procedure to see if the V-Go can be worn.   · Patient advised to monitor her BG 4 times a day (pre-meals and at HS)   · Patient advised to always carry back up Humalog and Lantus insulin pens (non-) should a problem develop with the V-Go. Patient also advised that she should have directions from her MD regarding insulin doses should she need to switch back to Humalog/Lantus pens temporarily.   · The V-Go should not be exposed to direct sunlight, hot tub, whirlpool or sauna use (replace with a new filled V-Go afterward)   · Check that the V-Go is securely in place during and after periods of increased physical activity, exposure to water or gone under water to the depth of 3 feet, 3 inches (the V-Go can go under water and continue to work safely)   · Reviewed s/s and tx of hypoglycemia    All of patient's and family member's questions and concerns were addressed. Patient to return in 2 weeks for CGMS download and review by Endocrine provider.   Phone number was provided and patient advised to call with any questions or concerns.     Monitoring (monitoring blood glucose/other parameters & using results): Discussion, Comprehends Key Points.  Patient reminded to bring Freestyle Vicky [place] to each Endocrinology visit to be uploaded and CGMS data reviewed.      Diabetes Care Plan/Intervention  Education Plan/Intervention:   1.  2  week Freestyle Vicky download per Endo provider--scheduled on Endo nurse schedule on 3/31/2020.    2.  Patient reminded to change VGo every morning as patient feels she can find a consistent time each morning to change VGo device.     Today's Self-Management Care Plan was developed with the patient's input and is based on barriers identified during today's assessment.    The long and short-term goals in the care plan were written with the patient/caregiver's input. The patient has agreed to work toward these goals to improve her overall diabetes control.      The patient received a copy of today's self-management plan and verbalized understanding of the care plan, goals, and all of today's instructions.      The patient was encouraged to communicate with her physician and care team regarding her condition(s) and treatment.  I provided the patient with my contact information today and encouraged her to contact me via phone or patient portal as needed.     Education Units of Time   Time Spent: 60 min

## 2020-03-17 ENCOUNTER — TELEPHONE (OUTPATIENT)
Dept: FAMILY MEDICINE | Facility: CLINIC | Age: 50
End: 2020-03-17

## 2020-03-17 NOTE — TELEPHONE ENCOUNTER
----- Message from Sandra Casper sent at 3/17/2020 11:17 AM CDT -----  Type: Needs Medical Advice    Who Called: self   Symptoms (please be specific):  NA   How long has patient had these symptoms:    Pharmacy name and phone #: NA   Best Call Back Number: [phone]  Additional Information:   Jose Mesa calling regarding an update for approval for diabetic shoes.

## 2020-03-18 ENCOUNTER — TELEPHONE (OUTPATIENT)
Dept: FAMILY MEDICINE | Facility: CLINIC | Age: 50
End: 2020-03-18

## 2020-03-18 NOTE — TELEPHONE ENCOUNTER
----- Message from Angel Rosario sent at 3/18/2020  9:27 AM CDT -----  Contact: pt  Type: Needs Medical Advice    Who Called:  pt    Best Call Back Number: [phone]  Additional Information: pt would like a call back to follow up on her message from yesterday. Please call to advise.

## 2020-03-18 NOTE — TELEPHONE ENCOUNTER
Checked with pt, endo can not sign off for her diabetic shoes. Needs to come from PCP  Checking with staff

## 2020-03-24 ENCOUNTER — TELEPHONE (OUTPATIENT)
Dept: ENDOCRINOLOGY | Facility: CLINIC | Age: 50
End: 2020-03-24

## 2020-03-26 DIAGNOSIS — E11.9 TYPE 2 DIABETES MELLITUS WITHOUT COMPLICATION, WITH LONG-TERM CURRENT USE OF INSULIN: ICD-10-CM

## 2020-03-26 DIAGNOSIS — Z79.4 TYPE 2 DIABETES MELLITUS WITHOUT COMPLICATION, WITH LONG-TERM CURRENT USE OF INSULIN: ICD-10-CM

## 2020-03-26 RX ORDER — INSULIN ASPART 100 [IU]/ML
INJECTION, SOLUTION INTRAVENOUS; SUBCUTANEOUS
Qty: 30 ML | Refills: 6 | Status: SHIPPED | OUTPATIENT
Start: 2020-03-26 | End: 2020-05-28

## 2020-03-26 NOTE — TELEPHONE ENCOUNTER
Pt. Notified of Edwin's previous message regarding her sugar ans use of Vgo, Pt. Verbalized understanding

## 2020-04-13 RX ORDER — PROMETHAZINE HYDROCHLORIDE AND CODEINE PHOSPHATE 6.25; 1 MG/5ML; MG/5ML
SOLUTION ORAL
Qty: 240 ML | Refills: 0 | Status: SHIPPED | OUTPATIENT
Start: 2020-04-13 | End: 2020-05-06

## 2020-05-06 RX ORDER — PROMETHAZINE HYDROCHLORIDE AND CODEINE PHOSPHATE 6.25; 1 MG/5ML; MG/5ML
SOLUTION ORAL
Qty: 240 ML | Refills: 0 | Status: SHIPPED | OUTPATIENT
Start: 2020-05-06 | End: 2020-05-21 | Stop reason: SDUPTHER

## 2020-05-07 ENCOUNTER — PATIENT OUTREACH (OUTPATIENT)
Dept: ADMINISTRATIVE | Facility: HOSPITAL | Age: 50
End: 2020-05-07

## 2020-05-14 ENCOUNTER — LAB VISIT (OUTPATIENT)
Dept: LAB | Facility: HOSPITAL | Age: 50
End: 2020-05-14
Attending: NURSE PRACTITIONER
Payer: MEDICARE

## 2020-05-14 DIAGNOSIS — Z79.4 TYPE 2 DIABETES MELLITUS WITHOUT COMPLICATION, WITH LONG-TERM CURRENT USE OF INSULIN: ICD-10-CM

## 2020-05-14 DIAGNOSIS — E11.9 TYPE 2 DIABETES MELLITUS WITHOUT COMPLICATION, WITH LONG-TERM CURRENT USE OF INSULIN: ICD-10-CM

## 2020-05-14 LAB
ALBUMIN SERPL BCP-MCNC: 3.2 G/DL (ref 3.5–5.2)
ALP SERPL-CCNC: 77 U/L (ref 55–135)
ALT SERPL W/O P-5'-P-CCNC: 16 U/L (ref 10–44)
ANION GAP SERPL CALC-SCNC: 10 MMOL/L (ref 8–16)
AST SERPL-CCNC: 17 U/L (ref 10–40)
BILIRUB SERPL-MCNC: 0.3 MG/DL (ref 0.1–1)
BUN SERPL-MCNC: 11 MG/DL (ref 6–20)
CALCIUM SERPL-MCNC: 9.3 MG/DL (ref 8.7–10.5)
CHLORIDE SERPL-SCNC: 100 MMOL/L (ref 95–110)
CO2 SERPL-SCNC: 29 MMOL/L (ref 23–29)
CREAT SERPL-MCNC: 1 MG/DL (ref 0.5–1.4)
EST. GFR  (AFRICAN AMERICAN): >60 ML/MIN/1.73 M^2
EST. GFR  (NON AFRICAN AMERICAN): >60 ML/MIN/1.73 M^2
ESTIMATED AVG GLUCOSE: 301 MG/DL (ref 68–131)
GLUCOSE SERPL-MCNC: 139 MG/DL (ref 70–110)
HBA1C MFR BLD HPLC: 12.1 % (ref 4–5.6)
POTASSIUM SERPL-SCNC: 3.7 MMOL/L (ref 3.5–5.1)
PROT SERPL-MCNC: 6.6 G/DL (ref 6–8.4)
SODIUM SERPL-SCNC: 139 MMOL/L (ref 136–145)

## 2020-05-14 PROCEDURE — 80053 COMPREHEN METABOLIC PANEL: CPT

## 2020-05-14 PROCEDURE — 36415 COLL VENOUS BLD VENIPUNCTURE: CPT | Mod: PN

## 2020-05-14 PROCEDURE — 83036 HEMOGLOBIN GLYCOSYLATED A1C: CPT

## 2020-05-21 ENCOUNTER — TELEPHONE (OUTPATIENT)
Dept: ENDOCRINOLOGY | Facility: CLINIC | Age: 50
End: 2020-05-21

## 2020-05-21 RX ORDER — PROMETHAZINE HYDROCHLORIDE AND CODEINE PHOSPHATE 6.25; 1 MG/5ML; MG/5ML
SOLUTION ORAL
Qty: 240 ML | Refills: 0 | Status: SHIPPED | OUTPATIENT
Start: 2020-05-21 | End: 2020-05-28 | Stop reason: SDUPTHER

## 2020-05-21 RX ORDER — PROMETHAZINE HYDROCHLORIDE AND CODEINE PHOSPHATE 6.25; 1 MG/5ML; MG/5ML
SOLUTION ORAL
Qty: 240 ML | Refills: 0 | Status: SHIPPED | OUTPATIENT
Start: 2020-05-21 | End: 2020-06-03 | Stop reason: SDUPTHER

## 2020-05-21 NOTE — TELEPHONE ENCOUNTER
----- Message from Kerrie Tamayo sent at 5/21/2020  1:08 PM CDT -----  Contact: Pt  Type: Needs Medical Advice  Who Called:  Pt  Best Call Back Number: .[phone]  Additional Information: Patient is calling to get appts moved from today to 5/28.Please call back and advise.

## 2020-05-21 NOTE — TELEPHONE ENCOUNTER
Spoke with pt, she states she has an appt today but started with diarrhea this morning. She does not think she will be able to make her appt. She is taking pepto for now for her sx. I have her appt r/s to next week but she needs her cough syrup refilled today

## 2020-05-21 NOTE — TELEPHONE ENCOUNTER
----- Message from Sarah Spann sent at 5/21/2020  9:09 AM CDT -----  Contact: pt  Type:  Patient Returning Call    Who Called:  pt  Does the patient know what this is regarding?:  Pt would like for office nurse to give her a call back as soon as possible. Pt has appt scheduled today and is feeling ill and still needs refill. Please follow up.  Best Call Back Number:    Additional Information:  Thank you

## 2020-05-21 NOTE — TELEPHONE ENCOUNTER
Spoke with pt  She is sick with GI issues and will not make it to appt today  Rescheduled to 6/4/20 at 10;00am per pt request

## 2020-05-28 ENCOUNTER — OFFICE VISIT (OUTPATIENT)
Dept: FAMILY MEDICINE | Facility: CLINIC | Age: 50
End: 2020-05-28
Payer: MEDICARE

## 2020-05-28 VITALS — DIASTOLIC BLOOD PRESSURE: 84 MMHG | SYSTOLIC BLOOD PRESSURE: 132 MMHG

## 2020-05-28 DIAGNOSIS — E11.9 TYPE 2 DIABETES MELLITUS WITHOUT COMPLICATION, WITH LONG-TERM CURRENT USE OF INSULIN: ICD-10-CM

## 2020-05-28 DIAGNOSIS — G89.29 CHRONIC MIDLINE LOW BACK PAIN WITHOUT SCIATICA: ICD-10-CM

## 2020-05-28 DIAGNOSIS — J45.41 MODERATE PERSISTENT ASTHMA WITH ACUTE EXACERBATION: ICD-10-CM

## 2020-05-28 DIAGNOSIS — I10 ESSENTIAL HYPERTENSION: Primary | ICD-10-CM

## 2020-05-28 DIAGNOSIS — M54.50 CHRONIC MIDLINE LOW BACK PAIN WITHOUT SCIATICA: ICD-10-CM

## 2020-05-28 DIAGNOSIS — K64.1 SECOND DEGREE HEMORRHOIDS: ICD-10-CM

## 2020-05-28 DIAGNOSIS — Z79.4 TYPE 2 DIABETES MELLITUS WITHOUT COMPLICATION, WITH LONG-TERM CURRENT USE OF INSULIN: ICD-10-CM

## 2020-05-28 DIAGNOSIS — R05.3 CHRONIC COUGH: ICD-10-CM

## 2020-05-28 PROCEDURE — 99999 PR PBB SHADOW E&M-EST. PATIENT-LVL III: CPT | Mod: PBBFAC,,, | Performed by: FAMILY MEDICINE

## 2020-05-28 PROCEDURE — 99214 PR OFFICE/OUTPT VISIT, EST, LEVL IV, 30-39 MIN: ICD-10-PCS | Mod: S$PBB,,, | Performed by: FAMILY MEDICINE

## 2020-05-28 PROCEDURE — 99213 OFFICE O/P EST LOW 20 MIN: CPT | Mod: PBBFAC,PN | Performed by: FAMILY MEDICINE

## 2020-05-28 PROCEDURE — 2024F PR 7 FIELD PHOTOS WITH INTERP/ REVIEW: ICD-10-PCS | Mod: ,,, | Performed by: FAMILY MEDICINE

## 2020-05-28 PROCEDURE — 99999 PR PBB SHADOW E&M-EST. PATIENT-LVL III: ICD-10-PCS | Mod: PBBFAC,,, | Performed by: FAMILY MEDICINE

## 2020-05-28 PROCEDURE — 99214 OFFICE O/P EST MOD 30 MIN: CPT | Mod: S$PBB,,, | Performed by: FAMILY MEDICINE

## 2020-05-28 PROCEDURE — 2024F 7 FLD RTA PHOTO EVC RTNOPTHY: CPT | Mod: ,,, | Performed by: FAMILY MEDICINE

## 2020-05-28 RX ORDER — CHLORZOXAZONE 500 MG/1
500 TABLET ORAL 4 TIMES DAILY PRN
Qty: 180 TABLET | Refills: 3 | Status: SHIPPED | OUTPATIENT
Start: 2020-05-28 | End: 2021-03-17

## 2020-05-28 RX ORDER — HYDROCODONE BITARTRATE AND ACETAMINOPHEN 7.5; 325 MG/1; MG/1
1 TABLET ORAL EVERY 12 HOURS PRN
Qty: 60 TABLET | Refills: 0 | Status: SHIPPED | OUTPATIENT
Start: 2020-05-28 | End: 2020-08-24 | Stop reason: SDUPTHER

## 2020-05-28 RX ORDER — HYDROCORTISONE ACETATE 25 MG/1
25 SUPPOSITORY RECTAL 2 TIMES DAILY PRN
Qty: 24 SUPPOSITORY | Refills: 2 | Status: SHIPPED | OUTPATIENT
Start: 2020-05-28 | End: 2021-09-16 | Stop reason: SDUPTHER

## 2020-05-28 RX ORDER — HYDROCODONE BITARTRATE AND ACETAMINOPHEN 7.5; 325 MG/1; MG/1
1 TABLET ORAL EVERY 12 HOURS PRN
Qty: 60 TABLET | Refills: 0 | Status: SHIPPED | OUTPATIENT
Start: 2020-06-28 | End: 2020-08-24 | Stop reason: SDUPTHER

## 2020-05-28 RX ORDER — HYDROCODONE BITARTRATE AND ACETAMINOPHEN 7.5; 325 MG/1; MG/1
1 TABLET ORAL EVERY 12 HOURS PRN
Qty: 60 TABLET | Refills: 0 | Status: SHIPPED | OUTPATIENT
Start: 2020-07-28 | End: 2020-08-24 | Stop reason: SDUPTHER

## 2020-05-28 RX ORDER — ALBUTEROL SULFATE 0.63 MG/3ML
1 SOLUTION RESPIRATORY (INHALATION) EVERY 6 HOURS PRN
Qty: 180 ML | Refills: 3 | Status: SHIPPED | OUTPATIENT
Start: 2020-05-28 | End: 2021-03-17

## 2020-05-28 NOTE — PROGRESS NOTES
"Subjective:       Patient ID: Jose Cousin is a 49 y.o. female.    Chief Complaint: Chest Congestion    She is due for refill of hydrocodone b.i.d. for her chronic low back pain.  She has asthma and a chronic cough and cough induced syncope.  She has dyspnea on exertion.  She is currently using Advair twice a day as well as albuterol as needed.  She has lost some weight.  Her mental health professional has her on several medications including medicine for ADD.  She has an appointment with endocrine next week for monitoring her diabetes.  Her hemoglobin A1c has been unexplainedly high.  She does admit to consuming more sweets.  Blood pressure has been controlled with hydrochlorothiazide plus losartan.    Review of Systems   Constitutional: Positive for unexpected weight change.   Respiratory: Positive for cough, shortness of breath and wheezing.    Cardiovascular: Negative for chest pain, palpitations and leg swelling.   Neurological: Negative for weakness and numbness.       Objective:     Blood pressure 132/84, pulse (P) 107, temperature (P) 97.1 °F (36.2 °C), temperature source (P) Oral, height (P) 5' 7" (1.702 m), weight (P) 94.3 kg (208 lb 0.1 oz), last menstrual period 11/21/2019.      Physical Exam   Constitutional: She appears well-developed.   She is overweight and in no distress.  She does have a frequent cough.   Cardiovascular: Normal rate and regular rhythm.   Pulses:       Dorsalis pedis pulses are 1+ on the right side, and 1+ on the left side.        Posterior tibial pulses are 1+ on the right side, and 1+ on the left side.   Pulmonary/Chest: Effort normal and breath sounds normal.   Musculoskeletal:        Right foot: There is no deformity.        Left foot: There is no deformity.   Feet:   Right Foot:   Protective Sensation: 4 sites tested. 4 sites sensed.   Skin Integrity: Negative for ulcer.   Left Foot:   Protective Sensation: 4 sites tested. 4 sites sensed.   Skin Integrity: Negative for " ulcer.   Neurological: She is alert.       Assessment:       1. Essential hypertension    2. Chronic midline low back pain without sciatica    3. Moderate persistent asthma with acute exacerbation    4. Type 2 diabetes mellitus without complication, with long-term current use of insulin    5. Chronic cough    6. Second degree hemorrhoids        Plan:       I refilled hydrocodone, albuterol nebulizer solution, chlorzoxazone.  Anusol HC suppositories.  Follow-up in 3 months.  CBC with the next blood draw.

## 2020-06-02 ENCOUNTER — TELEPHONE (OUTPATIENT)
Dept: ENDOCRINOLOGY | Facility: CLINIC | Age: 50
End: 2020-06-02

## 2020-06-02 ENCOUNTER — TELEPHONE (OUTPATIENT)
Dept: FAMILY MEDICINE | Facility: CLINIC | Age: 50
End: 2020-06-02

## 2020-06-02 NOTE — TELEPHONE ENCOUNTER
"[facility] indicating that this has been denied.  "reason for denial: The National Drug Code(NDC) of the drug you are asking for cannot be covered by your Part D because it is listed as an unapproved drug on the Food and Drug Administrations NDC Structured Product Labeling(SPL) Data Elements file(or NSDE file). Therefore it cannot be paid for by your Medicare Part D...". Info faxed to Pharm  "

## 2020-06-02 NOTE — TELEPHONE ENCOUNTER
Rec'd PA request from [facility](fax [phone]) for Anucort Suppositories.     Initiated PA via [email or web address].  KEY: [id number]  Dx code: K64.1     Awaiting determination response.

## 2020-06-03 RX ORDER — PROMETHAZINE HYDROCHLORIDE AND CODEINE PHOSPHATE 6.25; 1 MG/5ML; MG/5ML
SOLUTION ORAL
Qty: 240 ML | Refills: 0 | Status: SHIPPED | OUTPATIENT
Start: 2020-06-08 | End: 2020-06-18

## 2020-06-03 NOTE — TELEPHONE ENCOUNTER
----- Message from Maria Del Rosario Suarez sent at 6/3/2020 11:33 AM CDT -----  Type:  RX Refill Request    Who Called:  patient  Refill or New Rx:  refill  RX Name and Strength:  promethazine-codeine 6.25-10 mg/5 ml (PHENERGAN WITH CODEINE) 6.25-10 mg/5 mL syrup  How is the patient currently taking it? (ex. 1XDay):  One tsp every 8 hours as needed  Is this a 30 day or 90 day RX:    Preferred Pharmacy with phone number:    [facility] - [facility] [address] [address]  [address] [address] [address]  Phone: [phone] Fax: [phone]  Local or Mail Order:  local  Ordering Provider:  Dr Berny Stubbs Call Back Number:  [phone]  Additional Information:  Thank you!

## 2020-07-15 RX ORDER — PROMETHAZINE HYDROCHLORIDE AND CODEINE PHOSPHATE 6.25; 1 MG/5ML; MG/5ML
SOLUTION ORAL
Qty: 240 ML | Refills: 0 | Status: SHIPPED | OUTPATIENT
Start: 2020-07-15 | End: 2020-07-30

## 2020-07-17 ENCOUNTER — OFFICE VISIT (OUTPATIENT)
Dept: ENDOCRINOLOGY | Facility: CLINIC | Age: 50
End: 2020-07-17
Payer: MEDICARE

## 2020-07-17 VITALS
WEIGHT: 210.75 LBS | HEIGHT: 67 IN | HEART RATE: 99 BPM | BODY MASS INDEX: 33.08 KG/M2 | SYSTOLIC BLOOD PRESSURE: 130 MMHG | DIASTOLIC BLOOD PRESSURE: 80 MMHG

## 2020-07-17 DIAGNOSIS — Z79.4 TYPE 2 DIABETES MELLITUS WITHOUT COMPLICATION, WITH LONG-TERM CURRENT USE OF INSULIN: Primary | ICD-10-CM

## 2020-07-17 DIAGNOSIS — E11.9 TYPE 2 DIABETES MELLITUS WITHOUT COMPLICATION, WITH LONG-TERM CURRENT USE OF INSULIN: Primary | ICD-10-CM

## 2020-07-17 DIAGNOSIS — F50.81 BINGE EATING DISORDER: ICD-10-CM

## 2020-07-17 DIAGNOSIS — E11.649 HYPOGLYCEMIA DUE TO TYPE 2 DIABETES MELLITUS: ICD-10-CM

## 2020-07-17 DIAGNOSIS — E66.9 OBESITY (BMI 30-39.9): ICD-10-CM

## 2020-07-17 DIAGNOSIS — I10 ESSENTIAL HYPERTENSION: ICD-10-CM

## 2020-07-17 DIAGNOSIS — E78.2 MIXED HYPERLIPIDEMIA: ICD-10-CM

## 2020-07-17 PROCEDURE — 99215 OFFICE O/P EST HI 40 MIN: CPT | Mod: PBBFAC,PN | Performed by: NURSE PRACTITIONER

## 2020-07-17 PROCEDURE — 2024F PR 7 FIELD PHOTOS WITH INTERP/ REVIEW: ICD-10-PCS | Mod: ,,, | Performed by: NURSE PRACTITIONER

## 2020-07-17 PROCEDURE — 2024F 7 FLD RTA PHOTO EVC RTNOPTHY: CPT | Mod: ,,, | Performed by: NURSE PRACTITIONER

## 2020-07-17 PROCEDURE — 99214 OFFICE O/P EST MOD 30 MIN: CPT | Mod: S$PBB,,, | Performed by: NURSE PRACTITIONER

## 2020-07-17 PROCEDURE — 99999 PR PBB SHADOW E&M-EST. PATIENT-LVL V: ICD-10-PCS | Mod: PBBFAC,,, | Performed by: NURSE PRACTITIONER

## 2020-07-17 PROCEDURE — 99214 PR OFFICE/OUTPT VISIT, EST, LEVL IV, 30-39 MIN: ICD-10-PCS | Mod: S$PBB,,, | Performed by: NURSE PRACTITIONER

## 2020-07-17 PROCEDURE — 99999 PR PBB SHADOW E&M-EST. PATIENT-LVL V: CPT | Mod: PBBFAC,,, | Performed by: NURSE PRACTITIONER

## 2020-07-17 RX ORDER — INSULIN ASPART 100 [IU]/ML
10 INJECTION, SOLUTION INTRAVENOUS; SUBCUTANEOUS
Qty: 2 BOX | Refills: 6 | Status: SHIPPED | OUTPATIENT
Start: 2020-07-17 | End: 2020-11-13 | Stop reason: SDUPTHER

## 2020-07-17 RX ORDER — INSULIN GLARGINE 100 [IU]/ML
20 INJECTION, SOLUTION SUBCUTANEOUS NIGHTLY
Qty: 15 ML | Refills: 6 | Status: SHIPPED | OUTPATIENT
Start: 2020-07-17 | End: 2020-10-05

## 2020-07-17 RX ORDER — DEXTROAMPHETAMINE SACCHARATE, AMPHETAMINE ASPARTATE, DEXTROAMPHETAMINE SULFATE AND AMPHETAMINE SULFATE 7.5; 7.5; 7.5; 7.5 MG/1; MG/1; MG/1; MG/1
30 TABLET ORAL 2 TIMES DAILY
COMMUNITY
Start: 2020-06-23

## 2020-07-17 NOTE — PROGRESS NOTES
CC: Ms. Jose Mesa arrives today for management of Type 2 DM and review of chronic medical conditions, as listed in the Visit Diagnosis section of this encounter.       HPI: Ms. Jose Mesa was diagnosed with Type 2 DM in 2005. She was diagnosed based on lab work. Initial treatment consisted of metformin. Glimepiride later added. Victoza to her medication regimen in 2016. Insulin added in 2018. + FH of DM on mother. Denies hospitalizations due to DM.     Patient was last seen by me in February. VGo was prescribed at this visit. However, she felt this was uncomfortable and stopped using 1 month ago. She then resumed insulin pens    Patient states that she either doesn't eat for a long period of time or overeats and eats until she feels sick. Taking Adderall for binge eating. Following with TEMITOPE Bowen, in Psychiatry.     BG monitoring per [employer] Vicky.      Hypoglycemia: No    Missing Insulin/PO medication doses: No     Dietary Habits: Eats 2 meals/day. Ongoing binge eating. Has cut out sugary beverages.      Last DM education appointment:  8/2016    CURRENT DIABETIC MEDS: metformin XR 1000 mg twice daily, Victoza 1.8 mg daily, Basaglar 22 units QHS, Novolog 8 units with lunch and dinner + correction scale, target 150, ISF 25  Glucometer type: Accucheck Adrienne    Previous DM treatments:  Lantus - not covered by insurance   Glimepiride   acarbose    Last Eye Exam: 6/2020, no DR per patient. [facility]   Last Podiatry Exam: 11/2019, Dr. Olvera    REVIEW OF SYSTEMS  Constitutional: no c/o fatigue, weakness. + 9# weight loss since February.  Eyes: denies visual disturbances.  Cardiac: no palpitations or chest pain.  Respiratory: denies cough, dyspnea.   GI: no c/o abdominal pain or nausea. Denies h/o pancreatitis.   Skin: no rashes, lesions   Neuro: + numbness, tingling in middle and ring finger of R hand that comes and goes.   Endocrine: denies polyphagia, polydipsia, polyuria.      Personally  "reviewed Past Medical, Surgical, Social History.    Vital Signs  /80   Pulse 99   Ht 5' 7" (1.702 m)   Wt 95.6 kg (210 lb 12.2 oz)   LMP 11/21/2019   BMI 33.01 kg/m²     Personally reviewed the below labs:    Hemoglobin A1C   Date Value Ref Range Status   05/14/2020 12.1 (H) 4.0 - 5.6 % Final     Comment:     ADA Screening Guidelines:  5.7-6.4%  Consistent with prediabetes  >or=6.5%  Consistent with diabetes  High levels of fetal hemoglobin interfere with the HbA1C  assay. Heterozygous hemoglobin variants (HbS, HgC, etc)do  not significantly interfere with this assay.   However, presence of multiple variants may affect accuracy.     02/07/2020 11.7 (H) 4.0 - 5.6 % Final     Comment:     ADA Screening Guidelines:  5.7-6.4%  Consistent with prediabetes  >or=6.5%  Consistent with diabetes  High levels of fetal hemoglobin interfere with the HbA1C  assay. Heterozygous hemoglobin variants (HbS, HgC, etc)do  not significantly interfere with this assay.   However, presence of multiple variants may affect accuracy.     11/08/2019 8.5 (H) 4.0 - 5.6 % Final     Comment:     ADA Screening Guidelines:  5.7-6.4%  Consistent with prediabetes  >or=6.5%  Consistent with diabetes  High levels of fetal hemoglobin interfere with the HbA1C  assay. Heterozygous hemoglobin variants (HbS, HgC, etc)do  not significantly interfere with this assay.   However, presence of multiple variants may affect accuracy.     05/03/2012 7.1 (H) 4.8 - 5.9 % Final     Comment:     **In order to standardize %HbA1c results worldwide, as of October 11, 2010,  the %HbA1c is being calculated using the master equation recommended in the  consensus statement adopted by the ADA (American Diabetes Assoc), EASD  (European Assoc for the Study of Diabetes), IFCC (International Federation  of Clinical Chemistry and Laboratory Medicine) and IDF (International  Diabetes Federation). Result units: %HgbA1c (DCCT/NGSP).  In common with other methods, Hb A1C values " may not accurately reflect mean  blood glucose in patients with hemoglobin variants (HgbF, HgbS and HgbC).  Any cause of shortened erythrocyte survival will reduce exposure of  erythrocytes to glucose with a consequent decrease in HbA1c (%) values, even  though the time-averaged blood glucose level may be elevated. Causes of  shortened erythrocyte lifetime might be hemolytic anemia or other hemolytic  diseases, homozygous sickle cell trait, pregnancy, recent significant or  chronic blood loss, etc. Caution should be used when interpreting the HbA1c  results from patients with these conditions.       Chemistry        Component Value Date/Time     05/14/2020 0849    K 3.7 05/14/2020 0849     05/14/2020 0849    CO2 29 05/14/2020 0849    BUN 11 05/14/2020 0849    CREATININE 1.0 05/14/2020 0849    CREATININE 1.0 05/06/2012 0431     (H) 05/14/2020 0849        Component Value Date/Time    CALCIUM 9.3 05/14/2020 0849    CALCIUM 9.5 05/06/2012 0431    ALKPHOS 77 05/14/2020 0849    ALKPHOS 49 05/03/2012 1635    AST 17 05/14/2020 0849    AST 13 05/03/2012 1635    ALT 16 05/14/2020 0849    BILITOT 0.3 05/14/2020 0849          Lab Results   Component Value Date    CHOL 194 02/07/2020    CHOL 219 (H) 07/26/2019    CHOL 175 07/27/2018     Lab Results   Component Value Date    HDL 58 02/07/2020    HDL 68 07/26/2019    HDL 51 07/27/2018     Lab Results   Component Value Date    LDLCALC 110.2 02/07/2020    LDLCALC 131.2 07/26/2019    LDLCALC 97.8 07/27/2018     Lab Results   Component Value Date    TRIG 129 02/07/2020    TRIG 99 07/26/2019    TRIG 131 07/27/2018     Lab Results   Component Value Date    CHOLHDL 29.9 02/07/2020    CHOLHDL 31.1 07/26/2019    CHOLHDL 29.1 07/27/2018       Lab Results   Component Value Date    MICALBCREAT 6.8 05/14/2020     Lab Results   Component Value Date    TSH 1.632 07/26/2019       CrCl cannot be calculated (Patient's most recent lab result is older than the maximum 7 days  allowed.).    No results found for: [id number]         PHYSICAL EXAMINATION  Constitutional: Appears well, no distress.  Neck: Supple, trachea midline; no thyromegaly or nodules.   Respiratory: CTA, even and unlabored.   Cardiovascular: RRR, no murmurs, no carotid bruits. DP pulses  2+ bilaterally; no edema.    GI: active bowel sounds, no hernia  Skin: warm and dry; no acanthosis nigracans observed.  Neuro: DTR 1+ BUE/1+BLE.Previously, no loss of protective sensation via 10 gm monofilament or vibratory exam bilaterally.  Feet: appropriate footwear.       FREESTYLE BAKARI DOWNLOAD: See media file for details. Occasional hypoglycemia noted - one episode in evening and a few overnight. Occasional prandial excursions.   Average glucose: 164 mg/dL  Above 250 mg/dL: 13 %  181-250 mg/dL: 18 %   mg/dL: 67 %  54-69 mg/dL: 2 %  Below 54 mg/dL: 0 %        A1c goal < 7%      Assessment/Plan  1. Type 2 diabetes mellitus without complication, with long-term current use of insulin  -- Worsening. Binge eating behavior is worsening matters. I highly suspect noncompliance with insulin admin in the past, as her recent CGMS download only reveals average glucose of 164.  -- decrease Basaglar to 20 units QHS  -- increase Novolog back to 10 units AC + Correction scale, target 150, ISF 25.   -- continue Victoza, metformin    -- BG monitoring per Freestyle Bakari.    -- Discussed diagnosis of DM, A1c goals, progression of disease, long term complications and tx options.  Advised patient to check BG before activities, such as driving or exercise.  -- Reviewed hypoglycemia management: treat with 1/2 glass of juice, 1/2 can regular coke, or 4 glucose tablets. Monitor and repeat treatment every 15 minutes until BG is >70 Then have a snack, which includes a complex carbohydrate and protein.   2. Essential hypertension  -- acceptable  -- continue losartan    3. Mixed hyperlipidemia  -- acceptable  -- continue atorvastatin    4. Obesity (BMI  30-39.9)  -- improving  Body mass index is 33.01 kg/m².   5. Binge eating disorder -- following with Psych  -- worsening DM control   6. Hypoglycemia  -- Basaglar dose increased  -- advised pt to notify me if this continues.        FOLLOW UP  Follow up in about 8 weeks (around 9/11/2020).   Patient instructed to bring BG logs to each follow up   Patient encouraged to call for any BG/medication issues, concerns, or questions.      Orders Placed This Encounter   Procedures    Hemoglobin A1C

## 2020-07-23 NOTE — TELEPHONE ENCOUNTER
----- Message from Raz Quarles sent at 7/23/2020  3:16 PM CDT -----  Type:  Patient Returning Call    Who Called:  Patient  Who Left Message for Patient: Cassidy  Does the patient know what this is regarding?:  Appointment  Best Call Back Number:  [phone]  Additional Information:

## 2020-08-10 ENCOUNTER — TELEPHONE (OUTPATIENT)
Dept: FAMILY MEDICINE | Facility: CLINIC | Age: 50
End: 2020-08-10

## 2020-08-10 ENCOUNTER — PATIENT OUTREACH (OUTPATIENT)
Dept: ADMINISTRATIVE | Facility: HOSPITAL | Age: 50
End: 2020-08-10

## 2020-08-10 NOTE — LETTER
August 10, 2020    Jose Mesa  [address] [place] [address]             [facility]  [address]  [place] [address]  Phone: [phone] Dear Mrs. Mesa:    [facility] is committed to your overall health.  To help you get the most out of each of your visits, we will review your information to make sure you are up to date on all of your recommended tests and/or procedures.      Dr. Rayo Arrieta MD has found that your chart shows you may be due for     Health Maintenance Due   Topic    Low Dose Statin     Hemoglobin A1c     Eye Exam        Future Appointments   Date Time Provider Department Center   8/24/2020  8:30 AM [facility] LABORATORY [facility] LAB [facility]   8/24/2020  9:30 AM Rayo Arrieta MD [facility] FAM MED [facility]   9/17/2020  3:30 PM EDMAR Mares,FNP-C [facility] ENDOCRI [facility]     If you have had any of the above done at another facility, please bring the records or information with you so that your record at [facility] will be complete.  If you would like to schedule any of these, please contact me.    If you are currently taking medication, please bring it with you to your appointment for review.        MD Oneida Smith LPN Clinical Care Coordinator  [facility]  [address] [facility] [address]  [place] [address]  P: [phone]  F: [phone]

## 2020-08-10 NOTE — PROGRESS NOTES
Health Maintenance Due   Topic Date Due    Sign Pain Contract  1988    Low Dose Statin  1991    Hemoglobin A1c  2020    Eye Exam  2020       Chart review completed 08/10/2020.  Care Everywhere updates requested and reviewed.  Immunizations reconciled. Media reports reviewed.  Duplicate HM overrides and  orders removed.  Overdue HM topic chart audit and/or requested.  Overdue lab testing linked to upcoming lab appointments if applies.

## 2020-08-10 NOTE — TELEPHONE ENCOUNTER
----- Message from Amadeo Jim sent at 8/10/2020  3:17 PM CDT -----  Contact: pt  Type: Needs Medical Advice  Who Called:  pt    Best Call Back Number: [phone]    Additional Information: pt called to f/u about her request for diabetic shoes. Please call to advise

## 2020-08-10 NOTE — TELEPHONE ENCOUNTER
Spoke with pt, she wants to know if paperwork is completed for her diabetic shoes, states she dropped off form last Monday

## 2020-08-11 ENCOUNTER — TELEPHONE (OUTPATIENT)
Dept: FAMILY MEDICINE | Facility: CLINIC | Age: 50
End: 2020-08-11

## 2020-08-11 NOTE — TELEPHONE ENCOUNTER
----- Message from Soco Sher sent at 8/11/2020  8:28 AM CDT -----  Regarding: advice  Contact: Patient/[phone] (home)  Type: Needs Medical Advice  Who Called:  Patient/[phone] (home)     Additional Information: States that she had brought in paper work for her diabetic shoes for your office to fill out and fax back to her foot doctor. She wants to find out the status of this. Please call to advise.

## 2020-08-13 NOTE — TELEPHONE ENCOUNTER
Form has been given to Dr Arrieta today. Will contact pt once he has reviewed and let her know his recommendation.

## 2020-08-24 ENCOUNTER — LAB VISIT (OUTPATIENT)
Dept: LAB | Facility: HOSPITAL | Age: 50
End: 2020-08-24
Attending: FAMILY MEDICINE
Payer: MEDICARE

## 2020-08-24 ENCOUNTER — OFFICE VISIT (OUTPATIENT)
Dept: FAMILY MEDICINE | Facility: CLINIC | Age: 50
End: 2020-08-24
Payer: MEDICARE

## 2020-08-24 VITALS
TEMPERATURE: 98 F | SYSTOLIC BLOOD PRESSURE: 130 MMHG | WEIGHT: 226 LBS | HEIGHT: 67 IN | HEART RATE: 70 BPM | BODY MASS INDEX: 35.47 KG/M2 | DIASTOLIC BLOOD PRESSURE: 78 MMHG

## 2020-08-24 DIAGNOSIS — G89.29 CHRONIC MIDLINE LOW BACK PAIN WITHOUT SCIATICA: ICD-10-CM

## 2020-08-24 DIAGNOSIS — R05.3 CHRONIC COUGH: ICD-10-CM

## 2020-08-24 DIAGNOSIS — J45.41 MODERATE PERSISTENT ASTHMA WITH ACUTE EXACERBATION: ICD-10-CM

## 2020-08-24 DIAGNOSIS — Z79.4 TYPE 2 DIABETES MELLITUS WITH DIABETIC POLYNEUROPATHY, WITH LONG-TERM CURRENT USE OF INSULIN: Primary | ICD-10-CM

## 2020-08-24 DIAGNOSIS — I10 ESSENTIAL HYPERTENSION: ICD-10-CM

## 2020-08-24 DIAGNOSIS — Z79.4 TYPE 2 DIABETES MELLITUS WITHOUT COMPLICATION, WITH LONG-TERM CURRENT USE OF INSULIN: ICD-10-CM

## 2020-08-24 DIAGNOSIS — E11.9 TYPE 2 DIABETES MELLITUS WITHOUT COMPLICATION, WITH LONG-TERM CURRENT USE OF INSULIN: ICD-10-CM

## 2020-08-24 DIAGNOSIS — M54.50 CHRONIC MIDLINE LOW BACK PAIN WITHOUT SCIATICA: ICD-10-CM

## 2020-08-24 DIAGNOSIS — E11.42 TYPE 2 DIABETES MELLITUS WITH DIABETIC POLYNEUROPATHY, WITH LONG-TERM CURRENT USE OF INSULIN: Primary | ICD-10-CM

## 2020-08-24 LAB
BASOPHILS # BLD AUTO: 0.02 K/UL (ref 0–0.2)
BASOPHILS NFR BLD: 0.4 % (ref 0–1.9)
DIFFERENTIAL METHOD: ABNORMAL
EOSINOPHIL # BLD AUTO: 0.2 K/UL (ref 0–0.5)
EOSINOPHIL NFR BLD: 4.7 % (ref 0–8)
ERYTHROCYTE [DISTWIDTH] IN BLOOD BY AUTOMATED COUNT: 14.7 % (ref 11.5–14.5)
ESTIMATED AVG GLUCOSE: 258 MG/DL (ref 68–131)
HBA1C MFR BLD HPLC: 10.6 % (ref 4–5.6)
HCT VFR BLD AUTO: 37.5 % (ref 37–48.5)
HGB BLD-MCNC: 11.7 G/DL (ref 12–16)
IMM GRANULOCYTES # BLD AUTO: 0.01 K/UL (ref 0–0.04)
IMM GRANULOCYTES NFR BLD AUTO: 0.2 % (ref 0–0.5)
LYMPHOCYTES # BLD AUTO: 2.5 K/UL (ref 1–4.8)
LYMPHOCYTES NFR BLD: 52.5 % (ref 18–48)
MCH RBC QN AUTO: 28.5 PG (ref 27–31)
MCHC RBC AUTO-ENTMCNC: 31.2 G/DL (ref 32–36)
MCV RBC AUTO: 92 FL (ref 82–98)
MONOCYTES # BLD AUTO: 0.5 K/UL (ref 0.3–1)
MONOCYTES NFR BLD: 11.3 % (ref 4–15)
NEUTROPHILS # BLD AUTO: 1.5 K/UL (ref 1.8–7.7)
NEUTROPHILS NFR BLD: 30.9 % (ref 38–73)
NRBC BLD-RTO: 0 /100 WBC
PLATELET # BLD AUTO: 242 K/UL (ref 150–350)
PMV BLD AUTO: 11.8 FL (ref 9.2–12.9)
RBC # BLD AUTO: 4.1 M/UL (ref 4–5.4)
WBC # BLD AUTO: 4.69 K/UL (ref 3.9–12.7)

## 2020-08-24 PROCEDURE — 36415 COLL VENOUS BLD VENIPUNCTURE: CPT | Mod: PN

## 2020-08-24 PROCEDURE — 99999 PR PBB SHADOW E&M-EST. PATIENT-LVL IV: ICD-10-PCS | Mod: PBBFAC,,, | Performed by: FAMILY MEDICINE

## 2020-08-24 PROCEDURE — 99999 PR PBB SHADOW E&M-EST. PATIENT-LVL IV: CPT | Mod: PBBFAC,,, | Performed by: FAMILY MEDICINE

## 2020-08-24 PROCEDURE — 85025 COMPLETE CBC W/AUTO DIFF WBC: CPT

## 2020-08-24 PROCEDURE — 99214 OFFICE O/P EST MOD 30 MIN: CPT | Mod: PBBFAC,PN | Performed by: FAMILY MEDICINE

## 2020-08-24 PROCEDURE — 99214 OFFICE O/P EST MOD 30 MIN: CPT | Mod: S$PBB,,, | Performed by: FAMILY MEDICINE

## 2020-08-24 PROCEDURE — 99214 PR OFFICE/OUTPT VISIT, EST, LEVL IV, 30-39 MIN: ICD-10-PCS | Mod: S$PBB,,, | Performed by: FAMILY MEDICINE

## 2020-08-24 PROCEDURE — 83036 HEMOGLOBIN GLYCOSYLATED A1C: CPT

## 2020-08-24 RX ORDER — PROMETHAZINE HYDROCHLORIDE AND CODEINE PHOSPHATE 6.25; 1 MG/5ML; MG/5ML
5 SOLUTION ORAL EVERY 8 HOURS PRN
Qty: 240 ML | Refills: 2 | Status: SHIPPED | OUTPATIENT
Start: 2020-08-24 | End: 2020-10-30 | Stop reason: SDUPTHER

## 2020-08-24 RX ORDER — HYDROCODONE BITARTRATE AND ACETAMINOPHEN 7.5; 325 MG/1; MG/1
1 TABLET ORAL EVERY 12 HOURS PRN
Qty: 60 TABLET | Refills: 0 | Status: SHIPPED | OUTPATIENT
Start: 2020-08-27 | End: 2020-11-23 | Stop reason: SDUPTHER

## 2020-08-24 RX ORDER — HYDROCODONE BITARTRATE AND ACETAMINOPHEN 7.5; 325 MG/1; MG/1
1 TABLET ORAL EVERY 12 HOURS PRN
Qty: 60 TABLET | Refills: 0 | Status: SHIPPED | OUTPATIENT
Start: 2020-09-27 | End: 2020-11-23 | Stop reason: SDUPTHER

## 2020-08-24 RX ORDER — HYDROCODONE BITARTRATE AND ACETAMINOPHEN 7.5; 325 MG/1; MG/1
1 TABLET ORAL EVERY 12 HOURS PRN
Qty: 60 TABLET | Refills: 0 | Status: SHIPPED | OUTPATIENT
Start: 2020-10-27 | End: 2020-11-23 | Stop reason: SDUPTHER

## 2020-08-24 RX ORDER — SUMATRIPTAN SUCCINATE 100 MG/1
100 TABLET ORAL ONCE AS NEEDED
Qty: 6 TABLET | Refills: 5 | Status: SHIPPED | OUTPATIENT
Start: 2020-08-24 | End: 2022-03-22 | Stop reason: SDUPTHER

## 2020-08-24 NOTE — PROGRESS NOTES
"Subjective:       Patient ID: Jose Cousin is a 49 y.o. female.    Chief Complaint: Diabetes (DM f/u. Fill out form)    Follow-up chronic pain.  She has low back pain and takes Lortab twice a day.  Stable dose with no side effects.  She has previously signed a pain contract but I did not find it in her chart so we will complete a new 1 today.  She has gotten diabetic shoes in the past.  She does have a very mild peripheral neuropathy and sees podiatry for regular debridement of calluses.  She has a chronic cough which can cause syncope.  She takes codeine containing cough syrup to help control the coughing.  She has stable asthma.  She does get a regular urine drug screen at her mental health office.    Review of Systems   Constitutional: Negative for fever and unexpected weight change.   Respiratory: Positive for cough and shortness of breath.    Cardiovascular: Negative for chest pain.   Musculoskeletal: Positive for back pain.   Neurological: Negative for weakness.         Objective:     Blood pressure 130/78, pulse 70, temperature 97.9 °F (36.6 °C), temperature source Temporal, height 5' 7" (1.702 m), weight 102.5 kg (225 lb 15.5 oz), last menstrual period 11/21/2019.      Physical Exam  Constitutional:       General: She is not in acute distress.     Appearance: She is obese.   Neck:      Vascular: No carotid bruit.   Cardiovascular:      Rate and Rhythm: Normal rate and regular rhythm.      Pulses:           Dorsalis pedis pulses are 1+ on the right side and 1+ on the left side.        Posterior tibial pulses are 1+ on the right side and 1+ on the left side.      Heart sounds: No murmur.   Pulmonary:      Effort: Pulmonary effort is normal. No respiratory distress.   Musculoskeletal:      Right foot: No deformity.      Left foot: No deformity.   Feet:      Right foot:      Protective Sensation: 4 sites tested. 3 sites sensed.      Skin integrity: No ulcer.      Left foot:      Protective Sensation: 4 sites " tested. 3 sites sensed.      Skin integrity: No ulcer.   Lymphadenopathy:      Cervical: No cervical adenopathy.   Neurological:      Mental Status: She is alert.      Comments: Sensation to monofilament is present but perhaps slightly decreased.  She has trouble feeling vibration in the left toes.   Psychiatric:         Mood and Affect: Mood normal.         Assessment:       1. Type 2 diabetes mellitus with diabetic polyneuropathy, with long-term current use of insulin    2. Essential hypertension    3. Chronic midline low back pain without sciatica    4. Chronic cough    5. Moderate persistent asthma with acute exacerbation        Plan:       I refilled hydrocodone for 3 months.  New pain contract signed.  I refilled Cheratussin syrup as well as Imitrex.  She has an appointment with Nhung Michael next month.  I filled out a form for diabetic shoes.

## 2020-08-31 ENCOUNTER — TELEPHONE (OUTPATIENT)
Dept: FAMILY MEDICINE | Facility: CLINIC | Age: 50
End: 2020-08-31

## 2020-08-31 NOTE — TELEPHONE ENCOUNTER
----- Message from Josselyn RICHY Armstrong sent at 8/31/2020  1:22 PM CDT -----  Contact: Marga urbina/ Dr. Moses Olvera  Type: Needs Medical Advice  Who Called:  Marga urbina/ Dr. Moses Olvera  Best Call Back Number: [phone]   Additional Information: requesting a call in regards to diabetic shoes, they are needing patient current clinicals notes.   Fax number

## 2020-09-28 ENCOUNTER — PATIENT OUTREACH (OUTPATIENT)
Dept: ADMINISTRATIVE | Facility: OTHER | Age: 50
End: 2020-09-28

## 2020-09-28 NOTE — PROGRESS NOTES
Health Maintenance Due   Topic Date Due    Influenza Vaccine (1) 08/01/2020    Eye Exam  08/23/2020    Shingles Vaccine (1 of 2) 09/05/2020     Updates were requested from care everywhere.  Chart was reviewed for overdue Proactive [facility] Encounters ([facility]AN[facility]) topics (CRS, Breast Cancer Screening, Eye exam)  Health Maintenance has been updated.  LINKS immunization registry triggered.  Immunizations were reconciled.

## 2020-09-29 ENCOUNTER — OFFICE VISIT (OUTPATIENT)
Dept: ENDOCRINOLOGY | Facility: CLINIC | Age: 50
End: 2020-09-29
Payer: MEDICARE

## 2020-09-29 VITALS
WEIGHT: 234.25 LBS | BODY MASS INDEX: 36.76 KG/M2 | HEART RATE: 94 BPM | HEIGHT: 67 IN | SYSTOLIC BLOOD PRESSURE: 130 MMHG | DIASTOLIC BLOOD PRESSURE: 80 MMHG

## 2020-09-29 DIAGNOSIS — E11.9 TYPE 2 DIABETES MELLITUS WITHOUT COMPLICATION, WITH LONG-TERM CURRENT USE OF INSULIN: Primary | ICD-10-CM

## 2020-09-29 DIAGNOSIS — I10 ESSENTIAL HYPERTENSION: ICD-10-CM

## 2020-09-29 DIAGNOSIS — E78.2 MIXED HYPERLIPIDEMIA: ICD-10-CM

## 2020-09-29 DIAGNOSIS — E66.9 OBESITY (BMI 30-39.9): ICD-10-CM

## 2020-09-29 DIAGNOSIS — E11.649 HYPOGLYCEMIA DUE TO TYPE 2 DIABETES MELLITUS: ICD-10-CM

## 2020-09-29 DIAGNOSIS — Z79.4 TYPE 2 DIABETES MELLITUS WITHOUT COMPLICATION, WITH LONG-TERM CURRENT USE OF INSULIN: Primary | ICD-10-CM

## 2020-09-29 PROCEDURE — 99999 PR PBB SHADOW E&M-EST. PATIENT-LVL V: CPT | Mod: PBBFAC,,, | Performed by: NURSE PRACTITIONER

## 2020-09-29 PROCEDURE — 99999 PR PBB SHADOW E&M-EST. PATIENT-LVL V: ICD-10-PCS | Mod: PBBFAC,,, | Performed by: NURSE PRACTITIONER

## 2020-09-29 PROCEDURE — 99214 PR OFFICE/OUTPT VISIT, EST, LEVL IV, 30-39 MIN: ICD-10-PCS | Mod: S$PBB,,, | Performed by: NURSE PRACTITIONER

## 2020-09-29 PROCEDURE — 99214 OFFICE O/P EST MOD 30 MIN: CPT | Mod: S$PBB,,, | Performed by: NURSE PRACTITIONER

## 2020-09-29 PROCEDURE — 99215 OFFICE O/P EST HI 40 MIN: CPT | Mod: PBBFAC,PN | Performed by: NURSE PRACTITIONER

## 2020-09-29 RX ORDER — OXYCODONE HYDROCHLORIDE 5 MG/1
TABLET ORAL
COMMUNITY
Start: 2020-09-16 | End: 2020-11-23

## 2020-09-29 RX ORDER — METFORMIN HYDROCHLORIDE 500 MG/1
500 TABLET, EXTENDED RELEASE ORAL 2 TIMES DAILY
Start: 2020-09-29 | End: 2021-03-17

## 2020-09-29 NOTE — PROGRESS NOTES
CC: Ms. Jose Mesa arrives today for management of Type 2 DM and review of chronic medical conditions, as listed in the Visit Diagnosis section of this encounter.       HPI: Ms. Jose Mesa was diagnosed with Type 2 DM in 2005. She was diagnosed based on lab work. Initial treatment consisted of metformin. Glimepiride later added. Victoza to her medication regimen in 2016. Insulin added in 2018. + FH of DM on mother. Denies hospitalizations due to DM.     Patient was last seen by me in July. At this time, Basaglar dose was decreased and Novolog dose was increased.     She had recent R ganglion cyst removal on 9/16.     BG monitoring per [employer] [facility].      Hypoglycemia: Yes - may occur before lunch. This was occurring for the 2 days after her surgery. She reports that she wasn't eating much during this time.  Symptoms: jittery, tingling  Treatment: peppermint    Missing Insulin/PO medication doses: No     Dietary Habits: Eats 2 meals/day. Snacks on peanuts. Has cut out sugary beverages.      Last DM education appointment:  8/2016    Patient states that she will be changing to a [employer] Medicare supplement on 10/1.       CURRENT DIABETIC MEDS: metformin  mg twice daily, Victoza 1.8 mg daily, Basaglar 20 units QHS, Novolog 10 units with lunch and dinner + correction scale, target 150, ISF 25  Glucometer type: Accucheck Adrienne     Previous DM treatments:  Lantus - not covered by insurance   Glimepiride   Acarbose  VGo    Last Eye Exam: 6/2020, no DR per patient. [facility]   Last Podiatry Exam: 11/2019, Dr. Olvera    REVIEW OF SYSTEMS  Constitutional: no c/o fatigue, weakness, weight loss. + 24 # weight gain since July.  Eyes: denies visual disturbances.  Cardiac: no palpitations or chest pain.  Respiratory: denies dyspnea. + chronic dry cough that she attributes to COPD  GI: no c/o abdominal pain or nausea. Denies h/o pancreatitis.   Skin: no rashes, lesions. Had ganglion cyst removal to R  "hand. Wound healing well.   Neuro: + numbness, tingling in middle and ring finger of R hand that comes and goes. She has been diagnosed with carpal tunnel.   Endocrine: denies polyphagia, polydipsia, polyuria.      Personally reviewed Past Medical, Surgical, Social History.    Vital Signs  /80   Pulse 94   Ht 5' 7" (1.702 m)   Wt 106.3 kg (234 lb 3.8 oz)   BMI 36.69 kg/m²     Personally reviewed the below labs:    Hemoglobin A1C   Date Value Ref Range Status   08/24/2020 10.6 (H) 4.0 - 5.6 % Final     Comment:     ADA Screening Guidelines:  5.7-6.4%  Consistent with prediabetes  >or=6.5%  Consistent with diabetes  High levels of fetal hemoglobin interfere with the HbA1C  assay. Heterozygous hemoglobin variants (HbS, HgC, etc)do  not significantly interfere with this assay.   However, presence of multiple variants may affect accuracy.     05/14/2020 12.1 (H) 4.0 - 5.6 % Final     Comment:     ADA Screening Guidelines:  5.7-6.4%  Consistent with prediabetes  >or=6.5%  Consistent with diabetes  High levels of fetal hemoglobin interfere with the HbA1C  assay. Heterozygous hemoglobin variants (HbS, HgC, etc)do  not significantly interfere with this assay.   However, presence of multiple variants may affect accuracy.     02/07/2020 11.7 (H) 4.0 - 5.6 % Final     Comment:     ADA Screening Guidelines:  5.7-6.4%  Consistent with prediabetes  >or=6.5%  Consistent with diabetes  High levels of fetal hemoglobin interfere with the HbA1C  assay. Heterozygous hemoglobin variants (HbS, HgC, etc)do  not significantly interfere with this assay.   However, presence of multiple variants may affect accuracy.     05/03/2012 7.1 (H) 4.8 - 5.9 % Final     Comment:     **In order to standardize %HbA1c results worldwide, as of October 11, 2010,  the %HbA1c is being calculated using the master equation recommended in the  consensus statement adopted by the ADA (American Diabetes Assoc), EASD  (European Assoc for the Study of " Diabetes), IFCC (International Federation  of Clinical Chemistry and Laboratory Medicine) and IDF (International  Diabetes Federation). Result units: %HgbA1c (DCCT/NGSP).  In common with other methods, Hb A1C values may not accurately reflect mean  blood glucose in patients with hemoglobin variants (HgbF, HgbS and HgbC).  Any cause of shortened erythrocyte survival will reduce exposure of  erythrocytes to glucose with a consequent decrease in HbA1c (%) values, even  though the time-averaged blood glucose level may be elevated. Causes of  shortened erythrocyte lifetime might be hemolytic anemia or other hemolytic  diseases, homozygous sickle cell trait, pregnancy, recent significant or  chronic blood loss, etc. Caution should be used when interpreting the HbA1c  results from patients with these conditions.       Chemistry        Component Value Date/Time     05/14/2020 0849    K 3.7 05/14/2020 0849     05/14/2020 0849    CO2 29 05/14/2020 0849    BUN 11 05/14/2020 0849    CREATININE 1.0 05/14/2020 0849    CREATININE 1.0 05/06/2012 0431     (H) 05/14/2020 0849        Component Value Date/Time    CALCIUM 9.3 05/14/2020 0849    CALCIUM 9.5 05/06/2012 0431    ALKPHOS 77 05/14/2020 0849    ALKPHOS 49 05/03/2012 1635    AST 17 05/14/2020 0849    AST 13 05/03/2012 1635    ALT 16 05/14/2020 0849    BILITOT 0.3 05/14/2020 0849          Lab Results   Component Value Date    CHOL 194 02/07/2020    CHOL 219 (H) 07/26/2019    CHOL 175 07/27/2018     Lab Results   Component Value Date    HDL 58 02/07/2020    HDL 68 07/26/2019    HDL 51 07/27/2018     Lab Results   Component Value Date    LDLCALC 110.2 02/07/2020    LDLCALC 131.2 07/26/2019    LDLCALC 97.8 07/27/2018     Lab Results   Component Value Date    TRIG 129 02/07/2020    TRIG 99 07/26/2019    TRIG 131 07/27/2018     Lab Results   Component Value Date    CHOLHDL 29.9 02/07/2020    CHOLHDL 31.1 07/26/2019    CHOLHDL 29.1 07/27/2018       Lab Results    Component Value Date    MICALBCREAT 6.8 05/14/2020     Lab Results   Component Value Date    TSH 1.632 07/26/2019       CrCl cannot be calculated (Patient's most recent lab result is older than the maximum 7 days allowed.).    No results found for: [id number]         PHYSICAL EXAMINATION  Constitutional: Appears well, no distress.  Neck: Supple, trachea midline; no thyromegaly or nodules.   Respiratory: CTA, even and unlabored.   Cardiovascular: RRR, no murmurs, no carotid bruits. DP pulses  2+ bilaterally; no edema.    GI: active bowel sounds, no hernia  Skin: warm and dry; no acanthosis nigracans observed.  Neuro: DTR diminished to LUE/1+BLE.Previously, no loss of protective sensation via 10 gm monofilament or vibratory exam bilaterally.  Feet: appropriate footwear.       FREESTYLE BAKARI DOWNLOAD: See media file for details. Most fasting glucoses are at goal. Sporadic prandial excursions. The day of and for 2 days after her surgery, she had bouts of hypoglycemia. This has not reoccurred in the past week.   Average glucose: 146 mg/dL  Above 250 mg/dL: 5 %  181-250 mg/dL: 18 %   mg/dL: 71 %  54-69 mg/dL: 5 %  Below 54 mg/dL: 1 %        A1c goal < 7%      Assessment/Plan  1. Type 2 diabetes mellitus without complication, with long-term current use of insulin  -- Improving. CGMS download reveals that glucoses are much better controlled than August A1c suggests. I suspect that recent hypoglycemia was related to decreased appetite after her surgery. This has improved over the past week.   -- continue insulin at current doses  -- continue Victoza, metformin    -- BG monitoring per [employer].    -- Discussed diagnosis of DM, A1c goals, progression of disease, long term complications and tx options.  Advised patient to check BG before activities, such as driving or exercise.  -- Reviewed hypoglycemia management: treat with 1/2 glass of juice, 1/2 can regular coke, or 4 glucose tablets. Monitor and repeat  treatment every 15 minutes until BG is >70 Then have a snack, which includes a complex carbohydrate and protein.   2. Essential hypertension  -- acceptable  -- continue losartan    3. Mixed hyperlipidemia  -- acceptable  -- continue atorvastatin    4. Obesity (BMI 30-39.9)  -- + weight gain. Possibly related to improved DM control and better glucose absorption.   Body mass index is 36.69 kg/m².   5. Hypoglycemia related to Type 2 DM -- advised pt to notify me if this reoccurs        FOLLOW UP  Follow up in about 4 months (around 1/29/2021).   Patient instructed to bring BG logs to each follow up   Patient encouraged to call for any BG/medication issues, concerns, or questions.      Orders Placed This Encounter   Procedures    Hemoglobin A1C    Comprehensive metabolic panel    Lipid Panel

## 2020-10-05 ENCOUNTER — TELEPHONE (OUTPATIENT)
Dept: ENDOCRINOLOGY | Facility: CLINIC | Age: 50
End: 2020-10-05

## 2020-10-05 DIAGNOSIS — Z79.4 TYPE 2 DIABETES MELLITUS WITH DIABETIC POLYNEUROPATHY, WITH LONG-TERM CURRENT USE OF INSULIN: Primary | ICD-10-CM

## 2020-10-05 DIAGNOSIS — E11.42 TYPE 2 DIABETES MELLITUS WITH DIABETIC POLYNEUROPATHY, WITH LONG-TERM CURRENT USE OF INSULIN: Primary | ICD-10-CM

## 2020-10-05 RX ORDER — INSULIN GLARGINE 100 [IU]/ML
20 INJECTION, SOLUTION SUBCUTANEOUS NIGHTLY
Qty: 15 ML | Refills: 6 | Status: SHIPPED | OUTPATIENT
Start: 2020-10-05 | End: 2020-11-13 | Stop reason: SDUPTHER

## 2020-10-05 NOTE — TELEPHONE ENCOUNTER
Insurance previously didn't cover Lantus so we changed to Basaglar. I will try sending Lantus in again.

## 2020-10-05 NOTE — TELEPHONE ENCOUNTER
Insurance not covering Basaglar.Irmaa re giving her only for 30 days. Is there anything it can be changed to?If not, I need to do a PA

## 2020-10-28 DIAGNOSIS — E11.9 TYPE 2 DIABETES MELLITUS WITHOUT COMPLICATION, UNSPECIFIED WHETHER LONG TERM INSULIN USE: ICD-10-CM

## 2020-10-30 RX ORDER — PROMETHAZINE HYDROCHLORIDE AND CODEINE PHOSPHATE 6.25; 1 MG/5ML; MG/5ML
5 SOLUTION ORAL EVERY 8 HOURS PRN
Qty: 240 ML | Refills: 2 | Status: SHIPPED | OUTPATIENT
Start: 2020-10-30 | End: 2020-12-15 | Stop reason: SDUPTHER

## 2020-10-30 NOTE — TELEPHONE ENCOUNTER
----- Message from Faye Huerta sent at 10/30/2020  9:45 AM CDT -----  Type: Needs Medical Advice  Who Called: Patient  Pharmacy name and phone #:  [facility]  Best Call Back Number: [phone] (home)   Additional Information: the patient said she needs the Rx Promethazine Codeine Syrup called in to the pharmacy

## 2020-11-12 ENCOUNTER — TELEPHONE (OUTPATIENT)
Dept: FAMILY MEDICINE | Facility: CLINIC | Age: 50
End: 2020-11-12

## 2020-11-12 DIAGNOSIS — Z12.31 VISIT FOR SCREENING MAMMOGRAM: Primary | ICD-10-CM

## 2020-11-12 NOTE — TELEPHONE ENCOUNTER
----- Message from Vonda Vasques sent at 11/12/2020 12:54 PM CST -----  Contact: [phone]  What orders is pt asking for?:mammogram order    Is there a future appointment with the provider?: yes    When?: 11/23    Comments?:

## 2020-11-13 DIAGNOSIS — Z79.4 TYPE 2 DIABETES MELLITUS WITHOUT COMPLICATION, WITH LONG-TERM CURRENT USE OF INSULIN: ICD-10-CM

## 2020-11-13 DIAGNOSIS — E11.42 TYPE 2 DIABETES MELLITUS WITH DIABETIC POLYNEUROPATHY, WITH LONG-TERM CURRENT USE OF INSULIN: ICD-10-CM

## 2020-11-13 DIAGNOSIS — E11.9 TYPE 2 DIABETES MELLITUS WITHOUT COMPLICATION, WITH LONG-TERM CURRENT USE OF INSULIN: ICD-10-CM

## 2020-11-13 DIAGNOSIS — Z79.4 TYPE 2 DIABETES MELLITUS WITH DIABETIC POLYNEUROPATHY, WITH LONG-TERM CURRENT USE OF INSULIN: ICD-10-CM

## 2020-11-13 DIAGNOSIS — E11.9 TYPE 2 DIABETES MELLITUS WITHOUT COMPLICATION, WITHOUT LONG-TERM CURRENT USE OF INSULIN: ICD-10-CM

## 2020-11-13 RX ORDER — LIRAGLUTIDE 6 MG/ML
1.8 INJECTION SUBCUTANEOUS DAILY
Qty: 27 ML | Refills: 3 | Status: SHIPPED | OUTPATIENT
Start: 2020-11-13 | End: 2021-05-07 | Stop reason: SDUPTHER

## 2020-11-13 RX ORDER — INSULIN ASPART 100 [IU]/ML
10 INJECTION, SOLUTION INTRAVENOUS; SUBCUTANEOUS
Qty: 2 BOX | Refills: 6 | Status: SHIPPED | OUTPATIENT
Start: 2020-11-13 | End: 2021-03-26

## 2020-11-13 RX ORDER — INSULIN GLARGINE 100 [IU]/ML
20 INJECTION, SOLUTION SUBCUTANEOUS NIGHTLY
Qty: 15 ML | Refills: 3 | Status: SHIPPED | OUTPATIENT
Start: 2020-11-13 | End: 2021-03-26

## 2020-11-13 NOTE — TELEPHONE ENCOUNTER
----- Message from Ada Kelly sent at 11/13/2020  3:31 PM CST -----  Contact: pt  Patient called for a refill to be sent to - [facility] [place]  [address] [facility] - VICTOZA 3-AYALA 0.6 mg/0.1 mL (18 mg/3 mL) PnIj  Patient is asking if you will call this in today please

## 2020-11-13 NOTE — TELEPHONE ENCOUNTER
----- Message from Raz Quarles sent at 11/13/2020  3:38 PM CST -----  Type:  Patient Returning Call    Who Called:  Patient  Who Left Message for Patient:  Sha  Does the patient know what this is regarding?:  Refills  Best Call Back Number:  [phone]  Additional Information:

## 2020-11-23 ENCOUNTER — OFFICE VISIT (OUTPATIENT)
Dept: FAMILY MEDICINE | Facility: CLINIC | Age: 50
End: 2020-11-23
Payer: COMMERCIAL

## 2020-11-23 VITALS
OXYGEN SATURATION: 97 % | HEIGHT: 67 IN | SYSTOLIC BLOOD PRESSURE: 130 MMHG | DIASTOLIC BLOOD PRESSURE: 80 MMHG | HEART RATE: 109 BPM | WEIGHT: 230.69 LBS | BODY MASS INDEX: 36.21 KG/M2 | TEMPERATURE: 98 F

## 2020-11-23 DIAGNOSIS — Z79.4 TYPE 2 DIABETES MELLITUS WITH DIABETIC POLYNEUROPATHY, WITH LONG-TERM CURRENT USE OF INSULIN: Primary | ICD-10-CM

## 2020-11-23 DIAGNOSIS — G89.29 CHRONIC MIDLINE LOW BACK PAIN WITHOUT SCIATICA: ICD-10-CM

## 2020-11-23 DIAGNOSIS — M54.50 CHRONIC MIDLINE LOW BACK PAIN WITHOUT SCIATICA: ICD-10-CM

## 2020-11-23 DIAGNOSIS — I10 ESSENTIAL HYPERTENSION: ICD-10-CM

## 2020-11-23 DIAGNOSIS — E11.42 TYPE 2 DIABETES MELLITUS WITH DIABETIC POLYNEUROPATHY, WITH LONG-TERM CURRENT USE OF INSULIN: Primary | ICD-10-CM

## 2020-11-23 PROCEDURE — 99214 OFFICE O/P EST MOD 30 MIN: CPT | Mod: S$GLB,,, | Performed by: FAMILY MEDICINE

## 2020-11-23 PROCEDURE — 99214 PR OFFICE/OUTPT VISIT, EST, LEVL IV, 30-39 MIN: ICD-10-PCS | Mod: S$GLB,,, | Performed by: FAMILY MEDICINE

## 2020-11-23 RX ORDER — HYDROCODONE BITARTRATE AND ACETAMINOPHEN 7.5; 325 MG/1; MG/1
1 TABLET ORAL EVERY 12 HOURS PRN
Qty: 60 TABLET | Refills: 0 | Status: SHIPPED | OUTPATIENT
Start: 2020-11-25 | End: 2021-03-02 | Stop reason: SDUPTHER

## 2020-11-23 RX ORDER — LOSARTAN POTASSIUM 100 MG/1
100 TABLET ORAL DAILY
Qty: 90 TABLET | Refills: 3
Start: 2020-11-23 | End: 2021-06-15

## 2020-11-23 RX ORDER — HYDROCODONE BITARTRATE AND ACETAMINOPHEN 7.5; 325 MG/1; MG/1
1 TABLET ORAL EVERY 12 HOURS PRN
Qty: 60 TABLET | Refills: 0 | Status: SHIPPED | OUTPATIENT
Start: 2021-01-25 | End: 2021-03-17 | Stop reason: SDUPTHER

## 2020-11-23 RX ORDER — AMLODIPINE BESYLATE 10 MG/1
TABLET ORAL
COMMUNITY
Start: 2020-11-16 | End: 2020-11-23 | Stop reason: ALTCHOICE

## 2020-11-23 RX ORDER — INFLUENZA A VIRUS A/GUANGDONG-MAONAN/SWL1536/2019 CNIC-1909 (H1N1) ANTIGEN (FORMALDEHYDE INACTIVATED), INFLUENZA A VIRUS A/HONG KONG/2671/2019 (H3N2) ANTIGEN (FORMALDEHYDE INACTIVATED), INFLUENZA B VIRUS B/PHUKET/3073/2013 ANTIGEN (FORMALDEHYDE INACTIVATED), AND INFLUENZA B VIRUS B/WASHINGTON/02/2019 ANTIGEN (FORMALDEHYDE INACTIVATED) 15; 15; 15; 15 UG/.5ML; UG/.5ML; UG/.5ML; UG/.5ML
INJECTION, SUSPENSION INTRAMUSCULAR
COMMUNITY
Start: 2020-10-03 | End: 2021-03-17 | Stop reason: ALTCHOICE

## 2020-11-23 RX ORDER — HYDROCODONE BITARTRATE AND ACETAMINOPHEN 7.5; 325 MG/1; MG/1
1 TABLET ORAL EVERY 12 HOURS PRN
Qty: 60 TABLET | Refills: 0 | Status: SHIPPED | OUTPATIENT
Start: 2020-12-25 | End: 2021-03-17 | Stop reason: SDUPTHER

## 2020-11-23 NOTE — PROGRESS NOTES
"Subjective:       Patient ID: Jose Cousin is a 50 y.o. female.    Chief Complaint: Medication Refill    Follow-up chronic pain.  She went through her pain medication a bit faster because of dental surgery and hand surgery.  She had a ganglion removed from the dorsal aspect of her right wrist by Dr. Logan.  She has healed but still has pain and stiffness in the wrist.  She has diabetes and takes Basaglar 22 units and NovoLog 10 units.  Also Victoza 1.8 mg weekly.  She sees Alissa Bowen for her psychiatric needs and ADD.  Her pain contract is 3 years old and designates [facility] as her pain pharmacy but she [facility].  Her blood pressure is high today but is usually well controlled.  Dr. Bowen had increased her losartan to 100 mg in the morning and 50 mg at night.  She does not take amlodipine.  She does take the hydrochlorothiazide 25 mg daily.    Review of Systems   Constitutional: Positive for unexpected weight change (She had gained some weight and then lost a few lb).   Respiratory: Positive for cough, shortness of breath and wheezing.    Cardiovascular: Negative for chest pain.   Musculoskeletal: Positive for back pain.         Objective:     Blood pressure 130/80, pulse 109, temperature 98.4 °F (36.9 °C), temperature source Skin, height 5' 7" (1.702 m), weight 104.6 kg (230 lb 11.4 oz), SpO2 97 %.      Physical Exam  Constitutional:       General: She is in acute distress.      Appearance: She is obese.   Cardiovascular:      Rate and Rhythm: Normal rate.      Heart sounds: No murmur.   Pulmonary:      Effort: No respiratory distress.      Breath sounds: No wheezing.   Musculoskeletal:      Right lower leg: No edema.      Left lower leg: No edema.      Comments: When she stands she has been forward.  Tender to touch in the lumbar area.  Pain with attempts at extension.  She can flex forward about 30°.   Neurological:      Mental Status: She is alert.         Assessment:       1. " Type 2 diabetes mellitus with diabetic polyneuropathy, with long-term current use of insulin    2. Chronic midline low back pain without sciatica    3. Essential hypertension        Plan:       I refilled her hydrocodone starting November 25th.  Follow-up in 3 months.  We signed a new pain contract today.

## 2020-11-25 ENCOUNTER — TELEPHONE (OUTPATIENT)
Dept: FAMILY MEDICINE | Facility: CLINIC | Age: 50
End: 2020-11-25

## 2020-11-25 NOTE — TELEPHONE ENCOUNTER
----- Message from Ada Kelly sent at 11/25/2020  9:37 AM CST -----  Contact: Tarun from [facility]  Tarun called from [facility] they are asking if the Hydrocodone can be refilled on 12/24/20   Call back [phone]

## 2020-11-27 ENCOUNTER — TELEPHONE (OUTPATIENT)
Dept: ENDOCRINOLOGY | Facility: CLINIC | Age: 50
End: 2020-11-27

## 2020-11-27 NOTE — TELEPHONE ENCOUNTER
----- Message from Noemi Soriano sent at 11/27/2020 10:14 AM CST -----  Type: Needs Medical Advice    Who Called:  Patient  Best Call Back Number: [phone]  Additional Information:  Requesting to speak with nurse concerning insurance not covering her freestyle equipment/please call back to advise.

## 2020-11-27 NOTE — TELEPHONE ENCOUNTER
----- Message from Noemi Soriano sent at 11/27/2020 12:49 PM CST -----  Type:  Patient Returning Call    Who Called:  Patient  Who Left Message for Patient:  Naif Aparicio  Does the patient know what this is regarding?:  insurance and freestyle equipment  Best Call Back Number:  [phone]  Additional Information:

## 2020-11-27 NOTE — TELEPHONE ENCOUNTER
"Pt changed insurance providers  Advised to call insurance and so they can give her the name and phone number of available supply companies to fax us required paperwork and forms    "ok thank you"  "

## 2020-12-15 RX ORDER — PROMETHAZINE HYDROCHLORIDE AND CODEINE PHOSPHATE 6.25; 1 MG/5ML; MG/5ML
5 SOLUTION ORAL EVERY 8 HOURS PRN
Qty: 240 ML | Refills: 2 | Status: SHIPPED | OUTPATIENT
Start: 2020-12-15 | End: 2021-01-19 | Stop reason: SDUPTHER

## 2020-12-15 NOTE — TELEPHONE ENCOUNTER
----- Message from Rayo Basilio sent at 12/15/2020  9:24 AM CST -----  Type:  RX Refill Request    Who Called:  Patient  Refill or New Rx:  Refill  RX Name and Strength:  promethazine-codeine 6.25-10 mg/5 ml (PHENERGAN WITH CODEINE) 6.25-10 mg/5 mL syrup  How is the patient currently taking it? (ex. 1XDay):  As ordered  Is this a 30 day or 90 day RX:  As ordered  Preferred Pharmacy with phone number:    [id number] [address]  [address] [address]  Phone: [phone] Fax: [phone]  Local or Mail Order:  Local  Ordering Provider:  Dr. Berny Stubbs Call Back Number:  [phone]  Additional Information:  NA

## 2021-01-14 ENCOUNTER — HOSPITAL ENCOUNTER (OUTPATIENT)
Dept: RADIOLOGY | Facility: HOSPITAL | Age: 51
Discharge: HOME | End: 2021-01-14
Attending: FAMILY MEDICINE
Payer: COMMERCIAL

## 2021-01-14 DIAGNOSIS — Z12.31 SCREENING MAMMOGRAM, ENCOUNTER FOR: ICD-10-CM

## 2021-01-14 DIAGNOSIS — Z12.31 VISIT FOR SCREENING MAMMOGRAM: ICD-10-CM

## 2021-01-14 PROCEDURE — 77067 SCR MAMMO BI INCL CAD: CPT | Mod: 26,,, | Performed by: RADIOLOGY

## 2021-01-14 PROCEDURE — 77063 BREAST TOMOSYNTHESIS BI: CPT | Mod: 26,,, | Performed by: RADIOLOGY

## 2021-01-14 PROCEDURE — 77067 SCR MAMMO BI INCL CAD: CPT | Mod: TC,PO

## 2021-01-14 PROCEDURE — 77067 MAMMO DIGITAL SCREENING BILAT WITH TOMO: ICD-10-PCS | Mod: 26,,, | Performed by: RADIOLOGY

## 2021-01-14 PROCEDURE — 77063 MAMMO DIGITAL SCREENING BILAT WITH TOMO: ICD-10-PCS | Mod: 26,,, | Performed by: RADIOLOGY

## 2021-01-19 DIAGNOSIS — E78.2 MIXED HYPERLIPIDEMIA: ICD-10-CM

## 2021-01-19 RX ORDER — PROMETHAZINE HYDROCHLORIDE AND CODEINE PHOSPHATE 6.25; 1 MG/5ML; MG/5ML
5 SOLUTION ORAL EVERY 8 HOURS PRN
Qty: 240 ML | Refills: 2 | Status: SHIPPED | OUTPATIENT
Start: 2021-01-19 | End: 2021-03-02 | Stop reason: SDUPTHER

## 2021-01-19 RX ORDER — ATORVASTATIN CALCIUM 40 MG/1
40 TABLET, FILM COATED ORAL DAILY
Qty: 90 TABLET | Refills: 3 | Status: SHIPPED | OUTPATIENT
Start: 2021-01-19 | End: 2022-04-05 | Stop reason: SDUPTHER

## 2021-02-07 ENCOUNTER — PATIENT OUTREACH (OUTPATIENT)
Dept: ADMINISTRATIVE | Facility: OTHER | Age: 51
End: 2021-02-07

## 2021-02-10 RX ORDER — PEN NEEDLE, DIABETIC 30 GX3/16"
NEEDLE, DISPOSABLE MISCELLANEOUS
Qty: 200 EACH | Refills: 3 | Status: SHIPPED | OUTPATIENT
Start: 2021-02-10 | End: 2021-07-06 | Stop reason: SDUPTHER

## 2021-02-12 ENCOUNTER — TELEPHONE (OUTPATIENT)
Dept: ENDOCRINOLOGY | Facility: CLINIC | Age: 51
End: 2021-02-12

## 2021-02-12 RX ORDER — INSULIN PUMP SYRINGE, 3 ML
EACH MISCELLANEOUS
Qty: 1 EACH | Refills: 0 | Status: SHIPPED | OUTPATIENT
Start: 2021-02-12 | End: 2024-02-20

## 2021-02-12 RX ORDER — LANCETS
EACH MISCELLANEOUS
Qty: 150 EACH | Refills: 6 | Status: SHIPPED | OUTPATIENT
Start: 2021-02-12

## 2021-03-03 ENCOUNTER — PATIENT OUTREACH (OUTPATIENT)
Dept: ADMINISTRATIVE | Facility: HOSPITAL | Age: 51
End: 2021-03-03

## 2021-03-03 ENCOUNTER — TELEPHONE (OUTPATIENT)
Dept: FAMILY MEDICINE | Facility: CLINIC | Age: 51
End: 2021-03-03

## 2021-03-03 RX ORDER — HYDROCODONE BITARTRATE AND ACETAMINOPHEN 7.5; 325 MG/1; MG/1
1 TABLET ORAL EVERY 12 HOURS PRN
Qty: 60 TABLET | Refills: 0 | Status: SHIPPED | OUTPATIENT
Start: 2021-03-03 | End: 2021-03-17

## 2021-03-03 RX ORDER — PROMETHAZINE HYDROCHLORIDE AND CODEINE PHOSPHATE 6.25; 1 MG/5ML; MG/5ML
5 SOLUTION ORAL EVERY 8 HOURS PRN
Qty: 240 ML | Refills: 2 | Status: SHIPPED | OUTPATIENT
Start: 2021-03-03 | End: 2021-05-06 | Stop reason: SDUPTHER

## 2021-03-17 ENCOUNTER — OFFICE VISIT (OUTPATIENT)
Dept: FAMILY MEDICINE | Facility: CLINIC | Age: 51
End: 2021-03-17
Payer: COMMERCIAL

## 2021-03-17 VITALS
BODY MASS INDEX: 37.3 KG/M2 | SYSTOLIC BLOOD PRESSURE: 122 MMHG | WEIGHT: 237.63 LBS | DIASTOLIC BLOOD PRESSURE: 82 MMHG | HEIGHT: 67 IN | HEART RATE: 76 BPM

## 2021-03-17 DIAGNOSIS — R05.3 CHRONIC COUGH: ICD-10-CM

## 2021-03-17 DIAGNOSIS — E11.42 TYPE 2 DIABETES MELLITUS WITH DIABETIC POLYNEUROPATHY, WITH LONG-TERM CURRENT USE OF INSULIN: ICD-10-CM

## 2021-03-17 DIAGNOSIS — J30.1 SEASONAL ALLERGIC RHINITIS DUE TO POLLEN: ICD-10-CM

## 2021-03-17 DIAGNOSIS — J45.41 MODERATE PERSISTENT ASTHMA WITH ACUTE EXACERBATION: Primary | ICD-10-CM

## 2021-03-17 DIAGNOSIS — I10 ESSENTIAL HYPERTENSION: ICD-10-CM

## 2021-03-17 DIAGNOSIS — M54.50 CHRONIC MIDLINE LOW BACK PAIN WITHOUT SCIATICA: ICD-10-CM

## 2021-03-17 DIAGNOSIS — E66.01 MORBID OBESITY: ICD-10-CM

## 2021-03-17 DIAGNOSIS — G89.29 CHRONIC MIDLINE LOW BACK PAIN WITHOUT SCIATICA: ICD-10-CM

## 2021-03-17 DIAGNOSIS — Z79.4 TYPE 2 DIABETES MELLITUS WITH DIABETIC POLYNEUROPATHY, WITH LONG-TERM CURRENT USE OF INSULIN: ICD-10-CM

## 2021-03-17 PROCEDURE — 99214 OFFICE O/P EST MOD 30 MIN: CPT | Mod: S$GLB,,, | Performed by: FAMILY MEDICINE

## 2021-03-17 PROCEDURE — 99214 PR OFFICE/OUTPT VISIT, EST, LEVL IV, 30-39 MIN: ICD-10-PCS | Mod: S$GLB,,, | Performed by: FAMILY MEDICINE

## 2021-03-17 RX ORDER — HYDROCODONE BITARTRATE AND ACETAMINOPHEN 7.5; 325 MG/1; MG/1
1 TABLET ORAL EVERY 12 HOURS PRN
Qty: 60 TABLET | Refills: 0 | Status: SHIPPED | OUTPATIENT
Start: 2021-06-03 | End: 2021-06-15 | Stop reason: SDUPTHER

## 2021-03-17 RX ORDER — HYDROCODONE BITARTRATE AND ACETAMINOPHEN 7.5; 325 MG/1; MG/1
1 TABLET ORAL EVERY 12 HOURS PRN
Qty: 60 TABLET | Refills: 0 | Status: SHIPPED | OUTPATIENT
Start: 2021-04-03 | End: 2021-06-15 | Stop reason: SDUPTHER

## 2021-03-17 RX ORDER — ALBUTEROL SULFATE 0.63 MG/3ML
1 SOLUTION RESPIRATORY (INHALATION) EVERY 6 HOURS PRN
Qty: 180 ML | Refills: 3 | Status: SHIPPED | OUTPATIENT
Start: 2021-03-17

## 2021-03-17 RX ORDER — CHLORZOXAZONE 500 MG/1
500 TABLET ORAL 4 TIMES DAILY PRN
Qty: 180 TABLET | Refills: 3 | Status: SHIPPED | OUTPATIENT
Start: 2021-03-17 | End: 2021-08-04 | Stop reason: SDUPTHER

## 2021-03-17 RX ORDER — AMLODIPINE BESYLATE 10 MG/1
10 TABLET ORAL DAILY
COMMUNITY
Start: 2021-01-12 | End: 2021-08-04

## 2021-03-17 RX ORDER — HYDROCODONE BITARTRATE AND ACETAMINOPHEN 7.5; 325 MG/1; MG/1
1 TABLET ORAL EVERY 12 HOURS PRN
Qty: 60 TABLET | Refills: 0 | Status: SHIPPED | OUTPATIENT
Start: 2021-05-03 | End: 2021-06-15 | Stop reason: SDUPTHER

## 2021-03-17 RX ORDER — METFORMIN HYDROCHLORIDE 500 MG/1
500 TABLET, EXTENDED RELEASE ORAL 2 TIMES DAILY
Qty: 180 TABLET | Refills: 3
Start: 2021-03-17 | End: 2021-03-26

## 2021-03-19 ENCOUNTER — LAB VISIT (OUTPATIENT)
Dept: LAB | Facility: HOSPITAL | Age: 51
End: 2021-03-19
Attending: NURSE PRACTITIONER
Payer: COMMERCIAL

## 2021-03-19 DIAGNOSIS — E11.9 TYPE 2 DIABETES MELLITUS WITHOUT COMPLICATION, WITH LONG-TERM CURRENT USE OF INSULIN: ICD-10-CM

## 2021-03-19 DIAGNOSIS — Z79.4 TYPE 2 DIABETES MELLITUS WITHOUT COMPLICATION, WITH LONG-TERM CURRENT USE OF INSULIN: ICD-10-CM

## 2021-03-19 LAB
ALBUMIN SERPL BCP-MCNC: 3.7 G/DL (ref 3.5–5.2)
ALP SERPL-CCNC: 94 U/L (ref 55–135)
ALT SERPL W/O P-5'-P-CCNC: 17 U/L (ref 10–44)
ANION GAP SERPL CALC-SCNC: 11 MMOL/L (ref 8–16)
AST SERPL-CCNC: 18 U/L (ref 10–40)
BILIRUB SERPL-MCNC: 0.4 MG/DL (ref 0.1–1)
BUN SERPL-MCNC: 15 MG/DL (ref 6–20)
CALCIUM SERPL-MCNC: 9.2 MG/DL (ref 8.7–10.5)
CHLORIDE SERPL-SCNC: 99 MMOL/L (ref 95–110)
CHOLEST SERPL-MCNC: 222 MG/DL (ref 120–199)
CHOLEST/HDLC SERPL: 3.8 {RATIO} (ref 2–5)
CO2 SERPL-SCNC: 28 MMOL/L (ref 23–29)
CREAT SERPL-MCNC: 1.1 MG/DL (ref 0.5–1.4)
EST. GFR  (AFRICAN AMERICAN): >60 ML/MIN/1.73 M^2
EST. GFR  (NON AFRICAN AMERICAN): 58.7 ML/MIN/1.73 M^2
GLUCOSE SERPL-MCNC: 137 MG/DL (ref 70–110)
HDLC SERPL-MCNC: 59 MG/DL (ref 40–75)
HDLC SERPL: 26.6 % (ref 20–50)
LDLC SERPL CALC-MCNC: 135.8 MG/DL (ref 63–159)
NONHDLC SERPL-MCNC: 163 MG/DL
POTASSIUM SERPL-SCNC: 3.9 MMOL/L (ref 3.5–5.1)
PROT SERPL-MCNC: 7.4 G/DL (ref 6–8.4)
SODIUM SERPL-SCNC: 138 MMOL/L (ref 136–145)
TRIGL SERPL-MCNC: 136 MG/DL (ref 30–150)

## 2021-03-19 PROCEDURE — 80053 COMPREHEN METABOLIC PANEL: CPT | Performed by: NURSE PRACTITIONER

## 2021-03-19 PROCEDURE — 80061 LIPID PANEL: CPT | Performed by: NURSE PRACTITIONER

## 2021-03-19 PROCEDURE — 36415 COLL VENOUS BLD VENIPUNCTURE: CPT | Mod: PN | Performed by: NURSE PRACTITIONER

## 2021-03-19 PROCEDURE — 83036 HEMOGLOBIN GLYCOSYLATED A1C: CPT | Performed by: NURSE PRACTITIONER

## 2021-03-20 LAB
ESTIMATED AVG GLUCOSE: 312 MG/DL (ref 68–131)
HBA1C MFR BLD: 12.5 % (ref 4–5.6)

## 2021-03-24 ENCOUNTER — PATIENT OUTREACH (OUTPATIENT)
Dept: ADMINISTRATIVE | Facility: OTHER | Age: 51
End: 2021-03-24

## 2021-03-24 DIAGNOSIS — Z12.11 ENCOUNTER FOR FECAL IMMUNOCHEMICAL TEST SCREENING: Primary | ICD-10-CM

## 2021-03-26 ENCOUNTER — OFFICE VISIT (OUTPATIENT)
Dept: ENDOCRINOLOGY | Facility: CLINIC | Age: 51
End: 2021-03-26
Payer: COMMERCIAL

## 2021-03-26 ENCOUNTER — TELEPHONE (OUTPATIENT)
Dept: ENDOCRINOLOGY | Facility: CLINIC | Age: 51
End: 2021-03-26

## 2021-03-26 VITALS
BODY MASS INDEX: 37.45 KG/M2 | SYSTOLIC BLOOD PRESSURE: 120 MMHG | WEIGHT: 238.63 LBS | HEIGHT: 67 IN | DIASTOLIC BLOOD PRESSURE: 70 MMHG | HEART RATE: 88 BPM

## 2021-03-26 DIAGNOSIS — Z79.4 TYPE 2 DIABETES MELLITUS WITHOUT COMPLICATION, WITH LONG-TERM CURRENT USE OF INSULIN: ICD-10-CM

## 2021-03-26 DIAGNOSIS — E66.9 OBESITY (BMI 30-39.9): ICD-10-CM

## 2021-03-26 DIAGNOSIS — E11.42 TYPE 2 DIABETES MELLITUS WITH DIABETIC POLYNEUROPATHY, WITH LONG-TERM CURRENT USE OF INSULIN: Primary | ICD-10-CM

## 2021-03-26 DIAGNOSIS — E78.2 MIXED HYPERLIPIDEMIA: ICD-10-CM

## 2021-03-26 DIAGNOSIS — Z79.4 TYPE 2 DIABETES MELLITUS WITH DIABETIC POLYNEUROPATHY, WITH LONG-TERM CURRENT USE OF INSULIN: Primary | ICD-10-CM

## 2021-03-26 DIAGNOSIS — I10 ESSENTIAL HYPERTENSION: ICD-10-CM

## 2021-03-26 DIAGNOSIS — E11.9 TYPE 2 DIABETES MELLITUS WITHOUT COMPLICATION, WITH LONG-TERM CURRENT USE OF INSULIN: ICD-10-CM

## 2021-03-26 PROCEDURE — 99999 PR PBB SHADOW E&M-EST. PATIENT-LVL V: CPT | Mod: PBBFAC,,, | Performed by: NURSE PRACTITIONER

## 2021-03-26 PROCEDURE — 99214 PR OFFICE/OUTPT VISIT, EST, LEVL IV, 30-39 MIN: ICD-10-PCS | Mod: S$GLB,,, | Performed by: NURSE PRACTITIONER

## 2021-03-26 PROCEDURE — 99214 OFFICE O/P EST MOD 30 MIN: CPT | Mod: S$GLB,,, | Performed by: NURSE PRACTITIONER

## 2021-03-26 PROCEDURE — 99999 PR PBB SHADOW E&M-EST. PATIENT-LVL V: ICD-10-PCS | Mod: PBBFAC,,, | Performed by: NURSE PRACTITIONER

## 2021-03-26 RX ORDER — INSULIN GLARGINE 100 [IU]/ML
26 INJECTION, SOLUTION SUBCUTANEOUS NIGHTLY
Qty: 15 ML | Refills: 3
Start: 2021-03-26 | End: 2021-05-07

## 2021-03-26 RX ORDER — METFORMIN HYDROCHLORIDE 500 MG/1
500 TABLET, EXTENDED RELEASE ORAL DAILY
Qty: 180 TABLET | Refills: 3
Start: 2021-03-26 | End: 2022-04-05

## 2021-03-26 RX ORDER — INSULIN ASPART 100 [IU]/ML
12 INJECTION, SOLUTION INTRAVENOUS; SUBCUTANEOUS
Qty: 2 BOX | Refills: 6
Start: 2021-03-26 | End: 2021-05-07

## 2021-04-07 DIAGNOSIS — R39.15 URGENCY OF URINATION: ICD-10-CM

## 2021-04-07 RX ORDER — OXYBUTYNIN CHLORIDE 5 MG/1
5 TABLET ORAL 2 TIMES DAILY
Qty: 180 TABLET | Refills: 0 | Status: SHIPPED | OUTPATIENT
Start: 2021-04-07 | End: 2023-04-12 | Stop reason: SDUPTHER

## 2021-04-13 ENCOUNTER — TELEPHONE (OUTPATIENT)
Dept: ENDOCRINOLOGY | Facility: CLINIC | Age: 51
End: 2021-04-13

## 2021-05-06 ENCOUNTER — PATIENT OUTREACH (OUTPATIENT)
Dept: ADMINISTRATIVE | Facility: OTHER | Age: 51
End: 2021-05-06

## 2021-05-06 RX ORDER — PROMETHAZINE HYDROCHLORIDE AND CODEINE PHOSPHATE 6.25; 1 MG/5ML; MG/5ML
5 SOLUTION ORAL EVERY 8 HOURS PRN
Qty: 240 ML | Refills: 2 | Status: SHIPPED | OUTPATIENT
Start: 2021-05-06 | End: 2021-07-30

## 2021-05-07 ENCOUNTER — OFFICE VISIT (OUTPATIENT)
Dept: ENDOCRINOLOGY | Facility: CLINIC | Age: 51
End: 2021-05-07
Payer: COMMERCIAL

## 2021-05-07 VITALS
HEART RATE: 100 BPM | HEIGHT: 67 IN | DIASTOLIC BLOOD PRESSURE: 88 MMHG | BODY MASS INDEX: 36.93 KG/M2 | SYSTOLIC BLOOD PRESSURE: 132 MMHG | WEIGHT: 235.31 LBS

## 2021-05-07 DIAGNOSIS — Z79.4 TYPE 2 DIABETES MELLITUS WITHOUT COMPLICATION, WITH LONG-TERM CURRENT USE OF INSULIN: Primary | ICD-10-CM

## 2021-05-07 DIAGNOSIS — E11.9 TYPE 2 DIABETES MELLITUS WITHOUT COMPLICATION, WITH LONG-TERM CURRENT USE OF INSULIN: Primary | ICD-10-CM

## 2021-05-07 PROCEDURE — 99215 OFFICE O/P EST HI 40 MIN: CPT | Mod: PBBFAC,PN | Performed by: NURSE PRACTITIONER

## 2021-05-07 PROCEDURE — 99214 OFFICE O/P EST MOD 30 MIN: CPT | Mod: S$GLB,,, | Performed by: NURSE PRACTITIONER

## 2021-05-07 PROCEDURE — 99999 PR PBB SHADOW E&M-EST. PATIENT-LVL V: CPT | Mod: PBBFAC,,, | Performed by: NURSE PRACTITIONER

## 2021-05-07 PROCEDURE — 99999 PR PBB SHADOW E&M-EST. PATIENT-LVL V: ICD-10-PCS | Mod: PBBFAC,,, | Performed by: NURSE PRACTITIONER

## 2021-05-07 PROCEDURE — 99214 PR OFFICE/OUTPT VISIT, EST, LEVL IV, 30-39 MIN: ICD-10-PCS | Mod: S$GLB,,, | Performed by: NURSE PRACTITIONER

## 2021-05-07 RX ORDER — INSULIN GLARGINE 100 [IU]/ML
28 INJECTION, SOLUTION SUBCUTANEOUS NIGHTLY
Qty: 15 ML | Refills: 3
Start: 2021-05-07 | End: 2021-07-06

## 2021-05-07 RX ORDER — LIRAGLUTIDE 6 MG/ML
1.8 INJECTION SUBCUTANEOUS DAILY
Qty: 27 ML | Refills: 3 | Status: SHIPPED | OUTPATIENT
Start: 2021-05-07 | End: 2021-07-06 | Stop reason: SDUPTHER

## 2021-05-07 RX ORDER — INSULIN ASPART 100 [IU]/ML
14 INJECTION, SOLUTION INTRAVENOUS; SUBCUTANEOUS
Qty: 2 BOX | Refills: 6
Start: 2021-05-07 | End: 2021-07-06

## 2021-06-10 LAB
LEFT EYE DM RETINOPATHY: NEGATIVE
RIGHT EYE DM RETINOPATHY: NEGATIVE

## 2021-06-15 ENCOUNTER — OFFICE VISIT (OUTPATIENT)
Dept: FAMILY MEDICINE | Facility: CLINIC | Age: 51
End: 2021-06-15
Payer: COMMERCIAL

## 2021-06-15 VITALS
WEIGHT: 230.38 LBS | SYSTOLIC BLOOD PRESSURE: 132 MMHG | BODY MASS INDEX: 36.16 KG/M2 | DIASTOLIC BLOOD PRESSURE: 80 MMHG | HEIGHT: 67 IN | HEART RATE: 98 BPM

## 2021-06-15 DIAGNOSIS — J45.41 MODERATE PERSISTENT ASTHMA WITH ACUTE EXACERBATION: ICD-10-CM

## 2021-06-15 DIAGNOSIS — I10 ESSENTIAL HYPERTENSION: Primary | ICD-10-CM

## 2021-06-15 DIAGNOSIS — M54.50 CHRONIC MIDLINE LOW BACK PAIN WITHOUT SCIATICA: ICD-10-CM

## 2021-06-15 DIAGNOSIS — Z79.4 TYPE 2 DIABETES MELLITUS WITH DIABETIC POLYNEUROPATHY, WITH LONG-TERM CURRENT USE OF INSULIN: ICD-10-CM

## 2021-06-15 DIAGNOSIS — N95.1 MENOPAUSE SYNDROME: ICD-10-CM

## 2021-06-15 DIAGNOSIS — G89.29 CHRONIC MIDLINE LOW BACK PAIN WITHOUT SCIATICA: ICD-10-CM

## 2021-06-15 DIAGNOSIS — E11.42 TYPE 2 DIABETES MELLITUS WITH DIABETIC POLYNEUROPATHY, WITH LONG-TERM CURRENT USE OF INSULIN: ICD-10-CM

## 2021-06-15 PROCEDURE — 99215 OFFICE O/P EST HI 40 MIN: CPT | Mod: PBBFAC,PN | Performed by: FAMILY MEDICINE

## 2021-06-15 PROCEDURE — 99214 OFFICE O/P EST MOD 30 MIN: CPT | Mod: S$GLB,,, | Performed by: FAMILY MEDICINE

## 2021-06-15 PROCEDURE — 99999 PR PBB SHADOW E&M-EST. PATIENT-LVL V: ICD-10-PCS | Mod: PBBFAC,,, | Performed by: FAMILY MEDICINE

## 2021-06-15 PROCEDURE — 99214 PR OFFICE/OUTPT VISIT, EST, LEVL IV, 30-39 MIN: ICD-10-PCS | Mod: S$GLB,,, | Performed by: FAMILY MEDICINE

## 2021-06-15 PROCEDURE — 99999 PR PBB SHADOW E&M-EST. PATIENT-LVL V: CPT | Mod: PBBFAC,,, | Performed by: FAMILY MEDICINE

## 2021-06-15 RX ORDER — HYDROCODONE BITARTRATE AND ACETAMINOPHEN 7.5; 325 MG/1; MG/1
1 TABLET ORAL EVERY 12 HOURS PRN
Qty: 60 TABLET | Refills: 0 | OUTPATIENT
Start: 2021-08-03 | End: 2021-07-29

## 2021-06-15 RX ORDER — LOSARTAN POTASSIUM 50 MG/1
50 TABLET ORAL DAILY
COMMUNITY
End: 2021-08-04

## 2021-06-15 RX ORDER — HYDROCODONE BITARTRATE AND ACETAMINOPHEN 7.5; 325 MG/1; MG/1
1 TABLET ORAL EVERY 12 HOURS PRN
Qty: 60 TABLET | Refills: 0 | OUTPATIENT
Start: 2021-09-03 | End: 2021-07-29

## 2021-06-15 RX ORDER — HYDROCODONE BITARTRATE AND ACETAMINOPHEN 7.5; 325 MG/1; MG/1
1 TABLET ORAL EVERY 12 HOURS PRN
Qty: 60 TABLET | Refills: 0 | OUTPATIENT
Start: 2021-07-03 | End: 2021-07-29

## 2021-06-15 RX ORDER — PROGESTERONE 100 MG/1
100 CAPSULE ORAL DAILY
Qty: 30 CAPSULE | Refills: 3 | Status: SHIPPED | OUTPATIENT
Start: 2021-06-15 | End: 2021-09-16

## 2021-06-15 RX ORDER — ESTRADIOL 0.1 MG/D
1 PATCH TRANSDERMAL
Qty: 4 PATCH | Refills: 11 | Status: SHIPPED | OUTPATIENT
Start: 2021-06-15 | End: 2021-09-16

## 2021-06-24 ENCOUNTER — TELEPHONE (OUTPATIENT)
Dept: FAMILY MEDICINE | Facility: CLINIC | Age: 51
End: 2021-06-24

## 2021-06-25 ENCOUNTER — LAB VISIT (OUTPATIENT)
Dept: LAB | Facility: HOSPITAL | Age: 51
End: 2021-06-25
Payer: MEDICAID

## 2021-06-25 DIAGNOSIS — E11.42 TYPE 2 DIABETES MELLITUS WITH DIABETIC POLYNEUROPATHY, WITH LONG-TERM CURRENT USE OF INSULIN: ICD-10-CM

## 2021-06-25 DIAGNOSIS — Z79.4 TYPE 2 DIABETES MELLITUS WITH DIABETIC POLYNEUROPATHY, WITH LONG-TERM CURRENT USE OF INSULIN: ICD-10-CM

## 2021-06-25 LAB
ANION GAP SERPL CALC-SCNC: 14 MMOL/L (ref 8–16)
BUN SERPL-MCNC: 5 MG/DL (ref 6–20)
CALCIUM SERPL-MCNC: 9.5 MG/DL (ref 8.7–10.5)
CHLORIDE SERPL-SCNC: 101 MMOL/L (ref 95–110)
CO2 SERPL-SCNC: 23 MMOL/L (ref 23–29)
CREAT SERPL-MCNC: 0.9 MG/DL (ref 0.5–1.4)
EST. GFR  (AFRICAN AMERICAN): >60 ML/MIN/1.73 M^2
EST. GFR  (NON AFRICAN AMERICAN): >60 ML/MIN/1.73 M^2
ESTIMATED AVG GLUCOSE: 266 MG/DL (ref 68–131)
GLUCOSE SERPL-MCNC: 123 MG/DL (ref 70–110)
HBA1C MFR BLD: 10.9 % (ref 4–5.6)
POTASSIUM SERPL-SCNC: 3.6 MMOL/L (ref 3.5–5.1)
SODIUM SERPL-SCNC: 138 MMOL/L (ref 136–145)

## 2021-06-25 PROCEDURE — 83036 HEMOGLOBIN GLYCOSYLATED A1C: CPT | Performed by: NURSE PRACTITIONER

## 2021-06-25 PROCEDURE — 80048 BASIC METABOLIC PNL TOTAL CA: CPT | Performed by: NURSE PRACTITIONER

## 2021-06-25 PROCEDURE — 36415 COLL VENOUS BLD VENIPUNCTURE: CPT | Mod: PN | Performed by: NURSE PRACTITIONER

## 2021-06-28 ENCOUNTER — TELEPHONE (OUTPATIENT)
Dept: FAMILY MEDICINE | Facility: CLINIC | Age: 51
End: 2021-06-28

## 2021-07-03 ENCOUNTER — PATIENT OUTREACH (OUTPATIENT)
Dept: ADMINISTRATIVE | Facility: OTHER | Age: 51
End: 2021-07-03

## 2021-07-06 ENCOUNTER — OFFICE VISIT (OUTPATIENT)
Dept: ENDOCRINOLOGY | Facility: CLINIC | Age: 51
End: 2021-07-06
Payer: COMMERCIAL

## 2021-07-06 VITALS
HEART RATE: 98 BPM | HEIGHT: 67 IN | SYSTOLIC BLOOD PRESSURE: 120 MMHG | BODY MASS INDEX: 37.11 KG/M2 | WEIGHT: 236.44 LBS | DIASTOLIC BLOOD PRESSURE: 70 MMHG

## 2021-07-06 DIAGNOSIS — Z79.4 TYPE 2 DIABETES MELLITUS WITH MICROALBUMINURIA, WITH LONG-TERM CURRENT USE OF INSULIN: Primary | ICD-10-CM

## 2021-07-06 DIAGNOSIS — I10 ESSENTIAL HYPERTENSION: ICD-10-CM

## 2021-07-06 DIAGNOSIS — E66.9 OBESITY (BMI 30-39.9): ICD-10-CM

## 2021-07-06 DIAGNOSIS — E11.9 TYPE 2 DIABETES MELLITUS WITHOUT COMPLICATION, WITH LONG-TERM CURRENT USE OF INSULIN: ICD-10-CM

## 2021-07-06 DIAGNOSIS — Z79.4 TYPE 2 DIABETES MELLITUS WITHOUT COMPLICATION, WITH LONG-TERM CURRENT USE OF INSULIN: ICD-10-CM

## 2021-07-06 DIAGNOSIS — R80.9 TYPE 2 DIABETES MELLITUS WITH MICROALBUMINURIA, WITH LONG-TERM CURRENT USE OF INSULIN: Primary | ICD-10-CM

## 2021-07-06 DIAGNOSIS — E78.2 MIXED HYPERLIPIDEMIA: ICD-10-CM

## 2021-07-06 DIAGNOSIS — E11.29 TYPE 2 DIABETES MELLITUS WITH MICROALBUMINURIA, WITH LONG-TERM CURRENT USE OF INSULIN: Primary | ICD-10-CM

## 2021-07-06 LAB — GLUCOSE SERPL-MCNC: 450 MG/DL (ref 70–110)

## 2021-07-06 PROCEDURE — 82962 GLUCOSE BLOOD TEST: CPT | Mod: PBBFAC,PN | Performed by: NURSE PRACTITIONER

## 2021-07-06 PROCEDURE — 99214 PR OFFICE/OUTPT VISIT, EST, LEVL IV, 30-39 MIN: ICD-10-PCS | Mod: S$GLB,,, | Performed by: NURSE PRACTITIONER

## 2021-07-06 PROCEDURE — 99999 PR PBB SHADOW E&M-EST. PATIENT-LVL V: CPT | Mod: PBBFAC,,, | Performed by: NURSE PRACTITIONER

## 2021-07-06 PROCEDURE — 99215 OFFICE O/P EST HI 40 MIN: CPT | Mod: PBBFAC,PN | Performed by: NURSE PRACTITIONER

## 2021-07-06 PROCEDURE — 99999 PR PBB SHADOW E&M-EST. PATIENT-LVL V: ICD-10-PCS | Mod: PBBFAC,,, | Performed by: NURSE PRACTITIONER

## 2021-07-06 PROCEDURE — 99214 OFFICE O/P EST MOD 30 MIN: CPT | Mod: S$GLB,,, | Performed by: NURSE PRACTITIONER

## 2021-07-06 RX ORDER — INSULIN ASPART 100 [IU]/ML
18 INJECTION, SOLUTION INTRAVENOUS; SUBCUTANEOUS
Qty: 2 BOX | Refills: 6
Start: 2021-07-06 | End: 2021-10-06

## 2021-07-06 RX ORDER — LIRAGLUTIDE 6 MG/ML
1.8 INJECTION SUBCUTANEOUS DAILY
Qty: 9 ML | Refills: 6 | Status: SHIPPED | OUTPATIENT
Start: 2021-07-06 | End: 2022-04-25

## 2021-07-06 RX ORDER — PEN NEEDLE, DIABETIC 30 GX3/16"
NEEDLE, DISPOSABLE MISCELLANEOUS
Qty: 150 EACH | Refills: 6 | Status: SHIPPED | OUTPATIENT
Start: 2021-07-06 | End: 2023-03-31

## 2021-07-06 RX ORDER — INSULIN GLARGINE 100 [IU]/ML
34 INJECTION, SOLUTION SUBCUTANEOUS NIGHTLY
Qty: 15 ML | Refills: 3
Start: 2021-07-06 | End: 2021-09-13

## 2021-07-07 ENCOUNTER — HOSPITAL ENCOUNTER (OUTPATIENT)
Dept: RADIOLOGY | Facility: HOSPITAL | Age: 51
Discharge: HOME | End: 2021-07-07
Attending: OBSTETRICS & GYNECOLOGY
Payer: COMMERCIAL

## 2021-07-07 DIAGNOSIS — Z13.820 ENCOUNTER FOR IMAGING TO ASSESS OSTEOPOROSIS: ICD-10-CM

## 2021-07-07 PROCEDURE — 77080 DXA BONE DENSITY AXIAL: CPT | Mod: 26,,, | Performed by: RADIOLOGY

## 2021-07-07 PROCEDURE — 77080 DEXA BONE DENSITY SPINE HIP: ICD-10-PCS | Mod: 26,,, | Performed by: RADIOLOGY

## 2021-07-07 PROCEDURE — 77080 DXA BONE DENSITY AXIAL: CPT | Mod: TC,PO

## 2021-07-09 ENCOUNTER — TELEPHONE (OUTPATIENT)
Dept: ENDOCRINOLOGY | Facility: CLINIC | Age: 51
End: 2021-07-09

## 2021-07-09 ENCOUNTER — TELEPHONE (OUTPATIENT)
Dept: FAMILY MEDICINE | Facility: CLINIC | Age: 51
End: 2021-07-09

## 2021-07-09 DIAGNOSIS — E11.42 TYPE 2 DIABETES MELLITUS WITH DIABETIC POLYNEUROPATHY, WITH LONG-TERM CURRENT USE OF INSULIN: Primary | ICD-10-CM

## 2021-07-09 DIAGNOSIS — Z79.4 TYPE 2 DIABETES MELLITUS WITH DIABETIC POLYNEUROPATHY, WITH LONG-TERM CURRENT USE OF INSULIN: Primary | ICD-10-CM

## 2021-07-12 ENCOUNTER — TELEPHONE (OUTPATIENT)
Dept: FAMILY MEDICINE | Facility: CLINIC | Age: 51
End: 2021-07-12

## 2021-07-12 RX ORDER — FLASH GLUCOSE SCANNING READER
EACH MISCELLANEOUS
Qty: 1 EACH | Refills: 0 | Status: SHIPPED | OUTPATIENT
Start: 2021-07-12 | End: 2023-11-20 | Stop reason: SDUPTHER

## 2021-07-12 RX ORDER — FLASH GLUCOSE SENSOR
KIT MISCELLANEOUS
Qty: 2 KIT | Refills: 11 | Status: SHIPPED | OUTPATIENT
Start: 2021-07-12 | End: 2022-05-02

## 2021-07-19 ENCOUNTER — TELEPHONE (OUTPATIENT)
Dept: ENDOCRINOLOGY | Facility: CLINIC | Age: 51
End: 2021-07-19

## 2021-07-29 ENCOUNTER — HOSPITAL ENCOUNTER (EMERGENCY)
Facility: HOSPITAL | Age: 51
Discharge: HOME | End: 2021-07-29
Attending: EMERGENCY MEDICINE
Payer: MEDICAID

## 2021-07-29 VITALS
BODY MASS INDEX: 37.02 KG/M2 | OXYGEN SATURATION: 95 % | TEMPERATURE: 98 F | DIASTOLIC BLOOD PRESSURE: 76 MMHG | WEIGHT: 235.88 LBS | RESPIRATION RATE: 18 BRPM | HEIGHT: 67 IN | SYSTOLIC BLOOD PRESSURE: 109 MMHG | HEART RATE: 96 BPM

## 2021-07-29 DIAGNOSIS — N61.1 ABSCESS OF LEFT BREAST: Primary | ICD-10-CM

## 2021-07-29 DIAGNOSIS — L02.31 ABSCESS OF BUTTOCK: ICD-10-CM

## 2021-07-29 DIAGNOSIS — M17.12 PRIMARY OSTEOARTHRITIS OF LEFT KNEE: ICD-10-CM

## 2021-07-29 LAB — POCT GLUCOSE: 107 MG/DL (ref 70–110)

## 2021-07-29 PROCEDURE — 99284 EMERGENCY DEPT VISIT MOD MDM: CPT | Mod: 25

## 2021-07-29 PROCEDURE — 82962 GLUCOSE BLOOD TEST: CPT

## 2021-07-29 PROCEDURE — 10061 I&D ABSCESS COMP/MULTIPLE: CPT

## 2021-07-29 PROCEDURE — 25000003 PHARM REV CODE 250: Performed by: EMERGENCY MEDICINE

## 2021-07-29 RX ORDER — CLINDAMYCIN HYDROCHLORIDE 300 MG/1
300 CAPSULE ORAL EVERY 8 HOURS
Qty: 20 CAPSULE | Refills: 0 | Status: SHIPPED | OUTPATIENT
Start: 2021-07-29 | End: 2021-07-29 | Stop reason: SDUPTHER

## 2021-07-29 RX ORDER — CLINDAMYCIN HYDROCHLORIDE 150 MG/1
300 CAPSULE ORAL
Status: COMPLETED | OUTPATIENT
Start: 2021-07-29 | End: 2021-07-29

## 2021-07-29 RX ORDER — HYDROCODONE BITARTRATE AND ACETAMINOPHEN 5; 325 MG/1; MG/1
1 TABLET ORAL EVERY 6 HOURS PRN
Qty: 12 TABLET | Refills: 0 | Status: SHIPPED | OUTPATIENT
Start: 2021-07-29 | End: 2021-08-08

## 2021-07-29 RX ORDER — CLINDAMYCIN HYDROCHLORIDE 300 MG/1
300 CAPSULE ORAL EVERY 8 HOURS
Qty: 20 CAPSULE | Refills: 0 | Status: SHIPPED | OUTPATIENT
Start: 2021-07-29 | End: 2021-08-05

## 2021-07-29 RX ORDER — LIDOCAINE HCL/EPINEPHRINE/PF 2%-1:200K
10 VIAL (ML) INJECTION ONCE
Status: COMPLETED | OUTPATIENT
Start: 2021-07-29 | End: 2021-07-29

## 2021-07-29 RX ORDER — OXYCODONE HYDROCHLORIDE 10 MG/1
10 TABLET ORAL
Status: COMPLETED | OUTPATIENT
Start: 2021-07-29 | End: 2021-07-29

## 2021-07-29 RX ORDER — LIDOCAINE HYDROCHLORIDE 10 MG/ML
10 INJECTION INFILTRATION; PERINEURAL
Status: DISCONTINUED | OUTPATIENT
Start: 2021-07-29 | End: 2021-07-29

## 2021-07-29 RX ADMIN — CLINDAMYCIN HYDROCHLORIDE 300 MG: 150 CAPSULE ORAL at 02:07

## 2021-07-29 RX ADMIN — OXYCODONE HYDROCHLORIDE 10 MG: 10 TABLET ORAL at 01:07

## 2021-07-29 RX ADMIN — LIDOCAINE HYDROCHLORIDE,EPINEPHRINE BITARTRATE 10 ML: 20; .005 INJECTION, SOLUTION EPIDURAL; INFILTRATION; INTRACAUDAL; PERINEURAL at 02:07

## 2021-07-30 RX ORDER — PROMETHAZINE HYDROCHLORIDE AND CODEINE PHOSPHATE 6.25; 1 MG/5ML; MG/5ML
SOLUTION ORAL
Qty: 240 ML | Refills: 0 | Status: SHIPPED | OUTPATIENT
Start: 2021-07-30 | End: 2021-09-01 | Stop reason: SDUPTHER

## 2021-08-04 ENCOUNTER — OFFICE VISIT (OUTPATIENT)
Dept: FAMILY MEDICINE | Facility: CLINIC | Age: 51
End: 2021-08-04
Payer: COMMERCIAL

## 2021-08-04 VITALS
DIASTOLIC BLOOD PRESSURE: 78 MMHG | SYSTOLIC BLOOD PRESSURE: 132 MMHG | HEIGHT: 67 IN | HEART RATE: 90 BPM | BODY MASS INDEX: 37.47 KG/M2 | OXYGEN SATURATION: 96 % | WEIGHT: 238.75 LBS | TEMPERATURE: 98 F | RESPIRATION RATE: 18 BRPM

## 2021-08-04 DIAGNOSIS — G89.29 CHRONIC MIDLINE LOW BACK PAIN WITHOUT SCIATICA: ICD-10-CM

## 2021-08-04 DIAGNOSIS — F50.81 BINGE EATING DISORDER: ICD-10-CM

## 2021-08-04 DIAGNOSIS — Z79.4 TYPE 2 DIABETES MELLITUS WITH DIABETIC POLYNEUROPATHY, WITH LONG-TERM CURRENT USE OF INSULIN: ICD-10-CM

## 2021-08-04 DIAGNOSIS — R05.3 CHRONIC COUGH: ICD-10-CM

## 2021-08-04 DIAGNOSIS — L02.91 ABSCESS: Primary | ICD-10-CM

## 2021-08-04 DIAGNOSIS — E11.42 TYPE 2 DIABETES MELLITUS WITH DIABETIC POLYNEUROPATHY, WITH LONG-TERM CURRENT USE OF INSULIN: ICD-10-CM

## 2021-08-04 DIAGNOSIS — Z12.11 COLON CANCER SCREENING: ICD-10-CM

## 2021-08-04 DIAGNOSIS — M17.32 POST-TRAUMATIC OSTEOARTHRITIS OF LEFT KNEE: ICD-10-CM

## 2021-08-04 DIAGNOSIS — E78.2 MIXED HYPERLIPIDEMIA: ICD-10-CM

## 2021-08-04 DIAGNOSIS — M54.50 CHRONIC MIDLINE LOW BACK PAIN WITHOUT SCIATICA: ICD-10-CM

## 2021-08-04 DIAGNOSIS — I10 ESSENTIAL HYPERTENSION: ICD-10-CM

## 2021-08-04 DIAGNOSIS — Z00.00 HEALTH MAINTENANCE EXAMINATION: ICD-10-CM

## 2021-08-04 PROCEDURE — 99214 OFFICE O/P EST MOD 30 MIN: CPT | Mod: S$GLB,,, | Performed by: FAMILY MEDICINE

## 2021-08-04 PROCEDURE — 99999 PR PBB SHADOW E&M-EST. PATIENT-LVL V: CPT | Mod: PBBFAC,,, | Performed by: FAMILY MEDICINE

## 2021-08-04 PROCEDURE — 99999 PR PBB SHADOW E&M-EST. PATIENT-LVL V: ICD-10-PCS | Mod: PBBFAC,,, | Performed by: FAMILY MEDICINE

## 2021-08-04 PROCEDURE — 99214 PR OFFICE/OUTPT VISIT, EST, LEVL IV, 30-39 MIN: ICD-10-PCS | Mod: S$GLB,,, | Performed by: FAMILY MEDICINE

## 2021-08-04 RX ORDER — CHLORZOXAZONE 500 MG/1
500 TABLET ORAL 4 TIMES DAILY PRN
Qty: 180 TABLET | Refills: 3 | Status: SHIPPED | OUTPATIENT
Start: 2021-08-04 | End: 2021-09-16 | Stop reason: SDUPTHER

## 2021-08-04 RX ORDER — AMLODIPINE BESYLATE 10 MG/1
10 TABLET ORAL 2 TIMES DAILY
Start: 2021-08-04

## 2021-08-12 ENCOUNTER — TELEPHONE (OUTPATIENT)
Dept: ENDOCRINOLOGY | Facility: CLINIC | Age: 51
End: 2021-08-12

## 2021-08-20 ENCOUNTER — HOSPITAL ENCOUNTER (EMERGENCY)
Facility: HOSPITAL | Age: 51
Discharge: HOME | End: 2021-08-21
Attending: EMERGENCY MEDICINE
Payer: MEDICAID

## 2021-08-20 DIAGNOSIS — R06.02 SOB (SHORTNESS OF BREATH): ICD-10-CM

## 2021-08-20 DIAGNOSIS — J44.1 COPD WITH EXACERBATION: Primary | ICD-10-CM

## 2021-08-20 LAB
ALBUMIN SERPL BCP-MCNC: 2.8 G/DL (ref 3.5–5.2)
ALP SERPL-CCNC: 76 U/L (ref 55–135)
ALT SERPL W/O P-5'-P-CCNC: 16 U/L (ref 10–44)
ANION GAP SERPL CALC-SCNC: 13 MMOL/L (ref 8–16)
AST SERPL-CCNC: 27 U/L (ref 10–40)
BASOPHILS # BLD AUTO: 0.04 K/UL (ref 0–0.2)
BASOPHILS NFR BLD: 0.4 % (ref 0–1.9)
BILIRUB SERPL-MCNC: 0.4 MG/DL (ref 0.1–1)
BNP SERPL-MCNC: 31 PG/ML (ref 0–99)
BNP SERPL-MCNC: 31 PG/ML (ref 0–99)
BUN SERPL-MCNC: 18 MG/DL (ref 6–20)
CALCIUM SERPL-MCNC: 9.5 MG/DL (ref 8.7–10.5)
CHLORIDE SERPL-SCNC: 92 MMOL/L (ref 95–110)
CO2 SERPL-SCNC: 26 MMOL/L (ref 23–29)
CREAT SERPL-MCNC: 1.4 MG/DL (ref 0.5–1.4)
DIFFERENTIAL METHOD: ABNORMAL
EOSINOPHIL # BLD AUTO: 0 K/UL (ref 0–0.5)
EOSINOPHIL NFR BLD: 0 % (ref 0–8)
ERYTHROCYTE [DISTWIDTH] IN BLOOD BY AUTOMATED COUNT: 15 % (ref 11.5–14.5)
EST. GFR  (AFRICAN AMERICAN): 51 ML/MIN/1.73 M^2
EST. GFR  (NON AFRICAN AMERICAN): 44 ML/MIN/1.73 M^2
GLUCOSE SERPL-MCNC: 133 MG/DL (ref 70–110)
HCT VFR BLD AUTO: 35.5 % (ref 37–48.5)
HGB BLD-MCNC: 11.5 G/DL (ref 12–16)
IMM GRANULOCYTES # BLD AUTO: 0.05 K/UL (ref 0–0.04)
IMM GRANULOCYTES NFR BLD AUTO: 0.4 % (ref 0–0.5)
INFLUENZA A, MOLECULAR: NEGATIVE
INFLUENZA B, MOLECULAR: NEGATIVE
LYMPHOCYTES # BLD AUTO: 1.3 K/UL (ref 1–4.8)
LYMPHOCYTES NFR BLD: 11.9 % (ref 18–48)
MCH RBC QN AUTO: 27.6 PG (ref 27–31)
MCHC RBC AUTO-ENTMCNC: 32.4 G/DL (ref 32–36)
MCV RBC AUTO: 85 FL (ref 82–98)
MONOCYTES # BLD AUTO: 1.4 K/UL (ref 0.3–1)
MONOCYTES NFR BLD: 12.4 % (ref 4–15)
NEUTROPHILS # BLD AUTO: 8.4 K/UL (ref 1.8–7.7)
NEUTROPHILS NFR BLD: 74.9 % (ref 38–73)
NRBC BLD-RTO: 0 /100 WBC
PLATELET # BLD AUTO: 219 K/UL (ref 150–450)
PMV BLD AUTO: 9.9 FL (ref 9.2–12.9)
POTASSIUM SERPL-SCNC: 4.3 MMOL/L (ref 3.5–5.1)
PROT SERPL-MCNC: 7.5 G/DL (ref 6–8.4)
RBC # BLD AUTO: 4.16 M/UL (ref 4–5.4)
SARS-COV-2 RDRP RESP QL NAA+PROBE: NEGATIVE
SODIUM SERPL-SCNC: 131 MMOL/L (ref 136–145)
SPECIMEN SOURCE: NORMAL
WBC # BLD AUTO: 11.25 K/UL (ref 3.9–12.7)

## 2021-08-20 PROCEDURE — 85025 COMPLETE CBC W/AUTO DIFF WBC: CPT | Performed by: EMERGENCY MEDICINE

## 2021-08-20 PROCEDURE — U0002 COVID-19 LAB TEST NON-CDC: HCPCS | Performed by: EMERGENCY MEDICINE

## 2021-08-20 PROCEDURE — 87502 INFLUENZA DNA AMP PROBE: CPT | Performed by: EMERGENCY MEDICINE

## 2021-08-20 PROCEDURE — 36415 COLL VENOUS BLD VENIPUNCTURE: CPT | Performed by: EMERGENCY MEDICINE

## 2021-08-20 PROCEDURE — 83880 ASSAY OF NATRIURETIC PEPTIDE: CPT | Performed by: EMERGENCY MEDICINE

## 2021-08-20 PROCEDURE — 99285 EMERGENCY DEPT VISIT HI MDM: CPT | Mod: 25

## 2021-08-20 PROCEDURE — 93005 ELECTROCARDIOGRAM TRACING: CPT

## 2021-08-20 PROCEDURE — 80053 COMPREHEN METABOLIC PANEL: CPT | Performed by: EMERGENCY MEDICINE

## 2021-08-20 RX ORDER — HYDROCODONE BITARTRATE AND ACETAMINOPHEN 10; 325 MG/1; MG/1
1 TABLET ORAL
Status: COMPLETED | OUTPATIENT
Start: 2021-08-21 | End: 2021-08-20

## 2021-08-20 RX ORDER — LEVOFLOXACIN 750 MG/1
750 TABLET ORAL DAILY
Qty: 5 TABLET | Refills: 0 | Status: SHIPPED | OUTPATIENT
Start: 2021-08-20 | End: 2021-08-25

## 2021-08-20 RX ORDER — IPRATROPIUM BROMIDE AND ALBUTEROL SULFATE 2.5; .5 MG/3ML; MG/3ML
9 SOLUTION RESPIRATORY (INHALATION) CONTINUOUS
Status: DISCONTINUED | OUTPATIENT
Start: 2021-08-20 | End: 2021-08-21 | Stop reason: HOSPADM

## 2021-08-20 RX ORDER — HYDROCODONE BITARTRATE AND ACETAMINOPHEN 10; 325 MG/1; MG/1
2 TABLET ORAL
Status: DISCONTINUED | OUTPATIENT
Start: 2021-08-20 | End: 2021-08-20

## 2021-08-20 RX ADMIN — HYDROCODONE BITARTRATE AND ACETAMINOPHEN 1 TABLET: 10; 325 TABLET ORAL at 11:08

## 2021-08-21 VITALS
OXYGEN SATURATION: 95 % | TEMPERATURE: 102 F | BODY MASS INDEX: 36.88 KG/M2 | RESPIRATION RATE: 18 BRPM | DIASTOLIC BLOOD PRESSURE: 70 MMHG | SYSTOLIC BLOOD PRESSURE: 148 MMHG | HEIGHT: 67 IN | WEIGHT: 235 LBS | HEART RATE: 115 BPM

## 2021-08-21 PROCEDURE — 25000003 PHARM REV CODE 250: Performed by: EMERGENCY MEDICINE

## 2021-08-21 RX ADMIN — LEVOFLOXACIN 750 MG: 500 TABLET, FILM COATED ORAL at 12:08

## 2021-08-24 ENCOUNTER — LAB VISIT (OUTPATIENT)
Dept: LAB | Facility: HOSPITAL | Age: 51
End: 2021-08-24
Attending: FAMILY MEDICINE
Payer: MEDICAID

## 2021-08-24 DIAGNOSIS — Z12.11 COLON CANCER SCREENING: ICD-10-CM

## 2021-08-24 PROCEDURE — 82274 ASSAY TEST FOR BLOOD FECAL: CPT | Performed by: FAMILY MEDICINE

## 2021-08-26 LAB — HEMOCCULT STL QL IA: NEGATIVE

## 2021-09-01 RX ORDER — PROMETHAZINE HYDROCHLORIDE AND CODEINE PHOSPHATE 6.25; 1 MG/5ML; MG/5ML
SOLUTION ORAL
Qty: 240 ML | Refills: 0 | Status: SHIPPED | OUTPATIENT
Start: 2021-09-01 | End: 2021-09-09 | Stop reason: SDUPTHER

## 2021-09-09 ENCOUNTER — TELEPHONE (OUTPATIENT)
Dept: FAMILY MEDICINE | Facility: CLINIC | Age: 51
End: 2021-09-09

## 2021-09-09 RX ORDER — PROMETHAZINE HYDROCHLORIDE AND CODEINE PHOSPHATE 6.25; 1 MG/5ML; MG/5ML
SOLUTION ORAL
Qty: 240 ML | Refills: 1 | Status: SHIPPED | OUTPATIENT
Start: 2021-09-11 | End: 2021-09-16 | Stop reason: SDUPTHER

## 2021-09-16 ENCOUNTER — OFFICE VISIT (OUTPATIENT)
Dept: FAMILY MEDICINE | Facility: CLINIC | Age: 51
End: 2021-09-16
Payer: COMMERCIAL

## 2021-09-16 VITALS
HEIGHT: 67 IN | BODY MASS INDEX: 37.52 KG/M2 | SYSTOLIC BLOOD PRESSURE: 124 MMHG | HEART RATE: 95 BPM | WEIGHT: 239.06 LBS | DIASTOLIC BLOOD PRESSURE: 86 MMHG | OXYGEN SATURATION: 97 %

## 2021-09-16 DIAGNOSIS — G89.29 CHRONIC MIDLINE LOW BACK PAIN WITHOUT SCIATICA: ICD-10-CM

## 2021-09-16 DIAGNOSIS — I10 ESSENTIAL HYPERTENSION: Primary | ICD-10-CM

## 2021-09-16 DIAGNOSIS — M54.50 CHRONIC MIDLINE LOW BACK PAIN WITHOUT SCIATICA: ICD-10-CM

## 2021-09-16 DIAGNOSIS — J45.41 MODERATE PERSISTENT ASTHMA WITH ACUTE EXACERBATION: ICD-10-CM

## 2021-09-16 DIAGNOSIS — K64.1 SECOND DEGREE HEMORRHOIDS: ICD-10-CM

## 2021-09-16 DIAGNOSIS — R05.3 CHRONIC COUGH: ICD-10-CM

## 2021-09-16 PROCEDURE — G0008 ADMIN INFLUENZA VIRUS VAC: HCPCS | Mod: S$GLB,,, | Performed by: FAMILY MEDICINE

## 2021-09-16 PROCEDURE — 99213 OFFICE O/P EST LOW 20 MIN: CPT | Mod: S$GLB,,, | Performed by: FAMILY MEDICINE

## 2021-09-16 PROCEDURE — 90686 IIV4 VACC NO PRSV 0.5 ML IM: CPT | Mod: S$GLB,,, | Performed by: FAMILY MEDICINE

## 2021-09-16 PROCEDURE — 99999 PR PBB SHADOW E&M-EST. PATIENT-LVL IV: CPT | Mod: PBBFAC,,, | Performed by: FAMILY MEDICINE

## 2021-09-16 PROCEDURE — G0008 FLU VACCINE (QUAD) GREATER THAN OR EQUAL TO 3YO PRESERVATIVE FREE IM: ICD-10-PCS | Mod: S$GLB,,, | Performed by: FAMILY MEDICINE

## 2021-09-16 PROCEDURE — 99999 PR PBB SHADOW E&M-EST. PATIENT-LVL IV: ICD-10-PCS | Mod: PBBFAC,,, | Performed by: FAMILY MEDICINE

## 2021-09-16 PROCEDURE — 99213 PR OFFICE/OUTPT VISIT, EST, LEVL III, 20-29 MIN: ICD-10-PCS | Mod: S$GLB,,, | Performed by: FAMILY MEDICINE

## 2021-09-16 PROCEDURE — 90686 FLU VACCINE (QUAD) GREATER THAN OR EQUAL TO 3YO PRESERVATIVE FREE IM: ICD-10-PCS | Mod: S$GLB,,, | Performed by: FAMILY MEDICINE

## 2021-09-16 RX ORDER — HYDROCODONE BITARTRATE AND ACETAMINOPHEN 7.5; 325 MG/1; MG/1
1 TABLET ORAL EVERY 12 HOURS PRN
Qty: 60 TABLET | Refills: 0 | Status: SHIPPED | OUTPATIENT
Start: 2021-11-03 | End: 2021-11-30

## 2021-09-16 RX ORDER — HYDROCORTISONE ACETATE 25 MG/1
25 SUPPOSITORY RECTAL 2 TIMES DAILY PRN
Qty: 24 SUPPOSITORY | Refills: 2 | Status: SHIPPED | OUTPATIENT
Start: 2021-09-16 | End: 2024-02-20

## 2021-09-16 RX ORDER — PROMETHAZINE HYDROCHLORIDE AND CODEINE PHOSPHATE 6.25; 1 MG/5ML; MG/5ML
SOLUTION ORAL
Qty: 240 ML | Refills: 1 | Status: SHIPPED | OUTPATIENT
Start: 2021-10-05 | End: 2021-11-30

## 2021-09-16 RX ORDER — FLUTICASONE PROPIONATE AND SALMETEROL 250; 50 UG/1; UG/1
POWDER RESPIRATORY (INHALATION)
Qty: 60 EACH | Refills: 3 | Status: SHIPPED | OUTPATIENT
Start: 2021-09-16 | End: 2022-06-30

## 2021-09-16 RX ORDER — ALBUTEROL SULFATE 90 UG/1
AEROSOL, METERED RESPIRATORY (INHALATION)
Qty: 54 G | Refills: 3 | Status: SHIPPED | OUTPATIENT
Start: 2021-09-16 | End: 2023-05-06

## 2021-09-16 RX ORDER — HYDROCODONE BITARTRATE AND ACETAMINOPHEN 7.5; 325 MG/1; MG/1
1 TABLET ORAL EVERY 12 HOURS PRN
Qty: 60 TABLET | Refills: 0 | Status: SHIPPED | OUTPATIENT
Start: 2021-10-03 | End: 2021-12-21 | Stop reason: SDUPTHER

## 2021-09-16 RX ORDER — CHLORZOXAZONE 500 MG/1
500 TABLET ORAL 4 TIMES DAILY PRN
Qty: 180 TABLET | Refills: 3 | Status: SHIPPED | OUTPATIENT
Start: 2021-09-16 | End: 2022-06-30 | Stop reason: SDUPTHER

## 2021-09-29 ENCOUNTER — LAB VISIT (OUTPATIENT)
Dept: LAB | Facility: HOSPITAL | Age: 51
End: 2021-09-29
Payer: COMMERCIAL

## 2021-09-29 DIAGNOSIS — E11.29 TYPE 2 DIABETES MELLITUS WITH MICROALBUMINURIA, WITH LONG-TERM CURRENT USE OF INSULIN: ICD-10-CM

## 2021-09-29 DIAGNOSIS — R80.9 TYPE 2 DIABETES MELLITUS WITH MICROALBUMINURIA, WITH LONG-TERM CURRENT USE OF INSULIN: ICD-10-CM

## 2021-09-29 DIAGNOSIS — Z79.4 TYPE 2 DIABETES MELLITUS WITH MICROALBUMINURIA, WITH LONG-TERM CURRENT USE OF INSULIN: ICD-10-CM

## 2021-09-29 LAB
ALBUMIN SERPL BCP-MCNC: 3.5 G/DL (ref 3.5–5.2)
ALP SERPL-CCNC: 70 U/L (ref 55–135)
ALT SERPL W/O P-5'-P-CCNC: 13 U/L (ref 10–44)
ANION GAP SERPL CALC-SCNC: 12 MMOL/L (ref 8–16)
AST SERPL-CCNC: 14 U/L (ref 10–40)
BILIRUB SERPL-MCNC: 0.3 MG/DL (ref 0.1–1)
BUN SERPL-MCNC: 13 MG/DL (ref 6–20)
CALCIUM SERPL-MCNC: 9.6 MG/DL (ref 8.7–10.5)
CHLORIDE SERPL-SCNC: 100 MMOL/L (ref 95–110)
CO2 SERPL-SCNC: 24 MMOL/L (ref 23–29)
CREAT SERPL-MCNC: 1 MG/DL (ref 0.5–1.4)
EST. GFR  (AFRICAN AMERICAN): >60 ML/MIN/1.73 M^2
EST. GFR  (NON AFRICAN AMERICAN): >60 ML/MIN/1.73 M^2
ESTIMATED AVG GLUCOSE: 186 MG/DL (ref 68–131)
GLUCOSE SERPL-MCNC: 83 MG/DL (ref 70–110)
HBA1C MFR BLD: 8.1 % (ref 4–5.6)
POTASSIUM SERPL-SCNC: 3.9 MMOL/L (ref 3.5–5.1)
PROT SERPL-MCNC: 7.3 G/DL (ref 6–8.4)
SODIUM SERPL-SCNC: 136 MMOL/L (ref 136–145)

## 2021-09-29 PROCEDURE — 83036 HEMOGLOBIN GLYCOSYLATED A1C: CPT | Performed by: NURSE PRACTITIONER

## 2021-09-29 PROCEDURE — 36415 COLL VENOUS BLD VENIPUNCTURE: CPT | Mod: PN | Performed by: NURSE PRACTITIONER

## 2021-09-29 PROCEDURE — 80053 COMPREHEN METABOLIC PANEL: CPT | Performed by: NURSE PRACTITIONER

## 2021-10-05 ENCOUNTER — PATIENT OUTREACH (OUTPATIENT)
Dept: ADMINISTRATIVE | Facility: OTHER | Age: 51
End: 2021-10-05

## 2021-10-06 ENCOUNTER — OFFICE VISIT (OUTPATIENT)
Dept: ENDOCRINOLOGY | Facility: CLINIC | Age: 51
End: 2021-10-06
Payer: COMMERCIAL

## 2021-10-06 VITALS
HEIGHT: 67 IN | SYSTOLIC BLOOD PRESSURE: 130 MMHG | HEART RATE: 79 BPM | WEIGHT: 236.69 LBS | BODY MASS INDEX: 37.15 KG/M2 | DIASTOLIC BLOOD PRESSURE: 70 MMHG

## 2021-10-06 DIAGNOSIS — E78.2 MIXED HYPERLIPIDEMIA: ICD-10-CM

## 2021-10-06 DIAGNOSIS — Z79.4 TYPE 2 DIABETES MELLITUS WITH MICROALBUMINURIA, WITH LONG-TERM CURRENT USE OF INSULIN: Primary | ICD-10-CM

## 2021-10-06 DIAGNOSIS — I10 ESSENTIAL HYPERTENSION: ICD-10-CM

## 2021-10-06 DIAGNOSIS — E66.9 OBESITY (BMI 30-39.9): ICD-10-CM

## 2021-10-06 DIAGNOSIS — E11.29 TYPE 2 DIABETES MELLITUS WITH MICROALBUMINURIA, WITH LONG-TERM CURRENT USE OF INSULIN: Primary | ICD-10-CM

## 2021-10-06 DIAGNOSIS — R80.9 TYPE 2 DIABETES MELLITUS WITH MICROALBUMINURIA, WITH LONG-TERM CURRENT USE OF INSULIN: Primary | ICD-10-CM

## 2021-10-06 PROCEDURE — 95251 CONT GLUC MNTR ANALYSIS I&R: CPT | Mod: S$GLB,,, | Performed by: NURSE PRACTITIONER

## 2021-10-06 PROCEDURE — 95251 PR GLUCOSE MONITOR, 72 HOUR, PHYS INTERP: ICD-10-PCS | Mod: S$GLB,,, | Performed by: NURSE PRACTITIONER

## 2021-10-06 PROCEDURE — 99999 PR PBB SHADOW E&M-EST. PATIENT-LVL IV: CPT | Mod: PBBFAC,,, | Performed by: NURSE PRACTITIONER

## 2021-10-06 PROCEDURE — 99999 PR PBB SHADOW E&M-EST. PATIENT-LVL IV: ICD-10-PCS | Mod: PBBFAC,,, | Performed by: NURSE PRACTITIONER

## 2021-10-06 PROCEDURE — 99214 PR OFFICE/OUTPT VISIT, EST, LEVL IV, 30-39 MIN: ICD-10-PCS | Mod: S$GLB,,, | Performed by: NURSE PRACTITIONER

## 2021-10-06 PROCEDURE — 99214 OFFICE O/P EST MOD 30 MIN: CPT | Mod: S$GLB,,, | Performed by: NURSE PRACTITIONER

## 2021-10-06 RX ORDER — INSULIN ASPART 100 [IU]/ML
14 INJECTION, SOLUTION INTRAVENOUS; SUBCUTANEOUS
Qty: 2 BOX | Refills: 6
Start: 2021-10-06 | End: 2021-11-04

## 2021-10-06 RX ORDER — INSULIN GLARGINE 100 [IU]/ML
30 INJECTION, SOLUTION SUBCUTANEOUS NIGHTLY
Qty: 15 ML | Refills: 6
Start: 2021-10-06 | End: 2022-04-05

## 2021-10-13 NOTE — TELEPHONE ENCOUNTER
Spoke with [facility] medical,Yeny,  states she is not sure why the information was never sent to the medicare team for approval, will send over today for processing    normal...

## 2021-10-28 NOTE — PROGRESS NOTES
"Subjective:       Patient ID: Liza Spain Cousin is a 46 y.o. female.    Chief Complaint: Follow-up (needs info on disability paperwork); Diabetes; and Medication Problem (pt states she is having to take pain meds more often due to increase in pain and pain med not lasting long enough)    HPI Comments: She is here for follow-up.  She was successful in getting her disability claim approved.  Her diabetes has been not well-controlled.  She is still in the process of increasing her dose of Victoza.  She has been seeing Junior.  She brings in some fasting blood sugar readings that range from 300 down to 177.  The 177 and 181 are from the past 2 days.  She complains of chronic low back pain and chronic neck pain.  Sometimes she gets some radiation of the back pain but usually not.  She has been taking Lortab 7.564 times a day and has been running through them too fast.  I reviewed the .  She got refills on October 24, November 28, December 12, December 28.  She is currently out of pain medicine.  She has taken antidepressants such as sertraline in the past with variable benefit.  She does admit to some depressed mood, pessimism, talking to herself.    Diabetes       Review of Systems   Constitutional: Negative for fever and unexpected weight change.   Respiratory: Negative for cough and shortness of breath.         Her asthma is much better.  She was on oral steroids in December.   Musculoskeletal: Positive for back pain and neck pain.       Objective:     Blood pressure (!) 128/92, pulse 78, temperature 98 °F (36.7 °C), temperature source Oral, resp. rate 18, height 5' 7" (1.702 m), weight 109.2 kg (240 lb 13.6 oz), last menstrual period 01/07/2017.      Physical Exam   Constitutional:   She is obese and in no distress.   Neck:   She does have some limitations of neck movement.   Cardiovascular: Normal rate, regular rhythm and normal heart sounds.    Pulmonary/Chest: Effort normal and breath sounds normal. No " respiratory distress. She has no wheezes.   Musculoskeletal:   She can flex her lumbar spine and approximately 45°.       Assessment:       1. Type 2 diabetes mellitus without complication, without long-term current use of insulin    2. Depression, unspecified depression type    3. Essential hypertension    4. Obesity (BMI 30-39.9)    5. Chronic midline low back pain without sciatica        Plan:       At her request I wrote a letter to the [place] [employer].  Her son is incarcerated in [place] and it is very difficult for her to drive up there to visit him.  I will add Cymbalta 60 mg.  That should help with her depression and with her pain.  She will not be due for refill of Lortab until January 26.  She will be seeing endocrinology in February and I would like to see her in March.      Purse String (Intermediate) Text: Given the location of the defect and the characteristics of the surrounding skin a purse string intermediate closure was deemed most appropriate.  Undermining was performed circumfirentially around the surgical defect.  A purse string suture was then placed and tightened.

## 2021-11-04 ENCOUNTER — TELEPHONE (OUTPATIENT)
Dept: ENDOCRINOLOGY | Facility: CLINIC | Age: 51
End: 2021-11-04
Payer: MEDICAID

## 2021-11-04 DIAGNOSIS — E11.29 TYPE 2 DIABETES MELLITUS WITH MICROALBUMINURIA, WITH LONG-TERM CURRENT USE OF INSULIN: ICD-10-CM

## 2021-11-04 DIAGNOSIS — R80.9 TYPE 2 DIABETES MELLITUS WITH MICROALBUMINURIA, WITH LONG-TERM CURRENT USE OF INSULIN: ICD-10-CM

## 2021-11-04 DIAGNOSIS — Z79.4 TYPE 2 DIABETES MELLITUS WITH MICROALBUMINURIA, WITH LONG-TERM CURRENT USE OF INSULIN: ICD-10-CM

## 2021-11-04 RX ORDER — INSULIN ASPART 100 [IU]/ML
11 INJECTION, SOLUTION INTRAVENOUS; SUBCUTANEOUS
Qty: 2 EACH | Refills: 6
Start: 2021-11-04 | End: 2022-01-06

## 2021-11-30 RX ORDER — PROMETHAZINE HYDROCHLORIDE AND CODEINE PHOSPHATE 6.25; 1 MG/5ML; MG/5ML
SOLUTION ORAL
Qty: 240 ML | Refills: 1 | Status: SHIPPED | OUTPATIENT
Start: 2021-11-30 | End: 2021-12-21 | Stop reason: SDUPTHER

## 2021-11-30 RX ORDER — HYDROCODONE BITARTRATE AND ACETAMINOPHEN 7.5; 325 MG/1; MG/1
TABLET ORAL
Qty: 60 TABLET | Refills: 0 | Status: SHIPPED | OUTPATIENT
Start: 2021-12-03 | End: 2022-03-22 | Stop reason: SDUPTHER

## 2021-12-01 RX ORDER — PROMETHAZINE HYDROCHLORIDE AND CODEINE PHOSPHATE 6.25; 1 MG/5ML; MG/5ML
SOLUTION ORAL
Qty: 240 ML | Refills: 1 | OUTPATIENT
Start: 2021-12-01

## 2021-12-01 RX ORDER — HYDROCODONE BITARTRATE AND ACETAMINOPHEN 7.5; 325 MG/1; MG/1
1 TABLET ORAL EVERY 12 HOURS PRN
Qty: 60 TABLET | Refills: 0 | OUTPATIENT
Start: 2021-12-01

## 2021-12-02 DIAGNOSIS — E11.9 TYPE 2 DIABETES MELLITUS WITHOUT COMPLICATION, UNSPECIFIED WHETHER LONG TERM INSULIN USE: ICD-10-CM

## 2021-12-06 ENCOUNTER — PATIENT OUTREACH (OUTPATIENT)
Dept: ADMINISTRATIVE | Facility: HOSPITAL | Age: 51
End: 2021-12-06
Payer: MEDICAID

## 2021-12-20 ENCOUNTER — LAB VISIT (OUTPATIENT)
Dept: LAB | Facility: HOSPITAL | Age: 51
End: 2021-12-20
Payer: COMMERCIAL

## 2021-12-20 DIAGNOSIS — E11.29 TYPE 2 DIABETES MELLITUS WITH MICROALBUMINURIA, WITH LONG-TERM CURRENT USE OF INSULIN: ICD-10-CM

## 2021-12-20 DIAGNOSIS — Z79.4 TYPE 2 DIABETES MELLITUS WITH MICROALBUMINURIA, WITH LONG-TERM CURRENT USE OF INSULIN: ICD-10-CM

## 2021-12-20 DIAGNOSIS — R80.9 TYPE 2 DIABETES MELLITUS WITH MICROALBUMINURIA, WITH LONG-TERM CURRENT USE OF INSULIN: ICD-10-CM

## 2021-12-20 LAB
ANION GAP SERPL CALC-SCNC: 7 MMOL/L (ref 8–16)
BUN SERPL-MCNC: 10 MG/DL (ref 6–20)
CALCIUM SERPL-MCNC: 9.6 MG/DL (ref 8.7–10.5)
CHLORIDE SERPL-SCNC: 100 MMOL/L (ref 95–110)
CO2 SERPL-SCNC: 32 MMOL/L (ref 23–29)
CREAT SERPL-MCNC: 0.9 MG/DL (ref 0.5–1.4)
EST. GFR  (AFRICAN AMERICAN): >60 ML/MIN/1.73 M^2
EST. GFR  (NON AFRICAN AMERICAN): >60 ML/MIN/1.73 M^2
ESTIMATED AVG GLUCOSE: 140 MG/DL (ref 68–131)
GLUCOSE SERPL-MCNC: 144 MG/DL (ref 70–110)
HBA1C MFR BLD: 6.5 % (ref 4–5.6)
POTASSIUM SERPL-SCNC: 3.8 MMOL/L (ref 3.5–5.1)
SODIUM SERPL-SCNC: 139 MMOL/L (ref 136–145)

## 2021-12-20 PROCEDURE — 83036 HEMOGLOBIN GLYCOSYLATED A1C: CPT | Performed by: NURSE PRACTITIONER

## 2021-12-20 PROCEDURE — 36415 COLL VENOUS BLD VENIPUNCTURE: CPT | Mod: PN | Performed by: NURSE PRACTITIONER

## 2021-12-20 PROCEDURE — 80048 BASIC METABOLIC PNL TOTAL CA: CPT | Performed by: NURSE PRACTITIONER

## 2021-12-21 ENCOUNTER — OFFICE VISIT (OUTPATIENT)
Dept: FAMILY MEDICINE | Facility: CLINIC | Age: 51
End: 2021-12-21
Payer: COMMERCIAL

## 2021-12-21 VITALS
SYSTOLIC BLOOD PRESSURE: 128 MMHG | HEIGHT: 67 IN | HEART RATE: 78 BPM | BODY MASS INDEX: 37.65 KG/M2 | WEIGHT: 239.88 LBS | DIASTOLIC BLOOD PRESSURE: 78 MMHG

## 2021-12-21 DIAGNOSIS — J45.41 MODERATE PERSISTENT ASTHMA WITH ACUTE EXACERBATION: ICD-10-CM

## 2021-12-21 DIAGNOSIS — G89.29 CHRONIC MIDLINE LOW BACK PAIN WITHOUT SCIATICA: ICD-10-CM

## 2021-12-21 DIAGNOSIS — Z79.4 TYPE 2 DIABETES MELLITUS WITH DIABETIC POLYNEUROPATHY, WITH LONG-TERM CURRENT USE OF INSULIN: ICD-10-CM

## 2021-12-21 DIAGNOSIS — E11.42 TYPE 2 DIABETES MELLITUS WITH DIABETIC POLYNEUROPATHY, WITH LONG-TERM CURRENT USE OF INSULIN: ICD-10-CM

## 2021-12-21 DIAGNOSIS — I10 ESSENTIAL HYPERTENSION: Primary | ICD-10-CM

## 2021-12-21 DIAGNOSIS — R05.3 CHRONIC COUGH: ICD-10-CM

## 2021-12-21 DIAGNOSIS — M54.50 CHRONIC MIDLINE LOW BACK PAIN WITHOUT SCIATICA: ICD-10-CM

## 2021-12-21 PROCEDURE — 99215 OFFICE O/P EST HI 40 MIN: CPT | Mod: PBBFAC,PN | Performed by: FAMILY MEDICINE

## 2021-12-21 PROCEDURE — 99214 OFFICE O/P EST MOD 30 MIN: CPT | Mod: S$GLB,,, | Performed by: FAMILY MEDICINE

## 2021-12-21 PROCEDURE — 99999 PR PBB SHADOW E&M-EST. PATIENT-LVL V: CPT | Mod: PBBFAC,,, | Performed by: FAMILY MEDICINE

## 2021-12-21 PROCEDURE — 99999 PR PBB SHADOW E&M-EST. PATIENT-LVL V: ICD-10-PCS | Mod: PBBFAC,,, | Performed by: FAMILY MEDICINE

## 2021-12-21 PROCEDURE — 99214 PR OFFICE/OUTPT VISIT, EST, LEVL IV, 30-39 MIN: ICD-10-PCS | Mod: S$GLB,,, | Performed by: FAMILY MEDICINE

## 2021-12-21 RX ORDER — HYDROCODONE BITARTRATE AND ACETAMINOPHEN 7.5; 325 MG/1; MG/1
1 TABLET ORAL EVERY 12 HOURS PRN
Qty: 60 TABLET | Refills: 0 | Status: SHIPPED | OUTPATIENT
Start: 2022-01-03 | End: 2022-03-22 | Stop reason: SDUPTHER

## 2021-12-21 RX ORDER — PROMETHAZINE HYDROCHLORIDE AND CODEINE PHOSPHATE 6.25; 1 MG/5ML; MG/5ML
SOLUTION ORAL
Qty: 240 ML | Refills: 1 | Status: SHIPPED | OUTPATIENT
Start: 2021-01-03 | End: 2022-02-02 | Stop reason: SDUPTHER

## 2021-12-21 RX ORDER — HYDROCODONE BITARTRATE AND ACETAMINOPHEN 7.5; 325 MG/1; MG/1
1 TABLET ORAL EVERY 12 HOURS PRN
Qty: 60 TABLET | Refills: 0 | Status: SHIPPED | OUTPATIENT
Start: 2022-03-03 | End: 2022-03-22 | Stop reason: SDUPTHER

## 2021-12-21 RX ORDER — HYDROCODONE BITARTRATE AND ACETAMINOPHEN 7.5; 325 MG/1; MG/1
1 TABLET ORAL EVERY 12 HOURS PRN
Qty: 60 TABLET | Refills: 0 | Status: SHIPPED | OUTPATIENT
Start: 2022-02-03 | End: 2022-03-22 | Stop reason: SDUPTHER

## 2021-12-23 ENCOUNTER — TELEPHONE (OUTPATIENT)
Dept: RADIOLOGY | Facility: HOSPITAL | Age: 51
End: 2021-12-23
Payer: MEDICAID

## 2021-12-30 RX ORDER — INSULIN LISPRO 100 [IU]/ML
11 INJECTION, SOLUTION INTRAVENOUS; SUBCUTANEOUS
Qty: 15 ML | Refills: 5 | Status: SHIPPED | OUTPATIENT
Start: 2021-12-30 | End: 2022-01-06 | Stop reason: SDUPTHER

## 2022-01-05 ENCOUNTER — PATIENT OUTREACH (OUTPATIENT)
Dept: ADMINISTRATIVE | Facility: OTHER | Age: 52
End: 2022-01-05
Payer: MEDICAID

## 2022-01-05 NOTE — PROGRESS NOTES
Health Maintenance Due   Topic Date Due    Eye Exam  08/23/2020    Cervical Cancer Screening  12/11/2021    Mammogram  01/14/2022     Updates were requested from care everywhere.  Chart was reviewed for overdue Proactive [facility] Encounters (GIANLUCA) topics (CRS, Breast Cancer Screening, Eye exam)  Health Maintenance has been updated.  LINKS immunization registry triggered.  Immunizations were reconciled.

## 2022-01-06 ENCOUNTER — OFFICE VISIT (OUTPATIENT)
Dept: ENDOCRINOLOGY | Facility: CLINIC | Age: 52
End: 2022-01-06
Payer: COMMERCIAL

## 2022-01-06 VITALS
HEIGHT: 67 IN | SYSTOLIC BLOOD PRESSURE: 130 MMHG | BODY MASS INDEX: 37.56 KG/M2 | WEIGHT: 239.31 LBS | HEART RATE: 104 BPM | DIASTOLIC BLOOD PRESSURE: 70 MMHG

## 2022-01-06 DIAGNOSIS — Z79.4 TYPE 2 DIABETES MELLITUS WITH MICROALBUMINURIA, WITH LONG-TERM CURRENT USE OF INSULIN: Primary | ICD-10-CM

## 2022-01-06 DIAGNOSIS — E66.9 OBESITY (BMI 30-39.9): ICD-10-CM

## 2022-01-06 DIAGNOSIS — E11.29 TYPE 2 DIABETES MELLITUS WITH MICROALBUMINURIA, WITH LONG-TERM CURRENT USE OF INSULIN: Primary | ICD-10-CM

## 2022-01-06 DIAGNOSIS — I10 ESSENTIAL HYPERTENSION: ICD-10-CM

## 2022-01-06 DIAGNOSIS — R80.9 TYPE 2 DIABETES MELLITUS WITH MICROALBUMINURIA, WITH LONG-TERM CURRENT USE OF INSULIN: Primary | ICD-10-CM

## 2022-01-06 DIAGNOSIS — E78.2 MIXED HYPERLIPIDEMIA: ICD-10-CM

## 2022-01-06 PROCEDURE — 99214 PR OFFICE/OUTPT VISIT, EST, LEVL IV, 30-39 MIN: ICD-10-PCS | Mod: S$GLB,,, | Performed by: NURSE PRACTITIONER

## 2022-01-06 PROCEDURE — 95251 CONT GLUC MNTR ANALYSIS I&R: CPT | Mod: S$GLB,,, | Performed by: NURSE PRACTITIONER

## 2022-01-06 PROCEDURE — 99999 PR PBB SHADOW E&M-EST. PATIENT-LVL V: CPT | Mod: PBBFAC,,, | Performed by: NURSE PRACTITIONER

## 2022-01-06 PROCEDURE — 99999 PR PBB SHADOW E&M-EST. PATIENT-LVL V: ICD-10-PCS | Mod: PBBFAC,,, | Performed by: NURSE PRACTITIONER

## 2022-01-06 PROCEDURE — 95251 PR GLUCOSE MONITOR, 72 HOUR, PHYS INTERP: ICD-10-PCS | Mod: S$GLB,,, | Performed by: NURSE PRACTITIONER

## 2022-01-06 PROCEDURE — 99215 OFFICE O/P EST HI 40 MIN: CPT | Mod: PBBFAC,PN | Performed by: NURSE PRACTITIONER

## 2022-01-06 PROCEDURE — 99214 OFFICE O/P EST MOD 30 MIN: CPT | Mod: S$GLB,,, | Performed by: NURSE PRACTITIONER

## 2022-01-06 RX ORDER — INSULIN ASPART 100 [IU]/ML
12 INJECTION, SOLUTION INTRAVENOUS; SUBCUTANEOUS
Qty: 2 EACH | Refills: 6
Start: 2022-01-06 | End: 2022-04-05

## 2022-01-06 NOTE — PROGRESS NOTES
CC: Ms. Liza Mesa arrives today for management of Type 2 DM and review of chronic medical conditions, as listed in the Visit Diagnosis section of this encounter.       HPI: Ms. Liza Mesa was diagnosed with Type 2 DM in 2005. She was diagnosed based on lab work. Initial treatment consisted of metformin. Glimepiride later added. Victoza to her medication regimen in 2016. Insulin added in 2018. + FH of DM on mother. Denies hospitalizations due to DM.     Patient was last seen by me in October. Both insulin doses were decreased at this time.    BG monitoring per [facility]. Her insurance is covering this through her pharmacy benefit.     Hypoglycemia: Rare   Symptoms: jittery, tingling  Treatment: peppermint or jelly beans     Missing Insulin/PO medication doses: No     Dietary Habits: Eats 2 meals/day. Skips breakfast or may only eat a banana. Denies snacking. Avoids sugary beverages.      Last DM education appointment:  8/2016        CURRENT DIABETIC MEDS: metformin  mg daily, Victoza 1.8 mg daily, Lantus 30 units QHS, Novolog 14 units with lunch and dinner + correction scale, target 150, ISF 25  Glucometer type: Accucheck Adrienne     Previous DM treatments:  Lantus - not covered by insurance   Glimepiride   Acarbose  Metformin XR >500 mg -- diarrhea  VGo - didn't like wearing device    Last Eye Exam: 7/2021, no DR per patient. [facility]   Last Podiatry Exam: 11/2021, Dr. Olvera    REVIEW OF SYSTEMS  Constitutional: no c/o fatigue, weakness, weight loss. + weight gain   Cardiac: no palpitations or chest pain.  Respiratory: + chronic dyspnea, alternating productive/nonproductive cough related to asthma. Uses albuterol nebulizer PRN.  GI: no c/o abdominal pain or nausea. Denies h/o pancreatitis.   : + urinary incontinence.   Skin: no rashes, lesions.   Neuro: denies numbness, tingling, paresthesias.   Endocrine: denies polyphagia, polydipsia, polyuria.       Personally reviewed  "Past Medical, Surgical, Social History.    Vital Signs  /70   Pulse 104   Ht 5' 7" (1.702 m)   Wt 108.6 kg (239 lb 5 oz)   LMP 11/21/2019   BMI 37.48 kg/m²     Personally reviewed the below labs:    Hemoglobin A1C   Date Value Ref Range Status   12/20/2021 6.5 (H) 4.0 - 5.6 % Final     Comment:     ADA Screening Guidelines:  5.7-6.4%  Consistent with prediabetes  >or=6.5%  Consistent with diabetes    High levels of fetal hemoglobin interfere with the HbA1C  assay. Heterozygous hemoglobin variants (HbS, HgC, etc)do  not significantly interfere with this assay.   However, presence of multiple variants may affect accuracy.     09/29/2021 8.1 (H) 4.0 - 5.6 % Final     Comment:     ADA Screening Guidelines:  5.7-6.4%  Consistent with prediabetes  >or=6.5%  Consistent with diabetes    High levels of fetal hemoglobin interfere with the HbA1C  assay. Heterozygous hemoglobin variants (HbS, HgC, etc)do  not significantly interfere with this assay.   However, presence of multiple variants may affect accuracy.     06/25/2021 10.9 (H) 4.0 - 5.6 % Final     Comment:     ADA Screening Guidelines:  5.7-6.4%  Consistent with prediabetes  >or=6.5%  Consistent with diabetes    High levels of fetal hemoglobin interfere with the HbA1C  assay. Heterozygous hemoglobin variants (HbS, HgC, etc)do  not significantly interfere with this assay.   However, presence of multiple variants may affect accuracy.     05/03/2012 7.1 (H) 4.8 - 5.9 % Final     Comment:     **In order to standardize %HbA1c results worldwide, as of October 11, 2010,  the %HbA1c is being calculated using the master equation recommended in the  consensus statement adopted by the ADA (American Diabetes Assoc), EASD  (European Assoc for the Study of Diabetes), IFCC (International Federation  of Clinical Chemistry and Laboratory Medicine) and IDF (International  Diabetes Federation). Result units: %HgbA1c (DCCT/NGSP).  In common with other methods, Hb A1C values may " not accurately reflect mean  blood glucose in patients with hemoglobin variants (HgbF, HgbS and HgbC).  Any cause of shortened erythrocyte survival will reduce exposure of  erythrocytes to glucose with a consequent decrease in HbA1c (%) values, even  though the time-averaged blood glucose level may be elevated. Causes of  shortened erythrocyte lifetime might be hemolytic anemia or other hemolytic  diseases, homozygous sickle cell trait, pregnancy, recent significant or  chronic blood loss, etc. Caution should be used when interpreting the HbA1c  results from patients with these conditions.       Chemistry        Component Value Date/Time     12/20/2021 1358    K 3.8 12/20/2021 1358     12/20/2021 1358    CO2 32 (H) 12/20/2021 1358    BUN 10 12/20/2021 1358    CREATININE 0.9 12/20/2021 1358    CREATININE 1.0 05/06/2012 0431     (H) 12/20/2021 1358        Component Value Date/Time    CALCIUM 9.6 12/20/2021 1358    CALCIUM 9.5 05/06/2012 0431    ALKPHOS 70 09/29/2021 0919    ALKPHOS 49 05/03/2012 1635    AST 14 09/29/2021 0919    AST 13 05/03/2012 1635    ALT 13 09/29/2021 0919    BILITOT 0.3 09/29/2021 0919          Lab Results   Component Value Date    CHOL 222 (H) 03/19/2021    CHOL 194 02/07/2020    CHOL 219 (H) 07/26/2019     Lab Results   Component Value Date    HDL 59 03/19/2021    HDL 58 02/07/2020    HDL 68 07/26/2019     Lab Results   Component Value Date    LDLCALC 135.8 03/19/2021    LDLCALC 110.2 02/07/2020    LDLCALC 131.2 07/26/2019     Lab Results   Component Value Date    TRIG 136 03/19/2021    TRIG 129 02/07/2020    TRIG 99 07/26/2019     Lab Results   Component Value Date    CHOLHDL 26.6 03/19/2021    CHOLHDL 29.9 02/07/2020    CHOLHDL 31.1 07/26/2019       Lab Results   Component Value Date    MICALBCREAT 65.5 (H) 06/25/2021     Lab Results   Component Value Date    TSH 1.632 07/26/2019       CrCl cannot be calculated (Patient's most recent lab result is older than the maximum 7  days allowed.).    No results found for: [id number]         PHYSICAL EXAMINATION  Constitutional: Appears well, no distress.  Neck: Supple, trachea midline.  Respiratory: CTA, even and unlabored.   Cardiovascular: RRR, no murmurs.  GI: active bowel sounds, no hernia  Skin: warm and dry  Neuro: oriented to person, place, time  Feet: appropriate footwear.     [email or web address] BAKARI 2 DOWNLOAD: See media file for details. Fasting glucoses are acceptable. Infrequent prandial excursions. Hypoglycemia occurring in the afternoon/evening. No nocturnal hypo.  Average glucose: 137 mg/dL  Above 250 mg/dL: 2 %  181-250 mg/dL: 12 %   mg/dL: 83 %  54-69 mg/dL: 3 %  Below 54 mg/dL: 0 %        Goals      HEMOGLOBIN A1C < 7             Assessment/Plan  1. Type 2 diabetes mellitus with microalbuminuria, with long-term current use of insulin  -- A1c at goal. Appears to need less prandial insulin.  -- decrease Novolog to 12 units with meals + sliding scale.   -- continue Lantus 30 units QHS  -- continue Victoza, metformin XR    -- Discussed diagnosis of DM, A1c goals, progression of disease, long term complications and tx options.  Advised patient to check BG before activities, such as driving or exercise.  -- Reviewed hypoglycemia management: treat with 1/2 glass of juice, 1/2 can regular coke, or 4 glucose tablets. Monitor and repeat treatment every 15 minutes until BG is >70 Then have a snack, which includes a complex carbohydrate and protein.   2. Essential hypertension  -- controlled  -- continue amlodipine.   -- Losartan was discontinued by PCP due to patient's request to decrease amount of pills that she was taking.    3. Mixed hyperlipidemia  -- uncontrolled  -- now taking atorvastatin   4. Obesity (BMI 30-39.9)  -- uncontrolled  Body mass index is 37.48 kg/m².       FOLLOW UP  Follow up in about 3 months (around 4/6/2022).   Patient instructed to bring BG logs to each follow up   Patient encouraged to call for any  BG/medication issues, concerns, or questions.      Orders Placed This Encounter   Procedures    Hemoglobin A1C    Comprehensive Metabolic Panel    Lipid Panel

## 2022-01-07 ENCOUNTER — TELEPHONE (OUTPATIENT)
Dept: RADIOLOGY | Facility: HOSPITAL | Age: 52
End: 2022-01-07
Payer: MEDICAID

## 2022-01-07 NOTE — TELEPHONE ENCOUNTER
Patient called regarding orders from Dr. Hooker for annual mammogram, which were received but is for diagnostic bilateral mammogram, not screening mammogram. Patient denies any issues with her breasts, therefore, placed orders for screening mammogram under her PCP Dr. Arrieta (who has ordered mammograms previously) and cc'd Dr. Hooker as patient did not want to wait for additional orders from Dr. Hooker' office.    Scheduled for 1/28/22 at 11 am

## 2022-01-26 ENCOUNTER — TELEPHONE (OUTPATIENT)
Dept: FAMILY MEDICINE | Facility: CLINIC | Age: 52
End: 2022-01-26
Payer: MEDICAID

## 2022-01-26 NOTE — TELEPHONE ENCOUNTER
----- Message from Ciera Chu MA sent at 1/26/2022  3:20 PM CST -----  Type: Needs Medical Advice  Who Called:  Liza  Enoc Call Back Number: [phone]  Additional Information: patient would like to schedule an appointment.  She is reporting numbness and tingling in her right hand, predominately the fourth and fifth digits.  Please call patients to discuss

## 2022-01-28 ENCOUNTER — HOSPITAL ENCOUNTER (OUTPATIENT)
Dept: RADIOLOGY | Facility: HOSPITAL | Age: 52
Discharge: HOME | End: 2022-01-28
Attending: FAMILY MEDICINE
Payer: COMMERCIAL

## 2022-01-28 DIAGNOSIS — Z12.31 SCREENING MAMMOGRAM FOR BREAST CANCER: ICD-10-CM

## 2022-01-28 PROCEDURE — 77063 BREAST TOMOSYNTHESIS BI: CPT | Mod: TC,PO

## 2022-01-28 PROCEDURE — 77067 MAMMO DIGITAL SCREENING BILAT WITH TOMO: ICD-10-PCS | Mod: 26,,, | Performed by: RADIOLOGY

## 2022-01-28 PROCEDURE — 77063 BREAST TOMOSYNTHESIS BI: CPT | Mod: 26,,, | Performed by: RADIOLOGY

## 2022-01-28 PROCEDURE — 77067 SCR MAMMO BI INCL CAD: CPT | Mod: TC,PO

## 2022-01-28 PROCEDURE — 77063 MAMMO DIGITAL SCREENING BILAT WITH TOMO: ICD-10-PCS | Mod: 26,,, | Performed by: RADIOLOGY

## 2022-01-28 PROCEDURE — 77067 SCR MAMMO BI INCL CAD: CPT | Mod: 26,,, | Performed by: RADIOLOGY

## 2022-01-31 ENCOUNTER — TELEPHONE (OUTPATIENT)
Dept: FAMILY MEDICINE | Facility: CLINIC | Age: 52
End: 2022-01-31
Payer: MEDICAID

## 2022-01-31 NOTE — TELEPHONE ENCOUNTER
----- Message from Christopher Mae sent at 1/28/2022  4:44 PM CST -----  Contact: pt  Type: Needs Medical Advice    Who Called: pt  Best Call Back Number:[phone]    Requesting a call back regarding - pt is asking for call back please had a missed call no msg.  Please Advise- Thank you

## 2022-01-31 NOTE — TELEPHONE ENCOUNTER
----- Message from Becky Gong sent at 1/28/2022  4:51 PM CST -----  Contact: Patient  Type:  Patient Returning Call    Who Called: Patient     Who Left Message for Patient: Radha Colvin,    Does the patient know what this is regarding?: appt     Would the patient rather a call back or a response via [facility]?  Call    Best Call Back Number: [phone] (home)     Additional Information:

## 2022-02-02 ENCOUNTER — OFFICE VISIT (OUTPATIENT)
Dept: FAMILY MEDICINE | Facility: CLINIC | Age: 52
End: 2022-02-02
Payer: COMMERCIAL

## 2022-02-02 VITALS
HEART RATE: 82 BPM | SYSTOLIC BLOOD PRESSURE: 138 MMHG | HEIGHT: 67 IN | WEIGHT: 245.56 LBS | DIASTOLIC BLOOD PRESSURE: 78 MMHG | BODY MASS INDEX: 38.54 KG/M2

## 2022-02-02 DIAGNOSIS — Z79.4 TYPE 2 DIABETES MELLITUS WITH DIABETIC POLYNEUROPATHY, WITH LONG-TERM CURRENT USE OF INSULIN: ICD-10-CM

## 2022-02-02 DIAGNOSIS — I10 ESSENTIAL HYPERTENSION: ICD-10-CM

## 2022-02-02 DIAGNOSIS — G56.01 CARPAL TUNNEL SYNDROME OF RIGHT WRIST: Primary | ICD-10-CM

## 2022-02-02 DIAGNOSIS — E11.42 TYPE 2 DIABETES MELLITUS WITH DIABETIC POLYNEUROPATHY, WITH LONG-TERM CURRENT USE OF INSULIN: ICD-10-CM

## 2022-02-02 PROCEDURE — 99215 OFFICE O/P EST HI 40 MIN: CPT | Mod: PBBFAC,PN | Performed by: FAMILY MEDICINE

## 2022-02-02 PROCEDURE — 99999 PR PBB SHADOW E&M-EST. PATIENT-LVL V: ICD-10-PCS | Mod: PBBFAC,,, | Performed by: FAMILY MEDICINE

## 2022-02-02 PROCEDURE — 99213 PR OFFICE/OUTPT VISIT, EST, LEVL III, 20-29 MIN: ICD-10-PCS | Mod: S$GLB,,, | Performed by: FAMILY MEDICINE

## 2022-02-02 PROCEDURE — 99999 PR PBB SHADOW E&M-EST. PATIENT-LVL V: CPT | Mod: PBBFAC,,, | Performed by: FAMILY MEDICINE

## 2022-02-02 PROCEDURE — 99213 OFFICE O/P EST LOW 20 MIN: CPT | Mod: S$GLB,,, | Performed by: FAMILY MEDICINE

## 2022-02-02 RX ORDER — PROMETHAZINE HYDROCHLORIDE AND CODEINE PHOSPHATE 6.25; 1 MG/5ML; MG/5ML
SOLUTION ORAL
Qty: 240 ML | Refills: 1 | Status: SHIPPED | OUTPATIENT
Start: 2022-02-19 | End: 2022-03-22 | Stop reason: SDUPTHER

## 2022-02-02 NOTE — PATIENT INSTRUCTIONS
"Patient Education       Carpal Tunnel Syndrome   The Basics   Written by the doctors and editors at [facility]   What is carpal tunnel syndrome? -- Carpal tunnel syndrome is a condition that causes pain, numbness, and sometimes weakness in the fingers and hands. It happens when a nerve in the wrist called the "median nerve" gets pinched or squeezed.  The median nerve goes through a tunnel in the wrist. This tunnel is formed by the bones of the wrist and a tough band of tissue called a "ligament" (figure 1). Experts do not know exactly how the nerve can get pinched. But they think it might happen when:  · Tendons that go through the same tunnel get swollen (tendons are bands of tissue that connect muscles to bones)  · Tissues surrounding the tendons harden or get swollen  · People use their hands for work that involves repetitive or forceful movements  The median nerve carries signals about sensation from the hand to the brain. Then it sends signals from the brain to the muscles. In other words, it helps tell the brain what the hand is feeling and makes the muscles of the hand move. The nerve is connected to these parts of the hand:  · Thumb  · Index finger  · Middle finger  · Parts of the ring finger  · Parts of the palm closest to the thumb  Women are more likely than men to get carpal tunnel syndrome. Being overweight probably increases the risk of carpal tunnel syndrome. Certain health conditions also might increase the risk, including diabetes and rheumatoid arthritis. Women who are pregnant are also more likely to get carpal tunnel syndrome, but it usually goes away after the baby is born.  What are the symptoms of carpal tunnel syndrome? -- The symptoms include pain and tingling in the thumb and the index, middle, and ring fingers (figure 1). Symptoms are typically worst at night and can wake you up from sleep. Often the symptoms affect both hands, but one hand might have worse symptoms than the other.  In some " cases, the muscles of the hand, thumb, or fingers can be weak or feel clumsy. In other cases, pain and tingling can extend to the whole hand or even up to the wrist and forearm. Rarely, pain and tingling extends past the elbow to the shoulder.  The symptoms can also flare up when you do things that involve bending and unbending your wrist or raising your arms. Some activities can trigger symptoms in people with carpal tunnel syndrome. But they do not actually cause the condition. Examples include:  · Driving  · Reading  · Typing  · Holding a phone  · Sleeping in certain positions  In many people, symptoms come and go. But some people eventually have symptoms all the time. They can end up having trouble moving their fingers or controlling their .  Is there a test for carpal tunnel syndrome? -- Yes. Electrical tests of the nerves can show if you have carpal tunnel syndrome, but these tests are not always necessary.  Your doctor will probably be able to tell if you have carpal tunnel syndrome by learning about your symptoms and doing an exam. During the exam, they might tap on or press on your wrist, or ask you to hold your hands in ways that are known to make symptoms worse.  Your doctor might also order electrical nerve tests. These tests can confirm that you have carpal tunnel syndrome. They include:  · Nerve conduction studies - Nerve conduction studies can show whether the median nerve is carrying electrical signals the right way. In people with carpal tunnel syndrome, signals can be slow or weak.  · Electromyography - Electromyography, also called EMG, can show whether the muscles in the hand and wrist are responding the right way to electrical signals. This test is most useful in figuring out if symptoms are related to carpal tunnel syndrome or another problem.  Should I see a doctor or nurse? -- Yes. See your doctor or nurse if you have the symptoms described above, and they bother you.  How is carpal  "tunnel syndrome treated? -- Treatments are often combined and can include:  · Wrist splints - Some people feel better if they wear splints at night that keep their hands in a "neutral position." The neutral position is when the wrist is not bent forward or backward and the fingers are curled naturally toward the palm.  Doctors often suggest splints for women who get carpal tunnel syndrome during pregnancy. They usually don't need other treatments, since in most cases, symptoms improve after the baby is born.  · Steroid shots or pills - Steroids are a group of medicines that control inflammation and swelling. To treat carpal tunnel syndrome, doctors sometimes inject steroids into the carpal tunnel. People who do not want to get a shot can take steroids in pill form instead. But the pills are less effective than the shot.  · Other physical treatments - Some people find that yoga helps with their symptoms. There is also weak evidence that something called "ultrasound therapy" might help in some cases. This involves using sound waves to try to treat symptoms. Other treatments such as "nerve gliding" and "carpal bone mobilization" are also sometimes helpful. These treatments are done by a physical or occupational therapist and involve moving the bones in your wrist around in a special way.  · Surgery - Doctors offer surgery to people who have ongoing or severe nerve damage that is causing the symptoms of carpal tunnel syndrome. Surgery for carpal tunnel syndrome involves cutting the ligament that stretches across the wrist to form the tunnel.  Can carpal tunnel syndrome be prevented? -- It's unclear whether there is any way to prevent carpal tunnel syndrome. People sometimes think that the condition happens because they use a computer too much. But studies have shown that computer use is probably not related to carpal tunnel syndrome.  All topics are updated as new evidence becomes available and our peer review process " "is complete.  This topic retrieved from [employer] on: Sep 21, 2021.  Topic 91801 Version 9.0  Release: 29.4.2 - C29.263  © 2021 UpToDate, Inc. and/or its affiliates. All rights reserved.  figure 1: Carpal tunnel syndrome     The carpal tunnel is a tunnel in the wrist that is formed by the bones of the wrist and a tough band of tissue called a "ligament." Carpal tunnel syndrome happens when a nerve that goes through that tunnel, called the "median nerve," gets pinched or squeezed. Carpal tunnel syndrome causes pain and numbness most often in the areas shaded here in blue.  Graphic 45235 Version 2.0    Consumer Information Use and Disclaimer   This information is not specific medical advice and does not replace information you receive from your health care provider. This is only a brief summary of general information. It does NOT include all information about conditions, illnesses, injuries, tests, procedures, treatments, therapies, discharge instructions or life-style choices that may apply to you. You must talk with your health care provider for complete information about your health and treatment options. This information should not be used to decide whether or not to accept your health care provider's advice, instructions or recommendations. Only your health care provider has the knowledge and training to provide advice that is right for you. The use of this information is governed by the [employer] End User License Agreement, available at https://www.[employer].TELOS/en/solutions/EMcube/about/aamir.The use of [employer] content is governed by the [employer] Terms of Use. ©2021 UpToDate, Inc. All rights reserved.  Copyright   © 2021 UpToDate, Inc. and/or its affiliates. All rights reserved.    "

## 2022-02-02 NOTE — PROGRESS NOTES
"Subjective:       Patient ID: Liza Spain Cousin is a 51 y.o. female.    Chief Complaint: Numbness (3-4 fingertips L hand, numbness. "Feels like they are burnt when touching anything". S/S started 1/12/22)    She has been having numbness and tingling of the right 3rd and 4th fingers for the past 3 weeks.  It feels like she touched something very hot and it still feels burning and tingling.  The fingers feel heavy.  She also mentions a left thumb that catches in flexion.  That just happens on occasion.  She is an insulin-requiring diabetic and her glucose control has improved.  Her blood pressure has been fairly well controlled on amlodipine 10 mg. We stopped losartan to simplify her medication regimen.  She is also asking for refill of cough medicine which is promethazine with codeine.  She got it filled 1 week ago.  She is afraid that it will run out before her next appointment with me.    Review of Systems   Constitutional: Positive for unexpected weight change (She has gained 6 lb).   Respiratory: Positive for cough. Negative for shortness of breath.    Cardiovascular: Negative for chest pain.   Neurological: Positive for numbness. Negative for weakness.         Objective:     Blood pressure 138/78, pulse 82, height 5' 7" (1.702 m), weight 111.4 kg (245 lb 9.5 oz), last menstrual period 11/21/2019.      Physical Exam  Constitutional:       General: She is not in acute distress.     Appearance: She is obese.   Cardiovascular:      Rate and Rhythm: Normal rate and regular rhythm.   Pulmonary:      Effort: No respiratory distress.      Breath sounds: Rhonchi present.   Musculoskeletal:      Comments: She has a dorsal scar on her right wrist where there was a ganglion excision.  Full range of room motion of both wrists with good strength.   Neurological:      Mental Status: She is alert.      Comments: Tinel's is mildly positive on the right.  Sensation to light touch, monofilament, vibration is intact and symmetric.  " Negative Spurling's.         Assessment:       Problem List Items Addressed This Visit     Type 2 diabetes mellitus with diabetic polyneuropathy, with long-term current use of insulin    Essential hypertension      Other Visit Diagnoses     Carpal tunnel syndrome of right wrist    -  Primary          Plan:       I gave her information on carpal tunnel syndrome.  She should wear a right wrist brace especially at night.  I will refill her cough medicine as of February 19th.

## 2022-02-09 ENCOUNTER — PATIENT OUTREACH (OUTPATIENT)
Dept: ADMINISTRATIVE | Facility: HOSPITAL | Age: 52
End: 2022-02-09
Payer: MEDICAID

## 2022-03-22 ENCOUNTER — OFFICE VISIT (OUTPATIENT)
Dept: FAMILY MEDICINE | Facility: CLINIC | Age: 52
End: 2022-03-22
Payer: COMMERCIAL

## 2022-03-22 VITALS
SYSTOLIC BLOOD PRESSURE: 130 MMHG | HEIGHT: 67 IN | BODY MASS INDEX: 39.43 KG/M2 | DIASTOLIC BLOOD PRESSURE: 88 MMHG | WEIGHT: 251.19 LBS | HEART RATE: 80 BPM

## 2022-03-22 DIAGNOSIS — I10 ESSENTIAL HYPERTENSION: ICD-10-CM

## 2022-03-22 DIAGNOSIS — E66.9 OBESITY (BMI 30-39.9): ICD-10-CM

## 2022-03-22 DIAGNOSIS — M54.50 CHRONIC MIDLINE LOW BACK PAIN WITHOUT SCIATICA: Primary | ICD-10-CM

## 2022-03-22 DIAGNOSIS — Z79.4 TYPE 2 DIABETES MELLITUS WITH DIABETIC POLYNEUROPATHY, WITH LONG-TERM CURRENT USE OF INSULIN: ICD-10-CM

## 2022-03-22 DIAGNOSIS — E11.42 TYPE 2 DIABETES MELLITUS WITH DIABETIC POLYNEUROPATHY, WITH LONG-TERM CURRENT USE OF INSULIN: ICD-10-CM

## 2022-03-22 DIAGNOSIS — G89.29 CHRONIC MIDLINE LOW BACK PAIN WITHOUT SCIATICA: Primary | ICD-10-CM

## 2022-03-22 DIAGNOSIS — G43.709 CHRONIC MIGRAINE WITHOUT AURA WITHOUT STATUS MIGRAINOSUS, NOT INTRACTABLE: ICD-10-CM

## 2022-03-22 DIAGNOSIS — J45.41 MODERATE PERSISTENT ASTHMA WITH ACUTE EXACERBATION: ICD-10-CM

## 2022-03-22 DIAGNOSIS — R05.3 CHRONIC COUGH: ICD-10-CM

## 2022-03-22 PROCEDURE — 99214 PR OFFICE/OUTPT VISIT, EST, LEVL IV, 30-39 MIN: ICD-10-PCS | Mod: S$GLB,,, | Performed by: FAMILY MEDICINE

## 2022-03-22 PROCEDURE — 99214 OFFICE O/P EST MOD 30 MIN: CPT | Mod: S$GLB,,, | Performed by: FAMILY MEDICINE

## 2022-03-22 PROCEDURE — 99999 PR PBB SHADOW E&M-EST. PATIENT-LVL IV: ICD-10-PCS | Mod: PBBFAC,,, | Performed by: FAMILY MEDICINE

## 2022-03-22 PROCEDURE — 99214 OFFICE O/P EST MOD 30 MIN: CPT | Mod: PBBFAC,PN | Performed by: FAMILY MEDICINE

## 2022-03-22 PROCEDURE — 99999 PR PBB SHADOW E&M-EST. PATIENT-LVL IV: CPT | Mod: PBBFAC,,, | Performed by: FAMILY MEDICINE

## 2022-03-22 RX ORDER — HYDROCODONE BITARTRATE AND ACETAMINOPHEN 7.5; 325 MG/1; MG/1
1 TABLET ORAL EVERY 12 HOURS PRN
Qty: 60 TABLET | Refills: 0 | Status: SHIPPED | OUTPATIENT
Start: 2022-06-02 | End: 2022-06-30

## 2022-03-22 RX ORDER — HYDROCODONE BITARTRATE AND ACETAMINOPHEN 7.5; 325 MG/1; MG/1
1 TABLET ORAL EVERY 12 HOURS PRN
Qty: 60 TABLET | Refills: 0 | Status: SHIPPED | OUTPATIENT
Start: 2022-04-02 | End: 2022-06-30 | Stop reason: SDUPTHER

## 2022-03-22 RX ORDER — SUMATRIPTAN SUCCINATE 100 MG/1
100 TABLET ORAL ONCE AS NEEDED
Qty: 6 TABLET | Refills: 5 | Status: SHIPPED | OUTPATIENT
Start: 2022-03-22 | End: 2023-04-12 | Stop reason: SDUPTHER

## 2022-03-22 RX ORDER — PROMETHAZINE HYDROCHLORIDE AND CODEINE PHOSPHATE 6.25; 1 MG/5ML; MG/5ML
SOLUTION ORAL
Qty: 240 ML | Refills: 1 | Status: SHIPPED | OUTPATIENT
Start: 2022-03-22 | End: 2022-06-30

## 2022-03-22 RX ORDER — HYDROCODONE BITARTRATE AND ACETAMINOPHEN 7.5; 325 MG/1; MG/1
1 TABLET ORAL EVERY 12 HOURS PRN
Qty: 60 TABLET | Refills: 0 | Status: SHIPPED | OUTPATIENT
Start: 2022-05-02 | End: 2022-06-30

## 2022-03-22 NOTE — PROGRESS NOTES
"Subjective:       Patient ID: Liza Spain Cousin is a 51 y.o. female.    Chief Complaint:  Follow-up control drugs  She takes Lortab twice a day to help deal with the back pain.  She has been on a stable dose with no side effects.  She has a chronic cough which causes syncope when it is severe.  She takes Phenergan with codeine intermittently.  She has moderate persistent asthma.  She takes Advair plus albuterol.  Her diabetes is managed by Nhung Michael.  Her hemoglobin A1c had improved to 6.5%.  She is due for lab follow-up in 2 weeks.  She reports some recent migraines and she is out of Imitrex.  The migraines cause photophobia and an interruption in her routine.    Review of Systems   Constitutional: Positive for unexpected weight change (She has gained weight). Negative for activity change.   Respiratory: Positive for cough. Negative for shortness of breath.    Cardiovascular: Negative for chest pain and palpitations.   Musculoskeletal: Positive for back pain.         Objective:     Blood pressure 130/88, pulse 80, height 5' 7" (1.702 m), weight 114 kg (251 lb 3.4 oz), last menstrual period 11/21/2019.      Physical Exam  Constitutional:       General: She is not in acute distress.     Appearance: She is obese.   Cardiovascular:      Rate and Rhythm: Normal rate and regular rhythm.      Pulses: Normal pulses.           Dorsalis pedis pulses are 2+ on the right side and 2+ on the left side.        Posterior tibial pulses are 2+ on the right side and 2+ on the left side.      Heart sounds: No murmur heard.  Pulmonary:      Effort: No respiratory distress.      Breath sounds: No wheezing.   Musculoskeletal:      Right foot: No deformity.      Left foot: No deformity.   Feet:      Right foot:      Protective Sensation: 4 sites tested. 4 sites sensed.      Toenail Condition: Fungal disease present.     Left foot:      Protective Sensation: 4 sites tested. 4 sites sensed.      Toenail Condition: Fungal disease " present.  Lymphadenopathy:      Cervical: No cervical adenopathy.   Neurological:      Mental Status: She is alert.         Assessment:       Problem List Items Addressed This Visit     Type 2 diabetes mellitus with diabetic polyneuropathy, with long-term current use of insulin    Essential hypertension    Midline low back pain without sciatica - Primary    Obesity (BMI 30-39.9)    Chronic cough    Moderate persistent asthma with acute exacerbation      Other Visit Diagnoses     Chronic migraine without aura without status migrainosus, not intractable              Plan:       I refilled her Lortab and Phenergan with codeine.  Continue diabetes management with Nhung Michael.

## 2022-03-30 ENCOUNTER — LAB VISIT (OUTPATIENT)
Dept: LAB | Facility: HOSPITAL | Age: 52
End: 2022-03-30
Attending: NURSE PRACTITIONER
Payer: COMMERCIAL

## 2022-03-30 DIAGNOSIS — Z79.4 TYPE 2 DIABETES MELLITUS WITH MICROALBUMINURIA, WITH LONG-TERM CURRENT USE OF INSULIN: ICD-10-CM

## 2022-03-30 DIAGNOSIS — E11.29 TYPE 2 DIABETES MELLITUS WITH MICROALBUMINURIA, WITH LONG-TERM CURRENT USE OF INSULIN: ICD-10-CM

## 2022-03-30 DIAGNOSIS — R80.9 TYPE 2 DIABETES MELLITUS WITH MICROALBUMINURIA, WITH LONG-TERM CURRENT USE OF INSULIN: ICD-10-CM

## 2022-03-30 LAB
ALBUMIN SERPL BCP-MCNC: 3.7 G/DL (ref 3.5–5.2)
ALP SERPL-CCNC: 74 U/L (ref 55–135)
ALT SERPL W/O P-5'-P-CCNC: 13 U/L (ref 10–44)
ANION GAP SERPL CALC-SCNC: 8 MMOL/L (ref 8–16)
AST SERPL-CCNC: 17 U/L (ref 10–40)
BILIRUB SERPL-MCNC: 0.3 MG/DL (ref 0.1–1)
BUN SERPL-MCNC: 14 MG/DL (ref 6–20)
CALCIUM SERPL-MCNC: 10.2 MG/DL (ref 8.7–10.5)
CHLORIDE SERPL-SCNC: 102 MMOL/L (ref 95–110)
CHOLEST SERPL-MCNC: 230 MG/DL (ref 120–199)
CHOLEST/HDLC SERPL: 4.4 {RATIO} (ref 2–5)
CO2 SERPL-SCNC: 31 MMOL/L (ref 23–29)
CREAT SERPL-MCNC: 0.9 MG/DL (ref 0.5–1.4)
EST. GFR  (AFRICAN AMERICAN): >60 ML/MIN/1.73 M^2
EST. GFR  (NON AFRICAN AMERICAN): >60 ML/MIN/1.73 M^2
ESTIMATED AVG GLUCOSE: 160 MG/DL (ref 68–131)
GLUCOSE SERPL-MCNC: 103 MG/DL (ref 70–110)
HBA1C MFR BLD: 7.2 % (ref 4–5.6)
HDLC SERPL-MCNC: 52 MG/DL (ref 40–75)
HDLC SERPL: 22.6 % (ref 20–50)
LDLC SERPL CALC-MCNC: 156.6 MG/DL (ref 63–159)
NONHDLC SERPL-MCNC: 178 MG/DL
POTASSIUM SERPL-SCNC: 4.3 MMOL/L (ref 3.5–5.1)
PROT SERPL-MCNC: 7.4 G/DL (ref 6–8.4)
SODIUM SERPL-SCNC: 141 MMOL/L (ref 136–145)
TRIGL SERPL-MCNC: 107 MG/DL (ref 30–150)

## 2022-03-30 PROCEDURE — 80053 COMPREHEN METABOLIC PANEL: CPT | Performed by: NURSE PRACTITIONER

## 2022-03-30 PROCEDURE — 80061 LIPID PANEL: CPT | Performed by: NURSE PRACTITIONER

## 2022-03-30 PROCEDURE — 83036 HEMOGLOBIN GLYCOSYLATED A1C: CPT | Performed by: NURSE PRACTITIONER

## 2022-03-30 PROCEDURE — 36415 COLL VENOUS BLD VENIPUNCTURE: CPT | Mod: PN | Performed by: NURSE PRACTITIONER

## 2022-04-05 ENCOUNTER — OFFICE VISIT (OUTPATIENT)
Dept: ENDOCRINOLOGY | Facility: CLINIC | Age: 52
End: 2022-04-05
Payer: COMMERCIAL

## 2022-04-05 VITALS
BODY MASS INDEX: 39.26 KG/M2 | DIASTOLIC BLOOD PRESSURE: 76 MMHG | SYSTOLIC BLOOD PRESSURE: 130 MMHG | HEART RATE: 91 BPM | WEIGHT: 250.13 LBS | HEIGHT: 67 IN

## 2022-04-05 DIAGNOSIS — E11.29 TYPE 2 DIABETES MELLITUS WITH MICROALBUMINURIA, WITH LONG-TERM CURRENT USE OF INSULIN: Primary | ICD-10-CM

## 2022-04-05 DIAGNOSIS — Z79.4 TYPE 2 DIABETES MELLITUS WITH MICROALBUMINURIA, WITH LONG-TERM CURRENT USE OF INSULIN: Primary | ICD-10-CM

## 2022-04-05 DIAGNOSIS — R80.9 TYPE 2 DIABETES MELLITUS WITH MICROALBUMINURIA, WITH LONG-TERM CURRENT USE OF INSULIN: Primary | ICD-10-CM

## 2022-04-05 DIAGNOSIS — E66.9 OBESITY (BMI 30-39.9): ICD-10-CM

## 2022-04-05 DIAGNOSIS — I10 ESSENTIAL HYPERTENSION: ICD-10-CM

## 2022-04-05 DIAGNOSIS — E78.2 MIXED HYPERLIPIDEMIA: ICD-10-CM

## 2022-04-05 PROCEDURE — 95251 PR GLUCOSE MONITOR, 72 HOUR, PHYS INTERP: ICD-10-PCS | Mod: S$GLB,,, | Performed by: NURSE PRACTITIONER

## 2022-04-05 PROCEDURE — 95251 CONT GLUC MNTR ANALYSIS I&R: CPT | Mod: S$GLB,,, | Performed by: NURSE PRACTITIONER

## 2022-04-05 PROCEDURE — 99999 PR PBB SHADOW E&M-EST. PATIENT-LVL IV: CPT | Mod: PBBFAC,,, | Performed by: NURSE PRACTITIONER

## 2022-04-05 PROCEDURE — 99214 PR OFFICE/OUTPT VISIT, EST, LEVL IV, 30-39 MIN: ICD-10-PCS | Mod: S$GLB,,, | Performed by: NURSE PRACTITIONER

## 2022-04-05 PROCEDURE — 99214 OFFICE O/P EST MOD 30 MIN: CPT | Mod: S$GLB,,, | Performed by: NURSE PRACTITIONER

## 2022-04-05 PROCEDURE — 99999 PR PBB SHADOW E&M-EST. PATIENT-LVL IV: ICD-10-PCS | Mod: PBBFAC,,, | Performed by: NURSE PRACTITIONER

## 2022-04-05 RX ORDER — INSULIN ASPART 100 [IU]/ML
11 INJECTION, SOLUTION INTRAVENOUS; SUBCUTANEOUS
Start: 2022-04-05 | End: 2022-11-03

## 2022-04-05 RX ORDER — ATORVASTATIN CALCIUM 40 MG/1
40 TABLET, FILM COATED ORAL NIGHTLY
Qty: 90 TABLET | Refills: 3 | Status: SHIPPED | OUTPATIENT
Start: 2022-04-05 | End: 2022-07-06

## 2022-04-05 RX ORDER — INSULIN GLARGINE 100 [IU]/ML
27 INJECTION, SOLUTION SUBCUTANEOUS NIGHTLY
Qty: 15 ML | Refills: 6
Start: 2022-04-05 | End: 2022-11-03

## 2022-04-05 NOTE — PROGRESS NOTES
CC: Ms. Liza Mesa arrives today for management of Type 2 DM and review of chronic medical conditions, as listed in the Visit Diagnosis section of this encounter.       HPI: Ms. Liza Mesa was diagnosed with Type 2 DM in 2005. She was diagnosed based on lab work. Initial treatment consisted of metformin.  Victoza was later added to her medication regimen in 2016. Insulin added in 2018. + FH of DM on mother. Denies hospitalizations due to DM.     Patient was last seen by me in January.     She has been weaning off of metformin. Reports diarrhea. Now only taking 2-3 days/week.     Patient admits to having been off of atorvastatin for quite some time. Denies issues with tolerability.     BG monitoring per [employer] [facility]. Her insurance is covering this through her pharmacy benefit.     Hypoglycemia: Rare   Symptoms: jittery, tingling  Treatment: peppermint or jelly beans     Missing Insulin/PO medication doses: No     Dietary Habits: Eats 2 meals/day. Rare snacking. Avoids sugary beverages.      Last DM education appointment:  8/2016        CURRENT DIABETIC MEDS: metformin  mg daily, Victoza 1.8 mg daily, Lantus 30 units QHS, Novolog 11 units with lunch and dinner + correction scale, target 150, ISF 25  Glucometer type: Accucheck Adrienne     Previous DM treatments:  Lantus - not covered by insurance   Glimepiride   Acarbose  Metformin XR >500 mg -- diarrhea  VGo - didn't like wearing device    Last Eye Exam: 7/2021, no DR per patient. [facility]   Last Podiatry Exam: 11/2021, Dr. Olvera    REVIEW OF SYSTEMS  Constitutional: no c/o fatigue, weakness, weight loss. + weight gain   Cardiac: no palpitations or chest pain.  Respiratory: + chronic dyspnea, alternating productive/nonproductive cough related to asthma. Uses albuterol nebulizer PRN. Reports status is currently mild.   GI: no c/o abdominal pain or nausea. Denies h/o pancreatitis. + diarrhea.   : + urinary incontinence.   Skin: no  "rashes, lesions.   Neuro: denies numbness, tingling, paresthesias.   Endocrine: denies polyphagia, polydipsia, polyuria.       Personally reviewed Past Medical, Surgical, Social History.    Vital Signs  /76   Pulse 91   Ht 5' 7" (1.702 m)   Wt 113.5 kg (250 lb 1.8 oz)   LMP 11/21/2019   BMI 39.17 kg/m²     Personally reviewed the below labs:    Hemoglobin A1C   Date Value Ref Range Status   03/30/2022 7.2 (H) 4.0 - 5.6 % Final     Comment:     ADA Screening Guidelines:  5.7-6.4%  Consistent with prediabetes  >or=6.5%  Consistent with diabetes    High levels of fetal hemoglobin interfere with the HbA1C  assay. Heterozygous hemoglobin variants (HbS, HgC, etc)do  not significantly interfere with this assay.   However, presence of multiple variants may affect accuracy.     12/20/2021 6.5 (H) 4.0 - 5.6 % Final     Comment:     ADA Screening Guidelines:  5.7-6.4%  Consistent with prediabetes  >or=6.5%  Consistent with diabetes    High levels of fetal hemoglobin interfere with the HbA1C  assay. Heterozygous hemoglobin variants (HbS, HgC, etc)do  not significantly interfere with this assay.   However, presence of multiple variants may affect accuracy.     09/29/2021 8.1 (H) 4.0 - 5.6 % Final     Comment:     ADA Screening Guidelines:  5.7-6.4%  Consistent with prediabetes  >or=6.5%  Consistent with diabetes    High levels of fetal hemoglobin interfere with the HbA1C  assay. Heterozygous hemoglobin variants (HbS, HgC, etc)do  not significantly interfere with this assay.   However, presence of multiple variants may affect accuracy.     05/03/2012 7.1 (H) 4.8 - 5.9 % Final     Comment:     **In order to standardize %HbA1c results worldwide, as of October 11, 2010,  the %HbA1c is being calculated using the master equation recommended in the  consensus statement adopted by the ADA (American Diabetes Assoc), EASD  (European Assoc for the Study of Diabetes), IFCC (International Federation  of Clinical Chemistry and " Laboratory Medicine) and IDF (International  Diabetes Federation). Result units: %HgbA1c (DCCT/NGSP).  In common with other methods, Hb A1C values may not accurately reflect mean  blood glucose in patients with hemoglobin variants (HgbF, HgbS and HgbC).  Any cause of shortened erythrocyte survival will reduce exposure of  erythrocytes to glucose with a consequent decrease in HbA1c (%) values, even  though the time-averaged blood glucose level may be elevated. Causes of  shortened erythrocyte lifetime might be hemolytic anemia or other hemolytic  diseases, homozygous sickle cell trait, pregnancy, recent significant or  chronic blood loss, etc. Caution should be used when interpreting the HbA1c  results from patients with these conditions.       Chemistry        Component Value Date/Time     03/30/2022 1039    K 4.3 03/30/2022 1039     03/30/2022 1039    CO2 31 (H) 03/30/2022 1039    BUN 14 03/30/2022 1039    CREATININE 0.9 03/30/2022 1039    CREATININE 1.0 05/06/2012 0431     03/30/2022 1039        Component Value Date/Time    CALCIUM 10.2 03/30/2022 1039    CALCIUM 9.5 05/06/2012 0431    ALKPHOS 74 03/30/2022 1039    ALKPHOS 49 05/03/2012 1635    AST 17 03/30/2022 1039    AST 13 05/03/2012 1635    ALT 13 03/30/2022 1039    BILITOT 0.3 03/30/2022 1039          Lab Results   Component Value Date    CHOL 230 (H) 03/30/2022    CHOL 222 (H) 03/19/2021    CHOL 194 02/07/2020     Lab Results   Component Value Date    HDL 52 03/30/2022    HDL 59 03/19/2021    HDL 58 02/07/2020     Lab Results   Component Value Date    LDLCALC 156.6 03/30/2022    LDLCALC 135.8 03/19/2021    LDLCALC 110.2 02/07/2020     Lab Results   Component Value Date    TRIG 107 03/30/2022    TRIG 136 03/19/2021    TRIG 129 02/07/2020     Lab Results   Component Value Date    CHOLHDL 22.6 03/30/2022    CHOLHDL 26.6 03/19/2021    CHOLHDL 29.9 02/07/2020       Lab Results   Component Value Date    MICALBCREAT 65.5 (H) 06/25/2021     Lab  Results   Component Value Date    TSH 1.632 07/26/2019       Estimated Creatinine Clearance: 96.2 mL/min (based on SCr of 0.9 mg/dL).    No results found for: [id number]         PHYSICAL EXAMINATION  Constitutional: Appears well, no distress.  Neck: Supple, trachea midline.  Respiratory: CTA, even and unlabored.   Cardiovascular: RRR, no murmurs.  GI: active bowel sounds, no hernia  Skin: warm and dry  Neuro: oriented to person, place, time  Feet: appropriate footwear.   Protective Sensation (w/ 10 gram monofilament):  Right: Intact  Left: Intact    Visual Inspection:  Normal -  Left and Onychomycosis -  Right     Pedal Pulses:   Right: Present  Left: Present    Posterior tibialis:   Right:Present  Left: Present        FREESTYLE BAKARI 2 DOWNLOAD: See media file for details. Fasting glucoses are acceptable. Sporadic prandial excursions. Some hypoglycemia noted overnight or in afternoon.   Average glucose: 141 mg/dL  Above 250 mg/dL: 1 %  181-250 mg/dL: 15 %   mg/dL: 81 %  54-69 mg/dL: 3 %  Below 54 mg/dL: 0 %         Goals      HEMOGLOBIN A1C < 7             Assessment/Plan  1. Type 2 diabetes mellitus with microalbuminuria, with long-term current use of insulin  -- A1c is acceptable. Appears to be having hypoglycemia either overnight or when going long time frame without eating.   -- decrease Lantus to 27 units QHS  -- continue Novolog 11 units with meals + sliding scale.   -- continue Victoza  -- not taking metformin XR regularly. Ok to discontinue.     -- Discussed diagnosis of DM, A1c goals, progression of disease, long term complications and tx options.  Advised patient to check BG before activities, such as driving or exercise.  -- Reviewed hypoglycemia management: treat with 1/2 glass of juice, 1/2 can regular coke, or 4 glucose tablets. Monitor and repeat treatment every 15 minutes until BG is >70 Then have a snack, which includes a complex carbohydrate and protein.   2. Essential hypertension  --  controlled  -- continue amlodipine.   -- Losartan was previously discontinued by PCP due to patient's request to decrease amount of pills that she was taking.    3. Mixed hyperlipidemia  -- uncontrolled  -- resume atorvastatin 40 mg nightly. New rx sent to pharmacy.   -- lipid panel with RTC   4. Obesity (BMI 30-39.9)  -- uncontrolled  Body mass index is 39.17 kg/m².       FOLLOW UP  Follow up in about 3 months (around 7/5/2022).   Patient instructed to bring BG logs to each follow up   Patient encouraged to call for any BG/medication issues, concerns, or questions.      Orders Placed This Encounter   Procedures    Hemoglobin A1C    Comprehensive Metabolic Panel    Lipid Panel    Microalbumin/Creatinine Ratio, Urine

## 2022-04-13 NOTE — TELEPHONE ENCOUNTER
----- Message from Yecenia Murphy sent at 11/13/2020  3:49 PM CST -----  Contact: DEON BAEZ [[id number]]  Patient is returning nurse's phone call.  Please call patient back at [phone].      There is 1 Wet Read(s) to document. There are no Wet Read(s) to document.

## 2022-04-19 RX ORDER — PROMETHAZINE HYDROCHLORIDE AND CODEINE PHOSPHATE 6.25; 1 MG/5ML; MG/5ML
SOLUTION ORAL
Qty: 240 ML | Refills: 1 | OUTPATIENT
Start: 2022-04-19

## 2022-04-19 NOTE — TELEPHONE ENCOUNTER
----- Message from Karolyn Delarosa sent at 4/19/2022 11:07 AM CDT -----  Contact: pt [phone]  Type:  RX Refill Request    Who Called:  Pt  Refill or New Rx:  refill  RX Name and Strength:  promethazine-codeine 6.25-10 mg/5 ml (PHENERGAN WITH CODEINE)   How is the patient currently taking it? (ex. 1XDay):  240mg  Is this a 30 day or 90 day RX:    Preferred Pharmacy with phone number:    [facility] [address] [address]  [address] [address] [address]  Phone: [phone] Fax: [phone]          Local or Mail Order:  Local  Ordering Provider: Berny Stubbs Call Back Number:  [phone]      Additional Information:  Pls call back and advise

## 2022-06-21 ENCOUNTER — TELEPHONE (OUTPATIENT)
Dept: FAMILY MEDICINE | Facility: CLINIC | Age: 52
End: 2022-06-21
Payer: COMMERCIAL

## 2022-06-21 DIAGNOSIS — G89.29 CHRONIC MIDLINE LOW BACK PAIN WITHOUT SCIATICA: Primary | ICD-10-CM

## 2022-06-21 DIAGNOSIS — M54.50 CHRONIC MIDLINE LOW BACK PAIN WITHOUT SCIATICA: Primary | ICD-10-CM

## 2022-06-22 ENCOUNTER — TELEPHONE (OUTPATIENT)
Dept: FAMILY MEDICINE | Facility: CLINIC | Age: 52
End: 2022-06-22
Payer: COMMERCIAL

## 2022-06-22 NOTE — TELEPHONE ENCOUNTER
----- Message from Qiana Buchanan sent at 6/22/2022 12:54 PM CDT -----  Patient Call Back    Who Called: PT     What is the request in detail: pt calling to follow up on a conversation that she had on yesterday regarding her pain medication. Pls advise.     Can the clinic reply by [facility]?    Best Call Back Number: [phone] ()

## 2022-06-22 NOTE — TELEPHONE ENCOUNTER
Original message sent to provider for review. No response received as of now, will contact pt once provider responds.

## 2022-06-22 NOTE — TELEPHONE ENCOUNTER
Spoke with pt in regards to rescheduling appt to establish care. Notified pt that Dr. Medrano does not manage long term controlled substances.  Referral for pain management pended. Please advise. Will contact pt with scheduling info once signed.

## 2022-06-23 RX ORDER — PROMETHAZINE HYDROCHLORIDE AND CODEINE PHOSPHATE 6.25; 1 MG/5ML; MG/5ML
SOLUTION ORAL
Qty: 240 ML | Refills: 1 | OUTPATIENT
Start: 2022-06-23

## 2022-06-23 NOTE — TELEPHONE ENCOUNTER
Facilitate referral     I do not refill controlled cough medications routinely or Ambien, or Xanax, or Parafon Forte

## 2022-06-24 NOTE — PROGRESS NOTES
THIS DOCUMENT WAS MADE IN PART WITH VOICE RECOGNITION SOFTWARE.  OCCASIONALLY THIS SOFTWARE WILL MISINTERPRET WORDS OR PHRASES.    Assessment and Plan:    1. Severe persistent asthma without complication  fluticasone-umeclidin-vilanter (TRELEGY ELLIPTA) 200-62.5-25 mcg inhaler   2. Chronic midline low back pain without sciatica  Ambulatory referral/consult to Pain Clinic   3. Moderate persistent asthma with acute exacerbation     4. Depression, unspecified depression type     5. Essential hypertension     6. Type 2 diabetes mellitus with diabetic polyneuropathy, with long-term current use of insulin     7. Obesity (BMI 30-39.9)     8. Chronic cough     9. Seasonal allergic rhinitis due to pollen     10. Mixed hyperlipidemia     11. Binge eating disorder     12. Chronic narcotic use     13. Insomnia, unspecified type         PLAN    New medication Trelegy to improve coughing symptoms from COPD, asthma    Referred to pain management for medication and modalities to treat back pain.  Will bridge patient from now on to July 20th when she has her 1st pain management appointment.  Will not be prescribing any benzodiazepines or other narcotics for this patient afterwards    Other chronic conditions appear to be fairly well controlled    Follow-up in 6 months      ______________________________________________________________________  Subjective:    Chief Complaint:  Chief Complaint   Patient presents with    Establish Care     Prev saw Dr Arrieta    Medication Refill     Parafon Forte/ Norco/ Phenergan syrup         HPI:  Liza is a 51 y.o. year old     Patient here to establish care  From a nurse practitioner in [place]    Chronic medical conditions reviewed    Of concern patient is currently using Xanax, Ambien, cough syrup, Norco, muscle relaxer, Adderall  Gets most of these medications from a nurse practitioner in [place]    Chronic issues below-----------------------------    Depression / Anxiety  Rx : Xanax 1 mg  TID    Insomnia   Rx: Ambien 10 mg    Allergic rhinitis  No current medications     Moderate persistent asthma + nicotine dependence-smoking  rx :  Albuterol, fluticasone-salmeterol, promethazine-codeine cough syrup, benzonatate  Prescribed codeine cough syrup by nurse practitioner in [place] quite routinely  Still smoking    Essential hypertension  Rx-amlodipine 10 mg  Home BP : 130/80    Binge Eating  rx : Adderall 30 mg BID  Prescriber : Alissa Bowen NP    Dyslipidemia  Rx-Lipitor 40 mg  Previous lipids not at goal,     Migraine headaches  Abortive-sumatriptan 100 mg as needed  HA occur about every 3 months     Type 2 diabetes mellitus complicated by diabetic polyneuropathy  rx : Victoza 1.8 mg daily, Lantus 27u daily, Novolog 11u TID with meals   Previous A1c-7.2 percent    Chronic low back pain  Rx-hydrocodone 7.5 mg b.i.d., chlorzoxazone 500 mg QID prn  Prescribed by previous primary care physician    Overactive bladder + Stress incont.   Rx-oxybutynin 5 mg b.i.d.  Medication working well            Past Medical History:  Past Medical History:   Diagnosis Date    Arthritis     Asthma 2012    Blood transfusion     Depression 2012    Diabetes 2012    HTN (hypertension) 2012    LBP (low back pain) 2012    Obesity 2013    Tobacco abuse 7/10/2013       Past Surgical History:  Past Surgical History:   Procedure Laterality Date     SECTION, LOW TRANSVERSE      GANGLION CYST EXCISION Right 2020    Dr. Logan       Family History:  Family History   Problem Relation Age of Onset    Breast cancer Maternal Aunt     Depression Maternal Grandmother         bipolar had to be hospitalized    Allergic rhinitis Neg Hx     Allergies Neg Hx     Angioedema Neg Hx     Asthma Neg Hx     Atopy Neg Hx     Eczema Neg Hx     Immunodeficiency Neg Hx     Rhinitis Neg Hx     Urticaria Neg Hx        Social History:  Social History     Socioeconomic History     Marital status: Single   Tobacco Use    Smoking status: Current Some Day Smoker     Packs/day: 0.50     Years: 30.00     Pack years: 15.00     Last attempt to quit: 2015     Years since quittin.5    Smokeless tobacco: Never Used   Substance and Sexual Activity    Alcohol use: Yes     Alcohol/week: 12.0 standard drinks     Types: 12 Cans of beer per week    Drug use: No    Sexual activity: Yes     Partners: Male     Birth control/protection: None       Medications:  Current Outpatient Medications on File Prior to Visit   Medication Sig Dispense Refill    albuterol (ACCUNEB) 0.63 mg/3 mL Nebu Take 3 mLs (0.63 mg total) by nebulization every 6 (six) hours as needed (wheezing). 180 mL 3    albuterol (VENTOLIN HFA) 90 mcg/actuation inhaler INHALE 2 PUFFS INTO THE LUNGS EVERY 6 HOURS AS NEEDED 54 g 3    ALPRAZolam (XANAX) 1 MG tablet Take 1 mg by mouth 2 (two) times daily as needed.       amLODIPine (NORVASC) 10 MG tablet Take 1 tablet (10 mg total) by mouth 2 (two) times daily.      atorvastatin (LIPITOR) 40 MG tablet Take 1 tablet (40 mg total) by mouth every evening. 90 tablet 3    benzonatate (TESSALON) 100 MG capsule Take 100 mg by mouth 3 (three) times daily as needed for Cough.      blood sugar diagnostic Strp To check BG 4 times daily, to use with insurance preferred meter. Dx code E11.42 150 each 6    chlorzoxazone (PARAFON FORTE) 500 mg Tab Take 1 tablet (500 mg total) by mouth 4 (four) times daily as needed (muscle spasm). 180 tablet 3    dextroamphetamine-amphetamine 30 mg Tab Take 30 mg by mouth 2 (two) times a day.       flash glucose scanning reader (FREESTYLE BAKARI 2 READER) [facility] Use for continuous glucose monitoring. 1 each 0    fluticasone-salmeterol diskus inhaler 250-50 mcg INHALE 1 PUFF INTO THE LUNGS TWICE DAILY 60 each 3    FREESTYLE BAKARI 2 SENSOR Kit USE FOR CONTINUOUS GLUCOSE MONITORING. CHANGE EVERY 14 DAYS 2 kit 11    HYDROcodone-acetaminophen (NORCO) 7.5-325 mg per  "tablet Take 1 tablet by mouth every 12 (twelve) hours as needed for Pain. 60 tablet 0    HYDROcodone-acetaminophen (NORCO) 7.5-325 mg per tablet Take 1 tablet by mouth every 12 (twelve) hours as needed for Pain. 60 tablet 0    HYDROcodone-acetaminophen (NORCO) 7.5-325 mg per tablet Take 1 tablet by mouth every 12 (twelve) hours as needed for Pain. 60 tablet 0    hydrocortisone (ANUSOL-HC) 25 mg suppository Place 1 suppository (25 mg total) rectally 2 (two) times daily as needed for Hemorrhoids. 24 suppository 2    insulin (LANTUS SOLOSTAR U-100 INSULIN) glargine 100 units/mL (3mL) SubQ pen Inject 27 Units into the skin every evening. 15 mL 6    insulin aspart U-100 (NOVOLOG FLEXPEN U-100 INSULIN) 100 unit/mL (3 mL) InPn pen Inject 11 Units into the skin 3 (three) times daily with meals. Plus correction scale, max TDD 60u      lancets Misc To check BG 4 times daily, to use with insurance preferred meter. Dx code E11.42 150 each 6    oxybutynin (DITROPAN) 5 MG Tab Take 1 tablet (5 mg total) by mouth 2 (two) times a day. 180 tablet 0    pen needle, diabetic 32 gauge x 5/32" Ndle Use 5 times daily with insulin and Victoza. 150 each 6    promethazine-codeine 6.25-10 mg/5 ml (PHENERGAN WITH CODEINE) 6.25-10 mg/5 mL syrup TAKE 1 TEASPOON(5ML) BY MOUTH EVERY 8 HOURS AS NEEDED FOR COUGH 240 mL 1    sumatriptan (IMITREX) 100 MG tablet Take 1 tablet (100 mg total) by mouth once as needed for Migraine. 6 tablet 5    VICTOZA 3-AYALA 0.6 mg/0.1 mL (18 mg/3 mL) PnIj pen INJECT 1.8 MG INTO THE SKIN ONCE DAILY. 9 mL 6    zolpidem (AMBIEN) 10 mg Tab 10 mg nightly as needed.   1    blood-glucose meter kit To check BG 4 times daily, to use with insurance preferred meter. Dx code E11.42 1 each 0     No current facility-administered medications on file prior to visit.       Allergies:  Patient has no known allergies.    Immunizations:  Immunization History   Administered Date(s) Administered    COVID-19, MRNA, LN-S, PF " "(Pfizer) (Purple Cap) 04/22/2021, 05/13/2021, 12/11/2021    DTaP 1970, 1970, 01/20/1971, 03/15/1972    Influenza - Quadrivalent 10/03/2020    Influenza - Quadrivalent - PF *Preferred* (6 months and older) 10/31/2018, 11/13/2019, 09/16/2021    MMR 11/17/1977    Measles / Rubella 01/19/1972    OPV 1970, 01/20/1971, 03/15/1972, 03/21/1974, 04/17/1975, 11/17/1977    Pneumococcal Conjugate - 13 Valent 06/12/2017    Pneumococcal Polysaccharide - 23 Valent 11/13/2019    Td (ADULT) 04/17/1975, 11/17/1977, 07/11/1984, 09/18/2009    Tdap 10/18/2017    Zoster Recombinant 06/16/2021, 09/22/2021       Review of Systems:  Review of Systems   All other systems reviewed and are negative.      Objective:    Vitals:  Vitals:    06/30/22 1502   BP: (!) 144/74   Pulse: 106   Resp: 16   Temp: 98.6 °F (37 °C)   TempSrc: Oral   SpO2: (!) 93%   Weight: 122.7 kg (270 lb 8.1 oz)   Height: 5' 7" (1.702 m)   PainSc:   7   PainLoc: Back       Physical Exam  Vitals reviewed.   Constitutional:       General: She is not in acute distress.  HENT:      Head: Normocephalic and atraumatic.   Eyes:      Pupils: Pupils are equal, round, and reactive to light.   Cardiovascular:      Rate and Rhythm: Normal rate and regular rhythm.      Heart sounds: No murmur heard.    No friction rub.   Pulmonary:      Effort: Pulmonary effort is normal.      Breath sounds: Normal breath sounds.   Abdominal:      General: Bowel sounds are normal. There is no distension.      Palpations: Abdomen is soft.      Tenderness: There is no abdominal tenderness.   Musculoskeletal:      Cervical back: Neck supple.   Skin:     General: Skin is warm and dry.      Findings: No rash.   Psychiatric:         Behavior: Behavior normal.             Cody Medrano MD  Family Medicine      "

## 2022-06-30 ENCOUNTER — LAB VISIT (OUTPATIENT)
Dept: LAB | Facility: HOSPITAL | Age: 52
End: 2022-06-30
Attending: NURSE PRACTITIONER
Payer: COMMERCIAL

## 2022-06-30 ENCOUNTER — OFFICE VISIT (OUTPATIENT)
Dept: FAMILY MEDICINE | Facility: CLINIC | Age: 52
End: 2022-06-30
Payer: COMMERCIAL

## 2022-06-30 VITALS
DIASTOLIC BLOOD PRESSURE: 74 MMHG | HEIGHT: 67 IN | BODY MASS INDEX: 42.46 KG/M2 | WEIGHT: 270.5 LBS | RESPIRATION RATE: 16 BRPM | OXYGEN SATURATION: 93 % | TEMPERATURE: 99 F | SYSTOLIC BLOOD PRESSURE: 144 MMHG | HEART RATE: 106 BPM

## 2022-06-30 DIAGNOSIS — F11.90 CHRONIC NARCOTIC USE: ICD-10-CM

## 2022-06-30 DIAGNOSIS — R05.3 CHRONIC COUGH: ICD-10-CM

## 2022-06-30 DIAGNOSIS — E66.9 OBESITY (BMI 30-39.9): ICD-10-CM

## 2022-06-30 DIAGNOSIS — F50.81 BINGE EATING DISORDER: ICD-10-CM

## 2022-06-30 DIAGNOSIS — G89.29 CHRONIC MIDLINE LOW BACK PAIN WITHOUT SCIATICA: ICD-10-CM

## 2022-06-30 DIAGNOSIS — J45.41 MODERATE PERSISTENT ASTHMA WITH ACUTE EXACERBATION: ICD-10-CM

## 2022-06-30 DIAGNOSIS — G47.00 INSOMNIA, UNSPECIFIED TYPE: ICD-10-CM

## 2022-06-30 DIAGNOSIS — E11.42 TYPE 2 DIABETES MELLITUS WITH DIABETIC POLYNEUROPATHY, WITH LONG-TERM CURRENT USE OF INSULIN: ICD-10-CM

## 2022-06-30 DIAGNOSIS — J45.50 SEVERE PERSISTENT ASTHMA WITHOUT COMPLICATION: Primary | ICD-10-CM

## 2022-06-30 DIAGNOSIS — J30.1 SEASONAL ALLERGIC RHINITIS DUE TO POLLEN: ICD-10-CM

## 2022-06-30 DIAGNOSIS — M54.50 CHRONIC MIDLINE LOW BACK PAIN WITHOUT SCIATICA: ICD-10-CM

## 2022-06-30 DIAGNOSIS — I10 ESSENTIAL HYPERTENSION: ICD-10-CM

## 2022-06-30 DIAGNOSIS — Z79.4 TYPE 2 DIABETES MELLITUS WITH MICROALBUMINURIA, WITH LONG-TERM CURRENT USE OF INSULIN: ICD-10-CM

## 2022-06-30 DIAGNOSIS — Z79.4 TYPE 2 DIABETES MELLITUS WITH DIABETIC POLYNEUROPATHY, WITH LONG-TERM CURRENT USE OF INSULIN: ICD-10-CM

## 2022-06-30 DIAGNOSIS — F32.A DEPRESSION, UNSPECIFIED DEPRESSION TYPE: ICD-10-CM

## 2022-06-30 DIAGNOSIS — E11.29 TYPE 2 DIABETES MELLITUS WITH MICROALBUMINURIA, WITH LONG-TERM CURRENT USE OF INSULIN: ICD-10-CM

## 2022-06-30 DIAGNOSIS — E78.2 MIXED HYPERLIPIDEMIA: ICD-10-CM

## 2022-06-30 DIAGNOSIS — R80.9 TYPE 2 DIABETES MELLITUS WITH MICROALBUMINURIA, WITH LONG-TERM CURRENT USE OF INSULIN: ICD-10-CM

## 2022-06-30 LAB
ALBUMIN SERPL BCP-MCNC: 3.8 G/DL (ref 3.5–5.2)
ALP SERPL-CCNC: 92 U/L (ref 55–135)
ALT SERPL W/O P-5'-P-CCNC: 18 U/L (ref 10–44)
ANION GAP SERPL CALC-SCNC: 7 MMOL/L (ref 8–16)
AST SERPL-CCNC: 17 U/L (ref 10–40)
BILIRUB SERPL-MCNC: 0.3 MG/DL (ref 0.1–1)
BUN SERPL-MCNC: 12 MG/DL (ref 6–20)
CALCIUM SERPL-MCNC: 9.8 MG/DL (ref 8.7–10.5)
CHLORIDE SERPL-SCNC: 100 MMOL/L (ref 95–110)
CHOLEST SERPL-MCNC: 207 MG/DL (ref 120–199)
CHOLEST/HDLC SERPL: 3.7 {RATIO} (ref 2–5)
CO2 SERPL-SCNC: 33 MMOL/L (ref 23–29)
CREAT SERPL-MCNC: 0.8 MG/DL (ref 0.5–1.4)
EST. GFR  (AFRICAN AMERICAN): >60 ML/MIN/1.73 M^2
EST. GFR  (NON AFRICAN AMERICAN): >60 ML/MIN/1.73 M^2
ESTIMATED AVG GLUCOSE: 183 MG/DL (ref 68–131)
GLUCOSE SERPL-MCNC: 141 MG/DL (ref 70–110)
HBA1C MFR BLD: 8 % (ref 4–5.6)
HDLC SERPL-MCNC: 56 MG/DL (ref 40–75)
HDLC SERPL: 27.1 % (ref 20–50)
LDLC SERPL CALC-MCNC: 127.2 MG/DL (ref 63–159)
NONHDLC SERPL-MCNC: 151 MG/DL
POTASSIUM SERPL-SCNC: 3.9 MMOL/L (ref 3.5–5.1)
PROT SERPL-MCNC: 7.7 G/DL (ref 6–8.4)
SODIUM SERPL-SCNC: 140 MMOL/L (ref 136–145)
TRIGL SERPL-MCNC: 119 MG/DL (ref 30–150)

## 2022-06-30 PROCEDURE — 99214 OFFICE O/P EST MOD 30 MIN: CPT | Mod: S$GLB,,, | Performed by: FAMILY MEDICINE

## 2022-06-30 PROCEDURE — 36415 COLL VENOUS BLD VENIPUNCTURE: CPT | Mod: PN | Performed by: NURSE PRACTITIONER

## 2022-06-30 PROCEDURE — 99999 PR PBB SHADOW E&M-EST. PATIENT-LVL V: CPT | Mod: PBBFAC,,, | Performed by: FAMILY MEDICINE

## 2022-06-30 PROCEDURE — 99999 PR PBB SHADOW E&M-EST. PATIENT-LVL V: ICD-10-PCS | Mod: PBBFAC,,, | Performed by: FAMILY MEDICINE

## 2022-06-30 PROCEDURE — 80061 LIPID PANEL: CPT | Performed by: NURSE PRACTITIONER

## 2022-06-30 PROCEDURE — 99214 PR OFFICE/OUTPT VISIT, EST, LEVL IV, 30-39 MIN: ICD-10-PCS | Mod: S$GLB,,, | Performed by: FAMILY MEDICINE

## 2022-06-30 PROCEDURE — 80053 COMPREHEN METABOLIC PANEL: CPT | Performed by: NURSE PRACTITIONER

## 2022-06-30 PROCEDURE — 83036 HEMOGLOBIN GLYCOSYLATED A1C: CPT | Performed by: NURSE PRACTITIONER

## 2022-06-30 RX ORDER — HYDROCODONE BITARTRATE AND ACETAMINOPHEN 7.5; 325 MG/1; MG/1
1 TABLET ORAL EVERY 12 HOURS PRN
Qty: 60 TABLET | Refills: 0 | Status: SHIPPED | OUTPATIENT
Start: 2022-06-30

## 2022-06-30 RX ORDER — BENZONATATE 100 MG/1
100 CAPSULE ORAL 3 TIMES DAILY PRN
COMMUNITY
End: 2022-07-06

## 2022-06-30 RX ORDER — CHLORZOXAZONE 500 MG/1
500 TABLET ORAL 4 TIMES DAILY PRN
Qty: 180 TABLET | Refills: 3 | Status: SHIPPED | OUTPATIENT
Start: 2022-06-30

## 2022-06-30 RX ORDER — FLUTICASONE FUROATE, UMECLIDINIUM BROMIDE AND VILANTEROL TRIFENATATE 200; 62.5; 25 UG/1; UG/1; UG/1
1 POWDER RESPIRATORY (INHALATION) DAILY
Qty: 60 EACH | Refills: 11 | Status: SHIPPED | OUTPATIENT
Start: 2022-06-30

## 2022-07-06 ENCOUNTER — OFFICE VISIT (OUTPATIENT)
Dept: ENDOCRINOLOGY | Facility: CLINIC | Age: 52
End: 2022-07-06
Payer: COMMERCIAL

## 2022-07-06 VITALS
DIASTOLIC BLOOD PRESSURE: 70 MMHG | HEART RATE: 105 BPM | SYSTOLIC BLOOD PRESSURE: 110 MMHG | WEIGHT: 262.56 LBS | BODY MASS INDEX: 41.21 KG/M2 | HEIGHT: 67 IN

## 2022-07-06 DIAGNOSIS — Z79.4 TYPE 2 DIABETES MELLITUS WITH MICROALBUMINURIA, WITH LONG-TERM CURRENT USE OF INSULIN: Primary | ICD-10-CM

## 2022-07-06 DIAGNOSIS — E11.29 TYPE 2 DIABETES MELLITUS WITH MICROALBUMINURIA, WITH LONG-TERM CURRENT USE OF INSULIN: Primary | ICD-10-CM

## 2022-07-06 DIAGNOSIS — E66.01 MORBID OBESITY WITH BMI OF 40.0-44.9, ADULT: ICD-10-CM

## 2022-07-06 DIAGNOSIS — R80.9 TYPE 2 DIABETES MELLITUS WITH MICROALBUMINURIA, WITH LONG-TERM CURRENT USE OF INSULIN: Primary | ICD-10-CM

## 2022-07-06 DIAGNOSIS — E78.2 MIXED HYPERLIPIDEMIA: ICD-10-CM

## 2022-07-06 DIAGNOSIS — I10 ESSENTIAL HYPERTENSION: ICD-10-CM

## 2022-07-06 PROCEDURE — 99999 PR PBB SHADOW E&M-EST. PATIENT-LVL V: ICD-10-PCS | Mod: PBBFAC,,, | Performed by: NURSE PRACTITIONER

## 2022-07-06 PROCEDURE — 99214 OFFICE O/P EST MOD 30 MIN: CPT | Mod: S$GLB,,, | Performed by: NURSE PRACTITIONER

## 2022-07-06 PROCEDURE — 95251 PR GLUCOSE MONITOR, 72 HOUR, PHYS INTERP: ICD-10-PCS | Mod: S$GLB,,, | Performed by: NURSE PRACTITIONER

## 2022-07-06 PROCEDURE — 99214 PR OFFICE/OUTPT VISIT, EST, LEVL IV, 30-39 MIN: ICD-10-PCS | Mod: S$GLB,,, | Performed by: NURSE PRACTITIONER

## 2022-07-06 PROCEDURE — 95251 CONT GLUC MNTR ANALYSIS I&R: CPT | Mod: S$GLB,,, | Performed by: NURSE PRACTITIONER

## 2022-07-06 PROCEDURE — 99999 PR PBB SHADOW E&M-EST. PATIENT-LVL V: CPT | Mod: PBBFAC,,, | Performed by: NURSE PRACTITIONER

## 2022-07-06 RX ORDER — ATORVASTATIN CALCIUM 80 MG/1
80 TABLET, FILM COATED ORAL NIGHTLY
Qty: 90 TABLET | Refills: 3 | Status: SHIPPED | OUTPATIENT
Start: 2022-07-06 | End: 2023-07-24 | Stop reason: SDUPTHER

## 2022-07-06 RX ORDER — SEMAGLUTIDE 1.34 MG/ML
0.5 INJECTION, SOLUTION SUBCUTANEOUS
Qty: 1 PEN | Refills: 6 | Status: SHIPPED | OUTPATIENT
Start: 2022-07-06 | End: 2023-02-09

## 2022-07-06 NOTE — PROGRESS NOTES
CC: Ms. Liza Mesa arrives today for management of Type 2 DM and review of chronic medical conditions, as listed in the Visit Diagnosis section of this encounter.       HPI: Ms. Liza Mesa was diagnosed with Type 2 DM in 2005. She was diagnosed based on lab work. Initial treatment consisted of metformin.  Victoza was later added to her medication regimen in 2016. Insulin added in 2018. + FH of DM on mother. Denies hospitalizations due to DM.     Patient was last seen by me in April. At this time, metformin XR was discontinued, due to diarrhea. Lantus dose was decreased. However, she is taking previous dose of Lantus.     She recently welcomed her great grandson.     She states that she will be following with Pain Management outside of [facility] [place]. Dr. Hollis?    She ran out of Lantus from Saturday to Monday and resumed last night.     BG monitoring per [employer] [facility] [employer]. Her insurance is covering this through her pharmacy benefit.     Hypoglycemia: Rare   Symptoms: jittery, tingling  Treatment: peppermint or jelly beans     Missing Insulin/PO medication doses: No     Dietary Habits: Eats 2 meals/day. Admits to not adhering to diet recently - cooking for family. Rare snacking. Avoids sugary beverages.      Last DM education appointment:  8/2016        CURRENT DIABETIC MEDS: Victoza 1.8 mg daily, Lantus 30 units QHS, Novolog 11 units with lunch and dinner + correction scale, target 150, ISF 25  Glucometer type: Accucheck Adrienne     Previous DM treatments:  Lantus - not covered by insurance   Glimepiride   Acarbose  Metformin XR -- diarrhea  VGo - didn't like wearing device    Last Eye Exam: 7/2021, no DR per patient. [facility]   Last Podiatry Exam: 11/2021, Dr. Olvera    REVIEW OF SYSTEMS  Constitutional: no c/o fatigue, weakness, weight loss. + weight gain   Cardiac: no palpitations or chest pain.  Respiratory: no cough, + chronic dyspnea, related to asthma. Uses albuterol nebulizer  "PRN.  GI: no c/o abdominal pain or nausea. Denies h/o pancreatitis.   : + urinary incontinence.    Skin: no rashes, lesions.   Neuro: denies numbness, tingling, paresthesias.   Endocrine: denies polyphagia, polydipsia, polyuria.       Personally reviewed Past Medical, Surgical, Social History.    Vital Signs  /70   Pulse 105   Ht 5' 7" (1.702 m)   Wt 119.1 kg (262 lb 9.1 oz)   LMP 11/21/2019   BMI 41.12 kg/m²     Personally reviewed the below labs:    Hemoglobin A1C   Date Value Ref Range Status   06/30/2022 8.0 (H) 4.0 - 5.6 % Final     Comment:     ADA Screening Guidelines:  5.7-6.4%  Consistent with prediabetes  >or=6.5%  Consistent with diabetes    High levels of fetal hemoglobin interfere with the HbA1C  assay. Heterozygous hemoglobin variants (HbS, HgC, etc)do  not significantly interfere with this assay.   However, presence of multiple variants may affect accuracy.     03/30/2022 7.2 (H) 4.0 - 5.6 % Final     Comment:     ADA Screening Guidelines:  5.7-6.4%  Consistent with prediabetes  >or=6.5%  Consistent with diabetes    High levels of fetal hemoglobin interfere with the HbA1C  assay. Heterozygous hemoglobin variants (HbS, HgC, etc)do  not significantly interfere with this assay.   However, presence of multiple variants may affect accuracy.     12/20/2021 6.5 (H) 4.0 - 5.6 % Final     Comment:     ADA Screening Guidelines:  5.7-6.4%  Consistent with prediabetes  >or=6.5%  Consistent with diabetes    High levels of fetal hemoglobin interfere with the HbA1C  assay. Heterozygous hemoglobin variants (HbS, HgC, etc)do  not significantly interfere with this assay.   However, presence of multiple variants may affect accuracy.     05/03/2012 7.1 (H) 4.8 - 5.9 % Final     Comment:     **In order to standardize %HbA1c results worldwide, as of October 11, 2010,  the %HbA1c is being calculated using the master equation recommended in the  consensus statement adopted by the ADA (American Diabetes Assoc), " EASD  (European Assoc for the Study of Diabetes), IFCC (International Federation  of Clinical Chemistry and Laboratory Medicine) and IDF (International  Diabetes Federation). Result units: %HgbA1c (DCCT/NGSP).  In common with other methods, Hb A1C values may not accurately reflect mean  blood glucose in patients with hemoglobin variants (HgbF, HgbS and HgbC).  Any cause of shortened erythrocyte survival will reduce exposure of  erythrocytes to glucose with a consequent decrease in HbA1c (%) values, even  though the time-averaged blood glucose level may be elevated. Causes of  shortened erythrocyte lifetime might be hemolytic anemia or other hemolytic  diseases, homozygous sickle cell trait, pregnancy, recent significant or  chronic blood loss, etc. Caution should be used when interpreting the HbA1c  results from patients with these conditions.       Chemistry        Component Value Date/Time     06/30/2022 0829    K 3.9 06/30/2022 0829     06/30/2022 0829    CO2 33 (H) 06/30/2022 0829    BUN 12 06/30/2022 0829    CREATININE 0.8 06/30/2022 0829    CREATININE 1.0 05/06/2012 0431     (H) 06/30/2022 0829        Component Value Date/Time    CALCIUM 9.8 06/30/2022 0829    CALCIUM 9.5 05/06/2012 0431    ALKPHOS 92 06/30/2022 0829    ALKPHOS 49 05/03/2012 1635    AST 17 06/30/2022 0829    AST 13 05/03/2012 1635    ALT 18 06/30/2022 0829    BILITOT 0.3 06/30/2022 0829          Lab Results   Component Value Date    CHOL 207 (H) 06/30/2022    CHOL 230 (H) 03/30/2022    CHOL 222 (H) 03/19/2021     Lab Results   Component Value Date    HDL 56 06/30/2022    HDL 52 03/30/2022    HDL 59 03/19/2021     Lab Results   Component Value Date    LDLCALC 127.2 06/30/2022    LDLCALC 156.6 03/30/2022    LDLCALC 135.8 03/19/2021     Lab Results   Component Value Date    TRIG 119 06/30/2022    TRIG 107 03/30/2022    TRIG 136 03/19/2021     Lab Results   Component Value Date    CHOLHDL 27.1 06/30/2022    CHOLHDL 22.6  03/30/2022    CHOLHDL 26.6 03/19/2021       Lab Results   Component Value Date    MICALBCREAT 16.7 06/30/2022     Lab Results   Component Value Date    TSH 1.632 07/26/2019       Estimated Creatinine Clearance: 111.1 mL/min (based on SCr of 0.8 mg/dL).    No results found for: [id number]         PHYSICAL EXAMINATION  Constitutional: Appears well, no distress.  Neck: Supple, trachea midline.  Respiratory: CTA, even and unlabored.   Cardiovascular: RRR, no murmurs.  GI: active bowel sounds, no hernia  Skin: warm and dry  Neuro: oriented to person, place, time  Feet: appropriate footwear.         FREESTYLE BAKARI 2 DOWNLOAD: See media file for details. Several gaps in data noted.  Fasting glucoses vary. However, some nocturnal hypoglycemia is noted. Some hypoglycemia also noted afternoon. Sporadic prandial excursions.   Average glucose: 152 mg/dL  Above 250 mg/dL: 4 %  181-250 mg/dL: 23 %   mg/dL: 70 %  54-69 mg/dL: 3 %  Below 54 mg/dL: 0 %         Goals      HEMOGLOBIN A1C < 7             Assessment/Plan  1. Type 2 diabetes mellitus with microalbuminuria, with long-term current use of insulin  -- A1c has increased. Will trial longer acting GLP-[id number] to see if able to reduce A1c and help with weight loss. She does plan to make dietary changes.   -- discontinue Victoza  -- start Ozempic 0.5 mg weekly. Discussed medication's mechanism of action, side effects, and contraindications. Advised pt to notify me for extreme nausea, abdominal pain, etc.  -- decrease Lantus to 27 units QHS  -- continue Novolog 11 units with meals + sliding scale.     -- Discussed diagnosis of DM, A1c goals, progression of disease, long term complications and tx options.  Advised patient to check BG before activities, such as driving or exercise.  -- Reviewed hypoglycemia management: treat with 1/2 glass of juice, 1/2 can regular coke, or 4 glucose tablets. Monitor and repeat treatment every 15 minutes until BG is >70 Then have a snack,  which includes a complex carbohydrate and protein.   2. Essential hypertension  -- controlled  -- continue amlodipine.   -- Losartan was previously discontinued by PCP due to patient's request to decrease amount of pills that she was taking.    3. Mixed hyperlipidemia  -- uncontrolled  -- increase atorvastatin to 80 mg nightly. New rx sent to pharmacy.   -- lipid panel with RTC   4. Morbid obesity  -- worsening  -- increasing insulin resistance   Body mass index is 41.12 kg/m².       FOLLOW UP  Follow up in about 3 months (around 10/6/2022).   Patient instructed to bring BG logs to each follow up   Patient encouraged to call for any BG/medication issues, concerns, or questions.      Orders Placed This Encounter   Procedures    Hemoglobin A1C    Lipid Panel    Comprehensive Metabolic Panel

## 2022-07-06 NOTE — PATIENT INSTRUCTIONS
Decrease Lantus to 27 units nightly    Continue Novolog 11 units with meals.     Stop Victoza.     Start Ozempic 0.5 mg once WEEKLY.    For now, take two of your atorvastatin pills. When you refill, you'll get the 80 mg tablet. Take 1 tablet from her on.

## 2022-08-25 ENCOUNTER — TELEPHONE (OUTPATIENT)
Dept: ENDOCRINOLOGY | Facility: CLINIC | Age: 52
End: 2022-08-25
Payer: COMMERCIAL

## 2022-08-25 NOTE — TELEPHONE ENCOUNTER
----- Message from Alfreda Cortés sent at 8/25/2022  3:34 PM CDT -----  .Type:  Patient Call Back    Who Called: pt       Does the patient know what this is regarding?: she wants to talk about her upcoming visit and medication     Would the patient rather a call back yes     Best Call Back Number:[phone]    Additional Information: Thank You

## 2022-08-25 NOTE — TELEPHONE ENCOUNTER
Pt states that she wanted you to know that she stopped the ozempic because it was not working. Her BG were constantly high for no reason. States she had refill left @ the pharmacy for Victoza and she started taking it today. Pt asked to have her appr and labs moved further back

## 2022-10-11 ENCOUNTER — PATIENT OUTREACH (OUTPATIENT)
Dept: ADMINISTRATIVE | Facility: HOSPITAL | Age: 52
End: 2022-10-11
Payer: COMMERCIAL

## 2022-10-27 ENCOUNTER — LAB VISIT (OUTPATIENT)
Dept: LAB | Facility: HOSPITAL | Age: 52
End: 2022-10-27
Attending: NURSE PRACTITIONER
Payer: COMMERCIAL

## 2022-10-27 DIAGNOSIS — E11.29 TYPE 2 DIABETES MELLITUS WITH MICROALBUMINURIA, WITH LONG-TERM CURRENT USE OF INSULIN: ICD-10-CM

## 2022-10-27 DIAGNOSIS — R80.9 TYPE 2 DIABETES MELLITUS WITH MICROALBUMINURIA, WITH LONG-TERM CURRENT USE OF INSULIN: ICD-10-CM

## 2022-10-27 DIAGNOSIS — Z79.4 TYPE 2 DIABETES MELLITUS WITH MICROALBUMINURIA, WITH LONG-TERM CURRENT USE OF INSULIN: ICD-10-CM

## 2022-10-27 LAB
ALBUMIN SERPL BCP-MCNC: 3.7 G/DL (ref 3.5–5.2)
ALP SERPL-CCNC: 98 U/L (ref 55–135)
ALT SERPL W/O P-5'-P-CCNC: 28 U/L (ref 10–44)
ANION GAP SERPL CALC-SCNC: 11 MMOL/L (ref 8–16)
AST SERPL-CCNC: 28 U/L (ref 10–40)
BILIRUB SERPL-MCNC: 0.4 MG/DL (ref 0.1–1)
BUN SERPL-MCNC: 6 MG/DL (ref 6–20)
CALCIUM SERPL-MCNC: 9.5 MG/DL (ref 8.7–10.5)
CHLORIDE SERPL-SCNC: 97 MMOL/L (ref 95–110)
CHOLEST SERPL-MCNC: 157 MG/DL (ref 120–199)
CHOLEST/HDLC SERPL: 4.1 {RATIO} (ref 2–5)
CO2 SERPL-SCNC: 28 MMOL/L (ref 23–29)
CREAT SERPL-MCNC: 0.8 MG/DL (ref 0.5–1.4)
EST. GFR  (NO RACE VARIABLE): >60 ML/MIN/1.73 M^2
ESTIMATED AVG GLUCOSE: 309 MG/DL (ref 68–131)
GLUCOSE SERPL-MCNC: 141 MG/DL (ref 70–110)
HBA1C MFR BLD: 12.4 % (ref 4–5.6)
HDLC SERPL-MCNC: 38 MG/DL (ref 40–75)
HDLC SERPL: 24.2 % (ref 20–50)
LDLC SERPL CALC-MCNC: 95 MG/DL (ref 63–159)
NONHDLC SERPL-MCNC: 119 MG/DL
POTASSIUM SERPL-SCNC: 3.4 MMOL/L (ref 3.5–5.1)
PROT SERPL-MCNC: 7.5 G/DL (ref 6–8.4)
SODIUM SERPL-SCNC: 136 MMOL/L (ref 136–145)
TRIGL SERPL-MCNC: 120 MG/DL (ref 30–150)

## 2022-10-27 PROCEDURE — 36415 COLL VENOUS BLD VENIPUNCTURE: CPT | Mod: PN | Performed by: NURSE PRACTITIONER

## 2022-10-27 PROCEDURE — 80053 COMPREHEN METABOLIC PANEL: CPT | Performed by: NURSE PRACTITIONER

## 2022-10-27 PROCEDURE — 83036 HEMOGLOBIN GLYCOSYLATED A1C: CPT | Performed by: NURSE PRACTITIONER

## 2022-10-27 PROCEDURE — 80061 LIPID PANEL: CPT | Performed by: NURSE PRACTITIONER

## 2022-11-03 ENCOUNTER — OFFICE VISIT (OUTPATIENT)
Dept: ENDOCRINOLOGY | Facility: CLINIC | Age: 52
End: 2022-11-03
Payer: COMMERCIAL

## 2022-11-03 VITALS
DIASTOLIC BLOOD PRESSURE: 90 MMHG | HEIGHT: 67 IN | BODY MASS INDEX: 42.3 KG/M2 | HEART RATE: 100 BPM | WEIGHT: 269.5 LBS | SYSTOLIC BLOOD PRESSURE: 130 MMHG

## 2022-11-03 DIAGNOSIS — Z79.4 TYPE 2 DIABETES MELLITUS WITH MICROALBUMINURIA, WITH LONG-TERM CURRENT USE OF INSULIN: Primary | ICD-10-CM

## 2022-11-03 DIAGNOSIS — E66.01 MORBID OBESITY WITH BMI OF 40.0-44.9, ADULT: ICD-10-CM

## 2022-11-03 DIAGNOSIS — R80.9 TYPE 2 DIABETES MELLITUS WITH MICROALBUMINURIA, WITH LONG-TERM CURRENT USE OF INSULIN: Primary | ICD-10-CM

## 2022-11-03 DIAGNOSIS — E11.29 TYPE 2 DIABETES MELLITUS WITH MICROALBUMINURIA, WITH LONG-TERM CURRENT USE OF INSULIN: Primary | ICD-10-CM

## 2022-11-03 DIAGNOSIS — E78.2 MIXED HYPERLIPIDEMIA: ICD-10-CM

## 2022-11-03 DIAGNOSIS — I10 ESSENTIAL HYPERTENSION: ICD-10-CM

## 2022-11-03 PROCEDURE — 1159F MED LIST DOCD IN RCRD: CPT | Mod: CPTII,S$GLB,, | Performed by: NURSE PRACTITIONER

## 2022-11-03 PROCEDURE — 3046F PR MOST RECENT HEMOGLOBIN A1C LEVEL > 9.0%: ICD-10-PCS | Mod: CPTII,S$GLB,, | Performed by: NURSE PRACTITIONER

## 2022-11-03 PROCEDURE — 3066F NEPHROPATHY DOC TX: CPT | Mod: CPTII,S$GLB,, | Performed by: NURSE PRACTITIONER

## 2022-11-03 PROCEDURE — 1160F RVW MEDS BY RX/DR IN RCRD: CPT | Mod: CPTII,S$GLB,, | Performed by: NURSE PRACTITIONER

## 2022-11-03 PROCEDURE — 3075F SYST BP GE 130 - 139MM HG: CPT | Mod: CPTII,S$GLB,, | Performed by: NURSE PRACTITIONER

## 2022-11-03 PROCEDURE — 3046F HEMOGLOBIN A1C LEVEL >9.0%: CPT | Mod: CPTII,S$GLB,, | Performed by: NURSE PRACTITIONER

## 2022-11-03 PROCEDURE — 99214 PR OFFICE/OUTPT VISIT, EST, LEVL IV, 30-39 MIN: ICD-10-PCS | Mod: S$GLB,,, | Performed by: NURSE PRACTITIONER

## 2022-11-03 PROCEDURE — 3008F BODY MASS INDEX DOCD: CPT | Mod: CPTII,S$GLB,, | Performed by: NURSE PRACTITIONER

## 2022-11-03 PROCEDURE — 1159F PR MEDICATION LIST DOCUMENTED IN MEDICAL RECORD: ICD-10-PCS | Mod: CPTII,S$GLB,, | Performed by: NURSE PRACTITIONER

## 2022-11-03 PROCEDURE — 99999 PR PBB SHADOW E&M-EST. PATIENT-LVL V: CPT | Mod: PBBFAC,,, | Performed by: NURSE PRACTITIONER

## 2022-11-03 PROCEDURE — 3061F PR NEG MICROALBUMINURIA RESULT DOCUMENTED/REVIEW: ICD-10-PCS | Mod: CPTII,S$GLB,, | Performed by: NURSE PRACTITIONER

## 2022-11-03 PROCEDURE — 3008F PR BODY MASS INDEX (BMI) DOCUMENTED: ICD-10-PCS | Mod: CPTII,S$GLB,, | Performed by: NURSE PRACTITIONER

## 2022-11-03 PROCEDURE — 1160F PR REVIEW ALL MEDS BY PRESCRIBER/CLIN PHARMACIST DOCUMENTED: ICD-10-PCS | Mod: CPTII,S$GLB,, | Performed by: NURSE PRACTITIONER

## 2022-11-03 PROCEDURE — 99214 OFFICE O/P EST MOD 30 MIN: CPT | Mod: S$GLB,,, | Performed by: NURSE PRACTITIONER

## 2022-11-03 PROCEDURE — 99999 PR PBB SHADOW E&M-EST. PATIENT-LVL V: ICD-10-PCS | Mod: PBBFAC,,, | Performed by: NURSE PRACTITIONER

## 2022-11-03 PROCEDURE — 3080F PR MOST RECENT DIASTOLIC BLOOD PRESSURE >= 90 MM HG: ICD-10-PCS | Mod: CPTII,S$GLB,, | Performed by: NURSE PRACTITIONER

## 2022-11-03 PROCEDURE — 3075F PR MOST RECENT SYSTOLIC BLOOD PRESS GE 130-139MM HG: ICD-10-PCS | Mod: CPTII,S$GLB,, | Performed by: NURSE PRACTITIONER

## 2022-11-03 PROCEDURE — 3080F DIAST BP >= 90 MM HG: CPT | Mod: CPTII,S$GLB,, | Performed by: NURSE PRACTITIONER

## 2022-11-03 PROCEDURE — 3066F PR DOCUMENTATION OF TREATMENT FOR NEPHROPATHY: ICD-10-PCS | Mod: CPTII,S$GLB,, | Performed by: NURSE PRACTITIONER

## 2022-11-03 PROCEDURE — 3061F NEG MICROALBUMINURIA REV: CPT | Mod: CPTII,S$GLB,, | Performed by: NURSE PRACTITIONER

## 2022-11-03 RX ORDER — LIRAGLUTIDE 6 MG/ML
1.8 INJECTION SUBCUTANEOUS DAILY
COMMUNITY
Start: 2022-09-15 | End: 2022-11-03

## 2022-11-03 RX ORDER — DIAZEPAM 10 MG/1
TABLET ORAL
COMMUNITY
Start: 2022-10-04

## 2022-11-03 RX ORDER — INSULIN ASPART 100 [IU]/ML
18 INJECTION, SOLUTION INTRAVENOUS; SUBCUTANEOUS
Start: 2022-11-03 | End: 2023-02-09

## 2022-11-03 RX ORDER — PROMETHAZINE HYDROCHLORIDE AND CODEINE PHOSPHATE 6.25; 1 MG/5ML; MG/5ML
SOLUTION ORAL
COMMUNITY
Start: 2022-10-19

## 2022-11-03 RX ORDER — INSULIN GLARGINE 100 [IU]/ML
32 INJECTION, SOLUTION SUBCUTANEOUS NIGHTLY
Qty: 15 ML | Refills: 6
Start: 2022-11-03 | End: 2023-02-03

## 2022-11-03 NOTE — PATIENT INSTRUCTIONS
Start Jardiance 10 mg every morning. Will plan to increase this dose to 25 mg after the first month. Be sure to drink plenty of water.     Take Ozempic 0.5 mg once weekly. We can increase this dose if needed.     Increase Lantus to 32 units nightly.    Continue same Novolog dose for now.

## 2022-11-03 NOTE — PROGRESS NOTES
CC: Ms. Liza Mesa arrives today for management of Type 2 DM and review of chronic medical conditions, as listed in the Visit Diagnosis section of this encounter.       HPI: Ms. Liza Mesa was diagnosed with Type 2 DM in 2005. She was diagnosed based on lab work. Initial treatment consisted of metformin.  Victoza was later added to her medication regimen in 2016. Insulin added in 2018. + FH of DM on mother. Denies hospitalizations due to DM.     Patient was last seen by me in July. At this time, Victoza was changed to Ozempic. She states that she took the Ozempic for 2 weeks and glucoses were more elevated. She cannot recall which dose she was injecting. She changed back to Victoza at end of August.    She states that she has not been taking care of herself because she has been caring for her mother, who nearly needed leg amputated. Has been taking her to wound care weekly for 3 months.     BG monitoring per [employer] [facility]. Her insurance is covering this through her pharmacy benefit. She just recently resumed.    Hypoglycemia: No  Symptoms: jittery, tingling  Treatment: peppermint or jelly beans     Missing Insulin/PO medication doses: No     Dietary Habits: Eats 2 meals/day. Has been eating out with mother more often. Has been snacking more recently. Avoids sugary beverages.      Last DM education appointment:  8/2016        CURRENT DIABETIC MEDS: Victoza 1.8 mg daily, Lantus 27 units QHS, Novolog 18 units with lunch and dinner + correction scale, target 150, ISF 25  Glucometer type: Accucheck Adrienne     Previous DM treatments:  Lantus - not covered by insurance   Glimepiride   Acarbose  Metformin XR -- diarrhea  VGo - didn't like wearing device    Last Eye Exam: 6/2022, no DR per patient. [facility]   Last Podiatry Exam: 11/2021, Dr. Olvera    REVIEW OF SYSTEMS  Constitutional: no c/o fatigue, weakness, weight loss. + weight gain   Cardiac: no palpitations or chest pain.  Respiratory: no  "cough, + chronic dyspnea, related to asthma. Uses albuterol nebulizer PRN.  GI: no c/o abdominal pain or nausea. Denies h/o pancreatitis.   Skin: no rashes, lesions.   Neuro: denies numbness, tingling, paresthesias.   Endocrine: denies polyphagia. + polydipsia, polyuria.       Personally reviewed Past Medical, Surgical, Social History.    Vital Signs  BP (!) 130/90   Pulse 100   Ht 5' 7" (1.702 m)   Wt 122.3 kg (269 lb 8.2 oz)   LMP 11/21/2019   BMI 42.21 kg/m²     Personally reviewed the below labs:    Hemoglobin A1C   Date Value Ref Range Status   10/27/2022 12.4 (H) 4.0 - 5.6 % Final     Comment:     ADA Screening Guidelines:  5.7-6.4%  Consistent with prediabetes  >or=6.5%  Consistent with diabetes    High levels of fetal hemoglobin interfere with the HbA1C  assay. Heterozygous hemoglobin variants (HbS, HgC, etc)do  not significantly interfere with this assay.   However, presence of multiple variants may affect accuracy.     06/30/2022 8.0 (H) 4.0 - 5.6 % Final     Comment:     ADA Screening Guidelines:  5.7-6.4%  Consistent with prediabetes  >or=6.5%  Consistent with diabetes    High levels of fetal hemoglobin interfere with the HbA1C  assay. Heterozygous hemoglobin variants (HbS, HgC, etc)do  not significantly interfere with this assay.   However, presence of multiple variants may affect accuracy.     03/30/2022 7.2 (H) 4.0 - 5.6 % Final     Comment:     ADA Screening Guidelines:  5.7-6.4%  Consistent with prediabetes  >or=6.5%  Consistent with diabetes    High levels of fetal hemoglobin interfere with the HbA1C  assay. Heterozygous hemoglobin variants (HbS, HgC, etc)do  not significantly interfere with this assay.   However, presence of multiple variants may affect accuracy.     05/03/2012 7.1 (H) 4.8 - 5.9 % Final     Comment:     **In order to standardize %HbA1c results worldwide, as of October 11, 2010,  the %HbA1c is being calculated using the master equation recommended in the  consensus statement " adopted by the ADA (American Diabetes Assoc), EASD  (European Assoc for the Study of Diabetes), IFCC (International Federation  of Clinical Chemistry and Laboratory Medicine) and IDF (International  Diabetes Federation). Result units: %HgbA1c (DCCT/NGSP).  In common with other methods, Hb A1C values may not accurately reflect mean  blood glucose in patients with hemoglobin variants (HgbF, HgbS and HgbC).  Any cause of shortened erythrocyte survival will reduce exposure of  erythrocytes to glucose with a consequent decrease in HbA1c (%) values, even  though the time-averaged blood glucose level may be elevated. Causes of  shortened erythrocyte lifetime might be hemolytic anemia or other hemolytic  diseases, homozygous sickle cell trait, pregnancy, recent significant or  chronic blood loss, etc. Caution should be used when interpreting the HbA1c  results from patients with these conditions.       Chemistry        Component Value Date/Time     10/27/2022 0945    K 3.4 (L) 10/27/2022 0945    CL 97 10/27/2022 0945    CO2 28 10/27/2022 0945    BUN 6 10/27/2022 0945    CREATININE 0.8 10/27/2022 0945    CREATININE 1.0 05/06/2012 0431     (H) 10/27/2022 0945        Component Value Date/Time    CALCIUM 9.5 10/27/2022 0945    CALCIUM 9.5 05/06/2012 0431    ALKPHOS 98 10/27/2022 0945    ALKPHOS 49 05/03/2012 1635    AST 28 10/27/2022 0945    AST 13 05/03/2012 1635    ALT 28 10/27/2022 0945    BILITOT 0.4 10/27/2022 0945          Lab Results   Component Value Date    CHOL 157 10/27/2022    CHOL 207 (H) 06/30/2022    CHOL 230 (H) 03/30/2022     Lab Results   Component Value Date    HDL 38 (L) 10/27/2022    HDL 56 06/30/2022    HDL 52 03/30/2022     Lab Results   Component Value Date    LDLCALC 95.0 10/27/2022    LDLCALC 127.2 06/30/2022    LDLCALC 156.6 03/30/2022     Lab Results   Component Value Date    TRIG 120 10/27/2022    TRIG 119 06/30/2022    TRIG 107 03/30/2022     Lab Results   Component Value Date     CHOLHDL 24.2 10/27/2022    CHOLHDL 27.1 06/30/2022    CHOLHDL 22.6 03/30/2022       Lab Results   Component Value Date    MICALBCREAT 16.7 06/30/2022     Lab Results   Component Value Date    TSH 1.632 07/26/2019       CrCl cannot be calculated (Patient's most recent lab result is older than the maximum 7 days allowed.).    No results found for: [id number]         PHYSICAL EXAMINATION  Constitutional: Appears well, no distress.  Neck: Supple, trachea midline.  Respiratory: CTA, even and unlabored.   Cardiovascular: RRR, no murmurs.  GI: active bowel sounds, no hernia  Skin: warm and dry  Neuro: oriented to person, place, time  Feet: appropriate footwear.         FREESTYLE BAKARI 2 DOWNLOAD: not enough data to interpret.          Goals        HEMOGLOBIN A1C < 7               Assessment/Plan  1. Type 2 diabetes mellitus with microalbuminuria, with long-term current use of insulin  -- Worsening. Will resume Ozempic, as she previously only took for 2 weeks and cannot recall if she took 0.25 mg or 0.5 mg dose. Will also add SGLT2-I.   -- discontinue Victoza  -- start Ozempic 0.5 mg weekly. Can increase to 1 mg in the future. Discussed medication's mechanism of action, side effects, and contraindications. Advised pt to notify me for extreme nausea, abdominal pain, etc.  -- Begin Jardiance 10 mg daily. Will increase to 25 mg after the first month. Discussed mechanism of action and side effects, including genital mycotic infections, UTIs, dehydration.  Encouraged increased hydration. Avoid diets with extreme carb restriction.   -- increase Lantus to 32 units QHS.  -- continue Novolog at current doses for now.   -- will obtain eye exam report    -- Discussed diagnosis of DM, A1c goals, progression of disease, long term complications and tx options.  Advised patient to check BG before activities, such as driving or exercise.  -- Reviewed hypoglycemia management: treat with 1/2 glass of juice, 1/2 can regular coke, or 4  glucose tablets. Monitor and repeat treatment every 15 minutes until BG is >70 Then have a snack, which includes a complex carbohydrate and protein.   2. Essential hypertension  -- borderline elevated, previously controlled.   -- continue amlodipine.   -- Losartan was previously discontinued by PCP due to patient's request to decrease amount of pills that she was taking.    3. Mixed hyperlipidemia  -- controlled  -- continue atorvastatin    4. Morbid obesity  -- worsening  -- Ozempic, Jardiance may add benefit  -- increasing insulin resistance   Body mass index is 42.21 kg/m².       FOLLOW UP  Follow up in about 3 months (around 2/3/2023).   Patient instructed to bring BG logs to each follow up   Patient encouraged to call for any BG/medication issues, concerns, or questions.      Orders Placed This Encounter   Procedures    Hemoglobin A1C    TSH    Basic Metabolic Panel

## 2022-11-29 ENCOUNTER — TELEPHONE (OUTPATIENT)
Dept: FAMILY MEDICINE | Facility: CLINIC | Age: 52
End: 2022-11-29
Payer: COMMERCIAL

## 2022-11-29 NOTE — TELEPHONE ENCOUNTER
----- Message from Theresa Billy sent at 11/29/2022  3:06 PM CST -----  Regarding: med request  Can the clinic reply in [facility]: no       Please refill the medication(s) listed below. Please call the patient when the prescription(s) is ready for  at this phone number   Please advise [phone]       Medication #1 Cold Pack 4-6 day supply to help with her congestion    Medication #2      Preferred Pharmacy:   [facility] [address] [address]  [address] [address] [address]  Phone: [phone] Fax: [phone]

## 2022-11-30 ENCOUNTER — OFFICE VISIT (OUTPATIENT)
Dept: FAMILY MEDICINE | Facility: CLINIC | Age: 52
End: 2022-11-30
Payer: COMMERCIAL

## 2022-11-30 VITALS
SYSTOLIC BLOOD PRESSURE: 138 MMHG | HEIGHT: 67 IN | WEIGHT: 267.63 LBS | DIASTOLIC BLOOD PRESSURE: 80 MMHG | BODY MASS INDEX: 42 KG/M2 | TEMPERATURE: 98 F | HEART RATE: 99 BPM | OXYGEN SATURATION: 95 %

## 2022-11-30 DIAGNOSIS — E11.65 UNCONTROLLED TYPE 2 DIABETES MELLITUS WITH HYPERGLYCEMIA: ICD-10-CM

## 2022-11-30 DIAGNOSIS — J22 LOWER RESPIRATORY INFECTION: Primary | ICD-10-CM

## 2022-11-30 DIAGNOSIS — J45.909 ASTHMA, UNSPECIFIED ASTHMA SEVERITY, UNSPECIFIED WHETHER COMPLICATED, UNSPECIFIED WHETHER PERSISTENT: ICD-10-CM

## 2022-11-30 PROCEDURE — 3008F BODY MASS INDEX DOCD: CPT | Mod: CPTII,S$GLB,, | Performed by: NURSE PRACTITIONER

## 2022-11-30 PROCEDURE — 99999 PR PBB SHADOW E&M-EST. PATIENT-LVL V: CPT | Mod: PBBFAC,,, | Performed by: NURSE PRACTITIONER

## 2022-11-30 PROCEDURE — 3046F PR MOST RECENT HEMOGLOBIN A1C LEVEL > 9.0%: ICD-10-PCS | Mod: CPTII,S$GLB,, | Performed by: NURSE PRACTITIONER

## 2022-11-30 PROCEDURE — 3061F PR NEG MICROALBUMINURIA RESULT DOCUMENTED/REVIEW: ICD-10-PCS | Mod: CPTII,S$GLB,, | Performed by: NURSE PRACTITIONER

## 2022-11-30 PROCEDURE — 3066F NEPHROPATHY DOC TX: CPT | Mod: CPTII,S$GLB,, | Performed by: NURSE PRACTITIONER

## 2022-11-30 PROCEDURE — 3061F NEG MICROALBUMINURIA REV: CPT | Mod: CPTII,S$GLB,, | Performed by: NURSE PRACTITIONER

## 2022-11-30 PROCEDURE — 99214 PR OFFICE/OUTPT VISIT, EST, LEVL IV, 30-39 MIN: ICD-10-PCS | Mod: S$GLB,,, | Performed by: NURSE PRACTITIONER

## 2022-11-30 PROCEDURE — 3046F HEMOGLOBIN A1C LEVEL >9.0%: CPT | Mod: CPTII,S$GLB,, | Performed by: NURSE PRACTITIONER

## 2022-11-30 PROCEDURE — 1159F MED LIST DOCD IN RCRD: CPT | Mod: CPTII,S$GLB,, | Performed by: NURSE PRACTITIONER

## 2022-11-30 PROCEDURE — 3075F SYST BP GE 130 - 139MM HG: CPT | Mod: CPTII,S$GLB,, | Performed by: NURSE PRACTITIONER

## 2022-11-30 PROCEDURE — 1159F PR MEDICATION LIST DOCUMENTED IN MEDICAL RECORD: ICD-10-PCS | Mod: CPTII,S$GLB,, | Performed by: NURSE PRACTITIONER

## 2022-11-30 PROCEDURE — 99214 OFFICE O/P EST MOD 30 MIN: CPT | Mod: S$GLB,,, | Performed by: NURSE PRACTITIONER

## 2022-11-30 PROCEDURE — 3008F PR BODY MASS INDEX (BMI) DOCUMENTED: ICD-10-PCS | Mod: CPTII,S$GLB,, | Performed by: NURSE PRACTITIONER

## 2022-11-30 PROCEDURE — 3079F DIAST BP 80-89 MM HG: CPT | Mod: CPTII,S$GLB,, | Performed by: NURSE PRACTITIONER

## 2022-11-30 PROCEDURE — 3066F PR DOCUMENTATION OF TREATMENT FOR NEPHROPATHY: ICD-10-PCS | Mod: CPTII,S$GLB,, | Performed by: NURSE PRACTITIONER

## 2022-11-30 PROCEDURE — 3079F PR MOST RECENT DIASTOLIC BLOOD PRESSURE 80-89 MM HG: ICD-10-PCS | Mod: CPTII,S$GLB,, | Performed by: NURSE PRACTITIONER

## 2022-11-30 PROCEDURE — 99999 PR PBB SHADOW E&M-EST. PATIENT-LVL V: ICD-10-PCS | Mod: PBBFAC,,, | Performed by: NURSE PRACTITIONER

## 2022-11-30 PROCEDURE — 3075F PR MOST RECENT SYSTOLIC BLOOD PRESS GE 130-139MM HG: ICD-10-PCS | Mod: CPTII,S$GLB,, | Performed by: NURSE PRACTITIONER

## 2022-11-30 RX ORDER — AZITHROMYCIN 250 MG/1
TABLET, FILM COATED ORAL
Qty: 6 TABLET | Refills: 0 | Status: SHIPPED | OUTPATIENT
Start: 2022-11-30 | End: 2022-12-05

## 2022-11-30 NOTE — PROGRESS NOTES
Subjective:       Patient ID: Liza Spain Cousin is a 52 y.o. female.    Chief Complaint: Follow-up (Chest congestion 3 days)    HPI  New patient to me with history of T2DM, asthma, HTN, HLD prsents for chest congestion x 1 week.   Denies fever. No flu or covid exposure. Grandchild with runny nose. She denies sinus pain/pressure, ear pain or sore throat    Asthma: on Trelegy. Has rescue inhaler and nebs--hasnt needed.     Diabetes uncontrolled most recent A1C 12.4    Vitals:    11/30/22 0856   BP: 138/80   Pulse: 99   Temp: 98.2 °F (36.8 °C)     Review of Systems   Constitutional:  Positive for fatigue. Negative for fever.   HENT:  Negative for congestion, ear pain, sinus pressure and sinus pain.    Respiratory:  Positive for cough. Negative for wheezing.         +chest congestion   Cardiovascular:  Negative for chest pain.     Past Medical History:   Diagnosis Date    Arthritis     Asthma 2/6/2012    Blood transfusion     Depression 2/6/2012    Diabetes 2/6/2012    HTN (hypertension) 2/6/2012    LBP (low back pain) 11/12/2012    Obesity 11/27/2013    Tobacco abuse 7/10/2013     Objective:      Physical Exam  Vitals and nursing note reviewed.   Constitutional:       General: She is not in acute distress.     Appearance: She is not diaphoretic.   HENT:      Head: Normocephalic.   Eyes:      General:         Right eye: No discharge.         Left eye: No discharge.   Neck:      Trachea: No tracheal deviation.   Cardiovascular:      Rate and Rhythm: Normal rate.      Heart sounds: Normal heart sounds.   Pulmonary:      Effort: Pulmonary effort is normal.      Breath sounds: Rhonchi present. No wheezing or rales.   Skin:     Coloration: Skin is not pale.   Neurological:      Mental Status: She is alert and oriented to person, place, and time.   Psychiatric:         Speech: Speech normal.         Behavior: Behavior normal.         Thought Content: Thought content normal.         Judgment: Judgment normal.       Assessment:        1. Lower respiratory infection    2. Asthma, unspecified asthma severity, unspecified whether complicated, unspecified whether persistent    3. Uncontrolled type 2 diabetes mellitus with hyperglycemia        Plan:       Lower respiratory infection  -     azithromycin (Z-AYALA) 250 MG tablet; Take 2 tablets by mouth on day 1; Take 1 tablet by mouth on days 2-5  Dispense: 6 tablet; Refill: 0    Asthma, unspecified asthma severity, unspecified whether complicated, unspecified whether persistent    Uncontrolled type 2 diabetes mellitus with hyperglycemia        Start mucinex  Nebs/rescue prn   education provided on supportive care, symptom monitoring and return precautions     Follow up for further evaluation if s/s worsen, fail to improve, or new symptoms arise.    Medication List with Changes/Refills   New Medications    AZITHROMYCIN (Z-AYALA) 250 MG TABLET    Take 2 tablets by mouth on day 1; Take 1 tablet by mouth on days 2-5   Current Medications    ALBUTEROL (ACCUNEB) 0.63 MG/3 ML NEBU    Take 3 mLs (0.63 mg total) by nebulization every 6 (six) hours as needed (wheezing).    ALBUTEROL (VENTOLIN HFA) 90 MCG/ACTUATION INHALER    INHALE 2 PUFFS INTO THE LUNGS EVERY 6 HOURS AS NEEDED    ALPRAZOLAM (XANAX) 1 MG TABLET    Take 1 mg by mouth 2 (two) times daily as needed.     AMLODIPINE (NORVASC) 10 MG TABLET    Take 1 tablet (10 mg total) by mouth 2 (two) times daily.    ATORVASTATIN (LIPITOR) 80 MG TABLET    Take 1 tablet (80 mg total) by mouth every evening.    BLOOD SUGAR DIAGNOSTIC STRP    To check BG 4 times daily, to use with insurance preferred meter. Dx code E11.42    BLOOD-GLUCOSE METER KIT    To check BG 4 times daily, to use with insurance preferred meter. Dx code E11.42    CHLORZOXAZONE (PARAFON FORTE) 500 MG TAB    Take 1 tablet (500 mg total) by mouth 4 (four) times daily as needed (muscle spasm).    DEXTROAMPHETAMINE-AMPHETAMINE 30 MG TAB    Take 30 mg by mouth 2 (two) times a day.     DIAZEPAM (VALIUM)  "10 MG TAB    TAKE 1 TABLET BY MOUTH 30 MINUTES PRIOR TO PROCEDURE    EMPAGLIFLOZIN (JARDIANCE) 10 MG TABLET    Take 1 tablet (10 mg total) by mouth once daily.    FLASH GLUCOSE SCANNING READER (FREESTYLE BAKARI 2 READER) [facility]    Use for continuous glucose monitoring.    FLUTICASONE-UMECLIDIN-VILANTER (TRELEGY ELLIPTA) 200-62.5-25 MCG INHALER    Inhale 1 puff into the lungs once daily.    FREESTYLE BAKARI 2 SENSOR KIT    USE FOR CONTINUOUS GLUCOSE MONITORING. CHANGE EVERY 14 DAYS    HYDROCODONE-ACETAMINOPHEN (NORCO) 7.5-325 MG PER TABLET    Take 1 tablet by mouth every 12 (twelve) hours as needed for Pain.    HYDROCORTISONE (ANUSOL-HC) 25 MG SUPPOSITORY    Place 1 suppository (25 mg total) rectally 2 (two) times daily as needed for Hemorrhoids.    INSULIN (LANTUS SOLOSTAR U-100 INSULIN) GLARGINE 100 UNITS/ML SUBQ PEN    Inject 32 Units into the skin every evening.    INSULIN ASPART U-100 (NOVOLOG FLEXPEN U-100 INSULIN) 100 UNIT/ML (3 ML) INPN PEN    Inject 18 Units into the skin 3 (three) times daily with meals. Plus correction scale, max TDD 60u    INSULIN LISPRO (HUMALOG KWIKPEN INSULIN) 100 UNIT/ML PEN    Inject 18 Units into the skin 3 (three) times daily before meals. Plus correction scale, max TDD 84u    LANCETS [facility]    To check BG 4 times daily, to use with insurance preferred meter. Dx code E11.42    OXYBUTYNIN (DITROPAN) 5 MG TAB    Take 1 tablet (5 mg total) by mouth 2 (two) times a day.    PEN NEEDLE, DIABETIC 32 GAUGE X 5/32" NDLE    Use 5 times daily with insulin and Victoza.    PROMETHAZINE-CODEINE 6.25-10 MG/5 ML (PHENERGAN WITH CODEINE) 6.25-10 MG/5 ML SYRUP    TAKE 5 ML BY MOUTH TWICE A DAY    SEMAGLUTIDE (OZEMPIC) 0.25 MG OR 0.5 MG(2 MG/1.5 ML) PEN INJECTOR    Inject 0.5 mg into the skin every 7 days.    SUMATRIPTAN (IMITREX) 100 MG TABLET    Take 1 tablet (100 mg total) by mouth once as needed for Migraine.    ZOLPIDEM (AMBIEN) 10 MG TAB    10 mg nightly as needed.          "

## 2022-12-02 DIAGNOSIS — R80.9 TYPE 2 DIABETES MELLITUS WITH MICROALBUMINURIA, WITH LONG-TERM CURRENT USE OF INSULIN: ICD-10-CM

## 2022-12-02 DIAGNOSIS — E11.29 TYPE 2 DIABETES MELLITUS WITH MICROALBUMINURIA, WITH LONG-TERM CURRENT USE OF INSULIN: ICD-10-CM

## 2022-12-02 DIAGNOSIS — Z79.4 TYPE 2 DIABETES MELLITUS WITH MICROALBUMINURIA, WITH LONG-TERM CURRENT USE OF INSULIN: ICD-10-CM

## 2022-12-28 ENCOUNTER — TELEPHONE (OUTPATIENT)
Dept: FAMILY MEDICINE | Facility: CLINIC | Age: 52
End: 2022-12-28
Payer: COMMERCIAL

## 2022-12-28 DIAGNOSIS — Z12.39 ENCOUNTER FOR SCREENING FOR MALIGNANT NEOPLASM OF BREAST, UNSPECIFIED SCREENING MODALITY: Primary | ICD-10-CM

## 2022-12-28 NOTE — TELEPHONE ENCOUNTER
----- Message from Stephane Wilkinson sent at 12/28/2022  4:21 PM CST -----  Contact: self  Type: Needs Medical Advice  Who Called: Patient   Best Call Back Number: [phone]  Additional Information: Patient just needs a mammogram order put in and she wants her results sent to [address] [place] [address] C Phani Hooker. Plz accommodate for pt. Thanks

## 2023-01-23 ENCOUNTER — PATIENT OUTREACH (OUTPATIENT)
Dept: ADMINISTRATIVE | Facility: HOSPITAL | Age: 53
End: 2023-01-23
Payer: COMMERCIAL

## 2023-01-23 NOTE — PROGRESS NOTES
UNCONTROLLED A1C REPORT.  PATIENT HAS AN UPCOMING LAB APPOINTMENT.  CHART REVIEWED;  LABS ORDERED AND LINKED WHAT IS NEEDED    Hemoglobin A1C   Date Value Ref Range Status   10/27/2022 12.4 (H) 4.0 - 5.6 % Final     Comment:     ADA Screening Guidelines:  5.7-6.4%  Consistent with prediabetes  >or=6.5%  Consistent with diabetes    High levels of fetal hemoglobin interfere with the HbA1C  assay. Heterozygous hemoglobin variants (HbS, HgC, etc)do  not significantly interfere with this assay.   However, presence of multiple variants may affect accuracy.     06/30/2022 8.0 (H) 4.0 - 5.6 % Final     Comment:     ADA Screening Guidelines:  5.7-6.4%  Consistent with prediabetes  >or=6.5%  Consistent with diabetes    High levels of fetal hemoglobin interfere with the HbA1C  assay. Heterozygous hemoglobin variants (HbS, HgC, etc)do  not significantly interfere with this assay.   However, presence of multiple variants may affect accuracy.     03/30/2022 7.2 (H) 4.0 - 5.6 % Final     Comment:     ADA Screening Guidelines:  5.7-6.4%  Consistent with prediabetes  >or=6.5%  Consistent with diabetes    High levels of fetal hemoglobin interfere with the HbA1C  assay. Heterozygous hemoglobin variants (HbS, HgC, etc)do  not significantly interfere with this assay.   However, presence of multiple variants may affect accuracy.     05/03/2012 7.1 (H) 4.8 - 5.9 % Final     Comment:     **In order to standardize %HbA1c results worldwide, as of October 11, 2010,  the %HbA1c is being calculated using the master equation recommended in the  consensus statement adopted by the ADA (American Diabetes Assoc), EASD  (European Assoc for the Study of Diabetes), IFCC (International Federation  of Clinical Chemistry and Laboratory Medicine) and IDF (International  Diabetes Federation). Result units: %HgbA1c (DCCT/NGSP).  In common with other methods, Hb A1C values may not accurately reflect mean  blood glucose in patients with hemoglobin variants  (HgbF, HgbS and HgbC).  Any cause of shortened erythrocyte survival will reduce exposure of  erythrocytes to glucose with a consequent decrease in HbA1c (%) values, even  though the time-averaged blood glucose level may be elevated. Causes of  shortened erythrocyte lifetime might be hemolytic anemia or other hemolytic  diseases, homozygous sickle cell trait, pregnancy, recent significant or  chronic blood loss, etc. Caution should be used when interpreting the HbA1c  results from patients with these conditions.

## 2023-01-30 ENCOUNTER — HOSPITAL ENCOUNTER (OUTPATIENT)
Dept: RADIOLOGY | Facility: HOSPITAL | Age: 53
Discharge: HOME | End: 2023-01-30
Attending: FAMILY MEDICINE
Payer: COMMERCIAL

## 2023-01-30 DIAGNOSIS — Z12.39 ENCOUNTER FOR SCREENING FOR MALIGNANT NEOPLASM OF BREAST, UNSPECIFIED SCREENING MODALITY: ICD-10-CM

## 2023-01-30 DIAGNOSIS — Z12.31 BREAST CANCER SCREENING BY MAMMOGRAM: ICD-10-CM

## 2023-01-30 PROCEDURE — 77067 MAMMO DIGITAL SCREENING BILAT WITH TOMO: ICD-10-PCS | Mod: 26,,, | Performed by: RADIOLOGY

## 2023-01-30 PROCEDURE — 77063 BREAST TOMOSYNTHESIS BI: CPT | Mod: 26,,, | Performed by: RADIOLOGY

## 2023-01-30 PROCEDURE — 77067 SCR MAMMO BI INCL CAD: CPT | Mod: 26,,, | Performed by: RADIOLOGY

## 2023-01-30 PROCEDURE — 77063 MAMMO DIGITAL SCREENING BILAT WITH TOMO: ICD-10-PCS | Mod: 26,,, | Performed by: RADIOLOGY

## 2023-01-30 PROCEDURE — 77067 SCR MAMMO BI INCL CAD: CPT | Mod: TC,PO

## 2023-02-02 ENCOUNTER — LAB VISIT (OUTPATIENT)
Dept: LAB | Facility: HOSPITAL | Age: 53
End: 2023-02-02
Payer: COMMERCIAL

## 2023-02-02 DIAGNOSIS — Z79.4 TYPE 2 DIABETES MELLITUS WITH MICROALBUMINURIA, WITH LONG-TERM CURRENT USE OF INSULIN: ICD-10-CM

## 2023-02-02 DIAGNOSIS — E11.29 TYPE 2 DIABETES MELLITUS WITH MICROALBUMINURIA, WITH LONG-TERM CURRENT USE OF INSULIN: ICD-10-CM

## 2023-02-02 DIAGNOSIS — R80.9 TYPE 2 DIABETES MELLITUS WITH MICROALBUMINURIA, WITH LONG-TERM CURRENT USE OF INSULIN: ICD-10-CM

## 2023-02-02 LAB
ANION GAP SERPL CALC-SCNC: 11 MMOL/L (ref 8–16)
BUN SERPL-MCNC: 12 MG/DL (ref 6–20)
CALCIUM SERPL-MCNC: 9.8 MG/DL (ref 8.7–10.5)
CHLORIDE SERPL-SCNC: 100 MMOL/L (ref 95–110)
CO2 SERPL-SCNC: 25 MMOL/L (ref 23–29)
CREAT SERPL-MCNC: 0.9 MG/DL (ref 0.5–1.4)
EST. GFR  (NO RACE VARIABLE): >60 ML/MIN/1.73 M^2
GLUCOSE SERPL-MCNC: 103 MG/DL (ref 70–110)
POTASSIUM SERPL-SCNC: 4.2 MMOL/L (ref 3.5–5.1)
SODIUM SERPL-SCNC: 136 MMOL/L (ref 136–145)
TSH SERPL DL<=0.005 MIU/L-ACNC: 0.81 UIU/ML (ref 0.4–4)

## 2023-02-02 PROCEDURE — 84443 ASSAY THYROID STIM HORMONE: CPT | Performed by: NURSE PRACTITIONER

## 2023-02-02 PROCEDURE — 83036 HEMOGLOBIN GLYCOSYLATED A1C: CPT | Performed by: NURSE PRACTITIONER

## 2023-02-02 PROCEDURE — 36415 COLL VENOUS BLD VENIPUNCTURE: CPT | Mod: PN | Performed by: NURSE PRACTITIONER

## 2023-02-02 PROCEDURE — 80048 BASIC METABOLIC PNL TOTAL CA: CPT | Performed by: NURSE PRACTITIONER

## 2023-02-03 LAB
ESTIMATED AVG GLUCOSE: 338 MG/DL (ref 68–131)
HBA1C MFR BLD: 13.4 % (ref 4–5.6)

## 2023-02-06 DIAGNOSIS — Z79.4 TYPE 2 DIABETES MELLITUS WITH MICROALBUMINURIA, WITH LONG-TERM CURRENT USE OF INSULIN: ICD-10-CM

## 2023-02-06 DIAGNOSIS — E11.29 TYPE 2 DIABETES MELLITUS WITH MICROALBUMINURIA, WITH LONG-TERM CURRENT USE OF INSULIN: ICD-10-CM

## 2023-02-06 DIAGNOSIS — R80.9 TYPE 2 DIABETES MELLITUS WITH MICROALBUMINURIA, WITH LONG-TERM CURRENT USE OF INSULIN: ICD-10-CM

## 2023-02-06 RX ORDER — LIRAGLUTIDE 6 MG/ML
INJECTION SUBCUTANEOUS
Qty: 9 PEN | Refills: 30 | Status: CANCELLED | OUTPATIENT
Start: 2023-02-06

## 2023-02-06 NOTE — TELEPHONE ENCOUNTER
Thre was no victoza in her med list but a [facility]    Pharmacy asking quantity 9 and supply 90 day for vistoza

## 2023-02-09 ENCOUNTER — OFFICE VISIT (OUTPATIENT)
Dept: ENDOCRINOLOGY | Facility: CLINIC | Age: 53
End: 2023-02-09
Payer: COMMERCIAL

## 2023-02-09 ENCOUNTER — OFFICE VISIT (OUTPATIENT)
Dept: FAMILY MEDICINE | Facility: CLINIC | Age: 53
End: 2023-02-09
Payer: COMMERCIAL

## 2023-02-09 VITALS
WEIGHT: 264.75 LBS | HEIGHT: 67 IN | DIASTOLIC BLOOD PRESSURE: 80 MMHG | SYSTOLIC BLOOD PRESSURE: 110 MMHG | HEART RATE: 80 BPM | DIASTOLIC BLOOD PRESSURE: 80 MMHG | HEART RATE: 80 BPM | WEIGHT: 264.75 LBS | BODY MASS INDEX: 41.55 KG/M2 | HEIGHT: 67 IN | BODY MASS INDEX: 41.55 KG/M2 | SYSTOLIC BLOOD PRESSURE: 110 MMHG

## 2023-02-09 DIAGNOSIS — Z79.4 TYPE 2 DIABETES MELLITUS WITH MICROALBUMINURIA, WITH LONG-TERM CURRENT USE OF INSULIN: Primary | ICD-10-CM

## 2023-02-09 DIAGNOSIS — E78.2 MIXED HYPERLIPIDEMIA: ICD-10-CM

## 2023-02-09 DIAGNOSIS — I10 ESSENTIAL HYPERTENSION: ICD-10-CM

## 2023-02-09 DIAGNOSIS — R80.9 TYPE 2 DIABETES MELLITUS WITH MICROALBUMINURIA, WITH LONG-TERM CURRENT USE OF INSULIN: Primary | ICD-10-CM

## 2023-02-09 DIAGNOSIS — L02.411 ABSCESS OF AXILLA, RIGHT: Primary | ICD-10-CM

## 2023-02-09 DIAGNOSIS — E11.29 TYPE 2 DIABETES MELLITUS WITH MICROALBUMINURIA, WITH LONG-TERM CURRENT USE OF INSULIN: Primary | ICD-10-CM

## 2023-02-09 DIAGNOSIS — E66.01 MORBID OBESITY WITH BMI OF 40.0-44.9, ADULT: ICD-10-CM

## 2023-02-09 LAB — GLUCOSE SERPL-MCNC: 88 MG/DL (ref 70–110)

## 2023-02-09 PROCEDURE — 3046F PR MOST RECENT HEMOGLOBIN A1C LEVEL > 9.0%: ICD-10-PCS | Mod: CPTII,S$GLB,, | Performed by: PHYSICIAN ASSISTANT

## 2023-02-09 PROCEDURE — 1160F PR REVIEW ALL MEDS BY PRESCRIBER/CLIN PHARMACIST DOCUMENTED: ICD-10-PCS | Mod: CPTII,S$GLB,, | Performed by: NURSE PRACTITIONER

## 2023-02-09 PROCEDURE — 99999 PR PBB SHADOW E&M-EST. PATIENT-LVL V: CPT | Mod: PBBFAC,,, | Performed by: NURSE PRACTITIONER

## 2023-02-09 PROCEDURE — 1159F PR MEDICATION LIST DOCUMENTED IN MEDICAL RECORD: ICD-10-PCS | Mod: CPTII,S$GLB,, | Performed by: NURSE PRACTITIONER

## 2023-02-09 PROCEDURE — 99213 OFFICE O/P EST LOW 20 MIN: CPT | Mod: S$GLB,,, | Performed by: PHYSICIAN ASSISTANT

## 2023-02-09 PROCEDURE — 95251 PR GLUCOSE MONITOR, 72 HOUR, PHYS INTERP: ICD-10-PCS | Mod: S$GLB,,, | Performed by: NURSE PRACTITIONER

## 2023-02-09 PROCEDURE — 3074F SYST BP LT 130 MM HG: CPT | Mod: CPTII,S$GLB,, | Performed by: NURSE PRACTITIONER

## 2023-02-09 PROCEDURE — 95251 CONT GLUC MNTR ANALYSIS I&R: CPT | Mod: S$GLB,,, | Performed by: NURSE PRACTITIONER

## 2023-02-09 PROCEDURE — 99213 PR OFFICE/OUTPT VISIT, EST, LEVL III, 20-29 MIN: ICD-10-PCS | Mod: S$GLB,,, | Performed by: PHYSICIAN ASSISTANT

## 2023-02-09 PROCEDURE — 1160F RVW MEDS BY RX/DR IN RCRD: CPT | Mod: CPTII,S$GLB,, | Performed by: NURSE PRACTITIONER

## 2023-02-09 PROCEDURE — 3079F DIAST BP 80-89 MM HG: CPT | Mod: CPTII,S$GLB,, | Performed by: NURSE PRACTITIONER

## 2023-02-09 PROCEDURE — 3008F BODY MASS INDEX DOCD: CPT | Mod: CPTII,S$GLB,, | Performed by: PHYSICIAN ASSISTANT

## 2023-02-09 PROCEDURE — 3046F HEMOGLOBIN A1C LEVEL >9.0%: CPT | Mod: CPTII,S$GLB,, | Performed by: PHYSICIAN ASSISTANT

## 2023-02-09 PROCEDURE — 3008F PR BODY MASS INDEX (BMI) DOCUMENTED: ICD-10-PCS | Mod: CPTII,S$GLB,, | Performed by: NURSE PRACTITIONER

## 2023-02-09 PROCEDURE — 1159F MED LIST DOCD IN RCRD: CPT | Mod: CPTII,S$GLB,, | Performed by: NURSE PRACTITIONER

## 2023-02-09 PROCEDURE — 87070 CULTURE OTHR SPECIMN AEROBIC: CPT | Performed by: PHYSICIAN ASSISTANT

## 2023-02-09 PROCEDURE — 3046F HEMOGLOBIN A1C LEVEL >9.0%: CPT | Mod: CPTII,S$GLB,, | Performed by: NURSE PRACTITIONER

## 2023-02-09 PROCEDURE — 82962 POCT GLUCOSE, HAND-HELD DEVICE: ICD-10-PCS | Mod: S$GLB,,, | Performed by: NURSE PRACTITIONER

## 2023-02-09 PROCEDURE — 4010F ACE/ARB THERAPY RXD/TAKEN: CPT | Mod: CPTII,S$GLB,, | Performed by: PHYSICIAN ASSISTANT

## 2023-02-09 PROCEDURE — 99214 OFFICE O/P EST MOD 30 MIN: CPT | Mod: S$GLB,,, | Performed by: NURSE PRACTITIONER

## 2023-02-09 PROCEDURE — 3046F PR MOST RECENT HEMOGLOBIN A1C LEVEL > 9.0%: ICD-10-PCS | Mod: CPTII,S$GLB,, | Performed by: NURSE PRACTITIONER

## 2023-02-09 PROCEDURE — 3079F DIAST BP 80-89 MM HG: CPT | Mod: CPTII,S$GLB,, | Performed by: PHYSICIAN ASSISTANT

## 2023-02-09 PROCEDURE — 3008F PR BODY MASS INDEX (BMI) DOCUMENTED: ICD-10-PCS | Mod: CPTII,S$GLB,, | Performed by: PHYSICIAN ASSISTANT

## 2023-02-09 PROCEDURE — 4010F PR ACE/ARB THEARPY RXD/TAKEN: ICD-10-PCS | Mod: CPTII,S$GLB,, | Performed by: NURSE PRACTITIONER

## 2023-02-09 PROCEDURE — 4010F PR ACE/ARB THEARPY RXD/TAKEN: ICD-10-PCS | Mod: CPTII,S$GLB,, | Performed by: PHYSICIAN ASSISTANT

## 2023-02-09 PROCEDURE — 3079F PR MOST RECENT DIASTOLIC BLOOD PRESSURE 80-89 MM HG: ICD-10-PCS | Mod: CPTII,S$GLB,, | Performed by: NURSE PRACTITIONER

## 2023-02-09 PROCEDURE — 99999 PR PBB SHADOW E&M-EST. PATIENT-LVL V: ICD-10-PCS | Mod: PBBFAC,,, | Performed by: NURSE PRACTITIONER

## 2023-02-09 PROCEDURE — 3008F BODY MASS INDEX DOCD: CPT | Mod: CPTII,S$GLB,, | Performed by: NURSE PRACTITIONER

## 2023-02-09 PROCEDURE — 99214 PR OFFICE/OUTPT VISIT, EST, LEVL IV, 30-39 MIN: ICD-10-PCS | Mod: S$GLB,,, | Performed by: NURSE PRACTITIONER

## 2023-02-09 PROCEDURE — 99999 PR PBB SHADOW E&M-EST. PATIENT-LVL II: CPT | Mod: PBBFAC,,, | Performed by: PHYSICIAN ASSISTANT

## 2023-02-09 PROCEDURE — 3074F PR MOST RECENT SYSTOLIC BLOOD PRESSURE < 130 MM HG: ICD-10-PCS | Mod: CPTII,S$GLB,, | Performed by: NURSE PRACTITIONER

## 2023-02-09 PROCEDURE — 4010F ACE/ARB THERAPY RXD/TAKEN: CPT | Mod: CPTII,S$GLB,, | Performed by: NURSE PRACTITIONER

## 2023-02-09 PROCEDURE — 82962 GLUCOSE BLOOD TEST: CPT | Mod: S$GLB,,, | Performed by: NURSE PRACTITIONER

## 2023-02-09 PROCEDURE — 3074F SYST BP LT 130 MM HG: CPT | Mod: CPTII,S$GLB,, | Performed by: PHYSICIAN ASSISTANT

## 2023-02-09 PROCEDURE — 99999 PR PBB SHADOW E&M-EST. PATIENT-LVL II: ICD-10-PCS | Mod: PBBFAC,,, | Performed by: PHYSICIAN ASSISTANT

## 2023-02-09 PROCEDURE — 3074F PR MOST RECENT SYSTOLIC BLOOD PRESSURE < 130 MM HG: ICD-10-PCS | Mod: CPTII,S$GLB,, | Performed by: PHYSICIAN ASSISTANT

## 2023-02-09 PROCEDURE — 3079F PR MOST RECENT DIASTOLIC BLOOD PRESSURE 80-89 MM HG: ICD-10-PCS | Mod: CPTII,S$GLB,, | Performed by: PHYSICIAN ASSISTANT

## 2023-02-09 RX ORDER — INSULIN LISPRO 100 [IU]/ML
18 INJECTION, SOLUTION INTRAVENOUS; SUBCUTANEOUS
Qty: 30 ML | Refills: 11 | Status: SHIPPED | OUTPATIENT
Start: 2023-02-09 | End: 2023-09-07

## 2023-02-09 RX ORDER — SULFAMETHOXAZOLE AND TRIMETHOPRIM 800; 160 MG/1; MG/1
1 TABLET ORAL 2 TIMES DAILY
Qty: 20 TABLET | Refills: 0 | Status: SHIPPED | OUTPATIENT
Start: 2023-02-09 | End: 2023-08-28

## 2023-02-09 RX ORDER — INSULIN GLARGINE 100 [IU]/ML
28 INJECTION, SOLUTION SUBCUTANEOUS NIGHTLY
Qty: 15 ML | Refills: 6
Start: 2023-02-09 | End: 2023-09-07 | Stop reason: SDUPTHER

## 2023-02-09 RX ORDER — LIRAGLUTIDE 6 MG/ML
1.8 INJECTION SUBCUTANEOUS
COMMUNITY
Start: 2023-01-24 | End: 2023-03-17 | Stop reason: SDUPTHER

## 2023-02-09 NOTE — PROGRESS NOTES
Subjective:       Patient ID: Liza Spain Cousin is a 52 y.o. female.    Chief Complaint: Follow-up (Zits under arms)    HPI  R axillary infection x 1 wk  Draining   Review of Systems   Constitutional: Negative.  Negative for activity change, appetite change, chills, diaphoresis, fatigue, fever and unexpected weight change.   HENT: Negative.     Eyes: Negative.    Respiratory: Negative.  Negative for cough and shortness of breath.    Cardiovascular: Negative.  Negative for chest pain and leg swelling.   Gastrointestinal: Negative.    Endocrine: Negative.    Genitourinary: Negative.    Musculoskeletal: Negative.    Integumentary:  Positive for rash and wound.   Neurological: Negative.        Objective:      Physical Exam  Vitals reviewed.   Constitutional:       General: She is not in acute distress.     Appearance: Normal appearance. She is obese. She is not ill-appearing, toxic-appearing or diaphoretic.   Eyes:      General: No scleral icterus.     Conjunctiva/sclera: Conjunctivae normal.   Musculoskeletal:         General: Swelling and tenderness present.      Cervical back: Normal range of motion and neck supple. No rigidity or tenderness.   Lymphadenopathy:      Cervical: No cervical adenopathy.   Skin:     General: Skin is warm and dry.      Findings: Erythema and rash present.      Comments: Draining pustules R axilla  No deep induration or fluctuance    Neurological:      Mental Status: She is alert.       Assessment:       Problem List Items Addressed This Visit    None  Visit Diagnoses       Abscess of axilla, right    -  Primary    Relevant Medications    sulfamethoxazole-trimethoprim 800-160mg (BACTRIM DS) 800-160 mg Tab    Other Relevant Orders    CULTURE, AEROBIC  (SPECIFY SOURCE) (Completed)            Plan:       Liza was seen today for follow-up.    Diagnoses and all orders for this visit:    Abscess of axilla, right  -     CULTURE, AEROBIC  (SPECIFY SOURCE)  -     sulfamethoxazole-trimethoprim  800-160mg (BACTRIM DS) 800-160 mg Tab; Take 1 tablet by mouth 2 (two) times daily.     Warm compresses   F/u check 10 days

## 2023-02-09 NOTE — PROGRESS NOTES
CC: Ms. Liza Mesa arrives today for management of Type 2 DM and review of chronic medical conditions, as listed in the Visit Diagnosis section of this encounter.       HPI: Ms. Liza Mesa was diagnosed with Type 2 DM in 2005. She was diagnosed based on lab work. Initial treatment consisted of metformin.  Victoza was later added to her medication regimen in 2016. Insulin added in 2018. + FH of DM on mother. Denies hospitalizations due to DM.     Patient was last seen by me in November. At this time, Victoza was changed to Ozempic. She states that glucoses were higher on the Ozempic. Jardiance was also added to regimen.     She has had a house guest. They tend to watch TV and graze in front of TV. Also picked up soda habit.     BG monitoring per Freestyle Vicky 2.     Hypoglycemia: RAre  Symptoms: jittery, tingling  Treatment: peppermint or jelly beans     Missing Insulin/PO medication doses: denies      Dietary Habits: Eats 1 meal/day. Then grazing through the day. Drinking ~2 sodas/day.      Last DM education appointment:  8/2016        CURRENT DIABETIC MEDS: Victoza 1.8 mg daily, Jardiance 25 mg daily, Lantus 30 units QHS, Humalog 18 units with meal + correction scale, target 150, ISF 25  Glucometer type: Accucheck Adrienne     Previous DM treatments:  Lantus - not covered by insurance   Glimepiride   Acarbose  Metformin XR -- diarrhea  VGo - didn't like wearing device  Ozempic - reports this was ineffective     Last Eye Exam: 6/2022, no DR per patient. [facility]   Last Podiatry Exam: 11/2021, Dr. Olvera    REVIEW OF SYSTEMS  Constitutional: no c/o fatigue, weakness. + weight loss.   Cardiac: no palpitations or chest pain.  Respiratory: no cough, + chronic dyspnea, related to asthma. Uses albuterol nebulizer PRN.  GI: no c/o abdominal pain or nausea. Denies h/o pancreatitis.   Skin: no rashes. + boil under R arm. Has Primary Care appt today.   Neuro: denies numbness, tingling, paresthesias.  "  Endocrine: denies polyphagia, polydipsia, polyuria.       Personally reviewed Past Medical, Surgical, Social History.    Vital Signs  /80   Pulse 80   Ht 5' 7" (1.702 m)   Wt 120.1 kg (264 lb 12.4 oz)   LMP 11/21/2019   BMI 41.47 kg/m²     Personally reviewed the below labs:    Hemoglobin A1C   Date Value Ref Range Status   02/02/2023 13.4 (H) 4.0 - 5.6 % Final     Comment:     ADA Screening Guidelines:  5.7-6.4%  Consistent with prediabetes  >or=6.5%  Consistent with diabetes    High levels of fetal hemoglobin interfere with the HbA1C  assay. Heterozygous hemoglobin variants (HbS, HgC, etc)do  not significantly interfere with this assay.   However, presence of multiple variants may affect accuracy.     10/27/2022 12.4 (H) 4.0 - 5.6 % Final     Comment:     ADA Screening Guidelines:  5.7-6.4%  Consistent with prediabetes  >or=6.5%  Consistent with diabetes    High levels of fetal hemoglobin interfere with the HbA1C  assay. Heterozygous hemoglobin variants (HbS, HgC, etc)do  not significantly interfere with this assay.   However, presence of multiple variants may affect accuracy.     06/30/2022 8.0 (H) 4.0 - 5.6 % Final     Comment:     ADA Screening Guidelines:  5.7-6.4%  Consistent with prediabetes  >or=6.5%  Consistent with diabetes    High levels of fetal hemoglobin interfere with the HbA1C  assay. Heterozygous hemoglobin variants (HbS, HgC, etc)do  not significantly interfere with this assay.   However, presence of multiple variants may affect accuracy.     05/03/2012 7.1 (H) 4.8 - 5.9 % Final     Comment:     **In order to standardize %HbA1c results worldwide, as of October 11, 2010,  the %HbA1c is being calculated using the master equation recommended in the  consensus statement adopted by the ADA (American Diabetes Assoc), EASD  (European Assoc for the Study of Diabetes), IFCC (International Federation  of Clinical Chemistry and Laboratory Medicine) and IDF (International  Diabetes Federation). " Result units: %HgbA1c (DCCT/NGSP).  In common with other methods, Hb A1C values may not accurately reflect mean  blood glucose in patients with hemoglobin variants (HgbF, HgbS and HgbC).  Any cause of shortened erythrocyte survival will reduce exposure of  erythrocytes to glucose with a consequent decrease in HbA1c (%) values, even  though the time-averaged blood glucose level may be elevated. Causes of  shortened erythrocyte lifetime might be hemolytic anemia or other hemolytic  diseases, homozygous sickle cell trait, pregnancy, recent significant or  chronic blood loss, etc. Caution should be used when interpreting the HbA1c  results from patients with these conditions.       Chemistry        Component Value Date/Time     02/02/2023 1332    K 4.2 02/02/2023 1332     02/02/2023 1332    CO2 25 02/02/2023 1332    BUN 12 02/02/2023 1332    CREATININE 0.9 02/02/2023 1332    CREATININE 1.0 05/06/2012 0431     02/02/2023 1332        Component Value Date/Time    CALCIUM 9.8 02/02/2023 1332    CALCIUM 9.5 05/06/2012 0431    ALKPHOS 98 10/27/2022 0945    ALKPHOS 49 05/03/2012 1635    AST 28 10/27/2022 0945    AST 13 05/03/2012 1635    ALT 28 10/27/2022 0945    BILITOT 0.4 10/27/2022 0945          Lab Results   Component Value Date    CHOL 157 10/27/2022    CHOL 207 (H) 06/30/2022    CHOL 230 (H) 03/30/2022     Lab Results   Component Value Date    HDL 38 (L) 10/27/2022    HDL 56 06/30/2022    HDL 52 03/30/2022     Lab Results   Component Value Date    LDLCALC 95.0 10/27/2022    LDLCALC 127.2 06/30/2022    LDLCALC 156.6 03/30/2022     Lab Results   Component Value Date    TRIG 120 10/27/2022    TRIG 119 06/30/2022    TRIG 107 03/30/2022     Lab Results   Component Value Date    CHOLHDL 24.2 10/27/2022    CHOLHDL 27.1 06/30/2022    CHOLHDL 22.6 03/30/2022       Lab Results   Component Value Date    MICALBCREAT 16.7 06/30/2022     Lab Results   Component Value Date    TSH 0.806 02/02/2023       CrCl cannot  be calculated (Patient's most recent lab result is older than the maximum 7 days allowed.).    No results found for: [id number]         PHYSICAL EXAMINATION  Constitutional: Appears well, no distress.  Neck: Supple, trachea midline.  Respiratory: CTA, even and unlabored.   Cardiovascular: RRR, no murmurs.  GI: active bowel sounds, no hernia  Skin: warm and dry  Neuro: oriented to person, place, time  Feet: appropriate footwear.         FREESTYLE BAKARI 2 DOWNLOAD: See media file for details. Most glucoses are within acceptable range. Occasional prandial excursions. Occasional mild hypoglycemia at various times. Doesn't correlate with A1c.  Average glucose: 147 mg/dL  Above 250 mg/dL: 3 %  181-250 mg/dL: 15 %   mg/dL: 81 %  54-69 mg/dL: 1 %  Below 54 mg/dL: 0 %'         Goals        HEMOGLOBIN A1C < 7               Assessment/Plan  1. Type 2 diabetes mellitus with microalbuminuria, with long-term current use of insulin  -- Perplexing case, as her CGM report doesn't correlate at all with A1c. Random glucoses drawn at time of 2 past A1cs have been reasonable. Making adjustments today based on CGM data. POCT glucose was 88, which correlated with CGM reading.   -- decrease Lantus to 28 units QHS.  -- continue Humalog 18 units AC + correction scale  -- continue Jardiance, Victoza    -- Discussed diagnosis of DM, A1c goals, progression of disease, long term complications and tx options.  Advised patient to check BG before activities, such as driving or exercise.  -- Reviewed hypoglycemia management: treat with 1/2 glass of juice, 1/2 can regular coke, or 4 glucose tablets. Monitor and repeat treatment every 15 minutes until BG is >70 Then have a snack, which includes a complex carbohydrate and protein.   2. Essential hypertension  -- controlled.   -- continue amlodipine.   -- Losartan was previously discontinued by PCP due to patient's request to decrease amount of pills that she was taking.    3. Mixed  hyperlipidemia  -- controlled  -- continue atorvastatin    4. Morbid obesity  -- uncontrolled  -- increasing insulin resistance   Body mass index is 41.47 kg/m².       FOLLOW UP  Follow up in about 3 months (around 5/9/2023).   Patient instructed to bring BG logs to each follow up   Patient encouraged to call for any BG/medication issues, concerns, or questions.      Orders Placed This Encounter   Procedures    Hemoglobin A1C    Comprehensive Metabolic Panel    Microalbumin/Creatinine Ratio, Urine    POCT Glucose, Hand-Held Device

## 2023-02-09 NOTE — Clinical Note
Cannot determine why A1c rarely correlates with CGM data. Basing judgments on CGM data. Average glucose was 147 with 81% in target range. Very odd.

## 2023-02-13 LAB — BACTERIA SPEC AEROBE CULT: NORMAL

## 2023-02-16 ENCOUNTER — TELEPHONE (OUTPATIENT)
Dept: FAMILY MEDICINE | Facility: CLINIC | Age: 53
End: 2023-02-16
Payer: COMMERCIAL

## 2023-02-16 NOTE — TELEPHONE ENCOUNTER
----- Message from Alejandro Kim PA-C sent at 2/13/2023  1:42 PM CST -----  No significant bacterial growth noted  Continue and complete antibiotic   If area of infection does not completely resolve then a follow up appointment is needed

## 2023-02-17 ENCOUNTER — TELEPHONE (OUTPATIENT)
Dept: FAMILY MEDICINE | Facility: CLINIC | Age: 53
End: 2023-02-17
Payer: COMMERCIAL

## 2023-02-17 NOTE — TELEPHONE ENCOUNTER
----- Message from Alejandro Kim PA-C sent at 2/14/2023  8:43 AM CST -----  Still no abnormal bacterial growth

## 2023-03-17 RX ORDER — LIRAGLUTIDE 6 MG/ML
INJECTION SUBCUTANEOUS
Qty: 27 ML | Refills: 3 | Status: SHIPPED | OUTPATIENT
Start: 2023-03-17 | End: 2023-08-28

## 2023-04-04 ENCOUNTER — TELEPHONE (OUTPATIENT)
Dept: FAMILY MEDICINE | Facility: CLINIC | Age: 53
End: 2023-04-04
Payer: COMMERCIAL

## 2023-04-04 NOTE — TELEPHONE ENCOUNTER
I have paperwork in my folder for diabetic shoe orders.  Patient has not been seen since previous June.  Needs appointment with myself or Brooke visit before paperwork can be completed

## 2023-04-04 NOTE — TELEPHONE ENCOUNTER
----- Message from Marilou Maldonado MA sent at 4/3/2023  2:07 PM CDT -----  Contact: Patient  Type: Needs Medical Advice  Who Called:  Patient    Best Call Back Number: [phone]    Additional Information: Dr. Aparicio (podiatry) is sending over orders to get diabetic shoes. She would like for you to sign it and return as soon as possible so that she can get these and wear the. Please call patient with any questions. Thanks!

## 2023-04-11 ENCOUNTER — TELEPHONE (OUTPATIENT)
Dept: FAMILY MEDICINE | Facility: CLINIC | Age: 53
End: 2023-04-11
Payer: COMMERCIAL

## 2023-04-11 ENCOUNTER — TELEPHONE (OUTPATIENT)
Dept: ENDOCRINOLOGY | Facility: CLINIC | Age: 53
End: 2023-04-11
Payer: COMMERCIAL

## 2023-04-11 NOTE — TELEPHONE ENCOUNTER
----- Message from Latha Cortés sent at 4/11/2023 12:04 PM CDT -----  Regarding: CALL BACK  Name of Who is Calling: SASHA KERI ROSMERY [[id number]]        What is the request in detail: Pt was unaware that she needed an appt with provider angel, before she could get her paperwork filled out for her shoes. Did see her diabetes specialist provider Fernanda on Feb 9th, Pt is just trying to get informed and trying to get an appt because she is overdue for her shoes.         Can the clinic reply by [facility]: no           What Number to Call Back [facility] not [facility]: [phone]

## 2023-04-11 NOTE — TELEPHONE ENCOUNTER
----- Message from aLtha Cortés sent at 4/11/2023 12:04 PM CDT -----  Regarding: CALL BACK  Name of Who is Calling: SASHA KERI ROSMERY [[id number]]        What is the request in detail: Pt was unaware that she needed an appt with provider angel, before she could get her paperwork filled out for her shoes. Did see her diabetes specialist provider Fernanda on Feb 9th, Pt is just trying to get informed and trying to get an appt because she is overdue for her shoes.         Can the clinic reply by [facility]: no           What Number to Call Back if not [facility]: [phone]

## 2023-04-12 ENCOUNTER — OFFICE VISIT (OUTPATIENT)
Dept: FAMILY MEDICINE | Facility: CLINIC | Age: 53
End: 2023-04-12
Payer: COMMERCIAL

## 2023-04-12 VITALS
BODY MASS INDEX: 41.77 KG/M2 | WEIGHT: 266.13 LBS | HEART RATE: 91 BPM | OXYGEN SATURATION: 98 % | SYSTOLIC BLOOD PRESSURE: 140 MMHG | HEIGHT: 67 IN | DIASTOLIC BLOOD PRESSURE: 80 MMHG

## 2023-04-12 DIAGNOSIS — Z00.00 GENERAL MEDICAL EXAM: Primary | ICD-10-CM

## 2023-04-12 DIAGNOSIS — Z79.4 TYPE 2 DIABETES MELLITUS WITH DIABETIC POLYNEUROPATHY, WITH LONG-TERM CURRENT USE OF INSULIN: ICD-10-CM

## 2023-04-12 DIAGNOSIS — Z12.11 ENCOUNTER FOR COLORECTAL CANCER SCREENING: ICD-10-CM

## 2023-04-12 DIAGNOSIS — Z79.899 ENCOUNTER FOR MONITORING DIURETIC THERAPY: ICD-10-CM

## 2023-04-12 DIAGNOSIS — Z51.81 ENCOUNTER FOR MONITORING DIURETIC THERAPY: ICD-10-CM

## 2023-04-12 DIAGNOSIS — E11.42 TYPE 2 DIABETES MELLITUS WITH DIABETIC POLYNEUROPATHY, WITH LONG-TERM CURRENT USE OF INSULIN: ICD-10-CM

## 2023-04-12 DIAGNOSIS — G43.709 CHRONIC MIGRAINE WITHOUT AURA WITHOUT STATUS MIGRAINOSUS, NOT INTRACTABLE: ICD-10-CM

## 2023-04-12 DIAGNOSIS — F11.90 CHRONIC NARCOTIC USE: ICD-10-CM

## 2023-04-12 DIAGNOSIS — E78.2 MIXED HYPERLIPIDEMIA: ICD-10-CM

## 2023-04-12 DIAGNOSIS — I10 ESSENTIAL HYPERTENSION: ICD-10-CM

## 2023-04-12 DIAGNOSIS — R39.15 URGENCY OF URINATION: ICD-10-CM

## 2023-04-12 DIAGNOSIS — Z12.12 ENCOUNTER FOR COLORECTAL CANCER SCREENING: ICD-10-CM

## 2023-04-12 DIAGNOSIS — F32.A DEPRESSION, UNSPECIFIED DEPRESSION TYPE: ICD-10-CM

## 2023-04-12 PROCEDURE — 3008F BODY MASS INDEX DOCD: CPT | Mod: CPTII,S$GLB,,

## 2023-04-12 PROCEDURE — 3046F HEMOGLOBIN A1C LEVEL >9.0%: CPT | Mod: CPTII,S$GLB,,

## 2023-04-12 PROCEDURE — 3046F PR MOST RECENT HEMOGLOBIN A1C LEVEL > 9.0%: ICD-10-PCS | Mod: CPTII,S$GLB,,

## 2023-04-12 PROCEDURE — 3077F SYST BP >= 140 MM HG: CPT | Mod: CPTII,S$GLB,,

## 2023-04-12 PROCEDURE — 3079F PR MOST RECENT DIASTOLIC BLOOD PRESSURE 80-89 MM HG: ICD-10-PCS | Mod: CPTII,S$GLB,,

## 2023-04-12 PROCEDURE — 1159F PR MEDICATION LIST DOCUMENTED IN MEDICAL RECORD: ICD-10-PCS | Mod: CPTII,S$GLB,,

## 2023-04-12 PROCEDURE — 99214 OFFICE O/P EST MOD 30 MIN: CPT | Mod: S$GLB,,,

## 2023-04-12 PROCEDURE — 3077F PR MOST RECENT SYSTOLIC BLOOD PRESSURE >= 140 MM HG: ICD-10-PCS | Mod: CPTII,S$GLB,,

## 2023-04-12 PROCEDURE — 99999 PR PBB SHADOW E&M-EST. PATIENT-LVL V: ICD-10-PCS | Mod: PBBFAC,,,

## 2023-04-12 PROCEDURE — 1159F MED LIST DOCD IN RCRD: CPT | Mod: CPTII,S$GLB,,

## 2023-04-12 PROCEDURE — 4010F ACE/ARB THERAPY RXD/TAKEN: CPT | Mod: CPTII,S$GLB,,

## 2023-04-12 PROCEDURE — 99999 PR PBB SHADOW E&M-EST. PATIENT-LVL V: CPT | Mod: PBBFAC,,,

## 2023-04-12 PROCEDURE — 99214 PR OFFICE/OUTPT VISIT, EST, LEVL IV, 30-39 MIN: ICD-10-PCS | Mod: S$GLB,,,

## 2023-04-12 PROCEDURE — 3079F DIAST BP 80-89 MM HG: CPT | Mod: CPTII,S$GLB,,

## 2023-04-12 PROCEDURE — 3008F PR BODY MASS INDEX (BMI) DOCUMENTED: ICD-10-PCS | Mod: CPTII,S$GLB,,

## 2023-04-12 PROCEDURE — 4010F PR ACE/ARB THEARPY RXD/TAKEN: ICD-10-PCS | Mod: CPTII,S$GLB,,

## 2023-04-12 RX ORDER — LOSARTAN POTASSIUM 100 MG/1
100 TABLET ORAL
COMMUNITY
Start: 2023-03-20

## 2023-04-12 RX ORDER — OXYBUTYNIN CHLORIDE 5 MG/1
5 TABLET ORAL 2 TIMES DAILY
Qty: 180 TABLET | Refills: 0 | Status: SHIPPED | OUTPATIENT
Start: 2023-04-12 | End: 2023-11-20

## 2023-04-12 RX ORDER — HYDROCHLOROTHIAZIDE 12.5 MG/1
12.5 TABLET ORAL DAILY
Qty: 30 TABLET | Refills: 2 | Status: SHIPPED | OUTPATIENT
Start: 2023-04-12 | End: 2023-08-28

## 2023-04-12 RX ORDER — SUMATRIPTAN SUCCINATE 100 MG/1
100 TABLET ORAL ONCE AS NEEDED
Qty: 6 TABLET | Refills: 5 | Status: SHIPPED | OUTPATIENT
Start: 2023-04-12 | End: 2023-11-20

## 2023-04-12 NOTE — PROGRESS NOTES
[facility]  [address]  [place] [address]    Subjective    Chief Complaint:   Chief Complaint   Patient presents with    Paper work       History of Present Illness:     Liza Ortegasin is a(n) 52 y.o. female with past medical history as noted below who presents to the clinic today for routine follow up.    Type 2 DM  Last a1c 13.4%. She follows with TEMITOPE Flores. Sees Dr. Olvera for podiatry, requesting diabetic shoes. She has bilateral lower extremity neuropathy. Has been working on cutting back sugar-sweetened beverages. Current regimen includes Victoza, Lantus 28 units nightly and 18 units with meals. Uses a Freestyle Vicky to monitor blood glucose. Due for diabetic foot exam.     Essential hypertension  BP today is elevated 140/80 upon my recheck. She reports compliance with losartan 100 mg and amlodipine 10 mg daily. Not reporting chest pain.     Migraine headaches  Frequency of headaches is about once every 3 months. Sumatriptan is effective. Requesting refill     OAB  Requesting refill of oxybutynin. No new symptoms. Is known to tolerate this medication well.     Depression/anxiety; insomnia; binge eating disorder  Takes Xanax 1 mg TID prescribed by psychiatry NP Alissa Bowen. Not currently on SSRI/antidepressant therapy. Is not interested in starting this. Also prescribed Ambien for insomnia and Adderall for binge eating disorder    Dyslipidemia  Reporting compliance with lipitor 40 mg daily. No side effects reported. Previous lipid panel within goal    Chronic medical conditions reviewed and updated below.       Depression / Anxiety  Rx : Xanax 1 mg TID  Alissa Bowen NP psychiatry     Insomnia   Rx: Ambien 10 mg  Alissa Bowen NP      Allergic rhinitis      Moderate persistent asthma + nicotine dependence-smoking  rx :  Albuterol, fluticasone-salmeterol, promethazine-codeine cough syrup, benzonatate  Prescribed codeine cough syrup by nurse  practitioner in [place] quite routinely  Still smoking     Essential hypertension  Rx-amlodipine 10 mg, losartan 100 mg daily     Binge Eating  rx : Adderall 30 mg BID  Prescriber : Alissa Bowen NP     Dyslipidemia  Rx-Lipitor 40 mg  Previous lipids not at goal,      Migraine headaches  Abortive-sumatriptan 100 mg as needed  HA occur about every 3 months      Type 2 diabetes mellitus complicated by diabetic polyneuropathy  rx : Victoza 1.8 mg daily, Lantus 27u daily, Novolog 11u TID with meals   Sees Dr. Olvera, podiatry  Follows with Nhung Michael NP  Most recent a1c 13.4%     Chronic low back pain  Rx-hydrocodone 7.5 mg b.i.d., chlorzoxazone 500 mg QID prn  Following with pain management     Overactive bladder + Stress incont.   Rx-oxybutynin 5 mg b.i.d.  Medication working well    Problem List:   Patient Active Problem List   Diagnosis    Type 2 diabetes mellitus with diabetic polyneuropathy, with long-term current use of insulin    Essential hypertension    Depression    Midline low back pain without sciatica    Obesity (BMI 30-39.9)    Chronic cough    Moderate persistent asthma with acute exacerbation    Allergic rhinitis due to pollen    Mixed hyperlipidemia    Morbid obesity    Binge eating disorder    Optic disc edema    Post-traumatic osteoarthritis of left knee       Current Outpatient Medications:   Current Outpatient Medications   Medication Instructions    albuterol (ACCUNEB) 0.63 mg, Nebulization, Every 6 hours PRN    albuterol (VENTOLIN HFA) 90 mcg/actuation inhaler INHALE 2 PUFFS INTO THE LUNGS EVERY 6 HOURS AS NEEDED    ALPRAZolam (XANAX) 1 mg, Oral, 2 times daily PRN    amLODIPine (NORVASC) 10 mg, Oral, 2 times daily    atorvastatin (LIPITOR) 80 mg, Oral, Nightly    blood sugar diagnostic Strp To check BG 4 times daily, to use with insurance preferred meter. Dx code E11.42    blood-glucose meter kit To check BG 4 times daily, to use with insurance preferred meter. Dx code E11.42     "chlorzoxazone (PARAFON FORTE) 500 mg, Oral, 4 times daily PRN    dextroamphetamine-amphetamine 30 mg Tab 30 mg, Oral, 2 times daily    diazePAM (VALIUM) 10 MG Tab TAKE 1 TABLET BY MOUTH 30 MINUTES PRIOR TO PROCEDURE    empagliflozin (JARDIANCE) 25 mg, Oral, Daily    flash glucose scanning reader (FREESTYLE BAKARI 2 READER) [facility] Use for continuous glucose monitoring.    fluticasone-umeclidin-vilanter (TRELEGY ELLIPTA) 200-62.5-25 mcg inhaler 1 puff, Inhalation, Daily    FREESTYLE BAKARI 2 SENSOR Kit USE FOR CONTINUOUS GLUCOSE MONITORING. CHANGE EVERY 14 DAYS    HYDROcodone-acetaminophen (NORCO) 7.5-325 mg per tablet 1 tablet, Oral, Every 12 hours PRN    hydrocortisone (ANUSOL-HC) 25 mg, Rectal, 2 times daily PRN    insulin (LANTUS SOLOSTAR U-100 INSULIN) 28 Units, Subcutaneous, Nightly    insulin lispro (HUMALOG KWIKPEN INSULIN) 18 Units, Subcutaneous, 3 times daily before meals, Plus correction scale, max TDD 84u    lancets [facility] To check BG 4 times daily, to use with insurance preferred meter. Dx code E11.42    losartan (COZAAR) 100 mg, Oral    oxybutynin (DITROPAN) 5 mg, Oral, 2 times daily    pen needle, diabetic (TRUEPLUS PEN NEEDLE) 32 gauge x 5/32" Ndle USE 5 TIMES DAILY WITH INSULIN AND VICTOZA.    promethazine-codeine 6.25-10 mg/5 ml (PHENERGAN WITH CODEINE) 6.25-10 mg/5 mL syrup TAKE 5 ML BY MOUTH TWICE A DAY    sulfamethoxazole-trimethoprim 800-160mg (BACTRIM DS) 800-160 mg Tab 1 tablet, Oral, 2 times daily    sumatriptan (IMITREX) 100 mg, Oral, Once as needed    VICTOZA 3-AYALA 0.6 mg/0.1 mL (18 mg/3 mL) PnIj pen Inject 1.8 mg into the skin.    zolpidem (AMBIEN) 10 mg, Nightly PRN       Surgical History:   Past Surgical History:   Procedure Laterality Date     SECTION, LOW TRANSVERSE      GANGLION CYST EXCISION Right 2020    Dr. Logan       Family History:   Family History   Problem Relation Age of Onset    Breast cancer Maternal Aunt     Depression Maternal Grandmother         bipolar had to " "be hospitalized    Allergic rhinitis Neg Hx     Allergies Neg Hx     Angioedema Neg Hx     Asthma Neg Hx     Atopy Neg Hx     Eczema Neg Hx     Immunodeficiency Neg Hx     Rhinitis Neg Hx     Urticaria Neg Hx        Allergies: Review of patient's allergies indicates:  Not on File    Tobacco Status:   Tobacco Use: High Risk    Smoking Tobacco Use: Some Days    Smokeless Tobacco Use: Never    Passive Exposure: Not on file       Sexual Activity:   Social History     Substance and Sexual Activity   Sexual Activity Yes    Partners: Male    Birth control/protection: None       Alcohol Use:   Social History     Substance and Sexual Activity   Alcohol Use Yes    Alcohol/week: 12.0 standard drinks    Types: 12 Cans of beer per week         Objective       Vitals:    04/12/23 1558   BP: (!) 144/78   Pulse: 91   SpO2: 98%   Weight: 120.7 kg (266 lb 1.5 oz)   Height: 5' 7" (1.702 m)       Review of Systems   Constitutional:  Negative for chills and fever.   Neurological:  Positive for tingling and headaches.   Psychiatric/Behavioral:  Positive for depression. The patient is nervous/anxious.      Physical Exam  Constitutional:       General: She is not in acute distress.     Appearance: Normal appearance.   HENT:      Head: Normocephalic and atraumatic.   Cardiovascular:      Rate and Rhythm: Normal rate and regular rhythm.      Pulses:           Dorsalis pedis pulses are 2+ on the right side and 2+ on the left side.        Posterior tibial pulses are 2+ on the right side and 2+ on the left side.      Heart sounds: Normal heart sounds. No murmur heard.  Pulmonary:      Effort: Pulmonary effort is normal. No respiratory distress.      Breath sounds: Normal breath sounds. No wheezing.   Musculoskeletal:      Right foot: Normal range of motion. No deformity.      Left foot: Normal range of motion. No deformity.   Feet:      Right foot:      Protective Sensation: 10 sites tested.  10 sites sensed.      Skin integrity: Callus and dry " skin present. No ulcer or skin breakdown.      Left foot:      Protective Sensation: 10 sites tested.  10 sites sensed.      Skin integrity: Callus and dry skin present. No ulcer or skin breakdown.      Comments: Polish on toes-- toenail integrity unable to be visualized    Footwear: inappropriate   Skin:     General: Skin is warm.   Neurological:      Mental Status: She is alert and oriented to person, place, and time.   Psychiatric:         Behavior: Behavior normal.         Assessment and Plan:    1. General medical exam    2. Chronic migraine without aura without status migrainosus, not intractable  Comments:  refilled sumatriptan, known to tolerate   Orders:  -     sumatriptan (IMITREX) 100 MG tablet; Take 1 tablet (100 mg total) by mouth once as needed for Migraine.  Dispense: 6 tablet; Refill: 5    3. Urgency of urination  Comments:  Refilled oxybutynin, known to tolerate   Orders:  -     oxybutynin (DITROPAN) 5 MG Tab; Take 1 tablet (5 mg total) by mouth 2 (two) times a day.  Dispense: 180 tablet; Refill: 0    4. Essential hypertension  Comments:  BP elevated today  Starting HCTZ 12.5 mg daily   BP recheck and BMP in 2 weeks   Orders:  -     hydroCHLOROthiazide (HYDRODIURIL) 12.5 MG Tab; Take 1 tablet (12.5 mg total) by mouth once daily.  Dispense: 30 tablet; Refill: 2    5. Encounter for monitoring diuretic therapy  Comments:  BMP in 2 weeks  Orders:  -     BASIC METABOLIC PANEL; Future; Expected date: 04/12/2023    6. Depression, unspecified depression type  Comments:  Discussed starting SSRI Fluoxetine to attempt weaning chronic benzo use  Not interested in this time   would like to continue seeing psychiatry     7. Mixed hyperlipidemia  Comments:  Last lipid panel in goal   Continue current therapy    8. Type 2 diabetes mellitus with diabetic polyneuropathy, with long-term current use of insulin  Comments:  Foot exam done today  Following with Nhung Michael NP     9. Chronic narcotic use        Visit  summary:    Liza Mesa presented today for follow up.     See plan as above.     She is requesting paperwork for diabetic footwear, which I feel is appropriate. I have faxed over this paperwork and will route today's visit to Dr. Olvera's office.      Patient was instructed to report to ER if symptoms become severe.    Follow up: with Dr. Medrano in 3 months.    Lorie Hernandez PA-C

## 2023-04-18 ENCOUNTER — PATIENT OUTREACH (OUTPATIENT)
Dept: ADMINISTRATIVE | Facility: HOSPITAL | Age: 53
End: 2023-04-18
Payer: COMMERCIAL

## 2023-04-18 NOTE — PROGRESS NOTES
UNCONTROLLED A1C REPORT.  PATIENT HAS AN UPCOMING LAB APPOINTMENT.  CHART REVIEWED;  LABS ORDERED AND LINKED WHAT IS NEEDED    Hemoglobin A1C   Date Value Ref Range Status   02/02/2023 13.4 (H) 4.0 - 5.6 % Final     Comment:     ADA Screening Guidelines:  5.7-6.4%  Consistent with prediabetes  >or=6.5%  Consistent with diabetes    High levels of fetal hemoglobin interfere with the HbA1C  assay. Heterozygous hemoglobin variants (HbS, HgC, etc)do  not significantly interfere with this assay.   However, presence of multiple variants may affect accuracy.     10/27/2022 12.4 (H) 4.0 - 5.6 % Final     Comment:     ADA Screening Guidelines:  5.7-6.4%  Consistent with prediabetes  >or=6.5%  Consistent with diabetes    High levels of fetal hemoglobin interfere with the HbA1C  assay. Heterozygous hemoglobin variants (HbS, HgC, etc)do  not significantly interfere with this assay.   However, presence of multiple variants may affect accuracy.     06/30/2022 8.0 (H) 4.0 - 5.6 % Final     Comment:     ADA Screening Guidelines:  5.7-6.4%  Consistent with prediabetes  >or=6.5%  Consistent with diabetes    High levels of fetal hemoglobin interfere with the HbA1C  assay. Heterozygous hemoglobin variants (HbS, HgC, etc)do  not significantly interfere with this assay.   However, presence of multiple variants may affect accuracy.     05/03/2012 7.1 (H) 4.8 - 5.9 % Final     Comment:     **In order to standardize %HbA1c results worldwide, as of October 11, 2010,  the %HbA1c is being calculated using the master equation recommended in the  consensus statement adopted by the ADA (American Diabetes Assoc), EASD  (European Assoc for the Study of Diabetes), IFCC (International Federation  of Clinical Chemistry and Laboratory Medicine) and IDF (International  Diabetes Federation). Result units: %HgbA1c (DCCT/NGSP).  In common with other methods, Hb A1C values may not accurately reflect mean  blood glucose in patients with hemoglobin variants  (HgbF, HgbS and HgbC).  Any cause of shortened erythrocyte survival will reduce exposure of  erythrocytes to glucose with a consequent decrease in HbA1c (%) values, even  though the time-averaged blood glucose level may be elevated. Causes of  shortened erythrocyte lifetime might be hemolytic anemia or other hemolytic  diseases, homozygous sickle cell trait, pregnancy, recent significant or  chronic blood loss, etc. Caution should be used when interpreting the HbA1c  results from patients with these conditions.

## 2023-04-26 ENCOUNTER — LAB VISIT (OUTPATIENT)
Dept: LAB | Facility: HOSPITAL | Age: 53
End: 2023-04-26
Payer: COMMERCIAL

## 2023-04-26 ENCOUNTER — CLINICAL SUPPORT (OUTPATIENT)
Dept: FAMILY MEDICINE | Facility: CLINIC | Age: 53
End: 2023-04-26
Payer: COMMERCIAL

## 2023-04-26 VITALS — SYSTOLIC BLOOD PRESSURE: 132 MMHG | DIASTOLIC BLOOD PRESSURE: 78 MMHG

## 2023-04-26 DIAGNOSIS — Z79.899 ENCOUNTER FOR MONITORING DIURETIC THERAPY: ICD-10-CM

## 2023-04-26 DIAGNOSIS — Z01.30 BP CHECK: Primary | ICD-10-CM

## 2023-04-26 DIAGNOSIS — Z51.81 ENCOUNTER FOR MONITORING DIURETIC THERAPY: ICD-10-CM

## 2023-04-26 LAB
ANION GAP SERPL CALC-SCNC: 11 MMOL/L (ref 8–16)
BUN SERPL-MCNC: 12 MG/DL (ref 6–20)
CALCIUM SERPL-MCNC: 10 MG/DL (ref 8.7–10.5)
CHLORIDE SERPL-SCNC: 99 MMOL/L (ref 95–110)
CO2 SERPL-SCNC: 30 MMOL/L (ref 23–29)
CREAT SERPL-MCNC: 0.9 MG/DL (ref 0.5–1.4)
EST. GFR  (NO RACE VARIABLE): >60 ML/MIN/1.73 M^2
GLUCOSE SERPL-MCNC: 99 MG/DL (ref 70–110)
POTASSIUM SERPL-SCNC: 4 MMOL/L (ref 3.5–5.1)
SODIUM SERPL-SCNC: 140 MMOL/L (ref 136–145)

## 2023-04-26 PROCEDURE — 99999 PR PBB SHADOW E&M-EST. PATIENT-LVL I: CPT | Mod: PBBFAC,,,

## 2023-04-26 PROCEDURE — 99999 PR PBB SHADOW E&M-EST. PATIENT-LVL I: ICD-10-PCS | Mod: PBBFAC,,,

## 2023-04-26 PROCEDURE — 36415 COLL VENOUS BLD VENIPUNCTURE: CPT | Mod: PN

## 2023-04-26 PROCEDURE — 80048 BASIC METABOLIC PNL TOTAL CA: CPT

## 2023-04-26 NOTE — PROGRESS NOTES
BP: 132/78 ,        Dr. Medrano notified.    Liza Spain Cousin 52 y.o. female is here today for Blood Pressure check.   History of HTN yes.    Review of patient's allergies indicates:  No Known Allergies  Creatinine   Date Value Ref Range Status   02/02/2023 0.9 0.5 - 1.4 mg/dL Final   05/06/2012 1.0 0.2 - 1.4 mg/dl Final     Sodium   Date Value Ref Range Status   02/02/2023 136 136 - 145 mmol/L Final     Potassium   Date Value Ref Range Status   02/02/2023 4.2 3.5 - 5.1 mmol/L Final   ]  Patient verifies taking blood pressure medications on a regular basis at the same time of the day.     Current Outpatient Medications:     amLODIPine (NORVASC) 10 MG tablet, Take 1 tablet (10 mg total) by mouth 2 (two) times daily. (Patient taking differently: Take 10 mg by mouth 2 (two) times daily.), Disp: , Rfl:     hydroCHLOROthiazide (HYDRODIURIL) 12.5 MG Tab, Take 1 tablet (12.5 mg total) by mouth once daily., Disp: 30 tablet, Rfl: 2    losartan (COZAAR) 100 MG tablet, Take 100 mg by mouth., Disp: , Rfl:     albuterol (ACCUNEB) 0.63 mg/3 mL Nebu, Take 3 mLs (0.63 mg total) by nebulization every 6 (six) hours as needed (wheezing)., Disp: 180 mL, Rfl: 3    albuterol (VENTOLIN HFA) 90 mcg/actuation inhaler, INHALE 2 PUFFS INTO THE LUNGS EVERY 6 HOURS AS NEEDED, Disp: 54 g, Rfl: 3    ALPRAZolam (XANAX) 1 MG tablet, Take 1 mg by mouth 2 (two) times daily as needed. , Disp: , Rfl:     atorvastatin (LIPITOR) 80 MG tablet, Take 1 tablet (80 mg total) by mouth every evening., Disp: 90 tablet, Rfl: 3    blood sugar diagnostic Strp, To check BG 4 times daily, to use with insurance preferred meter. Dx code E11.42 (Patient not taking: Reported on 4/12/2023), Disp: 150 each, Rfl: 6    blood-glucose meter kit, To check BG 4 times daily, to use with insurance preferred meter. Dx code E11.42 (Patient not taking: Reported on 4/12/2023), Disp: 1 each, Rfl: 0    chlorzoxazone (PARAFON FORTE) 500 mg Tab, Take 1 tablet (500 mg total) by mouth 4  "(four) times daily as needed (muscle spasm)., Disp: 180 tablet, Rfl: 3    dextroamphetamine-amphetamine 30 mg Tab, Take 30 mg by mouth 2 (two) times a day. , Disp: , Rfl:     diazePAM (VALIUM) 10 MG Tab, TAKE 1 TABLET BY MOUTH 30 MINUTES PRIOR TO PROCEDURE, Disp: , Rfl:     empagliflozin (JARDIANCE) 25 mg tablet, Take 1 tablet (25 mg total) by mouth once daily., Disp: 30 tablet, Rfl: 6    flash glucose scanning reader (FREESTYLE BAKARI 2 READER) [facility], Use for continuous glucose monitoring., Disp: 1 each, Rfl: 0    fluticasone-umeclidin-vilanter (TRELEGY ELLIPTA) 200-62.5-25 mcg inhaler, Inhale 1 puff into the lungs once daily., Disp: 60 each, Rfl: 11    FREESTYLE BAKARI 2 SENSOR Kit, USE FOR CONTINUOUS GLUCOSE MONITORING. CHANGE EVERY 14 DAYS, Disp: 2 kit, Rfl: 11    HYDROcodone-acetaminophen (NORCO) 7.5-325 mg per tablet, Take 1 tablet by mouth every 12 (twelve) hours as needed for Pain., Disp: 60 tablet, Rfl: 0    hydrocortisone (ANUSOL-HC) 25 mg suppository, Place 1 suppository (25 mg total) rectally 2 (two) times daily as needed for Hemorrhoids., Disp: 24 suppository, Rfl: 2    insulin (LANTUS SOLOSTAR U-100 INSULIN) glargine 100 units/mL SubQ pen, Inject 28 Units into the skin every evening., Disp: 15 mL, Rfl: 6    insulin lispro (HUMALOG KWIKPEN INSULIN) 100 unit/mL pen, Inject 18 Units into the skin 3 (three) times daily before meals. Plus correction scale, max TDD 84u, Disp: 30 mL, Rfl: 11    lancets [facility], To check BG 4 times daily, to use with insurance preferred meter. Dx code E11.42 (Patient not taking: Reported on 4/12/2023), Disp: 150 each, Rfl: 6    oxybutynin (DITROPAN) 5 MG Tab, Take 1 tablet (5 mg total) by mouth 2 (two) times a day., Disp: 180 tablet, Rfl: 0    pen needle, diabetic (TRUEPLUS PEN NEEDLE) 32 gauge x 5/32" Ndle, USE 5 TIMES DAILY WITH INSULIN AND VICTOZA., Disp: 150 each, Rfl: 6    promethazine-codeine 6.25-10 mg/5 ml (PHENERGAN WITH CODEINE) 6.25-10 mg/5 mL syrup, TAKE 5 ML BY MOUTH " TWICE A DAY, Disp: , Rfl:     sulfamethoxazole-trimethoprim 800-160mg (BACTRIM DS) 800-160 mg Tab, Take 1 tablet by mouth 2 (two) times daily., Disp: 20 tablet, Rfl: 0    sumatriptan (IMITREX) 100 MG tablet, Take 1 tablet (100 mg total) by mouth once as needed for Migraine., Disp: 6 tablet, Rfl: 5    VICTOZA 3-AYALA 0.6 mg/0.1 mL (18 mg/3 mL) PnIj pen, Inject 1.8 mg into the skin., Disp: 27 mL, Rfl: 3    zolpidem (AMBIEN) 10 mg Tab, 10 mg nightly as needed. , Disp: , Rfl: 1  Does patient have record of home blood pressure readings no.   Last dose of blood pressure medication was taken at 4/25/23 at 8AM.  Patient is asymptomatic.   Complains of NA.

## 2023-04-26 NOTE — Clinical Note
Please clarify the Amlodipine. Per pt's record medication sig is BID, however, pt has only been taking daily.

## 2023-05-04 ENCOUNTER — LAB VISIT (OUTPATIENT)
Dept: LAB | Facility: HOSPITAL | Age: 53
End: 2023-05-04
Payer: COMMERCIAL

## 2023-05-04 DIAGNOSIS — R80.9 TYPE 2 DIABETES MELLITUS WITH MICROALBUMINURIA, WITH LONG-TERM CURRENT USE OF INSULIN: ICD-10-CM

## 2023-05-04 DIAGNOSIS — Z79.4 TYPE 2 DIABETES MELLITUS WITH MICROALBUMINURIA, WITH LONG-TERM CURRENT USE OF INSULIN: ICD-10-CM

## 2023-05-04 DIAGNOSIS — E11.29 TYPE 2 DIABETES MELLITUS WITH MICROALBUMINURIA, WITH LONG-TERM CURRENT USE OF INSULIN: ICD-10-CM

## 2023-05-04 LAB
ALBUMIN SERPL BCP-MCNC: 3.6 G/DL (ref 3.5–5.2)
ALP SERPL-CCNC: 87 U/L (ref 55–135)
ALT SERPL W/O P-5'-P-CCNC: 13 U/L (ref 10–44)
ANION GAP SERPL CALC-SCNC: 8 MMOL/L (ref 8–16)
AST SERPL-CCNC: 12 U/L (ref 10–40)
BILIRUB SERPL-MCNC: 0.3 MG/DL (ref 0.1–1)
BUN SERPL-MCNC: 14 MG/DL (ref 6–20)
CALCIUM SERPL-MCNC: 9.8 MG/DL (ref 8.7–10.5)
CHLORIDE SERPL-SCNC: 97 MMOL/L (ref 95–110)
CO2 SERPL-SCNC: 33 MMOL/L (ref 23–29)
CREAT SERPL-MCNC: 0.9 MG/DL (ref 0.5–1.4)
EST. GFR  (NO RACE VARIABLE): >60 ML/MIN/1.73 M^2
ESTIMATED AVG GLUCOSE: 192 MG/DL (ref 68–131)
GLUCOSE SERPL-MCNC: 183 MG/DL (ref 70–110)
HBA1C MFR BLD: 8.3 % (ref 4–5.6)
POTASSIUM SERPL-SCNC: 4.1 MMOL/L (ref 3.5–5.1)
PROT SERPL-MCNC: 7.7 G/DL (ref 6–8.4)
SODIUM SERPL-SCNC: 138 MMOL/L (ref 136–145)

## 2023-05-04 PROCEDURE — 83036 HEMOGLOBIN GLYCOSYLATED A1C: CPT | Performed by: NURSE PRACTITIONER

## 2023-05-04 PROCEDURE — 36415 COLL VENOUS BLD VENIPUNCTURE: CPT | Mod: PN | Performed by: NURSE PRACTITIONER

## 2023-05-04 PROCEDURE — 80053 COMPREHEN METABOLIC PANEL: CPT | Performed by: NURSE PRACTITIONER

## 2023-05-06 RX ORDER — ALBUTEROL SULFATE 90 UG/1
AEROSOL, METERED RESPIRATORY (INHALATION)
Qty: 25.5 G | Refills: 3 | Status: SHIPPED | OUTPATIENT
Start: 2023-05-06

## 2023-05-11 ENCOUNTER — OFFICE VISIT (OUTPATIENT)
Dept: ENDOCRINOLOGY | Facility: CLINIC | Age: 53
End: 2023-05-11
Payer: COMMERCIAL

## 2023-05-11 VITALS
HEART RATE: 82 BPM | SYSTOLIC BLOOD PRESSURE: 126 MMHG | HEIGHT: 67 IN | BODY MASS INDEX: 42.32 KG/M2 | DIASTOLIC BLOOD PRESSURE: 70 MMHG | WEIGHT: 269.63 LBS

## 2023-05-11 DIAGNOSIS — E78.2 MIXED HYPERLIPIDEMIA: ICD-10-CM

## 2023-05-11 DIAGNOSIS — Z79.4 TYPE 2 DIABETES MELLITUS WITH MICROALBUMINURIA, WITH LONG-TERM CURRENT USE OF INSULIN: Primary | ICD-10-CM

## 2023-05-11 DIAGNOSIS — E11.29 TYPE 2 DIABETES MELLITUS WITH MICROALBUMINURIA, WITH LONG-TERM CURRENT USE OF INSULIN: Primary | ICD-10-CM

## 2023-05-11 DIAGNOSIS — R80.9 TYPE 2 DIABETES MELLITUS WITH MICROALBUMINURIA, WITH LONG-TERM CURRENT USE OF INSULIN: Primary | ICD-10-CM

## 2023-05-11 DIAGNOSIS — I10 ESSENTIAL HYPERTENSION: ICD-10-CM

## 2023-05-11 DIAGNOSIS — E66.01 MORBID OBESITY WITH BMI OF 40.0-44.9, ADULT: ICD-10-CM

## 2023-05-11 PROCEDURE — 1160F RVW MEDS BY RX/DR IN RCRD: CPT | Mod: CPTII,S$GLB,, | Performed by: NURSE PRACTITIONER

## 2023-05-11 PROCEDURE — 3061F NEG MICROALBUMINURIA REV: CPT | Mod: CPTII,S$GLB,, | Performed by: NURSE PRACTITIONER

## 2023-05-11 PROCEDURE — 3066F NEPHROPATHY DOC TX: CPT | Mod: CPTII,S$GLB,, | Performed by: NURSE PRACTITIONER

## 2023-05-11 PROCEDURE — 3066F PR DOCUMENTATION OF TREATMENT FOR NEPHROPATHY: ICD-10-PCS | Mod: CPTII,S$GLB,, | Performed by: NURSE PRACTITIONER

## 2023-05-11 PROCEDURE — 1160F PR REVIEW ALL MEDS BY PRESCRIBER/CLIN PHARMACIST DOCUMENTED: ICD-10-PCS | Mod: CPTII,S$GLB,, | Performed by: NURSE PRACTITIONER

## 2023-05-11 PROCEDURE — 99999 PR PBB SHADOW E&M-EST. PATIENT-LVL III: ICD-10-PCS | Mod: PBBFAC,,, | Performed by: NURSE PRACTITIONER

## 2023-05-11 PROCEDURE — 99999 PR PBB SHADOW E&M-EST. PATIENT-LVL III: CPT | Mod: PBBFAC,,, | Performed by: NURSE PRACTITIONER

## 2023-05-11 PROCEDURE — 3008F PR BODY MASS INDEX (BMI) DOCUMENTED: ICD-10-PCS | Mod: CPTII,S$GLB,, | Performed by: NURSE PRACTITIONER

## 2023-05-11 PROCEDURE — 3074F SYST BP LT 130 MM HG: CPT | Mod: CPTII,S$GLB,, | Performed by: NURSE PRACTITIONER

## 2023-05-11 PROCEDURE — 3008F BODY MASS INDEX DOCD: CPT | Mod: CPTII,S$GLB,, | Performed by: NURSE PRACTITIONER

## 2023-05-11 PROCEDURE — 4010F PR ACE/ARB THEARPY RXD/TAKEN: ICD-10-PCS | Mod: CPTII,S$GLB,, | Performed by: NURSE PRACTITIONER

## 2023-05-11 PROCEDURE — 3078F PR MOST RECENT DIASTOLIC BLOOD PRESSURE < 80 MM HG: ICD-10-PCS | Mod: CPTII,S$GLB,, | Performed by: NURSE PRACTITIONER

## 2023-05-11 PROCEDURE — 99214 OFFICE O/P EST MOD 30 MIN: CPT | Mod: S$GLB,,, | Performed by: NURSE PRACTITIONER

## 2023-05-11 PROCEDURE — 1159F MED LIST DOCD IN RCRD: CPT | Mod: CPTII,S$GLB,, | Performed by: NURSE PRACTITIONER

## 2023-05-11 PROCEDURE — 3052F PR MOST RECENT HEMOGLOBIN A1C LEVEL 8.0 - < 9.0%: ICD-10-PCS | Mod: CPTII,S$GLB,, | Performed by: NURSE PRACTITIONER

## 2023-05-11 PROCEDURE — 3078F DIAST BP <80 MM HG: CPT | Mod: CPTII,S$GLB,, | Performed by: NURSE PRACTITIONER

## 2023-05-11 PROCEDURE — 1159F PR MEDICATION LIST DOCUMENTED IN MEDICAL RECORD: ICD-10-PCS | Mod: CPTII,S$GLB,, | Performed by: NURSE PRACTITIONER

## 2023-05-11 PROCEDURE — 3074F PR MOST RECENT SYSTOLIC BLOOD PRESSURE < 130 MM HG: ICD-10-PCS | Mod: CPTII,S$GLB,, | Performed by: NURSE PRACTITIONER

## 2023-05-11 PROCEDURE — 4010F ACE/ARB THERAPY RXD/TAKEN: CPT | Mod: CPTII,S$GLB,, | Performed by: NURSE PRACTITIONER

## 2023-05-11 PROCEDURE — 3061F PR NEG MICROALBUMINURIA RESULT DOCUMENTED/REVIEW: ICD-10-PCS | Mod: CPTII,S$GLB,, | Performed by: NURSE PRACTITIONER

## 2023-05-11 PROCEDURE — 99214 PR OFFICE/OUTPT VISIT, EST, LEVL IV, 30-39 MIN: ICD-10-PCS | Mod: S$GLB,,, | Performed by: NURSE PRACTITIONER

## 2023-05-11 PROCEDURE — 3052F HG A1C>EQUAL 8.0%<EQUAL 9.0%: CPT | Mod: CPTII,S$GLB,, | Performed by: NURSE PRACTITIONER

## 2023-05-11 NOTE — PROGRESS NOTES
CC: Ms. Liza Mesa arrives today for management of Type 2 DM and review of chronic medical conditions, as listed in the Visit Diagnosis section of this encounter.       HPI: Ms. Liza Mesa was diagnosed with Type 2 DM in 2005. She was diagnosed based on lab work. Initial treatment consisted of metformin.  Victoza was later added to her medication regimen in 2016. Insulin added in 2018. + FH of DM on mother. Denies hospitalizations due to DM.     Patient was last seen by me in February.     BG monitoring per [employer] [facility] 2. [place] will not download today. Glucoses range , including postprandially.    Hypoglycemia: Occasional, mid day  Symptoms: jittery, tingling  Treatment: peppermint or jelly beans     Missing Insulin/PO medication doses: denies      Dietary Habits: Eats 2 meal/day. Breakfast is low carb, usually an egg.  Some meals are higher carb than others. Rare sodas.     Last DM education appointment:  8/2016        CURRENT DIABETIC MEDS: Victoza 1.8 mg daily, Jardiance 25 mg daily, Lantus 30 units QHS, Humalog 18 units with meal + correction scale, target 150, ISF 25  Glucometer type: Accucheck Adrienne     Previous DM treatments:  Lantus - not covered by insurance   Glimepiride   Acarbose  Metformin XR -- diarrhea  VGo - didn't like wearing device  Ozempic - reports this was ineffective     Last Eye Exam: 6/2022, no DR per patient. [facility]   Last Podiatry Exam: 11/2021, Dr. Olvera    REVIEW OF SYSTEMS  Constitutional: no c/o fatigue, weakness, weight loss.   Cardiac: no palpitations or chest pain.  Respiratory: no cough, + chronic dyspnea, related to asthma. Uses albuterol nebulizer PRN.  GI: no c/o abdominal pain or nausea. Denies h/o pancreatitis.   Skin: no rashes, lesions   Neuro: + intermittent numbness to middle, ring finger of R hand  Endocrine: denies polyphagia, polydipsia, polyuria.       Personally reviewed Past Medical, Surgical, Social History.    Vital  SUBJECTIVE:  Max Billy is a 61 y.o. male that presents with   Chief Complaint   Patient presents with    Hypertension     reg f/u     Back Pain   . HPI:    This is a 61year old white male who presents today for follow up with htn, hyperlipidemia and prediabetes. He also has some renal issues and is seeing a specialist for that. He tells me that he is getting ready to get radio frequency ablation of the lumbar and he has never had this done. It should help with his pain. He has gained 16 pounds since last visit but he is in a stable home now. He also says he has had more swelling in the left foot for about 2 weeks and says there is increased pain sometimes. He has not been evaluated for thrombosis. He tells me that he can go to [facility] in [facility] for evaluation of the left leg and possible thrombosis. He has bilateral pedal edema that is significant but his left leg is significantly larger than the right leg today. He has been worked up for this pedal edema and think he has been diagnosed with anasarca. He tells me that his blood pressures been good. He is also somewhat concerned about his sexual life he says he is having problems obtaining an erection and maintaining erection he is interested in trying some Viagra so I am going to give him a prescription and send it to [facility] he can use good Rx to get a coupon and make it affordable. He will not use this with nitroglycerin. Hypertension  This is a chronic problem. The current episode started more than 1 year ago. The problem is unchanged. The problem is controlled. Associated symptoms include peripheral edema. There are no associated agents to hypertension. Risk factors for coronary artery disease include dyslipidemia, family history, male gender, obesity, stress and sedentary lifestyle. Past treatments include ACE inhibitors. The current treatment provides significant improvement. Compliance problems include exercise. "Signs  /70 (BP Location: Left arm, Patient Position: Sitting, BP Method: X-Large (Manual))   Pulse 82   Ht 5' 7" (1.702 m)   Wt 122.3 kg (269 lb 10 oz)   LMP 11/21/2019   BMI 42.23 kg/m²     Personally reviewed the below labs:    Hemoglobin A1C   Date Value Ref Range Status   05/04/2023 8.3 (H) 4.0 - 5.6 % Final     Comment:     ADA Screening Guidelines:  5.7-6.4%  Consistent with prediabetes  >or=6.5%  Consistent with diabetes    High levels of fetal hemoglobin interfere with the HbA1C  assay. Heterozygous hemoglobin variants (HbS, HgC, etc)do  not significantly interfere with this assay.   However, presence of multiple variants may affect accuracy.     02/02/2023 13.4 (H) 4.0 - 5.6 % Final     Comment:     ADA Screening Guidelines:  5.7-6.4%  Consistent with prediabetes  >or=6.5%  Consistent with diabetes    High levels of fetal hemoglobin interfere with the HbA1C  assay. Heterozygous hemoglobin variants (HbS, HgC, etc)do  not significantly interfere with this assay.   However, presence of multiple variants may affect accuracy.     10/27/2022 12.4 (H) 4.0 - 5.6 % Final     Comment:     ADA Screening Guidelines:  5.7-6.4%  Consistent with prediabetes  >or=6.5%  Consistent with diabetes    High levels of fetal hemoglobin interfere with the HbA1C  assay. Heterozygous hemoglobin variants (HbS, HgC, etc)do  not significantly interfere with this assay.   However, presence of multiple variants may affect accuracy.     05/03/2012 7.1 (H) 4.8 - 5.9 % Final     Comment:     **In order to standardize %HbA1c results worldwide, as of October 11, 2010,  the %HbA1c is being calculated using the master equation recommended in the  consensus statement adopted by the ADA (American Diabetes Assoc), EASD  (European Assoc for the Study of Diabetes), IFCC (International Federation  of Clinical Chemistry and Laboratory Medicine) and IDF (International  Diabetes Federation). Result units: %HgbA1c (DCCT/NGSP).  In common with " Hyperlipidemia  This is a chronic problem. The current episode started more than 1 year ago. The problem is controlled. Recent lipid tests were reviewed and are normal. Factors aggravating his hyperlipidemia include fatty foods. Associated symptoms include leg pain and myalgias. Current antihyperlipidemic treatment includes statins and diet change. The current treatment provides moderate improvement of lipids. Compliance problems include adherence to exercise (very limited for exercise). Risk factors for coronary artery disease include dyslipidemia, family history, male sex, obesity, a sedentary lifestyle and stress. Back Pain  This is a chronic (sees pain management) problem. The current episode started more than 1 year ago. The problem occurs constantly. The pain is present in the lumbar spine, sacro-iliac, gluteal and thoracic spine. The quality of the pain is described as aching, burning, cramping and stabbing. The pain radiates to the left foot, left knee and left thigh. The pain is at a severity of 6/10. The pain is moderate. The pain is the same all the time. The symptoms are aggravated by bending, position and twisting. Stiffness is present all day, at night and in the morning. Associated symptoms include leg pain and paresis. Risk factors include lack of exercise and sedentary lifestyle. He has tried NSAIDs, home exercises, heat and analgesics for the symptoms. The treatment provided mild relief. Review of Systems   Constitutional: Negative. HENT: Negative. Eyes: Negative. Respiratory: Negative. Cardiovascular: Positive for leg swelling. Gastrointestinal: Negative. Endocrine: Negative. Genitourinary: Negative. Musculoskeletal: Positive for arthralgias, back pain and myalgias. Skin: Negative. Neurological: Negative. Hematological: Negative. Psychiatric/Behavioral: Negative. All other systems reviewed and are negative.        OBJECTIVE:  /78   Pulse 79 other methods, Hb A1C values may not accurately reflect mean  blood glucose in patients with hemoglobin variants (HgbF, HgbS and HgbC).  Any cause of shortened erythrocyte survival will reduce exposure of  erythrocytes to glucose with a consequent decrease in HbA1c (%) values, even  though the time-averaged blood glucose level may be elevated. Causes of  shortened erythrocyte lifetime might be hemolytic anemia or other hemolytic  diseases, homozygous sickle cell trait, pregnancy, recent significant or  chronic blood loss, etc. Caution should be used when interpreting the HbA1c  results from patients with these conditions.       Chemistry        Component Value Date/Time     05/04/2023 1056    K 4.1 05/04/2023 1056    CL 97 05/04/2023 1056    CO2 33 (H) 05/04/2023 1056    BUN 14 05/04/2023 1056    CREATININE 0.9 05/04/2023 1056    CREATININE 1.0 05/06/2012 0431     (H) 05/04/2023 1056        Component Value Date/Time    CALCIUM 9.8 05/04/2023 1056    CALCIUM 9.5 05/06/2012 0431    ALKPHOS 87 05/04/2023 1056    ALKPHOS 49 05/03/2012 1635    AST 12 05/04/2023 1056    AST 13 05/03/2012 1635    ALT 13 05/04/2023 1056    BILITOT 0.3 05/04/2023 1056          Lab Results   Component Value Date    CHOL 157 10/27/2022    CHOL 207 (H) 06/30/2022    CHOL 230 (H) 03/30/2022     Lab Results   Component Value Date    HDL 38 (L) 10/27/2022    HDL 56 06/30/2022    HDL 52 03/30/2022     Lab Results   Component Value Date    LDLCALC 95.0 10/27/2022    LDLCALC 127.2 06/30/2022    LDLCALC 156.6 03/30/2022     Lab Results   Component Value Date    TRIG 120 10/27/2022    TRIG 119 06/30/2022    TRIG 107 03/30/2022     Lab Results   Component Value Date    CHOLHDL 24.2 10/27/2022    CHOLHDL 27.1 06/30/2022    CHOLHDL 22.6 03/30/2022       Lab Results   Component Value Date    MICALBCREAT 4.8 05/04/2023     Lab Results   Component Value Date    TSH 0.806 02/02/2023       CrCl cannot be calculated (Patient's most recent lab  Time Range    GFR Non- >59 (3/14/2022) >59 Not in Time Range    Globulin 2.5 (3/14/2022) Not Established g/dL Not in Time Range    Glucose 109 (H) (3/14/2022) 74 - 106 mg/dL Not in Time Range    Potassium 3.8 (3/14/2022) 3.4 - 5.1 mmol/L Not in Time Range    Sodium 141 (3/14/2022) 136 - 145 mmol/L Not in Time Range    Total Bilirubin 0.3 (3/14/2022) 0.3 - 1.2 mg/dL Not in Time Range    Total Protein 6.4 (3/14/2022) 6.4 - 8.3 g/dL Not in Time Range        Hemoglobin A1C (%)   Date Value   03/14/2022 6.4 (H)     Microscopic Examination (no units)   Date Value   12/01/2021 Not Indicated     LDL Calculated (mg/dL)   Date Value   03/14/2022 98         Lab Results   Component Value Date    WBC 7.7 12/01/2021    NEUTROABS 2.8 12/05/2017    HGB 14.6 12/01/2021    HCT 45.4 12/01/2021    MCV 98.5 12/01/2021     12/01/2021       Lab Results   Component Value Date    TSH 1.52 03/10/2020         ASSESSMENT/PLAN:   Diagnosis Orders   1. Essential hypertension     2. Mixed hyperlipidemia  Lipid Panel   3. Prediabetes  Hemoglobin A1C    Comprehensive Metabolic Panel   4. Lumbar back pain with radiculopathy affecting right lower extremity     5.  Leg edema, left  US DUP UPPER EXTREMITY LEFT VENOUS           Orders Placed This Encounter   Procedures    US DUP UPPER EXTREMITY LEFT VENOUS     Standing Status:   Future     Standing Expiration Date:   3/14/2023     Order Specific Question:   Reason for exam:     Answer:   left leg edema    Hemoglobin A1C     Standing Status:   Future     Number of Occurrences:   1     Standing Expiration Date:   3/14/2023    Comprehensive Metabolic Panel     Standing Status:   Future     Number of Occurrences:   1     Standing Expiration Date:   3/14/2023    Lipid Panel     Standing Status:   Future     Number of Occurrences:   1     Standing Expiration Date:   3/14/2023     Order Specific Question:   Is Patient Fasting?/# of Hours     Answer:   6        Outpatient Encounter result is older than the maximum 7 days allowed.).    No results found for: [id number]         PHYSICAL EXAMINATION  Constitutional: Appears well, no distress.  Neck: Supple, trachea midline.  Respiratory: CTA, even and unlabored.   Cardiovascular: RRR, no murmurs.  GI: active bowel sounds, no hernia  Skin: warm and dry  Neuro: oriented to person, place, time  Feet: appropriate footwear.         FREESTYLE BAKARI 2 DOWNLOAD: Unable to download today         Goals        HEMOGLOBIN A1C < 7               Assessment/Plan  1. Type 2 diabetes mellitus with microalbuminuria, with long-term current use of insulin  -- Improving. Discussed ways she can improve diet further. Discussed changing Victoza to Mounjaro if A1c increases again. She felt that Ozempic didn't work for her.   -- decrease Lantus to 28 units QHS.  -- continue Humalog 18 units AC + correction scale for now.   -- continue Jardiance, Victoza    -- Discussed diagnosis of DM, A1c goals, progression of disease, long term complications and tx options.  Advised patient to check BG before activities, such as driving or exercise.  -- Reviewed hypoglycemia management: treat with 1/2 glass of juice, 1/2 can regular coke, or 4 glucose tablets. Monitor and repeat treatment every 15 minutes until BG is >70 Then have a snack, which includes a complex carbohydrate and protein.   2. Essential hypertension  -- controlled.   -- continue current meds   3. Mixed hyperlipidemia  -- controlled  -- continue atorvastatin   -- lipid panel with RTC   4. Morbid obesity  -- uncontrolled  -- worsening insulin resistance   Body mass index is 42.23 kg/m².       FOLLOW UP  Follow up in about 3 months (around 8/11/2023).   Patient instructed to bring BG logs to each follow up   Patient encouraged to call for any BG/medication issues, concerns, or questions.      Orders Placed This Encounter   Procedures    Hemoglobin A1C    Comprehensive Metabolic Panel    Lipid Panel  Medications as of 3/14/2022   Medication Sig Dispense Refill    metoprolol succinate (TOPROL XL) 50 MG extended release tablet Take 1 tablet by mouth daily 30 tablet 11    atorvastatin (LIPITOR) 20 MG tablet Take 1 tablet by mouth daily 30 tablet 11    Cholecalciferol (VITAMIN D3) 1.25 MG (87344 UT) CAPS TAKE 1 CAPSULE BY MOUTH EVERY WEEK 8 capsule 11    escitalopram (LEXAPRO) 20 MG tablet Take 1 tablet by mouth daily 60 tablet 11    bumetanide (BUMEX) 2 MG tablet TAKE ONE TABLET BY MOUTH EVERY MORNING 60 tablet 11    rOPINIRole (REQUIP) 2 MG tablet TAKE 1 TABLET BY MOUTH AT BEDTIME FOR RESTLESS LEGS 60 tablet 11    aspirin (ASPIRIN ADULT LOW STRENGTH) 81 MG EC tablet TAKE 1 TABLET BY MOUTH DAILY 60 tablet 11    pantoprazole (PROTONIX) 40 MG tablet TAKE ONE TABLET BY MOUTH DAILY FOR STOMACH 60 tablet 11    rosuvastatin (CRESTOR) 20 MG tablet Take 1 tablet by mouth nightly 30 tablet 11    fluticasone (FLONASE) 50 MCG/ACT nasal spray 2 sprays by Nasal route nightly as needed for Rhinitis or Allergies 1 each 11    lactulose (CHRONULAC) 10 GM/15ML solution Take 15 mLs by mouth nightly 450 mL 11    sildenafil (VIAGRA) 100 MG tablet Take 1 tablet by mouth as needed for Erectile Dysfunction 30 tablet 0    metOLazone (ZAROXOLYN) 2.5 MG tablet Take 1 tablet by mouth every other day      brimonidine (ALPHAGAN) 0.2 % ophthalmic solution       latanoprost (XALATAN) 0.005 % ophthalmic solution       [DISCONTINUED] metoprolol succinate (TOPROL XL) 50 MG extended release tablet Take 1 tablet by mouth daily (Patient taking differently: Take 100 mg by mouth daily ) 30 tablet 3    [DISCONTINUED] atorvastatin (LIPITOR) 20 MG tablet Take 1 tablet by mouth daily 30 tablet 5    [DISCONTINUED] Cholecalciferol (VITAMIN D3) 1.25 MG (96840 UT) CAPS TAKE 1 CAPSULE BY MOUTH EVERY WEEK 8 capsule 11    [DISCONTINUED] escitalopram (LEXAPRO) 20 MG tablet TAKE 1 TABLET BY MOUTH DAILY 60 tablet 11    [DISCONTINUED] bumetanide (BUMEX) 2 MG tablet TAKE ONE TABLET BY MOUTH EVERY MORNING 60 tablet 11    [DISCONTINUED] rOPINIRole (REQUIP) 2 MG tablet TAKE 1 TABLET BY MOUTH AT BEDTIME FOR RESTLESS LEGS 60 tablet 11    [DISCONTINUED] ASPIRIN ADULT LOW STRENGTH 81 MG EC tablet TAKE 1 TABLET BY MOUTH DAILY 60 tablet 11    [DISCONTINUED] pantoprazole (PROTONIX) 40 MG tablet TAKE ONE TABLET BY MOUTH DAILY FOR STOMACH 60 tablet 11    [DISCONTINUED] rosuvastatin (CRESTOR) 20 MG tablet Take 1 tablet by mouth nightly 30 tablet 11    [DISCONTINUED] vitamin D (ERGOCALCIFEROL) 1.25 MG (98235 UT) CAPS capsule take 1 capsule by mouth every week 4 capsule 11    [DISCONTINUED] fluticasone (FLONASE) 50 MCG/ACT nasal spray 2 sprays by Nasal route nightly as needed for Rhinitis or Allergies 1 Bottle 11    [DISCONTINUED] lactulose (CHRONULAC) 10 GM/15ML solution Take 15 mLs by mouth nightly 3 Bottle 11     No facility-administered encounter medications on file as of 3/14/2022. Return in about 3 months (around 6/14/2022), or if symptoms worsen or fail to improve. PATIENT COUNSELING    Counseling was provided today regardingthe following topics: Healthy eating habits, Regular exercise, substance abuse and healthy sleep habits. Discussed use, benefit, and side effectsof prescribed medications. Barriers to medication compliance addressed. All patient questions answered. Pt voiced understanding. Patient with one or more new problems requiring additional work-up/treatment.

## 2023-05-25 ENCOUNTER — TELEPHONE (OUTPATIENT)
Dept: GASTROENTEROLOGY | Facility: CLINIC | Age: 53
End: 2023-05-25
Payer: COMMERCIAL

## 2023-05-25 NOTE — TELEPHONE ENCOUNTER
Spoke to patient and scheduled c-scope. Prep instructions placed in mail to pt. Pt verbalized understanding

## 2023-07-24 ENCOUNTER — OFFICE VISIT (OUTPATIENT)
Dept: FAMILY MEDICINE | Facility: CLINIC | Age: 53
End: 2023-07-24
Payer: COMMERCIAL

## 2023-07-24 VITALS
SYSTOLIC BLOOD PRESSURE: 126 MMHG | OXYGEN SATURATION: 99 % | DIASTOLIC BLOOD PRESSURE: 84 MMHG | WEIGHT: 275.44 LBS | HEART RATE: 104 BPM | BODY MASS INDEX: 43.23 KG/M2 | HEIGHT: 67 IN

## 2023-07-24 DIAGNOSIS — E78.2 MIXED HYPERLIPIDEMIA: ICD-10-CM

## 2023-07-24 DIAGNOSIS — M67.911 DISORDER OF ROTATOR CUFF, RIGHT: ICD-10-CM

## 2023-07-24 DIAGNOSIS — M72.2 PLANTAR FASCIITIS OF RIGHT FOOT: ICD-10-CM

## 2023-07-24 DIAGNOSIS — Z12.11 COLON CANCER SCREENING: Primary | ICD-10-CM

## 2023-07-24 PROCEDURE — 20550 NJX 1 TENDON SHEATH/LIGAMENT: CPT | Mod: 59,RT,S$GLB, | Performed by: FAMILY MEDICINE

## 2023-07-24 PROCEDURE — 3079F PR MOST RECENT DIASTOLIC BLOOD PRESSURE 80-89 MM HG: ICD-10-PCS | Mod: CPTII,S$GLB,, | Performed by: FAMILY MEDICINE

## 2023-07-24 PROCEDURE — 3008F PR BODY MASS INDEX (BMI) DOCUMENTED: ICD-10-PCS | Mod: CPTII,S$GLB,, | Performed by: FAMILY MEDICINE

## 2023-07-24 PROCEDURE — 20550 PR INJECT TENDON SHEATH/LIGAMENT: ICD-10-PCS | Mod: 59,RT,S$GLB, | Performed by: FAMILY MEDICINE

## 2023-07-24 PROCEDURE — 3061F PR NEG MICROALBUMINURIA RESULT DOCUMENTED/REVIEW: ICD-10-PCS | Mod: CPTII,S$GLB,, | Performed by: FAMILY MEDICINE

## 2023-07-24 PROCEDURE — 3074F SYST BP LT 130 MM HG: CPT | Mod: CPTII,S$GLB,, | Performed by: FAMILY MEDICINE

## 2023-07-24 PROCEDURE — 99214 OFFICE O/P EST MOD 30 MIN: CPT | Mod: 25,S$GLB,, | Performed by: FAMILY MEDICINE

## 2023-07-24 PROCEDURE — 4010F ACE/ARB THERAPY RXD/TAKEN: CPT | Mod: CPTII,S$GLB,, | Performed by: FAMILY MEDICINE

## 2023-07-24 PROCEDURE — 3074F PR MOST RECENT SYSTOLIC BLOOD PRESSURE < 130 MM HG: ICD-10-PCS | Mod: CPTII,S$GLB,, | Performed by: FAMILY MEDICINE

## 2023-07-24 PROCEDURE — 3066F NEPHROPATHY DOC TX: CPT | Mod: CPTII,S$GLB,, | Performed by: FAMILY MEDICINE

## 2023-07-24 PROCEDURE — 3052F HG A1C>EQUAL 8.0%<EQUAL 9.0%: CPT | Mod: CPTII,S$GLB,, | Performed by: FAMILY MEDICINE

## 2023-07-24 PROCEDURE — 20610 LARGE JOINT ASPIRATION/INJECTION: R GLENOHUMERAL: ICD-10-PCS | Mod: RT,S$GLB,, | Performed by: FAMILY MEDICINE

## 2023-07-24 PROCEDURE — 3079F DIAST BP 80-89 MM HG: CPT | Mod: CPTII,S$GLB,, | Performed by: FAMILY MEDICINE

## 2023-07-24 PROCEDURE — 4010F PR ACE/ARB THEARPY RXD/TAKEN: ICD-10-PCS | Mod: CPTII,S$GLB,, | Performed by: FAMILY MEDICINE

## 2023-07-24 PROCEDURE — 99214 PR OFFICE/OUTPT VISIT, EST, LEVL IV, 30-39 MIN: ICD-10-PCS | Mod: 25,S$GLB,, | Performed by: FAMILY MEDICINE

## 2023-07-24 PROCEDURE — 99999 PR PBB SHADOW E&M-EST. PATIENT-LVL III: ICD-10-PCS | Mod: PBBFAC,,, | Performed by: FAMILY MEDICINE

## 2023-07-24 PROCEDURE — 3052F PR MOST RECENT HEMOGLOBIN A1C LEVEL 8.0 - < 9.0%: ICD-10-PCS | Mod: CPTII,S$GLB,, | Performed by: FAMILY MEDICINE

## 2023-07-24 PROCEDURE — 3061F NEG MICROALBUMINURIA REV: CPT | Mod: CPTII,S$GLB,, | Performed by: FAMILY MEDICINE

## 2023-07-24 PROCEDURE — 3066F PR DOCUMENTATION OF TREATMENT FOR NEPHROPATHY: ICD-10-PCS | Mod: CPTII,S$GLB,, | Performed by: FAMILY MEDICINE

## 2023-07-24 PROCEDURE — 99999 PR PBB SHADOW E&M-EST. PATIENT-LVL III: CPT | Mod: PBBFAC,,, | Performed by: FAMILY MEDICINE

## 2023-07-24 PROCEDURE — 3008F BODY MASS INDEX DOCD: CPT | Mod: CPTII,S$GLB,, | Performed by: FAMILY MEDICINE

## 2023-07-24 PROCEDURE — 20610 DRAIN/INJ JOINT/BURSA W/O US: CPT | Mod: RT,S$GLB,, | Performed by: FAMILY MEDICINE

## 2023-07-24 RX ORDER — BETAMETHASONE SODIUM PHOSPHATE AND BETAMETHASONE ACETATE 3; 3 MG/ML; MG/ML
9 INJECTION, SUSPENSION INTRA-ARTICULAR; INTRALESIONAL; INTRAMUSCULAR; SOFT TISSUE ONCE
Status: COMPLETED | OUTPATIENT
Start: 2023-07-24 | End: 2023-07-24

## 2023-07-24 RX ORDER — ATORVASTATIN CALCIUM 80 MG/1
80 TABLET, FILM COATED ORAL NIGHTLY
Qty: 90 TABLET | Refills: 3 | Status: SHIPPED | OUTPATIENT
Start: 2023-07-24 | End: 2024-07-23

## 2023-07-24 RX ORDER — TRIAMCINOLONE ACETONIDE 40 MG/ML
40 INJECTION, SUSPENSION INTRA-ARTICULAR; INTRAMUSCULAR
Status: COMPLETED | OUTPATIENT
Start: 2023-07-24 | End: 2023-07-24

## 2023-07-24 RX ADMIN — TRIAMCINOLONE ACETONIDE 40 MG: 40 INJECTION, SUSPENSION INTRA-ARTICULAR; INTRAMUSCULAR at 11:07

## 2023-07-24 RX ADMIN — BETAMETHASONE SODIUM PHOSPHATE AND BETAMETHASONE ACETATE 9 MG: 3; 3 INJECTION, SUSPENSION INTRA-ARTICULAR; INTRALESIONAL; INTRAMUSCULAR; SOFT TISSUE at 10:07

## 2023-07-24 NOTE — PROGRESS NOTES
THIS DOCUMENT WAS MADE IN PART WITH VOICE RECOGNITION SOFTWARE.  OCCASIONALLY THIS SOFTWARE WILL MISINTERPRET WORDS OR PHRASES.    Assessment and Plan:    1. Colon cancer screening        2. Mixed hyperlipidemia  atorvastatin (LIPITOR) 80 MG tablet      3. Plantar fasciitis of right foot  triamcinolone acetonide injection 40 mg      4. Disorder of rotator cuff, right  betamethasone acetate-betamethasone sodium phosphate injection 9 mg    Large Joint Aspiration/Injection: R glenohumeral        See injections  F/u soon for wellness / chronic health maint.       ______________________________________________________________________  Subjective:    Chief Complaint:  Chief Complaint   Patient presents with    Follow-up     3 month f/u. Saw vernoique in April, pt states shes been having pain in her right shoulder and right heel         HPI:  Liza is a 52 y.o. year old     Rt shoulder  Pain with abduction and internal rotation   No injury    Rt heel   Plantar surface  Worse in AM  No injury         Depression / Anxiety  Rx : Xanax 1 mg TID     Insomnia   Rx: Ambien 10 mg     Allergic rhinitis  No current medications      Moderate persistent asthma + nicotine dependence-smoking  rx :  Albuterol, fluticasone-salmeterol, promethazine-codeine cough syrup, benzonatate  Prescribed codeine cough syrup by nurse practitioner in [place] quite routinely  Still smoking     Essential hypertension  Rx-amlodipine 10 mg  Home BP : 130/80     Binge Eating  rx : Adderall 30 mg BID  Prescriber : Alissa Bowen NP     Dyslipidemia  Rx-Lipitor 40 mg  Previous lipids not at goal,      Migraine headaches  Abortive-sumatriptan 100 mg as needed  HA occur about every 3 months      Type 2 diabetes mellitus complicated by diabetic polyneuropathy  rx : Victoza 1.8 mg daily, Lantus 27u daily, Novolog 11u TID with meals   Previous A1c-7.2 percent     Chronic low back pain  Rx-hydrocodone 7.5 mg b.i.d., chlorzoxazone 500 mg QID prn  Prescribed by  previous primary care physician     Overactive bladder + Stress incont.   Rx-oxybutynin 5 mg b.i.d.  Medication working well       Past Medical History:  Past Medical History:   Diagnosis Date    Arthritis     Asthma 2012    Blood transfusion     Depression 2012    Diabetes 2012    HTN (hypertension) 2012    LBP (low back pain) 2012    Obesity 2013    Tobacco abuse 7/10/2013       Past Surgical History:  Past Surgical History:   Procedure Laterality Date     SECTION, LOW TRANSVERSE      GANGLION CYST EXCISION Right 2020    Dr. Logan       Family History:  Family History   Problem Relation Age of Onset    Breast cancer Maternal Aunt     Depression Maternal Grandmother         bipolar had to be hospitalized    Allergic rhinitis Neg Hx     Allergies Neg Hx     Angioedema Neg Hx     Asthma Neg Hx     Atopy Neg Hx     Eczema Neg Hx     Immunodeficiency Neg Hx     Rhinitis Neg Hx     Urticaria Neg Hx        Social History:  Social History     Socioeconomic History    Marital status: Single   Tobacco Use    Smoking status: Some Days     Packs/day: 0.50     Years: 30.00     Pack years: 15.00     Types: Cigarettes     Last attempt to quit: 2015     Years since quittin.5    Smokeless tobacco: Never   Substance and Sexual Activity    Alcohol use: Yes     Alcohol/week: 12.0 standard drinks     Types: 12 Cans of beer per week    Drug use: No    Sexual activity: Yes     Partners: Male     Birth control/protection: None       Medications:  Current Outpatient Medications on File Prior to Visit   Medication Sig Dispense Refill    albuterol (ACCUNEB) 0.63 mg/3 mL Nebu Take 3 mLs (0.63 mg total) by nebulization every 6 (six) hours as needed (wheezing). 180 mL 3    albuterol (PROVENTIL/VENTOLIN HFA) 90 mcg/actuation inhaler INHALE 2 PUFFS INTO THE LUNGS EVERY 6 HOURS AS NEEDED 25.5 g 3    ALPRAZolam (XANAX) 1 MG tablet Take 1 mg by mouth 2 (two) times  daily as needed.       amLODIPine (NORVASC) 10 MG tablet Take 1 tablet (10 mg total) by mouth 2 (two) times daily. (Patient taking differently: Take 10 mg by mouth 2 (two) times daily.)      atorvastatin (LIPITOR) 80 MG tablet Take 1 tablet (80 mg total) by mouth every evening. 90 tablet 3    blood sugar diagnostic Strp To check BG 4 times daily, to use with insurance preferred meter. Dx code E11.42 150 each 6    blood-glucose meter kit To check BG 4 times daily, to use with insurance preferred meter. Dx code E11.42 1 each 0    chlorzoxazone (PARAFON FORTE) 500 mg Tab Take 1 tablet (500 mg total) by mouth 4 (four) times daily as needed (muscle spasm). 180 tablet 3    dextroamphetamine-amphetamine 30 mg Tab Take 30 mg by mouth 2 (two) times a day.       diazePAM (VALIUM) 10 MG Tab TAKE 1 TABLET BY MOUTH 30 MINUTES PRIOR TO PROCEDURE      empagliflozin (JARDIANCE) 25 mg tablet Take 1 tablet (25 mg total) by mouth once daily. 30 tablet 6    flash glucose scanning reader (FREESTYLE BAKARI 2 READER) [facility] [place]. 1 each 0    fluticasone-umeclidin-vilanter (TRELEGY ELLIPTA) 200-62.5-25 mcg inhaler Inhale 1 puff into the lungs once daily. 60 each 11    FREESTYLE BAKARI 2 SENSOR Kit USE FOR CONTINUOUS GLUCOSE MONITORING. CHANGE EVERY 14 DAYS 2 kit 11    HYDROcodone-acetaminophen (NORCO) 7.5-325 mg per tablet Take 1 tablet by mouth every 12 (twelve) hours as needed for Pain. 60 tablet 0    hydrocortisone (ANUSOL-HC) 25 mg suppository Place 1 suppository (25 mg total) rectally 2 (two) times daily as needed for Hemorrhoids. 24 suppository 2    insulin (LANTUS SOLOSTAR U-100 INSULIN) glargine 100 units/mL SubQ pen Inject 28 Units into the skin every evening. 15 mL 6    insulin lispro (HUMALOG KWIKPEN INSULIN) 100 unit/mL pen Inject 18 Units into the skin 3 (three) times daily before meals. Plus correction scale, max TDD 84u 30 mL 11    lancets Misc To check BG 4 times daily, to use  "with insurance preferred meter. Dx code E11.42 150 each 6    losartan (COZAAR) 100 MG tablet Take 100 mg by mouth.      pen needle, diabetic (TRUEPLUS PEN NEEDLE) 32 gauge x 5/32" Ndle USE 5 TIMES DAILY WITH INSULIN AND VICTOZA. 150 each 6    promethazine-codeine 6.25-10 mg/5 ml (PHENERGAN WITH CODEINE) 6.25-10 mg/5 mL syrup TAKE 5 ML BY MOUTH TWICE A DAY      sulfamethoxazole-trimethoprim 800-160mg (BACTRIM DS) 800-160 mg Tab Take 1 tablet by mouth 2 (two) times daily. 20 tablet 0    VICTOZA 3-AYALA 0.6 mg/0.1 mL (18 mg/3 mL) PnIj pen Inject 1.8 mg into the skin. 27 mL 3    zolpidem (AMBIEN) 10 mg Tab 10 mg nightly as needed.   1    hydroCHLOROthiazide (HYDRODIURIL) 12.5 MG Tab Take 1 tablet (12.5 mg total) by mouth once daily. (Patient not taking: Reported on 7/24/2023) 30 tablet 2    oxybutynin (DITROPAN) 5 MG Tab Take 1 tablet (5 mg total) by mouth 2 (two) times a day. (Patient not taking: Reported on 7/24/2023) 180 tablet 0    sumatriptan (IMITREX) 100 MG tablet Take 1 tablet (100 mg total) by mouth once as needed for Migraine. (Patient not taking: Reported on 7/24/2023) 6 tablet 5     No current facility-administered medications on file prior to visit.       Allergies:  Patient has no known allergies.    Immunizations:  Immunization History   Administered Date(s) Administered    COVID-19, MRNA, LN-S, PF (Pfizer) (Purple Cap) 04/22/2021, 05/13/2021, 12/11/2021    DTaP 1970, 1970, 01/20/1971, 03/15/1972    Influenza - Quadrivalent 10/03/2020    Influenza - Quadrivalent - PF *Preferred* (6 months and older) 10/31/2018, 11/13/2019, 09/16/2021    MMR 11/17/1977    Measles / Rubella 01/19/1972    OPV 1970, 01/20/1971, 03/15/1972, 03/21/1974, 04/17/1975, 11/17/1977    Pneumococcal Conjugate - 13 Valent 06/12/2017    Pneumococcal Polysaccharide - 23 Valent 11/13/2019    Td (ADULT) 04/17/1975, 11/17/1977, 07/11/1984, 09/18/2009    Tdap 10/18/2017    Zoster Recombinant 06/16/2021, " "09/22/2021       Review of Systems:  Review of Systems   All other systems reviewed and are negative.    Objective:    Vitals:  Vitals:    07/24/23 1110   BP: 126/84   Pulse: 104   SpO2: 99%   Weight: 125 kg (275 lb 7.4 oz)   Height: 5' 7" (1.702 m)   PainSc: 0-No pain       Physical Exam  Vitals reviewed.   Constitutional:       General: She is not in acute distress.     Appearance: She is well-developed.   HENT:      Head: Normocephalic and atraumatic.   Pulmonary:      Effort: Pulmonary effort is normal. No respiratory distress.   Musculoskeletal:      Cervical back: Normal range of motion.        Feet:       Comments: decreased active range of motion secondary to pain   Quiles positive  Empty can positive   Psychiatric:         Behavior: Behavior normal.         Thought Content: Thought content normal.         Judgment: Judgment normal.           Cody Medrano MD  Family Medicine      "

## 2023-07-24 NOTE — PROCEDURES
Large Joint Aspiration/Injection: R glenohumeral    Date/Time: 7/24/2023 10:40 AM  Performed by: Cody Medrano MD  Authorized by: Cody Medrano MD     Consent Done?:  Yes (Verbal)  Indications:  Arthritis and pain  Site marked: the procedure site was marked    Timeout: prior to procedure the correct patient, procedure, and site was verified      Local anesthesia used?: Yes    Local anesthetic:  Lidocaine 2% without epinephrine  Anesthetic total (ml):  1.5    Approach:  Posterior  Location:  Shoulder  Site:  R glenohumeral  Medications:  9 mg celestone 9mg  Patient tolerance:  Patient tolerated the procedure well with no immediate complications

## 2023-07-24 NOTE — PROCEDURES
Procedures  Procedure-right plantar fascia injection  Indication-plantar fasciitis  Steroid-triamcinolone 20 mg   Anesthesia-1 mg 2% xylocaine without epinephrine   Location-plantar surface of heel  No complications

## 2023-08-04 ENCOUNTER — TELEPHONE (OUTPATIENT)
Dept: GASTROENTEROLOGY | Facility: CLINIC | Age: 53
End: 2023-08-04
Payer: COMMERCIAL

## 2023-08-04 NOTE — TELEPHONE ENCOUNTER
Spoke to patient and notified her the [facility] calls a day or two prior with procedure arrival times. Patient verbalized understanding

## 2023-08-04 NOTE — TELEPHONE ENCOUNTER
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 Thanks!

## 2023-08-07 ENCOUNTER — TELEPHONE (OUTPATIENT)
Dept: GASTROENTEROLOGY | Facility: CLINIC | Age: 53
End: 2023-08-07
Payer: COMMERCIAL

## 2023-08-07 NOTE — TELEPHONE ENCOUNTER
----- Message from Monica Mcdonough sent at 8/7/2023  9:06 AM CDT -----  Type: Needs Medical Advice  Who Called:  pt  Best Call Back Number: [phone]  Additional Information: pt is calling the office to speak with the nurse in regards to when she needs to start her prep as she misplaced her instructions. Please call back to advise. Thanks!

## 2023-08-09 ENCOUNTER — ANESTHESIA (OUTPATIENT)
Dept: ENDOSCOPY | Facility: HOSPITAL | Age: 53
End: 2023-08-09
Payer: COMMERCIAL

## 2023-08-09 ENCOUNTER — ANESTHESIA EVENT (OUTPATIENT)
Dept: ENDOSCOPY | Facility: HOSPITAL | Age: 53
End: 2023-08-09
Payer: COMMERCIAL

## 2023-08-09 ENCOUNTER — HOSPITAL ENCOUNTER (OUTPATIENT)
Facility: HOSPITAL | Age: 53
Discharge: HOME | End: 2023-08-09
Attending: INTERNAL MEDICINE | Admitting: INTERNAL MEDICINE
Payer: COMMERCIAL

## 2023-08-09 DIAGNOSIS — Z12.11 SCREEN FOR COLON CANCER: ICD-10-CM

## 2023-08-09 LAB — GLUCOSE SERPL-MCNC: 120 MG/DL (ref 70–110)

## 2023-08-09 PROCEDURE — D9220A PRA ANESTHESIA: Mod: PT,ANES,, | Performed by: ANESTHESIOLOGY

## 2023-08-09 PROCEDURE — 25000003 PHARM REV CODE 250: Mod: PO | Performed by: INTERNAL MEDICINE

## 2023-08-09 PROCEDURE — 82962 GLUCOSE BLOOD TEST: CPT | Mod: PO | Performed by: INTERNAL MEDICINE

## 2023-08-09 PROCEDURE — D9220A PRA ANESTHESIA: Mod: PT,CRNA,, | Performed by: NURSE ANESTHETIST, CERTIFIED REGISTERED

## 2023-08-09 PROCEDURE — 37000008 HC ANESTHESIA 1ST 15 MINUTES: Mod: PO | Performed by: INTERNAL MEDICINE

## 2023-08-09 PROCEDURE — 88305 TISSUE EXAM BY PATHOLOGIST: CPT | Mod: 26,,, | Performed by: PATHOLOGY

## 2023-08-09 PROCEDURE — 37000009 HC ANESTHESIA EA ADD 15 MINS: Mod: PO | Performed by: INTERNAL MEDICINE

## 2023-08-09 PROCEDURE — 88305 TISSUE EXAM BY PATHOLOGIST: CPT | Mod: PO | Performed by: PATHOLOGY

## 2023-08-09 PROCEDURE — 45385 COLONOSCOPY W/LESION REMOVAL: CPT | Mod: PO | Performed by: INTERNAL MEDICINE

## 2023-08-09 PROCEDURE — 63600175 PHARM REV CODE 636 W HCPCS: Mod: PO | Performed by: INTERNAL MEDICINE

## 2023-08-09 PROCEDURE — 63600175 PHARM REV CODE 636 W HCPCS: Mod: PO | Performed by: NURSE ANESTHETIST, CERTIFIED REGISTERED

## 2023-08-09 PROCEDURE — 25000003 PHARM REV CODE 250: Mod: PO | Performed by: NURSE ANESTHETIST, CERTIFIED REGISTERED

## 2023-08-09 PROCEDURE — D9220A PRA ANESTHESIA: ICD-10-PCS | Mod: PT,CRNA,, | Performed by: NURSE ANESTHETIST, CERTIFIED REGISTERED

## 2023-08-09 PROCEDURE — 27201089 HC SNARE, DISP (ANY): Mod: PO | Performed by: INTERNAL MEDICINE

## 2023-08-09 PROCEDURE — D9220A PRA ANESTHESIA: ICD-10-PCS | Mod: PT,ANES,, | Performed by: ANESTHESIOLOGY

## 2023-08-09 PROCEDURE — 88305 TISSUE EXAM BY PATHOLOGIST: ICD-10-PCS | Mod: 26,,, | Performed by: PATHOLOGY

## 2023-08-09 PROCEDURE — 45385 COLONOSCOPY W/LESION REMOVAL: CPT | Mod: ,,, | Performed by: INTERNAL MEDICINE

## 2023-08-09 PROCEDURE — 45385 PR COLONOSCOPY,REMV LESN,SNARE: ICD-10-PCS | Mod: ,,, | Performed by: INTERNAL MEDICINE

## 2023-08-09 RX ORDER — LIDOCAINE HYDROCHLORIDE 10 MG/ML
1 INJECTION, SOLUTION EPIDURAL; INFILTRATION; INTRACAUDAL; PERINEURAL ONCE
Status: COMPLETED | OUTPATIENT
Start: 2023-08-09 | End: 2023-08-09

## 2023-08-09 RX ORDER — LIDOCAINE HYDROCHLORIDE 20 MG/ML
INJECTION INTRAVENOUS
Status: DISCONTINUED | OUTPATIENT
Start: 2023-08-09 | End: 2023-08-09

## 2023-08-09 RX ORDER — SODIUM CHLORIDE 0.9 % (FLUSH) 0.9 %
10 SYRINGE (ML) INJECTION
Status: DISCONTINUED | OUTPATIENT
Start: 2023-08-09 | End: 2023-08-09 | Stop reason: HOSPADM

## 2023-08-09 RX ORDER — SODIUM CHLORIDE, SODIUM LACTATE, POTASSIUM CHLORIDE, CALCIUM CHLORIDE 600; 310; 30; 20 MG/100ML; MG/100ML; MG/100ML; MG/100ML
INJECTION, SOLUTION INTRAVENOUS CONTINUOUS
Status: DISCONTINUED | OUTPATIENT
Start: 2023-08-09 | End: 2023-08-09 | Stop reason: HOSPADM

## 2023-08-09 RX ORDER — PROPOFOL 10 MG/ML
VIAL (ML) INTRAVENOUS
Status: DISCONTINUED | OUTPATIENT
Start: 2023-08-09 | End: 2023-08-09

## 2023-08-09 RX ADMIN — PROPOFOL 50 MG: 10 INJECTION, EMULSION INTRAVENOUS at 07:08

## 2023-08-09 RX ADMIN — PROPOFOL 25 MG: 10 INJECTION, EMULSION INTRAVENOUS at 07:08

## 2023-08-09 RX ADMIN — PROPOFOL 125 MG: 10 INJECTION, EMULSION INTRAVENOUS at 07:08

## 2023-08-09 RX ADMIN — LIDOCAINE HYDROCHLORIDE 10 MG: 10 INJECTION, SOLUTION EPIDURAL; INFILTRATION; INTRACAUDAL; PERINEURAL at 07:08

## 2023-08-09 RX ADMIN — LIDOCAINE HYDROCHLORIDE 100 MG: 20 INJECTION INTRAVENOUS at 07:08

## 2023-08-09 RX ADMIN — PROPOFOL 50 MG: 10 INJECTION, EMULSION INTRAVENOUS at 08:08

## 2023-08-09 RX ADMIN — SODIUM CHLORIDE, POTASSIUM CHLORIDE, SODIUM LACTATE AND CALCIUM CHLORIDE: 600; 310; 30; 20 INJECTION, SOLUTION INTRAVENOUS at 07:08

## 2023-08-09 NOTE — ANESTHESIA POSTPROCEDURE EVALUATION
Anesthesia Post Evaluation    Patient: Liza Mesa    Procedure(s) Performed: Procedure(s) (LRB):  COLONOSCOPY (N/A)    Final Anesthesia Type: general      Patient location during evaluation: PACU  Patient participation: Yes- Able to Participate  Level of consciousness: awake and alert  Post-procedure vital signs: reviewed and stable  Pain management: adequate  Airway patency: patent    PONV status at discharge: No PONV  Anesthetic complications: no      Cardiovascular status: blood pressure returned to baseline  Respiratory status: unassisted  Hydration status: euvolemic  Follow-up not needed.          Vitals Value Taken Time   /78 08/09/23 0834   Temp 36.6 °C (97.8 °F) 08/09/23 0809   Pulse 90 08/09/23 0834   Resp 20 08/09/23 0834   SpO2 95 % 08/09/23 0834         Event Time   Out of Recovery 08:41:04         Pain/Barbara Score: Barbara Score: 10 (8/9/2023  8:32 AM)

## 2023-08-09 NOTE — PROVATION PATIENT INSTRUCTIONS
Discharge Summary/Instructions after an Endoscopic Procedure  Patient Name: Liza Ortegasin  Patient MRN: [id number]  Patient YOB: 1970 Wednesday, August 9, 2023  Reggie Posey MD  Dear patient,  As a result of recent federal legislation (The Federal Cures Act), you may   receive lab or pathology results from your procedure in your [facility]   account before your physician is able to contact you. Your physician or   their representative will relay the results to you with their   recommendations at their soonest availability.  Thank you,  RESTRICTIONS:  During your procedure today, you received medications for sedation.  These   medications may affect your judgment, balance and coordination.  Therefore,   for 24 hours, you have the following restrictions:   - DO NOT drive a car, operate machinery, make legal/financial decisions,   sign important papers or drink alcohol.    ACTIVITY:  Today: no heavy lifting, straining or running due to procedural   sedation/anesthesia.  The following day: return to full activity including work.  DIET:  Eat and drink normally unless instructed otherwise.     TREATMENT FOR COMMON SIDE EFFECTS:  - Mild abdominal pain, nausea, belching, bloating or excessive gas:  rest,   eat lightly and use a heating pad.  - Sore Throat: treat with throat lozenges and/or gargle with warm salt   water.  - Because air was used during the procedure, expelling large amounts of air   from your rectum or belching is normal.  - If a bowel prep was taken, you may not have a bowel movement for 1-3 days.    This is normal.  SYMPTOMS TO WATCH FOR AND REPORT TO YOUR PHYSICIAN:  1. Abdominal pain or bloating, other than gas cramps.  2. Chest pain.  3. Back pain.  4. Signs of infection such as: chills or fever occurring within 24 hours   after the procedure.  5. Rectal bleeding, which would show as bright red, maroon, or black stools.   (A tablespoon of blood from the rectum is not serious, especially  if   hemorrhoids are present.)  6. Vomiting.  7. Weakness or dizziness.  GO DIRECTLY TO THE NEAREST EMERGENCY ROOM IF YOU HAVE ANY OF THE FOLLOWING:      Difficulty breathing              Chills and/or fever over 101 F   Persistent vomiting and/or vomiting blood   Severe abdominal pain   Severe chest pain   Black, tarry stools   Bleeding- more than one tablespoon   Any other symptom or condition that you feel may need urgent attention  Your doctor recommends these additional instructions:  If any biopsies were taken, your doctors clinic will contact you in 1 to 2   weeks with any results.  We are waiting for your pathology results.   Your physician has recommended a repeat colonoscopy in five years for   surveillance based on pathology results.   You are being discharged to home.  For questions, problems or results please call your physician - Reggie Posey MD at Work:  [phone].  EMERGENCY PHONE NUMBER: [phone], LAB RESULTS: [phone]  IF A COMPLICATION OR EMERGENCY SITUATION ARISES AND YOU ARE UNABLE TO REACH   YOUR PHYSICIAN - GO DIRECTLY TO THE EMERGENCY ROOM.  ___________________________________________  Nurse Signature  ___________________________________________  Patient/Designated Responsible Party Signature  Reggie Posey MD  8/9/2023 8:08:17 AM  This report has been verified and signed electronically.  Dear patient,  As a result of recent federal legislation (The Federal Cures Act), you may   receive lab or pathology results from your procedure in your [facility]   account before your physician is able to contact you. Your physician or   their representative will relay the results to you with their   recommendations at their soonest availability.  Thank you.  PROVATION

## 2023-08-09 NOTE — ANESTHESIA PREPROCEDURE EVALUATION
08/09/2023  Liza Spain Cousin is a 52 y.o., female.      Pre-op Assessment    I have reviewed the Patient Summary Reports.     I have reviewed the Nursing Notes. I have reviewed the NPO Status.   I have reviewed the Medications.     Review of Systems  Anesthesia Hx:  Denies Family Hx of Anesthesia complications.   Denies Personal Hx of Anesthesia complications.   Cardiovascular:   Hypertension    Pulmonary:   Asthma moderate    Hepatic/GI:   Bowel Prep.    Musculoskeletal:   Arthritis     Neurological:   Peripheral Neuropathy    Endocrine:   Diabetes, poorly controlled, type 2, using insulin  Morbid Obesity / BMI > 40  Psych:   Psychiatric History depression          Physical Exam  General: Cooperative, Alert and Oriented    Airway:  Mallampati: III / II  Mouth Opening: Normal  TM Distance: Normal  Tongue: Normal  Neck: Girth Increased        Anesthesia Plan  Type of Anesthesia, risks & benefits discussed:    Anesthesia Type: Gen Natural Airway  Intra-op Monitoring Plan: Standard ASA Monitors  Induction:  IV  Informed Consent: Informed consent signed with the Patient and all parties understand the risks and agree with anesthesia plan.  All questions answered.   ASA Score: 3    Ready For Surgery From Anesthesia Perspective.     .

## 2023-08-09 NOTE — DISCHARGE SUMMARY
[facility] - [facility]  Discharge Note  Short Stay    Discharge Note  Short Stay      SUMMARY     Admit Date: 8/9/2023    Attending Physician: Reggie Posey MD     Discharge Physician: Reggie Posey MD    Discharge Date: 8/9/2023 8:09 AM    Final Diagnosis: Encounter for colorectal cancer screening [Z12.11, Z12.12]    Disposition: HOME OR SELF CARE    Patient Instructions:   Current Discharge Medication List        CONTINUE these medications which have NOT CHANGED    Details   albuterol (ACCUNEB) 0.63 mg/3 mL Nebu Take 3 mLs (0.63 mg total) by nebulization every 6 (six) hours as needed (wheezing).  Qty: 180 mL, Refills: 3      albuterol (PROVENTIL/VENTOLIN HFA) 90 mcg/actuation inhaler INHALE 2 PUFFS INTO THE LUNGS EVERY 6 HOURS AS NEEDED  Qty: 25.5 g, Refills: 3      ALPRAZolam (XANAX) 1 MG tablet Take 1 mg by mouth 2 (two) times daily as needed.       amLODIPine (NORVASC) 10 MG tablet Take 1 tablet (10 mg total) by mouth 2 (two) times daily.    Comments: .      atorvastatin (LIPITOR) 80 MG tablet Take 1 tablet (80 mg total) by mouth every evening.  Qty: 90 tablet, Refills: 3    Comments: WAIT UNTIL READY FOR NEXT REFILL  Associated Diagnoses: Mixed hyperlipidemia      dextroamphetamine-amphetamine 30 mg Tab Take 30 mg by mouth 2 (two) times a day.       empagliflozin (JARDIANCE) 25 mg tablet Take 1 tablet (25 mg total) by mouth once daily.  Qty: 30 tablet, Refills: 6    Associated Diagnoses: Type 2 diabetes mellitus with microalbuminuria, with long-term current use of insulin      fluticasone-umeclidin-vilanter (TRELEGY ELLIPTA) 200-62.5-25 mcg inhaler Inhale 1 puff into the lungs once daily.  Qty: 60 each, Refills: 11    Associated Diagnoses: Severe persistent asthma without complication      HYDROcodone-acetaminophen (NORCO) 7.5-325 mg per tablet Take 1 tablet by mouth every 12 (twelve) hours as needed for Pain.  Qty: 60 tablet, Refills: 0    Comments: Quantity prescribed more than 7 day supply?  Yes, quantity medically necessary for chronic back pain  Associated Diagnoses: Chronic midline low back pain without sciatica      insulin (LANTUS SOLOSTAR U-100 INSULIN) glargine 100 units/mL SubQ pen Inject 28 Units into the skin every evening.  Qty: 15 mL, Refills: 6    Associated Diagnoses: Type 2 diabetes mellitus with microalbuminuria, with long-term current use of insulin      insulin lispro (HUMALOG KWIKPEN INSULIN) 100 unit/mL pen Inject 18 Units into the skin 3 (three) times daily before meals. Plus correction scale, max TDD 84u  Qty: 30 mL, Refills: 11      losartan (COZAAR) 100 MG tablet Take 100 mg by mouth.      promethazine-codeine 6.25-10 mg/5 ml (PHENERGAN WITH CODEINE) 6.25-10 mg/5 mL syrup TAKE 5 ML BY MOUTH TWICE A DAY      zolpidem (AMBIEN) 10 mg Tab 10 mg nightly as needed.   Refills: 1      blood sugar diagnostic Strp To check BG 4 times daily, to use with insurance preferred meter. Dx code E11.42  Qty: 150 each, Refills: 6      blood-glucose meter kit To check BG 4 times daily, to use with insurance preferred meter. Dx code E11.42  Qty: 1 each, Refills: 0      chlorzoxazone (PARAFON FORTE) 500 mg Tab Take 1 tablet (500 mg total) by mouth 4 (four) times daily as needed (muscle spasm).  Qty: 180 tablet, Refills: 3    Associated Diagnoses: Chronic midline low back pain without sciatica      diazePAM (VALIUM) 10 MG Tab TAKE 1 TABLET BY MOUTH 30 MINUTES PRIOR TO PROCEDURE      flash glucose scanning reader (FREESTYLE BAKARI 2 READER) [facility] [place] Use for continuous glucose monitoring.  Qty: 1 each, Refills: 0    Comments: Dx code E11.42  Associated Diagnoses: Type 2 diabetes mellitus with diabetic polyneuropathy, with long-term current use of insulin      FREESTYLE BAKARI 2 SENSOR Kit USE FOR CONTINUOUS GLUCOSE MONITORING. CHANGE EVERY 14 DAYS  Qty: 2 kit, Refills: 11    Associated Diagnoses: Type 2 diabetes mellitus with diabetic polyneuropathy, with long-term current use of insulin     "  hydroCHLOROthiazide (HYDRODIURIL) 12.5 MG Tab Take 1 tablet (12.5 mg total) by mouth once daily.  Qty: 30 tablet, Refills: 2    Comments: .  Associated Diagnoses: Essential hypertension      hydrocortisone (ANUSOL-HC) 25 mg suppository Place 1 suppository (25 mg total) rectally 2 (two) times daily as needed for Hemorrhoids.  Qty: 24 suppository, Refills: 2    Associated Diagnoses: Second degree hemorrhoids      lancets Misc To check BG 4 times daily, to use with insurance preferred meter. Dx code E11.42  Qty: 150 each, Refills: 6      oxybutynin (DITROPAN) 5 MG Tab Take 1 tablet (5 mg total) by mouth 2 (two) times a day.  Qty: 180 tablet, Refills: 0    Associated Diagnoses: Urgency of urination      pen needle, diabetic (TRUEPLUS PEN NEEDLE) 32 gauge x 5/32" Ndle USE 5 TIMES DAILY WITH INSULIN AND VICTOZA.  Qty: 150 each, Refills: 6    Associated Diagnoses: Type 2 diabetes mellitus with microalbuminuria, with long-term current use of insulin      sulfamethoxazole-trimethoprim 800-160mg (BACTRIM DS) 800-160 mg Tab Take 1 tablet by mouth 2 (two) times daily.  Qty: 20 tablet, Refills: 0    Associated Diagnoses: Abscess of axilla, right      sumatriptan (IMITREX) 100 MG tablet Take 1 tablet (100 mg total) by mouth once as needed for Migraine.  Qty: 6 tablet, Refills: 5    Associated Diagnoses: Chronic migraine without aura without status migrainosus, not intractable      VICTOZA 3-AYALA 0.6 mg/0.1 mL (18 mg/3 mL) PnIj pen Inject 1.8 mg into the skin.  Qty: 27 mL, Refills: 3             Discharge Procedure Orders (must include Diet, Follow-up, Activity)    Follow Up:  Follow up with PCP as previously scheduled  Resume routine diet.  Activity as tolerated.    No driving day of procedure.    "

## 2023-08-09 NOTE — TRANSFER OF CARE
"Anesthesia Transfer of Care Note    Patient: Liza Mesa    Procedure(s) Performed: Procedure(s) (LRB):  COLONOSCOPY (N/A)    Patient location: PACU    Anesthesia Type: general    Transport from OR: Transported from OR on room air with adequate spontaneous ventilation    Post pain: adequate analgesia    Post assessment: no apparent anesthetic complications and tolerated procedure well    Post vital signs: stable    Level of consciousness: sedated and awake    Nausea/Vomiting: no nausea/vomiting    Complications: none    Transfer of care protocol was followed      Last vitals:   Visit Vitals  BP (!) 143/82 (BP Location: Right arm, Patient Position: Sitting)   Pulse 94   Temp 36.4 °C (97.5 °F) (Skin)   Resp 17   Ht 5' 7" (1.702 m)   Wt 124.7 kg (275 lb)   LMP 11/21/2019   SpO2 95%   Breastfeeding No   BMI 43.07 kg/m²     "

## 2023-08-09 NOTE — H&P
History & Physical - Short Stay  Gastroenterology      SUBJECTIVE:     Procedure: Colonoscopy    Chief Complaint/Indication for Procedure: Screening    PTA Medications   Medication Sig    albuterol (ACCUNEB) 0.63 mg/3 mL Nebu Take 3 mLs (0.63 mg total) by nebulization every 6 (six) hours as needed (wheezing).    albuterol (PROVENTIL/VENTOLIN HFA) 90 mcg/actuation inhaler INHALE 2 PUFFS INTO THE LUNGS EVERY 6 HOURS AS NEEDED    ALPRAZolam (XANAX) 1 MG tablet Take 1 mg by mouth 2 (two) times daily as needed.     amLODIPine (NORVASC) 10 MG tablet Take 1 tablet (10 mg total) by mouth 2 (two) times daily. (Patient taking differently: Take 10 mg by mouth 2 (two) times daily.)    atorvastatin (LIPITOR) 80 MG tablet Take 1 tablet (80 mg total) by mouth every evening.    dextroamphetamine-amphetamine 30 mg Tab Take 30 mg by mouth 2 (two) times a day.     empagliflozin (JARDIANCE) 25 mg tablet Take 1 tablet (25 mg total) by mouth once daily.    fluticasone-umeclidin-vilanter (TRELEGY ELLIPTA) 200-62.5-25 mcg inhaler Inhale 1 puff into the lungs once daily.    HYDROcodone-acetaminophen (NORCO) 7.5-325 mg per tablet Take 1 tablet by mouth every 12 (twelve) hours as needed for Pain.    insulin (LANTUS SOLOSTAR U-100 INSULIN) glargine 100 units/mL SubQ pen Inject 28 Units into the skin every evening.    insulin lispro (HUMALOG KWIKPEN INSULIN) 100 unit/mL pen Inject 18 Units into the skin 3 (three) times daily before meals. Plus correction scale, max TDD 84u    losartan (COZAAR) 100 MG tablet Take 100 mg by mouth.    promethazine-codeine 6.25-10 mg/5 ml (PHENERGAN WITH CODEINE) 6.25-10 mg/5 mL syrup TAKE 5 ML BY MOUTH TWICE A DAY    zolpidem (AMBIEN) 10 mg Tab 10 mg nightly as needed.     blood sugar diagnostic Strp To check BG 4 times daily, to use with insurance preferred meter. Dx code E11.42    blood-glucose meter kit To check BG 4 times daily, to use with insurance preferred meter. Dx code E11.42    chlorzoxazone (PARAFON  "FORTE) 500 mg Tab Take 1 tablet (500 mg total) by mouth 4 (four) times daily as needed (muscle spasm). (Patient not taking: Reported on 2023)    diazePAM (VALIUM) 10 MG Tab TAKE 1 TABLET BY MOUTH 30 MINUTES PRIOR TO PROCEDURE    flash glucose scanning reader (FREESTYLE BAKARI 2 READER) Misc Use for continuous glucose monitoring.    FREESTYLE BAKARI 2 SENSOR Kit USE FOR CONTINUOUS GLUCOSE MONITORING. CHANGE EVERY 14 DAYS    hydroCHLOROthiazide (HYDRODIURIL) 12.5 MG Tab Take 1 tablet (12.5 mg total) by mouth once daily. (Patient not taking: Reported on 2023)    hydrocortisone (ANUSOL-HC) 25 mg suppository Place 1 suppository (25 mg total) rectally 2 (two) times daily as needed for Hemorrhoids.    lancets Misc To check BG 4 times daily, to use with insurance preferred meter. Dx code E11.42    oxybutynin (DITROPAN) 5 MG Tab Take 1 tablet (5 mg total) by mouth 2 (two) times a day. (Patient not taking: Reported on 2023)    pen needle, diabetic (TRUEPLUS PEN NEEDLE) 32 gauge x 5/32" Ndle USE 5 TIMES DAILY WITH INSULIN AND VICTOZA.    sulfamethoxazole-trimethoprim 800-160mg (BACTRIM DS) 800-160 mg Tab Take 1 tablet by mouth 2 (two) times daily. (Patient not taking: Reported on 2023)    sumatriptan (IMITREX) 100 MG tablet Take 1 tablet (100 mg total) by mouth once as needed for Migraine.    VICTOZA 3-AYALA 0.6 mg/0.1 mL (18 mg/3 mL) PnIj pen Inject 1.8 mg into the skin.       Review of patient's allergies indicates:  No Known Allergies     Past Medical History:   Diagnosis Date    Arthritis     Asthma 2012    Blood transfusion     Depression 2012    Diabetes 2012    HTN (hypertension) 2012    LBP (low back pain) 2012    Obesity 2013    Tobacco abuse 7/10/2013     Past Surgical History:   Procedure Laterality Date     SECTION, LOW TRANSVERSE      GANGLION CYST EXCISION Right 2020    Dr. Logan     Family History   Problem Relation Age of Onset    Breast cancer Maternal " Aunt     Depression Maternal Grandmother         bipolar had to be hospitalized    Allergic rhinitis Neg Hx     Allergies Neg Hx     Angioedema Neg Hx     Asthma Neg Hx     Atopy Neg Hx     Eczema Neg Hx     Immunodeficiency Neg Hx     Rhinitis Neg Hx     Urticaria Neg Hx      Social History     Tobacco Use    Smoking status: Some Days     Current packs/day: 0.00     Average packs/day: 0.5 packs/day for 30.0 years (15.0 ttl pk-yrs)     Types: Cigarettes     Start date: 1985     Last attempt to quit: 2015     Years since quittin.6    Smokeless tobacco: Never   Substance Use Topics    Alcohol use: Yes     Alcohol/week: 12.0 standard drinks of alcohol     Types: 12 Cans of beer per week    Drug use: No         OBJECTIVE:     Vital Signs (Most Recent)       Physical Exam                                                        GENERAL:  Comfortable, in no acute distress.                                 HEENT EXAM:  Nonicteric.  No adenopathy.  Oropharynx is clear.               NECK:  Supple.                                                               LUNGS:  Clear.                                                               CARDIAC:  Regular rate and rhythm.  S1, S2.  No murmur.                      ABDOMEN:  Soft, positive bowel sounds, nontender.  No hepatosplenomegaly or masses.  No rebound or guarding.                                             EXTREMITIES:  No edema.     MENTAL STATUS:  Normal, alert and oriented.      ASSESSMENT/PLAN:     Assessment: Colorectal cancer screening    Plan: Colonoscopy    Anesthesia Plan: General    ASA Grade: ASA 2 - Patient with mild systemic disease with no functional limitations    MALLAMPATI SCORE:  I (soft palate, uvula, fauces, and tonsillar pillars visible)

## 2023-08-10 VITALS
TEMPERATURE: 98 F | HEART RATE: 90 BPM | BODY MASS INDEX: 43.16 KG/M2 | HEIGHT: 67 IN | WEIGHT: 275 LBS | RESPIRATION RATE: 20 BRPM | SYSTOLIC BLOOD PRESSURE: 152 MMHG | DIASTOLIC BLOOD PRESSURE: 78 MMHG | OXYGEN SATURATION: 95 %

## 2023-08-14 LAB
FINAL PATHOLOGIC DIAGNOSIS: NORMAL
GROSS: NORMAL
Lab: NORMAL

## 2023-08-24 ENCOUNTER — LAB VISIT (OUTPATIENT)
Dept: LAB | Facility: HOSPITAL | Age: 53
End: 2023-08-24
Payer: COMMERCIAL

## 2023-08-24 DIAGNOSIS — R80.9 TYPE 2 DIABETES MELLITUS WITH MICROALBUMINURIA, WITH LONG-TERM CURRENT USE OF INSULIN: ICD-10-CM

## 2023-08-24 DIAGNOSIS — E11.29 TYPE 2 DIABETES MELLITUS WITH MICROALBUMINURIA, WITH LONG-TERM CURRENT USE OF INSULIN: ICD-10-CM

## 2023-08-24 DIAGNOSIS — Z79.4 TYPE 2 DIABETES MELLITUS WITH MICROALBUMINURIA, WITH LONG-TERM CURRENT USE OF INSULIN: ICD-10-CM

## 2023-08-24 LAB
ALBUMIN SERPL BCP-MCNC: 3.4 G/DL (ref 3.5–5.2)
ALP SERPL-CCNC: 76 U/L (ref 55–135)
ALT SERPL W/O P-5'-P-CCNC: 14 U/L (ref 10–44)
ANION GAP SERPL CALC-SCNC: 11 MMOL/L (ref 8–16)
AST SERPL-CCNC: 13 U/L (ref 10–40)
BILIRUB SERPL-MCNC: 0.2 MG/DL (ref 0.1–1)
BUN SERPL-MCNC: 13 MG/DL (ref 6–20)
CALCIUM SERPL-MCNC: 9.5 MG/DL (ref 8.7–10.5)
CHLORIDE SERPL-SCNC: 100 MMOL/L (ref 95–110)
CHOLEST SERPL-MCNC: 245 MG/DL (ref 120–199)
CHOLEST/HDLC SERPL: 4.7 {RATIO} (ref 2–5)
CO2 SERPL-SCNC: 30 MMOL/L (ref 23–29)
CREAT SERPL-MCNC: 0.9 MG/DL (ref 0.5–1.4)
EST. GFR  (NO RACE VARIABLE): >60 ML/MIN/1.73 M^2
ESTIMATED AVG GLUCOSE: 186 MG/DL (ref 68–131)
GLUCOSE SERPL-MCNC: 143 MG/DL (ref 70–110)
HBA1C MFR BLD: 8.1 % (ref 4–5.6)
HDLC SERPL-MCNC: 52 MG/DL (ref 40–75)
HDLC SERPL: 21.2 % (ref 20–50)
LDLC SERPL CALC-MCNC: 171.6 MG/DL (ref 63–159)
NONHDLC SERPL-MCNC: 193 MG/DL
POTASSIUM SERPL-SCNC: 4 MMOL/L (ref 3.5–5.1)
PROT SERPL-MCNC: 7.2 G/DL (ref 6–8.4)
SODIUM SERPL-SCNC: 141 MMOL/L (ref 136–145)
TRIGL SERPL-MCNC: 107 MG/DL (ref 30–150)

## 2023-08-24 PROCEDURE — 83036 HEMOGLOBIN GLYCOSYLATED A1C: CPT | Performed by: NURSE PRACTITIONER

## 2023-08-24 PROCEDURE — 80053 COMPREHEN METABOLIC PANEL: CPT | Performed by: NURSE PRACTITIONER

## 2023-08-24 PROCEDURE — 36415 COLL VENOUS BLD VENIPUNCTURE: CPT | Mod: PN | Performed by: NURSE PRACTITIONER

## 2023-08-24 PROCEDURE — 80061 LIPID PANEL: CPT | Performed by: NURSE PRACTITIONER

## 2023-08-28 ENCOUNTER — OFFICE VISIT (OUTPATIENT)
Dept: FAMILY MEDICINE | Facility: CLINIC | Age: 53
End: 2023-08-28
Payer: COMMERCIAL

## 2023-08-28 VITALS
DIASTOLIC BLOOD PRESSURE: 74 MMHG | WEIGHT: 279.13 LBS | OXYGEN SATURATION: 99 % | SYSTOLIC BLOOD PRESSURE: 150 MMHG | HEART RATE: 92 BPM | HEIGHT: 67 IN | BODY MASS INDEX: 43.81 KG/M2

## 2023-08-28 DIAGNOSIS — Z00.00 WELLNESS EXAMINATION: Primary | ICD-10-CM

## 2023-08-28 DIAGNOSIS — Z79.4 TYPE 2 DIABETES MELLITUS WITH DIABETIC POLYNEUROPATHY, WITH LONG-TERM CURRENT USE OF INSULIN: ICD-10-CM

## 2023-08-28 DIAGNOSIS — E11.42 TYPE 2 DIABETES MELLITUS WITH DIABETIC POLYNEUROPATHY, WITH LONG-TERM CURRENT USE OF INSULIN: ICD-10-CM

## 2023-08-28 DIAGNOSIS — I10 ESSENTIAL HYPERTENSION: ICD-10-CM

## 2023-08-28 PROCEDURE — 99999 PR PBB SHADOW E&M-EST. PATIENT-LVL III: ICD-10-PCS | Mod: PBBFAC,,, | Performed by: FAMILY MEDICINE

## 2023-08-28 PROCEDURE — 3052F PR MOST RECENT HEMOGLOBIN A1C LEVEL 8.0 - < 9.0%: ICD-10-PCS | Mod: CPTII,S$GLB,, | Performed by: FAMILY MEDICINE

## 2023-08-28 PROCEDURE — 3066F NEPHROPATHY DOC TX: CPT | Mod: CPTII,S$GLB,, | Performed by: FAMILY MEDICINE

## 2023-08-28 PROCEDURE — 3078F DIAST BP <80 MM HG: CPT | Mod: CPTII,S$GLB,, | Performed by: FAMILY MEDICINE

## 2023-08-28 PROCEDURE — 99999 PR PBB SHADOW E&M-EST. PATIENT-LVL III: CPT | Mod: PBBFAC,,, | Performed by: FAMILY MEDICINE

## 2023-08-28 PROCEDURE — 3077F SYST BP >= 140 MM HG: CPT | Mod: CPTII,S$GLB,, | Performed by: FAMILY MEDICINE

## 2023-08-28 PROCEDURE — 3052F HG A1C>EQUAL 8.0%<EQUAL 9.0%: CPT | Mod: CPTII,S$GLB,, | Performed by: FAMILY MEDICINE

## 2023-08-28 PROCEDURE — 3078F PR MOST RECENT DIASTOLIC BLOOD PRESSURE < 80 MM HG: ICD-10-PCS | Mod: CPTII,S$GLB,, | Performed by: FAMILY MEDICINE

## 2023-08-28 PROCEDURE — 4010F ACE/ARB THERAPY RXD/TAKEN: CPT | Mod: CPTII,S$GLB,, | Performed by: FAMILY MEDICINE

## 2023-08-28 PROCEDURE — 4010F PR ACE/ARB THEARPY RXD/TAKEN: ICD-10-PCS | Mod: CPTII,S$GLB,, | Performed by: FAMILY MEDICINE

## 2023-08-28 PROCEDURE — 3061F NEG MICROALBUMINURIA REV: CPT | Mod: CPTII,S$GLB,, | Performed by: FAMILY MEDICINE

## 2023-08-28 PROCEDURE — 3008F BODY MASS INDEX DOCD: CPT | Mod: CPTII,S$GLB,, | Performed by: FAMILY MEDICINE

## 2023-08-28 PROCEDURE — 3077F PR MOST RECENT SYSTOLIC BLOOD PRESSURE >= 140 MM HG: ICD-10-PCS | Mod: CPTII,S$GLB,, | Performed by: FAMILY MEDICINE

## 2023-08-28 PROCEDURE — 3066F PR DOCUMENTATION OF TREATMENT FOR NEPHROPATHY: ICD-10-PCS | Mod: CPTII,S$GLB,, | Performed by: FAMILY MEDICINE

## 2023-08-28 PROCEDURE — 99396 PR PREVENTIVE VISIT,EST,40-64: ICD-10-PCS | Mod: S$GLB,,, | Performed by: FAMILY MEDICINE

## 2023-08-28 PROCEDURE — 3008F PR BODY MASS INDEX (BMI) DOCUMENTED: ICD-10-PCS | Mod: CPTII,S$GLB,, | Performed by: FAMILY MEDICINE

## 2023-08-28 PROCEDURE — 99396 PREV VISIT EST AGE 40-64: CPT | Mod: S$GLB,,, | Performed by: FAMILY MEDICINE

## 2023-08-28 PROCEDURE — 3061F PR NEG MICROALBUMINURIA RESULT DOCUMENTED/REVIEW: ICD-10-PCS | Mod: CPTII,S$GLB,, | Performed by: FAMILY MEDICINE

## 2023-08-28 RX ORDER — TIRZEPATIDE 2.5 MG/.5ML
2.5 INJECTION, SOLUTION SUBCUTANEOUS
Qty: 4 PEN | Refills: 0 | Status: SHIPPED | OUTPATIENT
Start: 2023-08-28 | End: 2023-11-20

## 2023-08-28 NOTE — PROGRESS NOTES
THIS DOCUMENT WAS MADE IN PART WITH VOICE RECOGNITION SOFTWARE.  OCCASIONALLY THIS SOFTWARE WILL MISINTERPRET WORDS OR PHRASES.    Assessment and Plan:    1. Type 2 diabetes mellitus with diabetic polyneuropathy, with long-term current use of insulin  tirzepatide (MOUNJARO) 2.5 mg/0.5 mL PnIj          PLAN    New medication in place of Victoza  Maintain follow-up with Endocrinology    patient on compliance with medicine, diet    Maintain follow-up with me in 3 months    ______________________________________________________________________  Subjective:    Chief Complaint:  Chief Complaint   Patient presents with    Follow-up     1 month f/u         HPI:  Liza is a 52 y.o. year old     Dm2 :   Not consistent with diet / meds  Prev a1c 8.1%   Taking care of mom with cancer   Has upcoming appointment with Endocrinology     Essential hypertension follow-up   Noncompliant with medications   For got to take then this morning   Not checking blood pressures at home        Depression / Anxiety  Rx : Xanax 1 mg TID     Insomnia   Rx: Ambien 10 mg     Allergic rhinitis  No current medications      Moderate persistent asthma + nicotine dependence-smoking  rx :  Albuterol, fluticasone-salmeterol, promethazine-codeine cough syrup, benzonatate  Prescribed codeine cough syrup by nurse practitioner in [place] quite routinely  Still smoking     Essential hypertension  Rx-amlodipine 10 mg + Losartan 100  Home BP : 130/80     Binge Eating  rx : Adderall 30 mg BID  Prescriber : Alissa Bowen NP     Dyslipidemia  Rx- lipitor 80 mg      Migraine headaches  Abortive-sumatriptan 100 mg as needed  HA occur about every 3 months      Type 2 diabetes mellitus complicated by diabetic polyneuropathy  rx : Victoza 1.8 mg daily, Lantus 28u daily, Novolog 18u TID with meals, Jardiance 25 mg   Previous A1c-8.1 percent     Chronic low back pain  Rx-hydrocodone 7.5 mg b.i.d., chlorzoxazone 500 mg QID prn  Prescribed by previous primary care  physician     Overactive bladder + Stress incont.   Rx-oxybutynin 5 mg b.i.d.  Medication working well    Past Medical History:  Past Medical History:   Diagnosis Date    Arthritis     Asthma 2012    Blood transfusion     Depression 2012    Diabetes 2012    HTN (hypertension) 2012    LBP (low back pain) 2012    Obesity 2013    Tobacco abuse 7/10/2013       Past Surgical History:  Past Surgical History:   Procedure Laterality Date     SECTION, LOW TRANSVERSE      COLONOSCOPY N/A 2023    Procedure: COLONOSCOPY;  Surgeon: Reggie Posey MD;  Location: [facility] ENDO;  Service: Endoscopy;  Laterality: N/A;    GANGLION CYST EXCISION Right 2020    Dr. Logan       Family History:  Family History   Problem Relation Age of Onset    Breast cancer Maternal Aunt     Depression Maternal Grandmother         bipolar had to be hospitalized    Allergic rhinitis Neg Hx     Allergies Neg Hx     Angioedema Neg Hx     Asthma Neg Hx     Atopy Neg Hx     Eczema Neg Hx     Immunodeficiency Neg Hx     Rhinitis Neg Hx     Urticaria Neg Hx        Social History:  Social History     Socioeconomic History    Marital status: Single   Tobacco Use    Smoking status: Some Days     Current packs/day: 0.00     Average packs/day: 0.5 packs/day for 30.0 years (15.0 ttl pk-yrs)     Types: Cigarettes     Start date: 1985     Last attempt to quit: 2015     Years since quittin.6    Smokeless tobacco: Never   Substance and Sexual Activity    Alcohol use: Yes     Alcohol/week: 12.0 standard drinks of alcohol     Types: 12 Cans of beer per week    Drug use: No    Sexual activity: Yes     Partners: Male     Birth control/protection: None       Medications:  Current Outpatient Medications on File Prior to Visit   Medication Sig Dispense Refill    albuterol (ACCUNEB) 0.63 mg/3 mL Nebu Take 3 mLs (0.63 mg total) by nebulization every 6 (six) hours as needed (wheezing). 180 mL 3    albuterol  (PROVENTIL/VENTOLIN HFA) 90 mcg/actuation inhaler INHALE 2 PUFFS INTO THE LUNGS EVERY 6 HOURS AS NEEDED 25.5 g 3    ALPRAZolam (XANAX) 1 MG tablet Take 1 mg by mouth 2 (two) times daily as needed.       amLODIPine (NORVASC) 10 MG tablet Take 1 tablet (10 mg total) by mouth 2 (two) times daily. (Patient taking differently: Take 10 mg by mouth 2 (two) times daily.)      atorvastatin (LIPITOR) 80 MG tablet Take 1 tablet (80 mg total) by mouth every evening. 90 tablet 3    blood sugar diagnostic Strp To check BG 4 times daily, to use with insurance preferred meter. Dx code E11.42 150 each 6    dextroamphetamine-amphetamine 30 mg Tab Take 30 mg by mouth 2 (two) times a day.       diazePAM (VALIUM) 10 MG Tab TAKE 1 TABLET BY MOUTH 30 MINUTES PRIOR TO PROCEDURE      empagliflozin (JARDIANCE) 25 mg tablet Take 1 tablet (25 mg total) by mouth once daily. 30 tablet 6    flash glucose scanning reader (FREESTYLE BAKARI 2 READER) [facility] [place] continuous glucose monitoring. 1 each 0    fluticasone-umeclidin-vilanter (TRELEGY ELLIPTA) 200-62.5-25 mcg inhaler Inhale 1 puff into the lungs once daily. 60 each 11    FREESTYLE BAKARI 2 SENSOR Kit USE FOR CONTINUOUS GLUCOSE MONITORING. CHANGE EVERY 14 DAYS 2 kit 11    HYDROcodone-acetaminophen (NORCO) 7.5-325 mg per tablet Take 1 tablet by mouth every 12 (twelve) hours as needed for Pain. 60 tablet 0    hydrocortisone (ANUSOL-HC) 25 mg suppository Place 1 suppository (25 mg total) rectally 2 (two) times daily as needed for Hemorrhoids. 24 suppository 2    insulin (LANTUS SOLOSTAR U-100 INSULIN) glargine 100 units/mL SubQ pen Inject 28 Units into the skin every evening. 15 mL 6    insulin lispro (HUMALOG KWIKPEN INSULIN) 100 unit/mL pen Inject 18 Units into the skin 3 (three) times daily before meals. Plus correction scale, max TDD 84u 30 mL 11    lancets Misc To check BG 4 times daily, to use with insurance preferred meter. Dx code E11.42 150 each 6    losartan (COZAAR) 100 MG tablet Take  "100 mg by mouth.      pen needle, diabetic (TRUEPLUS PEN NEEDLE) 32 gauge x 5/32" Ndle USE 5 TIMES DAILY WITH INSULIN AND VICTOZA. 150 each 6    promethazine-codeine 6.25-10 mg/5 ml (PHENERGAN WITH CODEINE) 6.25-10 mg/5 mL syrup TAKE 5 ML BY MOUTH TWICE A DAY      VICTOZA 3-AYALA 0.6 mg/0.1 mL (18 mg/3 mL) PnIj pen Inject 1.8 mg into the skin. 27 mL 3    zolpidem (AMBIEN) 10 mg Tab 10 mg nightly as needed.   1    blood-glucose meter kit To check BG 4 times daily, to use with insurance preferred meter. Dx code E11.42 1 each 0    chlorzoxazone (PARAFON FORTE) 500 mg Tab Take 1 tablet (500 mg total) by mouth 4 (four) times daily as needed (muscle spasm). (Patient not taking: Reported on 8/4/2023) 180 tablet 3    hydroCHLOROthiazide (HYDRODIURIL) 12.5 MG Tab Take 1 tablet (12.5 mg total) by mouth once daily. (Patient not taking: Reported on 7/24/2023) 30 tablet 2    oxybutynin (DITROPAN) 5 MG Tab Take 1 tablet (5 mg total) by mouth 2 (two) times a day. (Patient not taking: Reported on 7/24/2023) 180 tablet 0    sulfamethoxazole-trimethoprim 800-160mg (BACTRIM DS) 800-160 mg Tab Take 1 tablet by mouth 2 (two) times daily. (Patient not taking: Reported on 8/4/2023) 20 tablet 0    sumatriptan (IMITREX) 100 MG tablet Take 1 tablet (100 mg total) by mouth once as needed for Migraine. 6 tablet 5     No current facility-administered medications on file prior to visit.       Allergies:  Patient has no known allergies.    Immunizations:  Immunization History   Administered Date(s) Administered    COVID-19, MRNA, LN-S, PF (Pfizer) (Purple Cap) 04/22/2021, 05/13/2021, 12/11/2021    DTaP 1970, 1970, 01/20/1971, 03/15/1972    Influenza - Quadrivalent 10/03/2020    Influenza - Quadrivalent - PF *Preferred* (6 months and older) 10/31/2018, 11/13/2019, 09/16/2021    MMR 11/17/1977    Measles / Rubella 01/19/1972    OPV 1970, 01/20/1971, 03/15/1972, 03/21/1974, 04/17/1975, 11/17/1977    Pneumococcal Conjugate - 13 Valent " "06/12/2017    Pneumococcal Polysaccharide - 23 Valent 11/13/2019    Td (ADULT) 04/17/1975, 11/17/1977, 07/11/1984, 09/18/2009    Tdap 10/18/2017    Zoster Recombinant 06/16/2021, 09/22/2021       Review of Systems:  Review of Systems   All other systems reviewed and are negative.      Objective:    Vitals:  Vitals:    08/28/23 1130   BP: (!) 150/74   Pulse: 92   SpO2: 99%   Weight: 126.6 kg (279 lb 1.6 oz)   Height: 5' 7" (1.702 m)   PainSc: 0-No pain       Physical Exam  Vitals reviewed.   Constitutional:       General: She is not in acute distress.  HENT:      Head: Normocephalic and atraumatic.   Eyes:      Pupils: Pupils are equal, round, and reactive to light.   Cardiovascular:      Rate and Rhythm: Normal rate and regular rhythm.      Heart sounds: No murmur heard.     No friction rub.   Pulmonary:      Effort: Pulmonary effort is normal.      Breath sounds: Normal breath sounds.   Abdominal:      General: Bowel sounds are normal. There is no distension.      Palpations: Abdomen is soft.      Tenderness: There is no abdominal tenderness.   Musculoskeletal:      Cervical back: Neck supple.   Skin:     General: Skin is warm and dry.      Findings: No rash.   Psychiatric:         Behavior: Behavior normal.           Cody Medrano MD  Family Medicine      "

## 2023-09-07 ENCOUNTER — OFFICE VISIT (OUTPATIENT)
Dept: ENDOCRINOLOGY | Facility: CLINIC | Age: 53
End: 2023-09-07
Payer: COMMERCIAL

## 2023-09-07 VITALS
WEIGHT: 281.5 LBS | HEIGHT: 67 IN | BODY MASS INDEX: 44.18 KG/M2 | SYSTOLIC BLOOD PRESSURE: 126 MMHG | HEART RATE: 104 BPM | DIASTOLIC BLOOD PRESSURE: 70 MMHG

## 2023-09-07 DIAGNOSIS — Z79.4 TYPE 2 DIABETES MELLITUS WITH MICROALBUMINURIA, WITH LONG-TERM CURRENT USE OF INSULIN: Primary | ICD-10-CM

## 2023-09-07 DIAGNOSIS — E66.01 MORBID OBESITY WITH BMI OF 40.0-44.9, ADULT: ICD-10-CM

## 2023-09-07 DIAGNOSIS — R80.9 TYPE 2 DIABETES MELLITUS WITH MICROALBUMINURIA, WITH LONG-TERM CURRENT USE OF INSULIN: Primary | ICD-10-CM

## 2023-09-07 DIAGNOSIS — E11.29 TYPE 2 DIABETES MELLITUS WITH MICROALBUMINURIA, WITH LONG-TERM CURRENT USE OF INSULIN: Primary | ICD-10-CM

## 2023-09-07 DIAGNOSIS — I10 ESSENTIAL HYPERTENSION: ICD-10-CM

## 2023-09-07 DIAGNOSIS — E11.649 HYPOGLYCEMIA ASSOCIATED WITH TYPE 2 DIABETES MELLITUS: ICD-10-CM

## 2023-09-07 DIAGNOSIS — E78.2 MIXED HYPERLIPIDEMIA: ICD-10-CM

## 2023-09-07 PROCEDURE — 3061F PR NEG MICROALBUMINURIA RESULT DOCUMENTED/REVIEW: ICD-10-PCS | Mod: CPTII,S$GLB,, | Performed by: NURSE PRACTITIONER

## 2023-09-07 PROCEDURE — 3066F PR DOCUMENTATION OF TREATMENT FOR NEPHROPATHY: ICD-10-PCS | Mod: CPTII,S$GLB,, | Performed by: NURSE PRACTITIONER

## 2023-09-07 PROCEDURE — 3061F NEG MICROALBUMINURIA REV: CPT | Mod: CPTII,S$GLB,, | Performed by: NURSE PRACTITIONER

## 2023-09-07 PROCEDURE — 3066F NEPHROPATHY DOC TX: CPT | Mod: CPTII,S$GLB,, | Performed by: NURSE PRACTITIONER

## 2023-09-07 PROCEDURE — 1159F PR MEDICATION LIST DOCUMENTED IN MEDICAL RECORD: ICD-10-PCS | Mod: CPTII,S$GLB,, | Performed by: NURSE PRACTITIONER

## 2023-09-07 PROCEDURE — 1159F MED LIST DOCD IN RCRD: CPT | Mod: CPTII,S$GLB,, | Performed by: NURSE PRACTITIONER

## 2023-09-07 PROCEDURE — 3052F PR MOST RECENT HEMOGLOBIN A1C LEVEL 8.0 - < 9.0%: ICD-10-PCS | Mod: CPTII,S$GLB,, | Performed by: NURSE PRACTITIONER

## 2023-09-07 PROCEDURE — 99999 PR PBB SHADOW E&M-EST. PATIENT-LVL III: CPT | Mod: PBBFAC,,, | Performed by: NURSE PRACTITIONER

## 2023-09-07 PROCEDURE — 99214 PR OFFICE/OUTPT VISIT, EST, LEVL IV, 30-39 MIN: ICD-10-PCS | Mod: S$GLB,,, | Performed by: NURSE PRACTITIONER

## 2023-09-07 PROCEDURE — 99214 OFFICE O/P EST MOD 30 MIN: CPT | Mod: S$GLB,,, | Performed by: NURSE PRACTITIONER

## 2023-09-07 PROCEDURE — 4010F PR ACE/ARB THEARPY RXD/TAKEN: ICD-10-PCS | Mod: CPTII,S$GLB,, | Performed by: NURSE PRACTITIONER

## 2023-09-07 PROCEDURE — 95251 PR GLUCOSE MONITOR, 72 HOUR, PHYS INTERP: ICD-10-PCS | Mod: S$GLB,,, | Performed by: NURSE PRACTITIONER

## 2023-09-07 PROCEDURE — 3008F BODY MASS INDEX DOCD: CPT | Mod: CPTII,S$GLB,, | Performed by: NURSE PRACTITIONER

## 2023-09-07 PROCEDURE — 3074F SYST BP LT 130 MM HG: CPT | Mod: CPTII,S$GLB,, | Performed by: NURSE PRACTITIONER

## 2023-09-07 PROCEDURE — 3078F DIAST BP <80 MM HG: CPT | Mod: CPTII,S$GLB,, | Performed by: NURSE PRACTITIONER

## 2023-09-07 PROCEDURE — 4010F ACE/ARB THERAPY RXD/TAKEN: CPT | Mod: CPTII,S$GLB,, | Performed by: NURSE PRACTITIONER

## 2023-09-07 PROCEDURE — 3074F PR MOST RECENT SYSTOLIC BLOOD PRESSURE < 130 MM HG: ICD-10-PCS | Mod: CPTII,S$GLB,, | Performed by: NURSE PRACTITIONER

## 2023-09-07 PROCEDURE — 1160F PR REVIEW ALL MEDS BY PRESCRIBER/CLIN PHARMACIST DOCUMENTED: ICD-10-PCS | Mod: CPTII,S$GLB,, | Performed by: NURSE PRACTITIONER

## 2023-09-07 PROCEDURE — 1160F RVW MEDS BY RX/DR IN RCRD: CPT | Mod: CPTII,S$GLB,, | Performed by: NURSE PRACTITIONER

## 2023-09-07 PROCEDURE — 3078F PR MOST RECENT DIASTOLIC BLOOD PRESSURE < 80 MM HG: ICD-10-PCS | Mod: CPTII,S$GLB,, | Performed by: NURSE PRACTITIONER

## 2023-09-07 PROCEDURE — 99999 PR PBB SHADOW E&M-EST. PATIENT-LVL III: ICD-10-PCS | Mod: PBBFAC,,, | Performed by: NURSE PRACTITIONER

## 2023-09-07 PROCEDURE — 95251 CONT GLUC MNTR ANALYSIS I&R: CPT | Mod: S$GLB,,, | Performed by: NURSE PRACTITIONER

## 2023-09-07 PROCEDURE — 3008F PR BODY MASS INDEX (BMI) DOCUMENTED: ICD-10-PCS | Mod: CPTII,S$GLB,, | Performed by: NURSE PRACTITIONER

## 2023-09-07 PROCEDURE — 3052F HG A1C>EQUAL 8.0%<EQUAL 9.0%: CPT | Mod: CPTII,S$GLB,, | Performed by: NURSE PRACTITIONER

## 2023-09-07 RX ORDER — TIRZEPATIDE 5 MG/.5ML
5 INJECTION, SOLUTION SUBCUTANEOUS
Qty: 4 PEN | Refills: 6 | Status: SHIPPED | OUTPATIENT
Start: 2023-09-07 | End: 2023-11-20

## 2023-09-07 RX ORDER — INSULIN GLARGINE 100 [IU]/ML
30 INJECTION, SOLUTION SUBCUTANEOUS NIGHTLY
Qty: 15 ML | Refills: 6 | Status: SHIPPED | OUTPATIENT
Start: 2023-09-07 | End: 2023-11-20 | Stop reason: SDUPTHER

## 2023-09-07 RX ORDER — HYDROCHLOROTHIAZIDE 12.5 MG/1
12.5 TABLET ORAL
COMMUNITY
Start: 2023-09-05

## 2023-09-07 RX ORDER — INSULIN LISPRO 100 [IU]/ML
14 INJECTION, SOLUTION INTRAVENOUS; SUBCUTANEOUS
Qty: 30 ML | Refills: 11
Start: 2023-09-07

## 2023-09-07 NOTE — PROGRESS NOTES
"CC: Ms. Liza Mesa arrives today for management of Type 2 DM and review of chronic medical conditions, as listed in the Visit Diagnosis section of this encounter.       HPI: Ms. Liza Mesa was diagnosed with Type 2 DM in 2005. She was diagnosed based on lab work. Initial treatment consisted of metformin.  Victoza was later added to her medication regimen in 2016. Insulin added in 2018. + FH of DM on mother. Denies hospitalizations due to DM.     Patient was last seen by me in May. Mounjaro was recommended at that time but she opted to to stay with Victoza.     Last week, her PCP changed Victoza to Mounjaro. She took first dose on Monday.     She states that she is taking 28 units of Humalog but is prescribed 18 units.     Patient is unsure if she is still taking Jardiance.     BG monitoring per Freestyle Vicky 2.    Hypoglycemia: Occasional  Symptoms: jittery, tingling  Treatment: peppermint or jelly beans     Missing Insulin/PO medication doses: denies      Dietary Habits: Eats 2 meal/day. Often skips breakfast. Avoids sugary beverages.      Last DM education appointment:  8/2016    She quit smoking 2 days ago on her birthday, as a "gift to herself."    She states that she had stopped atorvastatin for awhile. She has since resumed since seeing her cholesterol panel results.      CURRENT DIABETIC MEDS: Jardiance 25 mg daily, Mounjaro 2.5 mg weekly, Lantus 30 units QHS, Humalog 28 units with meals (prescribed 18 units) + correction scale, target 150, ISF 25  Glucometer type: Accucheck Adrienne     Previous DM treatments:  Lantus - not covered by insurance   Glimepiride   Acarbose  Metformin XR -- diarrhea  VGo - didn't like wearing device  Ozempic - reports this was ineffective   Victoza    Last Eye Exam: 6/2022, no DR per patient. [facility]   Last Podiatry Exam: 11/2021, Dr. Olvera    REVIEW OF SYSTEMS  Constitutional: no c/o fatigue, weakness, weight loss.   Cardiac: no palpitations or chest " "pain.  Respiratory: + chronic cough, dyspnea, related to COPD. Uses albuterol nebulizer PRN.  GI: no c/o abdominal pain or nausea. Denies h/o pancreatitis.   Skin: no rashes, lesions   Neuro: no numbness, tingling, paresthesias   Endocrine: denies polyphagia, polydipsia, polyuria. + nocturia       Personally reviewed Past Medical, Surgical, Social History.    Vital Signs  /70   Pulse 104   Ht 5' 7" (1.702 m)   Wt 127.7 kg (281 lb 8.4 oz)   LMP 11/21/2019   BMI 44.09 kg/m²     Personally reviewed the below labs:    Hemoglobin A1C   Date Value Ref Range Status   08/24/2023 8.1 (H) 4.0 - 5.6 % Final     Comment:     ADA Screening Guidelines:  5.7-6.4%  Consistent with prediabetes  >or=6.5%  Consistent with diabetes    High levels of fetal hemoglobin interfere with the HbA1C  assay. Heterozygous hemoglobin variants (HbS, HgC, etc)do  not significantly interfere with this assay.   However, presence of multiple variants may affect accuracy.     05/04/2023 8.3 (H) 4.0 - 5.6 % Final     Comment:     ADA Screening Guidelines:  5.7-6.4%  Consistent with prediabetes  >or=6.5%  Consistent with diabetes    High levels of fetal hemoglobin interfere with the HbA1C  assay. Heterozygous hemoglobin variants (HbS, HgC, etc)do  not significantly interfere with this assay.   However, presence of multiple variants may affect accuracy.     02/02/2023 13.4 (H) 4.0 - 5.6 % Final     Comment:     ADA Screening Guidelines:  5.7-6.4%  Consistent with prediabetes  >or=6.5%  Consistent with diabetes    High levels of fetal hemoglobin interfere with the HbA1C  assay. Heterozygous hemoglobin variants (HbS, HgC, etc)do  not significantly interfere with this assay.   However, presence of multiple variants may affect accuracy.     05/03/2012 7.1 (H) 4.8 - 5.9 % Final     Comment:     **In order to standardize %HbA1c results worldwide, as of October 11, 2010,  the %HbA1c is being calculated using the master equation recommended in " the  consensus statement adopted by the ADA (American Diabetes Assoc), EASD  (European Assoc for the Study of Diabetes), IFCC (International Federation  of Clinical Chemistry and Laboratory Medicine) and IDF (International  Diabetes Federation). Result units: %HgbA1c (DCCT/NGSP).  In common with other methods, Hb A1C values may not accurately reflect mean  blood glucose in patients with hemoglobin variants (HgbF, HgbS and HgbC).  Any cause of shortened erythrocyte survival will reduce exposure of  erythrocytes to glucose with a consequent decrease in HbA1c (%) values, even  though the time-averaged blood glucose level may be elevated. Causes of  shortened erythrocyte lifetime might be hemolytic anemia or other hemolytic  diseases, homozygous sickle cell trait, pregnancy, recent significant or  chronic blood loss, etc. Caution should be used when interpreting the HbA1c  results from patients with these conditions.       Chemistry        Component Value Date/Time     08/24/2023 0831    K 4.0 08/24/2023 0831     08/24/2023 0831    CO2 30 (H) 08/24/2023 0831    BUN 13 08/24/2023 0831    CREATININE 0.9 08/24/2023 0831    CREATININE 1.0 05/06/2012 0431     (H) 08/24/2023 0831        Component Value Date/Time    CALCIUM 9.5 08/24/2023 0831    CALCIUM 9.5 05/06/2012 0431    ALKPHOS 76 08/24/2023 0831    ALKPHOS 49 05/03/2012 1635    AST 13 08/24/2023 0831    AST 13 05/03/2012 1635    ALT 14 08/24/2023 0831    BILITOT 0.2 08/24/2023 0831          Lab Results   Component Value Date    CHOL 245 (H) 08/24/2023    CHOL 157 10/27/2022    CHOL 207 (H) 06/30/2022     Lab Results   Component Value Date    HDL 52 08/24/2023    HDL 38 (L) 10/27/2022    HDL 56 06/30/2022     Lab Results   Component Value Date    LDLCALC 171.6 (H) 08/24/2023    LDLCALC 95.0 10/27/2022    LDLCALC 127.2 06/30/2022     Lab Results   Component Value Date    TRIG 107 08/24/2023    TRIG 120 10/27/2022    TRIG 119 06/30/2022     Lab Results  "  Component Value Date    CHOLHDL 21.2 08/24/2023    CHOLHDL 24.2 10/27/2022    CHOLHDL 27.1 06/30/2022       Lab Results   Component Value Date    MICALBCREAT 4.8 05/04/2023     Lab Results   Component Value Date    TSH 0.806 02/02/2023       CrCl cannot be calculated (Patient's most recent lab result is older than the maximum 7 days allowed.).    No results found for: "[id number]"         PHYSICAL EXAMINATION  Constitutional: Appears well, no distress.  Respiratory: CTA, even and unlabored.   Cardiovascular: RRR, no murmurs.  GI: active bowel sounds, no hernia  Skin: warm and dry  Neuro: oriented to person, place, time  Feet: appropriate footwear.         FREESTYLE BAKARI 2 DOWNLOAD: See media file for download. Fasting glucoses are acceptable. Hypoglycemia noted, mostly between meals. Occasional prandial excursions.   Average glucose: 152 mg/dL  Above 250 mg/dL: 3 %  181-250 mg/dL: 24 %   mg/dL: 66 %  54-69 mg/dL: 6 %  Below 54 mg/dL: 1 %           Goals        HEMOGLOBIN A1C < 7               Assessment/Plan  1. Type 2 diabetes mellitus with microalbuminuria, with long-term current use of insulin  -- Uncontrolled. Finally agreed to try Mounjaro. I expect this will provide more benefits than the Victoza. Goal will be to increase Mounjaro as tolerated and hopefully decrease insulin doses, especially if this produces weight loss.   -- continue Mounjaro 2.5 mg weekly and increase to 5 mg weekly after 4 weeks. Rx sent   -- continue Lantus 30 units QHS.  -- decrease Humalog to 14 units AC + correction scale  -- resume Jardiance 25 mg daily (unsure if taking)   -- schedule eye exam. Plans to go to [facility].    -- Discussed diagnosis of DM, A1c goals, progression of disease, long term complications and tx options.  Advised patient to check BG before activities, such as driving or exercise.  -- Reviewed hypoglycemia management: treat with 1/2 glass of juice, 1/2 can regular coke, or 4 glucose tablets. Monitor and " repeat treatment every 15 minutes until BG is >70 Then have a snack, which includes a complex carbohydrate and protein.   2. Essential hypertension  -- controlled.   -- continue current meds   3. Mixed hyperlipidemia  -- uncontrolled  -- has resumed atorvastatin   -- lipid panel with RTC   4. Morbid obesity  -- uncontrolled  -- worsening insulin resistance   Body mass index is 44.09 kg/m².   5. Hypoglycemia associated with type 2 diabetes mellitus -- likely due to high prandial insulin dose  -- se A/P #1       FOLLOW UP  Follow up in about 3 months (around 12/7/2023).   Patient instructed to bring BG logs to each follow up   Patient encouraged to call for any BG/medication issues, concerns, or questions.      Orders Placed This Encounter   Procedures    Hemoglobin A1C    Lipid Panel    Comprehensive Metabolic Panel

## 2023-09-07 NOTE — PATIENT INSTRUCTIONS
Reduce Humalog to 14 units before your meals.     Continue Lantus 30 units nightly.     Increase Mounjaro to 5 mg after 4 weeks.     Resume Jardiance 25 mg every morning.

## 2023-11-15 LAB
LEFT EYE DM RETINOPATHY: NEGATIVE
RIGHT EYE DM RETINOPATHY: NEGATIVE

## 2023-11-20 ENCOUNTER — OFFICE VISIT (OUTPATIENT)
Dept: FAMILY MEDICINE | Facility: CLINIC | Age: 53
End: 2023-11-20
Payer: COMMERCIAL

## 2023-11-20 VITALS
BODY MASS INDEX: 45.43 KG/M2 | WEIGHT: 289.44 LBS | HEIGHT: 67 IN | HEART RATE: 100 BPM | SYSTOLIC BLOOD PRESSURE: 136 MMHG | DIASTOLIC BLOOD PRESSURE: 74 MMHG | OXYGEN SATURATION: 98 %

## 2023-11-20 DIAGNOSIS — Z23 IMMUNIZATION DUE: ICD-10-CM

## 2023-11-20 DIAGNOSIS — Z12.39 ENCOUNTER FOR SCREENING FOR MALIGNANT NEOPLASM OF BREAST, UNSPECIFIED SCREENING MODALITY: ICD-10-CM

## 2023-11-20 DIAGNOSIS — R80.9 TYPE 2 DIABETES MELLITUS WITH MICROALBUMINURIA, WITH LONG-TERM CURRENT USE OF INSULIN: ICD-10-CM

## 2023-11-20 DIAGNOSIS — E11.42 TYPE 2 DIABETES MELLITUS WITH DIABETIC POLYNEUROPATHY, WITH LONG-TERM CURRENT USE OF INSULIN: Primary | ICD-10-CM

## 2023-11-20 DIAGNOSIS — I20.89 ANGINAL EQUIVALENT: ICD-10-CM

## 2023-11-20 DIAGNOSIS — L08.9 RECURRENT INFECTION OF SKIN: ICD-10-CM

## 2023-11-20 DIAGNOSIS — Z12.4 CERVICAL CANCER SCREENING: ICD-10-CM

## 2023-11-20 DIAGNOSIS — Z79.4 TYPE 2 DIABETES MELLITUS WITH DIABETIC POLYNEUROPATHY, WITH LONG-TERM CURRENT USE OF INSULIN: Primary | ICD-10-CM

## 2023-11-20 DIAGNOSIS — R06.02 EXERTIONAL SHORTNESS OF BREATH: ICD-10-CM

## 2023-11-20 DIAGNOSIS — E11.29 TYPE 2 DIABETES MELLITUS WITH MICROALBUMINURIA, WITH LONG-TERM CURRENT USE OF INSULIN: ICD-10-CM

## 2023-11-20 DIAGNOSIS — Z79.4 TYPE 2 DIABETES MELLITUS WITH MICROALBUMINURIA, WITH LONG-TERM CURRENT USE OF INSULIN: ICD-10-CM

## 2023-11-20 PROCEDURE — 99999 PR PBB SHADOW E&M-EST. PATIENT-LVL V: ICD-10-PCS | Mod: PBBFAC,,, | Performed by: FAMILY MEDICINE

## 2023-11-20 PROCEDURE — 3061F PR NEG MICROALBUMINURIA RESULT DOCUMENTED/REVIEW: ICD-10-PCS | Mod: CPTII,S$GLB,, | Performed by: FAMILY MEDICINE

## 2023-11-20 PROCEDURE — 3066F PR DOCUMENTATION OF TREATMENT FOR NEPHROPATHY: ICD-10-PCS | Mod: CPTII,S$GLB,, | Performed by: FAMILY MEDICINE

## 2023-11-20 PROCEDURE — 99214 OFFICE O/P EST MOD 30 MIN: CPT | Mod: S$GLB,,, | Performed by: FAMILY MEDICINE

## 2023-11-20 PROCEDURE — 3052F HG A1C>EQUAL 8.0%<EQUAL 9.0%: CPT | Mod: CPTII,S$GLB,, | Performed by: FAMILY MEDICINE

## 2023-11-20 PROCEDURE — 3075F SYST BP GE 130 - 139MM HG: CPT | Mod: CPTII,S$GLB,, | Performed by: FAMILY MEDICINE

## 2023-11-20 PROCEDURE — 99999 PR PBB SHADOW E&M-EST. PATIENT-LVL V: CPT | Mod: PBBFAC,,, | Performed by: FAMILY MEDICINE

## 2023-11-20 PROCEDURE — 3075F PR MOST RECENT SYSTOLIC BLOOD PRESS GE 130-139MM HG: ICD-10-PCS | Mod: CPTII,S$GLB,, | Performed by: FAMILY MEDICINE

## 2023-11-20 PROCEDURE — 93005 ELECTROCARDIOGRAM TRACING: CPT | Mod: S$GLB,,, | Performed by: FAMILY MEDICINE

## 2023-11-20 PROCEDURE — 3061F NEG MICROALBUMINURIA REV: CPT | Mod: CPTII,S$GLB,, | Performed by: FAMILY MEDICINE

## 2023-11-20 PROCEDURE — 3066F NEPHROPATHY DOC TX: CPT | Mod: CPTII,S$GLB,, | Performed by: FAMILY MEDICINE

## 2023-11-20 PROCEDURE — 3052F PR MOST RECENT HEMOGLOBIN A1C LEVEL 8.0 - < 9.0%: ICD-10-PCS | Mod: CPTII,S$GLB,, | Performed by: FAMILY MEDICINE

## 2023-11-20 PROCEDURE — 1160F RVW MEDS BY RX/DR IN RCRD: CPT | Mod: CPTII,S$GLB,, | Performed by: FAMILY MEDICINE

## 2023-11-20 PROCEDURE — 93010 EKG 12-LEAD: ICD-10-PCS | Mod: S$GLB,,, | Performed by: INTERNAL MEDICINE

## 2023-11-20 PROCEDURE — 3078F PR MOST RECENT DIASTOLIC BLOOD PRESSURE < 80 MM HG: ICD-10-PCS | Mod: CPTII,S$GLB,, | Performed by: FAMILY MEDICINE

## 2023-11-20 PROCEDURE — 1159F PR MEDICATION LIST DOCUMENTED IN MEDICAL RECORD: ICD-10-PCS | Mod: CPTII,S$GLB,, | Performed by: FAMILY MEDICINE

## 2023-11-20 PROCEDURE — 1160F PR REVIEW ALL MEDS BY PRESCRIBER/CLIN PHARMACIST DOCUMENTED: ICD-10-PCS | Mod: CPTII,S$GLB,, | Performed by: FAMILY MEDICINE

## 2023-11-20 PROCEDURE — 93005 EKG 12-LEAD: ICD-10-PCS | Mod: S$GLB,,, | Performed by: FAMILY MEDICINE

## 2023-11-20 PROCEDURE — 3008F BODY MASS INDEX DOCD: CPT | Mod: CPTII,S$GLB,, | Performed by: FAMILY MEDICINE

## 2023-11-20 PROCEDURE — 99214 PR OFFICE/OUTPT VISIT, EST, LEVL IV, 30-39 MIN: ICD-10-PCS | Mod: S$GLB,,, | Performed by: FAMILY MEDICINE

## 2023-11-20 PROCEDURE — 3078F DIAST BP <80 MM HG: CPT | Mod: CPTII,S$GLB,, | Performed by: FAMILY MEDICINE

## 2023-11-20 PROCEDURE — 93010 ELECTROCARDIOGRAM REPORT: CPT | Mod: S$GLB,,, | Performed by: INTERNAL MEDICINE

## 2023-11-20 PROCEDURE — 3008F PR BODY MASS INDEX (BMI) DOCUMENTED: ICD-10-PCS | Mod: CPTII,S$GLB,, | Performed by: FAMILY MEDICINE

## 2023-11-20 PROCEDURE — 4010F ACE/ARB THERAPY RXD/TAKEN: CPT | Mod: CPTII,S$GLB,, | Performed by: FAMILY MEDICINE

## 2023-11-20 PROCEDURE — 1159F MED LIST DOCD IN RCRD: CPT | Mod: CPTII,S$GLB,, | Performed by: FAMILY MEDICINE

## 2023-11-20 PROCEDURE — 4010F PR ACE/ARB THEARPY RXD/TAKEN: ICD-10-PCS | Mod: CPTII,S$GLB,, | Performed by: FAMILY MEDICINE

## 2023-11-20 RX ORDER — INSULIN GLARGINE 100 [IU]/ML
26 INJECTION, SOLUTION SUBCUTANEOUS NIGHTLY
Qty: 15 ML | Refills: 6
Start: 2023-11-20 | End: 2023-12-12 | Stop reason: SDUPTHER

## 2023-11-20 RX ORDER — FLASH GLUCOSE SCANNING READER
EACH MISCELLANEOUS
Qty: 1 EACH | Refills: 0 | Status: SHIPPED | OUTPATIENT
Start: 2023-11-20

## 2023-11-20 RX ORDER — MUPIROCIN 20 MG/G
OINTMENT TOPICAL 2 TIMES DAILY
Qty: 15 G | Refills: 2 | Status: SHIPPED | OUTPATIENT
Start: 2023-11-20

## 2023-12-01 ENCOUNTER — TELEPHONE (OUTPATIENT)
Dept: FAMILY MEDICINE | Facility: CLINIC | Age: 53
End: 2023-12-01
Payer: COMMERCIAL

## 2023-12-01 NOTE — TELEPHONE ENCOUNTER
Please advise     Pt stated that she had an well care visit on 08/28 and her insurance is denying it due to the visit not being documented correctly. Please call pt to advise

## 2023-12-01 NOTE — TELEPHONE ENCOUNTER
----- Message from Sal Shore sent at 12/1/2023  8:59 AM CST -----  Regarding: advise  Contact: Patient  Type: Needs Medical Advice  Who Called:  Patient   Symptoms (please be specific):    How long has patient had these symptoms:    Pharmacy name and phone #:    Best Call Back Number: [phone]  Additional Information: Pt stated that she had an well care visit on 08/28 and her insurance is denying it due to the visit not being documented correctly. Please call pt to advise

## 2023-12-05 ENCOUNTER — LAB VISIT (OUTPATIENT)
Dept: LAB | Facility: HOSPITAL | Age: 53
End: 2023-12-05
Attending: NURSE PRACTITIONER
Payer: COMMERCIAL

## 2023-12-05 DIAGNOSIS — R80.9 TYPE 2 DIABETES MELLITUS WITH MICROALBUMINURIA, WITH LONG-TERM CURRENT USE OF INSULIN: ICD-10-CM

## 2023-12-05 DIAGNOSIS — Z79.4 TYPE 2 DIABETES MELLITUS WITH MICROALBUMINURIA, WITH LONG-TERM CURRENT USE OF INSULIN: ICD-10-CM

## 2023-12-05 DIAGNOSIS — E11.29 TYPE 2 DIABETES MELLITUS WITH MICROALBUMINURIA, WITH LONG-TERM CURRENT USE OF INSULIN: ICD-10-CM

## 2023-12-05 LAB
ALBUMIN SERPL BCP-MCNC: 3.6 G/DL (ref 3.5–5.2)
ALP SERPL-CCNC: 75 U/L (ref 55–135)
ALT SERPL W/O P-5'-P-CCNC: 14 U/L (ref 10–44)
ANION GAP SERPL CALC-SCNC: 12 MMOL/L (ref 8–16)
AST SERPL-CCNC: 15 U/L (ref 10–40)
BILIRUB SERPL-MCNC: 0.4 MG/DL (ref 0.1–1)
BUN SERPL-MCNC: 16 MG/DL (ref 6–20)
CALCIUM SERPL-MCNC: 9.5 MG/DL (ref 8.7–10.5)
CHLORIDE SERPL-SCNC: 103 MMOL/L (ref 95–110)
CHOLEST SERPL-MCNC: 162 MG/DL (ref 120–199)
CHOLEST/HDLC SERPL: 3.7 {RATIO} (ref 2–5)
CO2 SERPL-SCNC: 28 MMOL/L (ref 23–29)
CREAT SERPL-MCNC: 0.9 MG/DL (ref 0.5–1.4)
EST. GFR  (NO RACE VARIABLE): >60 ML/MIN/1.73 M^2
ESTIMATED AVG GLUCOSE: 171 MG/DL (ref 68–131)
GLUCOSE SERPL-MCNC: 132 MG/DL (ref 70–110)
HBA1C MFR BLD: 7.6 % (ref 4–5.6)
HDLC SERPL-MCNC: 44 MG/DL (ref 40–75)
HDLC SERPL: 27.2 % (ref 20–50)
LDLC SERPL CALC-MCNC: 98 MG/DL (ref 63–159)
NONHDLC SERPL-MCNC: 118 MG/DL
POTASSIUM SERPL-SCNC: 4.4 MMOL/L (ref 3.5–5.1)
PROT SERPL-MCNC: 7.7 G/DL (ref 6–8.4)
SODIUM SERPL-SCNC: 143 MMOL/L (ref 136–145)
TRIGL SERPL-MCNC: 100 MG/DL (ref 30–150)

## 2023-12-05 PROCEDURE — 36415 COLL VENOUS BLD VENIPUNCTURE: CPT | Mod: PN | Performed by: NURSE PRACTITIONER

## 2023-12-05 PROCEDURE — 80053 COMPREHEN METABOLIC PANEL: CPT | Performed by: NURSE PRACTITIONER

## 2023-12-05 PROCEDURE — 80061 LIPID PANEL: CPT | Performed by: NURSE PRACTITIONER

## 2023-12-05 PROCEDURE — 83036 HEMOGLOBIN GLYCOSYLATED A1C: CPT | Performed by: NURSE PRACTITIONER

## 2023-12-12 ENCOUNTER — OFFICE VISIT (OUTPATIENT)
Dept: ENDOCRINOLOGY | Facility: CLINIC | Age: 53
End: 2023-12-12
Payer: COMMERCIAL

## 2023-12-12 VITALS
DIASTOLIC BLOOD PRESSURE: 80 MMHG | HEIGHT: 67 IN | HEART RATE: 98 BPM | BODY MASS INDEX: 45.78 KG/M2 | SYSTOLIC BLOOD PRESSURE: 110 MMHG | WEIGHT: 291.69 LBS

## 2023-12-12 DIAGNOSIS — Z79.4 TYPE 2 DIABETES MELLITUS WITH MICROALBUMINURIA, WITH LONG-TERM CURRENT USE OF INSULIN: Primary | ICD-10-CM

## 2023-12-12 DIAGNOSIS — I10 ESSENTIAL HYPERTENSION: ICD-10-CM

## 2023-12-12 DIAGNOSIS — E11.649 HYPOGLYCEMIA ASSOCIATED WITH TYPE 2 DIABETES MELLITUS: ICD-10-CM

## 2023-12-12 DIAGNOSIS — E11.29 TYPE 2 DIABETES MELLITUS WITH MICROALBUMINURIA, WITH LONG-TERM CURRENT USE OF INSULIN: Primary | ICD-10-CM

## 2023-12-12 DIAGNOSIS — R80.9 TYPE 2 DIABETES MELLITUS WITH MICROALBUMINURIA, WITH LONG-TERM CURRENT USE OF INSULIN: Primary | ICD-10-CM

## 2023-12-12 DIAGNOSIS — E78.2 MIXED HYPERLIPIDEMIA: ICD-10-CM

## 2023-12-12 DIAGNOSIS — E66.01 MORBID OBESITY WITH BMI OF 40.0-44.9, ADULT: ICD-10-CM

## 2023-12-12 PROCEDURE — 3051F PR MOST RECENT HEMOGLOBIN A1C LEVEL 7.0 - < 8.0%: ICD-10-PCS | Mod: CPTII,S$GLB,, | Performed by: NURSE PRACTITIONER

## 2023-12-12 PROCEDURE — 3079F DIAST BP 80-89 MM HG: CPT | Mod: CPTII,S$GLB,, | Performed by: NURSE PRACTITIONER

## 2023-12-12 PROCEDURE — 3008F PR BODY MASS INDEX (BMI) DOCUMENTED: ICD-10-PCS | Mod: CPTII,S$GLB,, | Performed by: NURSE PRACTITIONER

## 2023-12-12 PROCEDURE — 4010F ACE/ARB THERAPY RXD/TAKEN: CPT | Mod: CPTII,S$GLB,, | Performed by: NURSE PRACTITIONER

## 2023-12-12 PROCEDURE — 95251 PR GLUCOSE MONITOR, 72 HOUR, PHYS INTERP: ICD-10-PCS | Mod: S$GLB,,, | Performed by: NURSE PRACTITIONER

## 2023-12-12 PROCEDURE — 3061F PR NEG MICROALBUMINURIA RESULT DOCUMENTED/REVIEW: ICD-10-PCS | Mod: CPTII,S$GLB,, | Performed by: NURSE PRACTITIONER

## 2023-12-12 PROCEDURE — 3008F BODY MASS INDEX DOCD: CPT | Mod: CPTII,S$GLB,, | Performed by: NURSE PRACTITIONER

## 2023-12-12 PROCEDURE — 3066F PR DOCUMENTATION OF TREATMENT FOR NEPHROPATHY: ICD-10-PCS | Mod: CPTII,S$GLB,, | Performed by: NURSE PRACTITIONER

## 2023-12-12 PROCEDURE — 1160F PR REVIEW ALL MEDS BY PRESCRIBER/CLIN PHARMACIST DOCUMENTED: ICD-10-PCS | Mod: CPTII,S$GLB,, | Performed by: NURSE PRACTITIONER

## 2023-12-12 PROCEDURE — 99999 PR PBB SHADOW E&M-EST. PATIENT-LVL V: CPT | Mod: PBBFAC,,, | Performed by: NURSE PRACTITIONER

## 2023-12-12 PROCEDURE — 99214 OFFICE O/P EST MOD 30 MIN: CPT | Mod: S$GLB,,, | Performed by: NURSE PRACTITIONER

## 2023-12-12 PROCEDURE — 1159F MED LIST DOCD IN RCRD: CPT | Mod: CPTII,S$GLB,, | Performed by: NURSE PRACTITIONER

## 2023-12-12 PROCEDURE — 4010F PR ACE/ARB THEARPY RXD/TAKEN: ICD-10-PCS | Mod: CPTII,S$GLB,, | Performed by: NURSE PRACTITIONER

## 2023-12-12 PROCEDURE — 1160F RVW MEDS BY RX/DR IN RCRD: CPT | Mod: CPTII,S$GLB,, | Performed by: NURSE PRACTITIONER

## 2023-12-12 PROCEDURE — 1159F PR MEDICATION LIST DOCUMENTED IN MEDICAL RECORD: ICD-10-PCS | Mod: CPTII,S$GLB,, | Performed by: NURSE PRACTITIONER

## 2023-12-12 PROCEDURE — 3079F PR MOST RECENT DIASTOLIC BLOOD PRESSURE 80-89 MM HG: ICD-10-PCS | Mod: CPTII,S$GLB,, | Performed by: NURSE PRACTITIONER

## 2023-12-12 PROCEDURE — 99999 PR PBB SHADOW E&M-EST. PATIENT-LVL V: ICD-10-PCS | Mod: PBBFAC,,, | Performed by: NURSE PRACTITIONER

## 2023-12-12 PROCEDURE — 3051F HG A1C>EQUAL 7.0%<8.0%: CPT | Mod: CPTII,S$GLB,, | Performed by: NURSE PRACTITIONER

## 2023-12-12 PROCEDURE — 95251 CONT GLUC MNTR ANALYSIS I&R: CPT | Mod: S$GLB,,, | Performed by: NURSE PRACTITIONER

## 2023-12-12 PROCEDURE — 3074F PR MOST RECENT SYSTOLIC BLOOD PRESSURE < 130 MM HG: ICD-10-PCS | Mod: CPTII,S$GLB,, | Performed by: NURSE PRACTITIONER

## 2023-12-12 PROCEDURE — 3074F SYST BP LT 130 MM HG: CPT | Mod: CPTII,S$GLB,, | Performed by: NURSE PRACTITIONER

## 2023-12-12 PROCEDURE — 3061F NEG MICROALBUMINURIA REV: CPT | Mod: CPTII,S$GLB,, | Performed by: NURSE PRACTITIONER

## 2023-12-12 PROCEDURE — 99214 PR OFFICE/OUTPT VISIT, EST, LEVL IV, 30-39 MIN: ICD-10-PCS | Mod: S$GLB,,, | Performed by: NURSE PRACTITIONER

## 2023-12-12 PROCEDURE — 3066F NEPHROPATHY DOC TX: CPT | Mod: CPTII,S$GLB,, | Performed by: NURSE PRACTITIONER

## 2023-12-12 RX ORDER — INSULIN GLARGINE 100 [IU]/ML
18 INJECTION, SOLUTION SUBCUTANEOUS NIGHTLY
Qty: 15 ML | Refills: 6 | Status: SHIPPED | OUTPATIENT
Start: 2023-12-12

## 2023-12-12 NOTE — PATIENT INSTRUCTIONS
A1c - 7.6% (12/5/2023)    Lantus 18 units nightly.   Decrease Humalog to 14 units before meals.   Continue Mounjaro 7.5 mg weekly.

## 2023-12-12 NOTE — PROGRESS NOTES
CC: Ms. Liza Mesa arrives today for management of Type 2 DM and review of chronic medical conditions, as listed in the Visit Diagnosis section of this encounter.       HPI: Ms. Liza Mesa was diagnosed with Type 2 DM in 2005. She was diagnosed based on lab work. Initial treatment consisted of metformin.  Victoza was later added to her medication regimen in 2016. Insulin added in 2018. + FH of DM on mother. Denies hospitalizations due to DM.     Patient was last seen by me in September.     Her PCP increased Mounjaro to 7.5 mg weekly ~ 3 weeks ago.     She states that she is taking a much higher dose of Humalog than prescribed and much less Lantus than prescribed. She swapped the doses of each    BG monitoring per Freestyle Vicky 2.    Hypoglycemia: Occasional, reports no specific pattern.   Symptoms: jittery, tingling  Treatment: peppermint or jelly beans     Missing Insulin/PO medication doses: sometimes skips Humalog      Dietary Habits: Eats 2 meal/day. Often skips breakfast. Occasional snack. Avoids sugary beverages.      Last DM education appointment:  8/2016      CURRENT DIABETIC MEDS: Jardiance 25 mg daily, Mounjaro 7.5 mg weekly, Lantus 18 units QHS (prescribed 30), Humalog 26-30 units with meals (prescribed 14 units) + correction scale, target 150, ISF 25  Glucometer type: Accucheck Adrienne     Previous DM treatments:  Lantus - not covered by insurance   Glimepiride   Acarbose  Metformin XR -- diarrhea  VGo - didn't like wearing device  Ozempic - reports this was ineffective   Victoza    Last Eye Exam: 2023,  no DR per patient.  Dr. Betancourt in [place]   Last Podiatry Exam: 11/2021, Dr. Olvera    REVIEW OF SYSTEMS  Constitutional: no c/o fatigue, weakness, weight loss. + weight gain  Cardiac: no palpitations or chest pain.  Respiratory: + chronic cough, dyspnea, related to COPD. Uses albuterol nebulizer PRN.  GI: no c/o abdominal pain or nausea. Denies h/o pancreatitis.   Skin: no rashes,  "lesions   Neuro: no numbness, tingling, paresthesias   Endocrine: denies polyphagia, polydipsia, polyuria.       Personally reviewed Past Medical, Surgical, Social History.    Vital Signs  /80   Pulse 98   Ht 5' 7" (1.702 m)   Wt 132.3 kg (291 lb 10.7 oz)   LMP 11/21/2019   BMI 45.68 kg/m²     Personally reviewed the below labs:    Hemoglobin A1C   Date Value Ref Range Status   12/05/2023 7.6 (H) 4.0 - 5.6 % Final     Comment:     ADA Screening Guidelines:  5.7-6.4%  Consistent with prediabetes  >or=6.5%  Consistent with diabetes    High levels of fetal hemoglobin interfere with the HbA1C  assay. Heterozygous hemoglobin variants (HbS, HgC, etc)do  not significantly interfere with this assay.   However, presence of multiple variants may affect accuracy.     08/24/2023 8.1 (H) 4.0 - 5.6 % Final     Comment:     ADA Screening Guidelines:  5.7-6.4%  Consistent with prediabetes  >or=6.5%  Consistent with diabetes    High levels of fetal hemoglobin interfere with the HbA1C  assay. Heterozygous hemoglobin variants (HbS, HgC, etc)do  not significantly interfere with this assay.   However, presence of multiple variants may affect accuracy.     05/04/2023 8.3 (H) 4.0 - 5.6 % Final     Comment:     ADA Screening Guidelines:  5.7-6.4%  Consistent with prediabetes  >or=6.5%  Consistent with diabetes    High levels of fetal hemoglobin interfere with the HbA1C  assay. Heterozygous hemoglobin variants (HbS, HgC, etc)do  not significantly interfere with this assay.   However, presence of multiple variants may affect accuracy.     05/03/2012 7.1 (H) 4.8 - 5.9 % Final     Comment:     **In order to standardize %HbA1c results worldwide, as of October 11, 2010,  the %HbA1c is being calculated using the master equation recommended in the  consensus statement adopted by the ADA (American Diabetes Assoc), EASD  (European Assoc for the Study of Diabetes), IFCC (International Federation  of Clinical Chemistry and Laboratory " Medicine) and IDF (International  Diabetes Federation). Result units: %HgbA1c (DCCT/NGSP).  In common with other methods, Hb A1C values may not accurately reflect mean  blood glucose in patients with hemoglobin variants (HgbF, HgbS and HgbC).  Any cause of shortened erythrocyte survival will reduce exposure of  erythrocytes to glucose with a consequent decrease in HbA1c (%) values, even  though the time-averaged blood glucose level may be elevated. Causes of  shortened erythrocyte lifetime might be hemolytic anemia or other hemolytic  diseases, homozygous sickle cell trait, pregnancy, recent significant or  chronic blood loss, etc. Caution should be used when interpreting the HbA1c  results from patients with these conditions.       Chemistry        Component Value Date/Time     12/05/2023 0757    K 4.4 12/05/2023 0757     12/05/2023 0757    CO2 28 12/05/2023 0757    BUN 16 12/05/2023 0757    CREATININE 0.9 12/05/2023 0757    CREATININE 1.0 05/06/2012 0431     (H) 12/05/2023 0757        Component Value Date/Time    CALCIUM 9.5 12/05/2023 0757    CALCIUM 9.5 05/06/2012 0431    ALKPHOS 75 12/05/2023 0757    ALKPHOS 49 05/03/2012 1635    AST 15 12/05/2023 0757    AST 13 05/03/2012 1635    ALT 14 12/05/2023 0757    BILITOT 0.4 12/05/2023 0757          Lab Results   Component Value Date    CHOL 162 12/05/2023    CHOL 245 (H) 08/24/2023    CHOL 157 10/27/2022     Lab Results   Component Value Date    HDL 44 12/05/2023    HDL 52 08/24/2023    HDL 38 (L) 10/27/2022     Lab Results   Component Value Date    LDLCALC 98.0 12/05/2023    LDLCALC 171.6 (H) 08/24/2023    LDLCALC 95.0 10/27/2022     Lab Results   Component Value Date    TRIG 100 12/05/2023    TRIG 107 08/24/2023    TRIG 120 10/27/2022     Lab Results   Component Value Date    CHOLHDL 27.2 12/05/2023    CHOLHDL 21.2 08/24/2023    CHOLHDL 24.2 10/27/2022       Lab Results   Component Value Date    MICALBCREAT 4.8 05/04/2023     Lab Results  "  Component Value Date    TSH 0.806 02/02/2023       CrCl cannot be calculated (Patient's most recent lab result is older than the maximum 7 days allowed.).    No results found for: "[id number]"         PHYSICAL EXAMINATION  Constitutional: Appears well, no distress.  Respiratory: CTA, even and unlabored.   Cardiovascular: RRR, no murmurs.  GI: active bowel sounds, no hernia  Skin: warm and dry  Neuro: oriented to person, place, time  Feet: appropriate footwear.         FREESTYLE BAKARI 2 DOWNLOAD: See media file for download. Nocturnal and daytime hypoglycemia are noted. Infrequent prandial excursions.   Average glucose: 132 mg/dL  Above 250 mg/dL: 1 %  181-250 mg/dL: 12 %   mg/dL: 83 %  54-69 mg/dL: 4 %  Below 54 mg/dL: 0 %           Goals        HEMOGLOBIN A1C < 7               Assessment/Plan  1. Type 2 diabetes mellitus with microalbuminuria, with long-term current use of insulin  -- A1c above goal but average glucose is acceptable on CGM report. However, patient swapped her basal/prandial doses and now has significant mismatch and hypoglycemia.   -- decrease Humalog back to original dose of 14 units AC + correction scale  -- continue Lantus 18 units QHS. Had been prescribed 30u but is having early AM hypoglycemia. May need to increase in the future if FBG increase.   -- continue Mounjaro 7.5 mg weekly. Has only taken 2 doses at this time so will hold off on dose increase today.   -- continue Jardiance 25 mg daily  -- will call for eye exam report  -- nurse visit in 2 weeks for CGM download and review of dose changes today    -- Discussed diagnosis of DM, A1c goals, progression of disease, long term complications and tx options.  Advised patient to check BG before activities, such as driving or exercise.  -- Reviewed hypoglycemia management: treat with 1/2 glass of juice, 1/2 can regular coke, or 4 glucose tablets. Monitor and repeat treatment every 15 minutes until BG is >70 Then have a snack, which " includes a complex carbohydrate and protein.   2. Essential hypertension  -- controlled.   -- continue current meds   3. Mixed hyperlipidemia  -- controlled  -- continue atorvastatin    4. Morbid obesity  -- uncontrolled  -- worsening insulin resistance   There is no height or weight on file to calculate BMI.   5. Hypoglycemia associated with type 2 diabetes mellitus  -- likely due to high prandial insulin dose  -- se A/P #1       FOLLOW UP  Follow up in about 3 months (around 3/12/2024).   Patient instructed to bring BG logs to each follow up   Patient encouraged to call for any BG/medication issues, concerns, or questions.      Orders Placed This Encounter   Procedures    Hemoglobin A1C    Comprehensive Metabolic Panel    Microalbumin/Creatinine Ratio, Urine

## 2024-02-20 ENCOUNTER — OFFICE VISIT (OUTPATIENT)
Dept: FAMILY MEDICINE | Facility: CLINIC | Age: 54
End: 2024-02-20
Payer: COMMERCIAL

## 2024-02-20 VITALS
BODY MASS INDEX: 44.9 KG/M2 | HEIGHT: 67 IN | DIASTOLIC BLOOD PRESSURE: 62 MMHG | OXYGEN SATURATION: 89 % | SYSTOLIC BLOOD PRESSURE: 110 MMHG | WEIGHT: 286.06 LBS | HEART RATE: 86 BPM

## 2024-02-20 DIAGNOSIS — Z12.39 ENCOUNTER FOR SCREENING FOR MALIGNANT NEOPLASM OF BREAST, UNSPECIFIED SCREENING MODALITY: ICD-10-CM

## 2024-02-20 DIAGNOSIS — Z23 IMMUNIZATION DUE: ICD-10-CM

## 2024-02-20 DIAGNOSIS — R06.02 EXERTIONAL SHORTNESS OF BREATH: ICD-10-CM

## 2024-02-20 DIAGNOSIS — I20.89 ANGINAL EQUIVALENT: ICD-10-CM

## 2024-02-20 DIAGNOSIS — Z79.4 TYPE 2 DIABETES MELLITUS WITH DIABETIC POLYNEUROPATHY, WITH LONG-TERM CURRENT USE OF INSULIN: Primary | ICD-10-CM

## 2024-02-20 DIAGNOSIS — Z12.4 CERVICAL CANCER SCREENING: ICD-10-CM

## 2024-02-20 DIAGNOSIS — E11.42 TYPE 2 DIABETES MELLITUS WITH DIABETIC POLYNEUROPATHY, WITH LONG-TERM CURRENT USE OF INSULIN: Primary | ICD-10-CM

## 2024-02-20 PROCEDURE — 3078F DIAST BP <80 MM HG: CPT | Mod: CPTII,S$GLB,, | Performed by: FAMILY MEDICINE

## 2024-02-20 PROCEDURE — 99214 OFFICE O/P EST MOD 30 MIN: CPT | Mod: S$GLB,,, | Performed by: FAMILY MEDICINE

## 2024-02-20 PROCEDURE — 4010F ACE/ARB THERAPY RXD/TAKEN: CPT | Mod: CPTII,S$GLB,, | Performed by: FAMILY MEDICINE

## 2024-02-20 PROCEDURE — 1159F MED LIST DOCD IN RCRD: CPT | Mod: CPTII,S$GLB,, | Performed by: FAMILY MEDICINE

## 2024-02-20 PROCEDURE — 3074F SYST BP LT 130 MM HG: CPT | Mod: CPTII,S$GLB,, | Performed by: FAMILY MEDICINE

## 2024-02-20 PROCEDURE — 3008F BODY MASS INDEX DOCD: CPT | Mod: CPTII,S$GLB,, | Performed by: FAMILY MEDICINE

## 2024-02-20 PROCEDURE — 99999 PR PBB SHADOW E&M-EST. PATIENT-LVL IV: CPT | Mod: PBBFAC,,, | Performed by: FAMILY MEDICINE

## 2024-02-20 NOTE — PROGRESS NOTES
THIS DOCUMENT WAS MADE IN PART WITH VOICE RECOGNITION SOFTWARE.  OCCASIONALLY THIS SOFTWARE WILL MISINTERPRET WORDS OR PHRASES.    Assessment and Plan:    1. Type 2 diabetes mellitus with diabetic polyneuropathy, with long-term current use of insulin        2. Encounter for screening for malignant neoplasm of breast, unspecified screening modality        3. Immunization due        4. Cervical cancer screening        5. Exertional shortness of breath  Nuclear Stress - Cardiology Interpreted      6. Anginal equivalent  Nuclear Stress - Cardiology Interpreted        Reorder nuclear stress test for evaluation for coronary artery disease in a patient with significant symptoms and risk factors    Diabetes improved, continue current regimen and follow-up with Endocrinology     Patient has follow-up with gynecology upcoming to manage her mammogram and cervical cancer screening     Will obtain information for diabetic eye exam           ______________________________________________________________________  Subjective:    Chief Complaint:  Chief Complaint   Patient presents with    Follow-up        HPI:  Liza is a 53 y.o. year old       Follow-up exertional shortness of breath   Patient was ordered a nuclear stress test but never scheduled her nuclear stress test.    Still having exertional shortness of breath despite being optimized for COPD treatment with her Trelegy which she is compliant with    Follow-up cervical cancer screening   Patient was recommended to follow up with my physician assistant for Pap smear, patient never followed up with physician assistant for Pap smear     Most recent A1c 7.6, big improvement from her maximum of greater than 13%.      Depression / Anxiety  Rx : Xanax 1 mg TID     Insomnia   Rx: Ambien 10 mg     Allergic rhinitis  No current medications      Moderate persistent asthma + nicotine dependence-smoking  rx :  Albuterol, Trelegy, promethazine-codeine cough syrup,  benzonatate  Prescribed codeine cough syrup by nurse practitioner in [place] quite routinely  Still smoking     Essential hypertension  Rx-amlodipine 10 mg + Losartan 100  Home BP : 130/80     Binge Eating  rx : Adderall 30 mg BID  Prescriber : Alissa Bowen NP     Dyslipidemia  Rx- lipitor 80 mg      Migraine headaches  Abortive-sumatriptan 100 mg as needed  HA occur about every 3 months      Type 2 diabetes mellitus complicated by diabetic polyneuropathy  rx : Lantus 18u daily, Novolog 14u TID with meals, Jardiance 25 mg , Mounjaro 7.5 mg  Previous A1c- 7.6 percent     Chronic low back pain  Rx-hydrocodone 7.5 mg b.i.d., chlorzoxazone 500 mg QID prn  Prescribed by previous primary care physician     Overactive bladder + Stress incont.   Prev Rx-oxybutynin   Medication working well      The 10-year ASCVD risk score (Haritha JHAVERI, et al., 2019) is: 6.1%    Values used to calculate the score:      Age: 53 years      Sex: Female      Is Non- : Yes      Diabetic: Yes      Tobacco smoker: No      Systolic Blood Pressure: 110 mmHg      Is BP treated: Yes      HDL Cholesterol: 44 mg/dL      Total Cholesterol: 162 mg/dL    Past Medical History:  Past Medical History:   Diagnosis Date    Arthritis     Asthma 2012    Blood transfusion     COPD (chronic obstructive pulmonary disease)     Depression 2012    Diabetes 2012    HTN (hypertension) 2012    LBP (low back pain) 2012    Obesity 2013    Tobacco abuse 07/10/2013       Past Surgical History:  Past Surgical History:   Procedure Laterality Date     SECTION, LOW TRANSVERSE      COLONOSCOPY N/A 2023    Procedure: COLONOSCOPY;  Surgeon: Reggie Posey MD;  Location: [facility] ENDO;  Service: Endoscopy;  Laterality: N/A;    GANGLION CYST EXCISION Right 2020    Dr. Logan       Family History:  Family History   Problem Relation Age of Onset    Breast cancer Maternal Aunt     Depression Maternal  Grandmother         bipolar had to be hospitalized    Allergic rhinitis Neg Hx     Allergies Neg Hx     Angioedema Neg Hx     Asthma Neg Hx     Atopy Neg Hx     Eczema Neg Hx     Immunodeficiency Neg Hx     Rhinitis Neg Hx     Urticaria Neg Hx        Social History:  Social History     Socioeconomic History    Marital status: Single   Tobacco Use    Smoking status: Former     Current packs/day: 0.00     Average packs/day: 0.5 packs/day for 30.0 years (15.0 ttl pk-yrs)     Types: Cigarettes     Start date: 1985     Quit date: 2015     Years since quittin.1    Smokeless tobacco: Never   Substance and Sexual Activity    Alcohol use: Yes     Alcohol/week: 12.0 standard drinks of alcohol     Types: 12 Cans of beer per week    Drug use: No    Sexual activity: Yes     Partners: Male     Birth control/protection: None       Medications:  Current Outpatient Medications on File Prior to Visit   Medication Sig Dispense Refill    albuterol (ACCUNEB) 0.63 mg/3 mL Nebu Take 3 mLs (0.63 mg total) by nebulization every 6 (six) hours as needed (wheezing). 180 mL 3    albuterol (PROVENTIL/VENTOLIN HFA) 90 mcg/actuation inhaler INHALE 2 PUFFS INTO THE LUNGS EVERY 6 HOURS AS NEEDED 25.5 g 3    ALPRAZolam (XANAX) 1 MG tablet Take 1 mg by mouth 2 (two) times daily as needed.       amLODIPine (NORVASC) 10 MG tablet Take 1 tablet (10 mg total) by mouth 2 (two) times daily. (Patient taking differently: Take 10 mg by mouth 2 (two) times daily.)      atorvastatin (LIPITOR) 80 MG tablet Take 1 tablet (80 mg total) by mouth every evening. 90 tablet 3    blood sugar diagnostic Strp To check BG 4 times daily, to use with insurance preferred meter. Dx code E11.42 150 each 6    blood-glucose meter kit To check BG 4 times daily, to use with insurance preferred meter. Dx code E11.42 1 each 0    chlorzoxazone (PARAFON FORTE) 500 mg Tab Take 1 tablet (500 mg total) by mouth 4 (four) times daily as needed (muscle spasm). 180 tablet 3     "dextroamphetamine-amphetamine 30 mg Tab Take 30 mg by mouth 2 (two) times a day.       empagliflozin (JARDIANCE) 25 mg tablet Take 1 tablet (25 mg total) by mouth once daily. 30 tablet 6    flash glucose scanning reader (FREESTYLE BAKARI 2 READER) [facility] Use for continuous glucose monitoring. 1 each 0    fluticasone-umeclidin-vilanter (TRELEGY ELLIPTA) 200-62.5-25 mcg inhaler Inhale 1 puff into the lungs once daily. 60 each 11    FREESTYLE BAKARI 2 SENSOR Kit USE FOR CONTINUOUS GLUCOSE MONITORING. CHANGE EVERY 14 DAYS 2 kit 11    HYDROcodone-acetaminophen (NORCO) 7.5-325 mg per tablet Take 1 tablet by mouth every 12 (twelve) hours as needed for Pain. 60 tablet 0    insulin (LANTUS SOLOSTAR U-100 INSULIN) glargine 100 units/mL SubQ pen Inject 18 Units into the skin every evening. 15 mL 6    insulin lispro (HUMALOG KWIKPEN INSULIN) 100 unit/mL pen Inject 14 Units into the skin 3 (three) times daily before meals. Plus correction scale, max TDD 84u 30 mL 11    lancets Misc To check BG 4 times daily, to use with insurance preferred meter. Dx code E11.42 150 each 6    pen needle, diabetic (TRUEPLUS PEN NEEDLE) 32 gauge x 5/32" Ndle USE 5 TIMES DAILY WITH INSULIN AND VICTOZA. 150 each 6    promethazine-codeine 6.25-10 mg/5 ml (PHENERGAN WITH CODEINE) 6.25-10 mg/5 mL syrup TAKE 5 ML BY MOUTH TWICE A DAY      tirzepatide 7.5 mg/0.5 mL PnIj Inject 7.5 mg into the skin every 7 days. 4 Pen 2    zolpidem (AMBIEN) 10 mg Tab 10 mg nightly as needed.   1    diazePAM (VALIUM) 10 MG Tab TAKE 1 TABLET BY MOUTH 30 MINUTES PRIOR TO PROCEDURE      hydroCHLOROthiazide (HYDRODIURIL) 12.5 MG Tab Take 12.5 mg by mouth.      hydrocortisone (ANUSOL-HC) 25 mg suppository Place 1 suppository (25 mg total) rectally 2 (two) times daily as needed for Hemorrhoids. (Patient not taking: Reported on 2/20/2024) 24 suppository 2    losartan (COZAAR) 100 MG tablet Take 100 mg by mouth.      mupirocin (BACTROBAN) 2 % ointment by Nasal route 2 (two) times " "daily. (Patient not taking: Reported on 2/20/2024) 15 g 2    sumatriptan (IMITREX) 100 MG tablet Take 1 tablet (100 mg total) by mouth once as needed for Migraine. 6 tablet 5     No current facility-administered medications on file prior to visit.       Allergies:  Patient has no known allergies.    Immunizations:  Immunization History   Administered Date(s) Administered    COVID-19, MRNA, LN-S, PF (Pfizer) (Purple Cap) 04/22/2021, 05/13/2021, 12/11/2021    DTaP 1970, 1970, 01/20/1971, 03/15/1972    Influenza - Quadrivalent 10/03/2020    Influenza - Quadrivalent - PF *Preferred* (6 months and older) 10/31/2018, 11/13/2019, 09/16/2021, 10/04/2023    MMR 11/17/1977    Measles / Rubella 01/19/1972    OPV 1970, 01/20/1971, 03/15/1972, 03/21/1974, 04/17/1975, 11/17/1977    Pneumococcal Conjugate - 13 Valent 06/12/2017    Pneumococcal Polysaccharide - 23 Valent 11/13/2019    Td (ADULT) 04/17/1975, 11/17/1977, 07/11/1984, 09/18/2009    Tdap 10/18/2017    Zoster Recombinant 06/16/2021, 09/22/2021       Review of Systems:  Review of Systems   All other systems reviewed and are negative.      Objective:    Vitals:  Vitals:    02/20/24 1501   BP: 110/62   Pulse: 86   SpO2: (!) 89%   Weight: 129.8 kg (286 lb 0.8 oz)   Height: 5' 7" (1.702 m)   PainSc:   6   PainLoc: Back       Physical Exam  Vitals reviewed.   Constitutional:       General: She is not in acute distress.  HENT:      Head: Normocephalic and atraumatic.   Eyes:      Pupils: Pupils are equal, round, and reactive to light.   Cardiovascular:      Rate and Rhythm: Normal rate and regular rhythm.      Heart sounds: No murmur heard.     No friction rub.   Pulmonary:      Effort: Pulmonary effort is normal.      Breath sounds: Normal breath sounds.   Abdominal:      General: Bowel sounds are normal. There is no distension.      Palpations: Abdomen is soft.      Tenderness: There is no abdominal tenderness.   Musculoskeletal:      Cervical back: Neck " supple.   Skin:     General: Skin is warm and dry.      Findings: No rash.   Psychiatric:         Behavior: Behavior normal.           Cody Medrano MD  Family Medicine

## 2024-02-26 ENCOUNTER — PATIENT OUTREACH (OUTPATIENT)
Dept: ADMINISTRATIVE | Facility: HOSPITAL | Age: 54
End: 2024-02-26
Payer: COMMERCIAL

## 2024-02-26 DIAGNOSIS — E11.42 TYPE 2 DIABETES MELLITUS WITH DIABETIC POLYNEUROPATHY, WITH LONG-TERM CURRENT USE OF INSULIN: ICD-10-CM

## 2024-02-26 DIAGNOSIS — Z79.4 TYPE 2 DIABETES MELLITUS WITH DIABETIC POLYNEUROPATHY, WITH LONG-TERM CURRENT USE OF INSULIN: ICD-10-CM

## 2024-02-26 RX ORDER — FLASH GLUCOSE SENSOR
KIT MISCELLANEOUS
Qty: 2 KIT | Refills: 11 | Status: SHIPPED | OUTPATIENT
Start: 2024-02-26

## 2024-02-26 NOTE — PROGRESS NOTES
Population Health Chart Review & Patient Outreach Details    Outreach Performed: NO    Additional Pop Health Notes:           Updates Requested / Reviewed:      Updated Care Coordination Note and Care Team Updated         Health Maintenance Topics Overdue:    Health Maintenance Due   Topic Date Due    Eye Exam  06/16/2023    COVID-19 Vaccine (4 - 2023-24 season) 09/01/2023    Cervical Cancer Screening  12/11/2023    Mammogram  01/30/2024    Foot Exam  04/12/2024         Health Maintenance Topic(s) Outreach Outcomes & Actions Taken:    Eye Exam - Outreach Outcomes & Actions Taken  : External Records Requested & Care Team Updated if Applicable

## 2024-02-26 NOTE — TELEPHONE ENCOUNTER
----- Message from yJoti Shore sent at 2/26/2024  3:01 PM CST -----  Type: Needs Medical Advice  Who Called:  pt  Best Call Back Number: [phone]  Additional Information: pt is requesting a refill on her FREESTYLE BAKARI 2 SENSOR Kit, pl call bk to advise thanks      [facility] [address] [address]  [address] [address] [address]  Phone: [phone] Fax: [phone]

## 2024-02-26 NOTE — LETTER
FAX      AUTHORIZATION FOR RELEASE OF   CONFIDENTIAL INFORMATION        Dear Dr Betancourt,      We are seeing Liza Mesa, date of birth 1970, in the clinic at [facility]. Cody Medrano MD is the patient's PCP. Liza Mesa has an outstanding lab/procedure at the time we reviewed their chart. In order to help keep their health information updated, Liza Mesa has authorized us to request the following medical record(s):     ()  MAMMOGRAM (WITHIN 2 YRS)  ()  COLON/PATH W/RECALL (WITHIN 10 YRS)     ()  PAP SMEAR (WITHIN 3 YRS)      ()  OUTSIDE LAB RESULTS    ()  DEXA SCAN (WITHIN 3 YRS)      (x) DM EYE EXAM (WITHIN 48 MONTHS)          ()  FOOT EXAM (WITHIN 1yr.)          () OUTSIDE IMMUNIZATIONS    ()  OTHER                                   Please fax records to [facility] [phone].     If you have any questions, please contact [place] at [phone]                Patient Name: Liza Mesa  : 1970  Patient Phone #: [phone]

## 2024-02-27 ENCOUNTER — PATIENT OUTREACH (OUTPATIENT)
Dept: ADMINISTRATIVE | Facility: HOSPITAL | Age: 54
End: 2024-02-27
Payer: COMMERCIAL

## 2024-02-27 NOTE — PROGRESS NOTES
Population Health Chart Review & Patient Outreach Details    Outreach Performed: NO    Additional Pop Health Notes:           Updates Requested / Reviewed:      Updated Care Coordination Note, , and Care Team Updated         Health Maintenance Topics Overdue:    Health Maintenance Due   Topic Date Due    Eye Exam  06/16/2023    COVID-19 Vaccine (4 - 2023-24 season) 09/01/2023    Cervical Cancer Screening  12/11/2023    Mammogram  01/30/2024    Foot Exam  04/12/2024         Health Maintenance Topic(s) Outreach Outcomes & Actions Taken:    Eye Exam - Outreach Outcomes & Actions Taken  : Diabetic Eye External Records Uploaded, Care Team & History Updated if Applicable

## 2024-03-02 DIAGNOSIS — Z79.4 TYPE 2 DIABETES MELLITUS WITH DIABETIC POLYNEUROPATHY, WITH LONG-TERM CURRENT USE OF INSULIN: ICD-10-CM

## 2024-03-02 DIAGNOSIS — E11.42 TYPE 2 DIABETES MELLITUS WITH DIABETIC POLYNEUROPATHY, WITH LONG-TERM CURRENT USE OF INSULIN: ICD-10-CM

## 2024-03-02 NOTE — TELEPHONE ENCOUNTER
No care due was identified.  Health [facility] Embedded Care Due Messages. Reference number: [id number].   3/02/2024 10:01:32 AM CST

## 2024-03-03 RX ORDER — TIRZEPATIDE 7.5 MG/.5ML
7.5 INJECTION, SOLUTION SUBCUTANEOUS
Qty: 12 PEN | Refills: 0 | Status: SHIPPED | OUTPATIENT
Start: 2024-03-03 | End: 2024-05-21

## 2024-03-03 NOTE — TELEPHONE ENCOUNTER
Refill Decision Note   Liza Mesa  is requesting a refill authorization.  Brief Assessment and Rationale for Refill:  Approve     Medication Therapy Plan:        Comments:     Note composed:12:05 AM 03/03/2024

## 2024-03-05 ENCOUNTER — HOSPITAL ENCOUNTER (OUTPATIENT)
Dept: RADIOLOGY | Facility: HOSPITAL | Age: 54
Discharge: HOME | End: 2024-03-05
Attending: FAMILY MEDICINE
Payer: COMMERCIAL

## 2024-03-05 ENCOUNTER — HOSPITAL ENCOUNTER (OUTPATIENT)
Dept: CARDIOLOGY | Facility: HOSPITAL | Age: 54
Discharge: HOME | End: 2024-03-05
Attending: FAMILY MEDICINE
Payer: COMMERCIAL

## 2024-03-05 VITALS — HEIGHT: 67 IN | BODY MASS INDEX: 44.89 KG/M2 | WEIGHT: 286 LBS

## 2024-03-05 DIAGNOSIS — R06.02 EXERTIONAL SHORTNESS OF BREATH: ICD-10-CM

## 2024-03-05 DIAGNOSIS — I20.89 ANGINAL EQUIVALENT: ICD-10-CM

## 2024-03-05 LAB
CV PHARM DOSE: 0.4 MG
CV STRESS BASE HR: 89 BPM
DIASTOLIC BLOOD PRESSURE: 94 MMHG
NUC REST EJECTION FRACTION: 68
OHS CV CPX 1 MINUTE RECOVERY HEART RATE: 106 BPM
OHS CV CPX 85 PERCENT MAX PREDICTED HEART RATE MALE: 142
OHS CV CPX MAX PREDICTED HEART RATE: 167
OHS CV CPX PATIENT IS FEMALE: 1
OHS CV CPX PATIENT IS MALE: 0
OHS CV CPX PEAK DIASTOLIC BLOOD PRESSURE: 84 MMHG
OHS CV CPX PEAK HEAR RATE: 107 BPM
OHS CV CPX PEAK RATE PRESSURE PRODUCT: NORMAL
OHS CV CPX PEAK SYSTOLIC BLOOD PRESSURE: 156 MMHG
OHS CV CPX PERCENT MAX PREDICTED HEART RATE ACHIEVED: 67
OHS CV CPX RATE PRESSURE PRODUCT PRESENTING: NORMAL
OHS CV PHARM TIME: 1410 MIN
PAP RECOMMENDATION EXT: NORMAL
PAP SMEAR: NORMAL
SYSTOLIC BLOOD PRESSURE: 142 MMHG

## 2024-03-05 PROCEDURE — 93018 CV STRESS TEST I&R ONLY: CPT | Mod: ,,, | Performed by: INTERNAL MEDICINE

## 2024-03-05 PROCEDURE — A9502 TC99M TETROFOSMIN: HCPCS | Mod: PO | Performed by: FAMILY MEDICINE

## 2024-03-05 PROCEDURE — 78452 HT MUSCLE IMAGE SPECT MULT: CPT | Mod: PO

## 2024-03-05 PROCEDURE — 63600175 PHARM REV CODE 636 W HCPCS: Mod: PO | Performed by: FAMILY MEDICINE

## 2024-03-05 PROCEDURE — 78452 HT MUSCLE IMAGE SPECT MULT: CPT | Mod: 26,,, | Performed by: INTERNAL MEDICINE

## 2024-03-05 PROCEDURE — 93017 CV STRESS TEST TRACING ONLY: CPT | Mod: PO

## 2024-03-05 PROCEDURE — 93016 CV STRESS TEST SUPVJ ONLY: CPT | Mod: ,,, | Performed by: INTERNAL MEDICINE

## 2024-03-05 RX ORDER — REGADENOSON 0.08 MG/ML
0.4 INJECTION, SOLUTION INTRAVENOUS
Status: COMPLETED | OUTPATIENT
Start: 2024-03-05 | End: 2024-03-05

## 2024-03-05 RX ORDER — AMINOPHYLLINE 25 MG/ML
75 INJECTION, SOLUTION INTRAVENOUS
Status: COMPLETED | OUTPATIENT
Start: 2024-03-05 | End: 2024-03-05

## 2024-03-05 RX ADMIN — AMINOPHYLLINE 75 MG: 25 INJECTION, SOLUTION INTRAVENOUS at 02:03

## 2024-03-05 RX ADMIN — TETROFOSMIN 16 MILLICURIE: 1.38 INJECTION, POWDER, LYOPHILIZED, FOR SOLUTION INTRAVENOUS at 02:03

## 2024-03-05 RX ADMIN — REGADENOSON 0.4 MG: 0.08 INJECTION, SOLUTION INTRAVENOUS at 02:03

## 2024-03-05 RX ADMIN — TETROFOSMIN 48.1 MILLICURIE: 1.38 INJECTION, POWDER, LYOPHILIZED, FOR SOLUTION INTRAVENOUS at 02:03

## 2024-03-06 ENCOUNTER — TELEPHONE (OUTPATIENT)
Dept: FAMILY MEDICINE | Facility: CLINIC | Age: 54
End: 2024-03-06
Payer: COMMERCIAL

## 2024-03-06 ENCOUNTER — HOSPITAL ENCOUNTER (OUTPATIENT)
Dept: RADIOLOGY | Facility: HOSPITAL | Age: 54
Discharge: HOME | End: 2024-03-06
Attending: OBSTETRICS & GYNECOLOGY
Payer: COMMERCIAL

## 2024-03-06 ENCOUNTER — TELEPHONE (OUTPATIENT)
Dept: RADIOLOGY | Facility: HOSPITAL | Age: 54
End: 2024-03-06

## 2024-03-06 DIAGNOSIS — R94.39 ABNORMAL NUCLEAR STRESS TEST: Primary | ICD-10-CM

## 2024-03-06 DIAGNOSIS — Z78.0 POST-MENOPAUSAL: ICD-10-CM

## 2024-03-06 DIAGNOSIS — Z12.31 ENCOUNTER FOR SCREENING MAMMOGRAM FOR MALIGNANT NEOPLASM OF BREAST: ICD-10-CM

## 2024-03-06 PROCEDURE — 77067 SCR MAMMO BI INCL CAD: CPT | Mod: TC,PO

## 2024-03-06 PROCEDURE — 77080 DXA BONE DENSITY AXIAL: CPT | Mod: 26,,, | Performed by: RADIOLOGY

## 2024-03-06 PROCEDURE — 77067 SCR MAMMO BI INCL CAD: CPT | Mod: 26,,, | Performed by: RADIOLOGY

## 2024-03-06 PROCEDURE — 77063 BREAST TOMOSYNTHESIS BI: CPT | Mod: 26,,, | Performed by: RADIOLOGY

## 2024-03-06 PROCEDURE — 77080 DXA BONE DENSITY AXIAL: CPT | Mod: TC,PO

## 2024-03-06 NOTE — TELEPHONE ENCOUNTER
----- Message from Cody Medrano MD sent at 3/6/2024  6:37 AM CST -----  Call patient, help schedule cardiology, let her know that the stress test was positive and we need to get her in sooner than later for further evaluation.  Also, recommend patient start taking aspirin 81 mg daily for now

## 2024-03-18 ENCOUNTER — HOSPITAL ENCOUNTER (OUTPATIENT)
Dept: RADIOLOGY | Facility: HOSPITAL | Age: 54
Discharge: HOME | End: 2024-03-18
Attending: OBSTETRICS & GYNECOLOGY
Payer: COMMERCIAL

## 2024-03-18 DIAGNOSIS — R92.8 ABNORMALITY OF RIGHT BREAST ON SCREENING MAMMOGRAM: ICD-10-CM

## 2024-03-18 PROCEDURE — 76642 ULTRASOUND BREAST LIMITED: CPT | Mod: TC,PO,RT

## 2024-03-18 PROCEDURE — 77065 DX MAMMO INCL CAD UNI: CPT | Mod: TC,PO,RT

## 2024-03-18 PROCEDURE — 77061 BREAST TOMOSYNTHESIS UNI: CPT | Mod: 26,RT,, | Performed by: RADIOLOGY

## 2024-03-18 PROCEDURE — 77065 DX MAMMO INCL CAD UNI: CPT | Mod: 26,RT,, | Performed by: RADIOLOGY

## 2024-03-18 PROCEDURE — 77061 BREAST TOMOSYNTHESIS UNI: CPT | Mod: TC,PO,RT

## 2024-03-18 PROCEDURE — 76642 ULTRASOUND BREAST LIMITED: CPT | Mod: 26,RT,, | Performed by: RADIOLOGY

## 2024-04-09 ENCOUNTER — LAB VISIT (OUTPATIENT)
Dept: LAB | Facility: HOSPITAL | Age: 54
End: 2024-04-09
Attending: NURSE PRACTITIONER
Payer: COMMERCIAL

## 2024-04-09 DIAGNOSIS — R80.9 TYPE 2 DIABETES MELLITUS WITH MICROALBUMINURIA, WITH LONG-TERM CURRENT USE OF INSULIN: ICD-10-CM

## 2024-04-09 DIAGNOSIS — Z79.4 TYPE 2 DIABETES MELLITUS WITH MICROALBUMINURIA, WITH LONG-TERM CURRENT USE OF INSULIN: ICD-10-CM

## 2024-04-09 DIAGNOSIS — E11.29 TYPE 2 DIABETES MELLITUS WITH MICROALBUMINURIA, WITH LONG-TERM CURRENT USE OF INSULIN: ICD-10-CM

## 2024-04-09 LAB
ALBUMIN SERPL BCP-MCNC: 3.4 G/DL (ref 3.5–5.2)
ALP SERPL-CCNC: 76 U/L (ref 55–135)
ALT SERPL W/O P-5'-P-CCNC: 15 U/L (ref 10–44)
ANION GAP SERPL CALC-SCNC: 8 MMOL/L (ref 8–16)
AST SERPL-CCNC: 15 U/L (ref 10–40)
BILIRUB SERPL-MCNC: 0.3 MG/DL (ref 0.1–1)
BUN SERPL-MCNC: 10 MG/DL (ref 6–20)
CALCIUM SERPL-MCNC: 8.9 MG/DL (ref 8.7–10.5)
CHLORIDE SERPL-SCNC: 101 MMOL/L (ref 95–110)
CO2 SERPL-SCNC: 30 MMOL/L (ref 23–29)
CREAT SERPL-MCNC: 0.8 MG/DL (ref 0.5–1.4)
EST. GFR  (NO RACE VARIABLE): >60 ML/MIN/1.73 M^2
ESTIMATED AVG GLUCOSE: 166 MG/DL (ref 68–131)
GLUCOSE SERPL-MCNC: 95 MG/DL (ref 70–110)
HBA1C MFR BLD: 7.4 % (ref 4–5.6)
POTASSIUM SERPL-SCNC: 3.9 MMOL/L (ref 3.5–5.1)
PROT SERPL-MCNC: 7.1 G/DL (ref 6–8.4)
SODIUM SERPL-SCNC: 139 MMOL/L (ref 136–145)

## 2024-04-09 PROCEDURE — 83036 HEMOGLOBIN GLYCOSYLATED A1C: CPT | Performed by: NURSE PRACTITIONER

## 2024-04-09 PROCEDURE — 36415 COLL VENOUS BLD VENIPUNCTURE: CPT | Mod: PN | Performed by: NURSE PRACTITIONER

## 2024-04-09 PROCEDURE — 80053 COMPREHEN METABOLIC PANEL: CPT | Performed by: NURSE PRACTITIONER

## 2024-04-16 ENCOUNTER — OFFICE VISIT (OUTPATIENT)
Dept: ENDOCRINOLOGY | Facility: CLINIC | Age: 54
End: 2024-04-16
Payer: COMMERCIAL

## 2024-04-16 VITALS
HEIGHT: 67 IN | SYSTOLIC BLOOD PRESSURE: 120 MMHG | BODY MASS INDEX: 45.03 KG/M2 | WEIGHT: 286.94 LBS | DIASTOLIC BLOOD PRESSURE: 80 MMHG | HEART RATE: 100 BPM

## 2024-04-16 DIAGNOSIS — E66.01 MORBID OBESITY WITH BMI OF 40.0-44.9, ADULT: ICD-10-CM

## 2024-04-16 DIAGNOSIS — Z79.4 TYPE 2 DIABETES MELLITUS WITH MICROALBUMINURIA, WITH LONG-TERM CURRENT USE OF INSULIN: Primary | ICD-10-CM

## 2024-04-16 DIAGNOSIS — E11.29 TYPE 2 DIABETES MELLITUS WITH MICROALBUMINURIA, WITH LONG-TERM CURRENT USE OF INSULIN: Primary | ICD-10-CM

## 2024-04-16 DIAGNOSIS — I10 ESSENTIAL HYPERTENSION: ICD-10-CM

## 2024-04-16 DIAGNOSIS — R80.9 TYPE 2 DIABETES MELLITUS WITH MICROALBUMINURIA, WITH LONG-TERM CURRENT USE OF INSULIN: Primary | ICD-10-CM

## 2024-04-16 DIAGNOSIS — E78.2 MIXED HYPERLIPIDEMIA: ICD-10-CM

## 2024-04-16 DIAGNOSIS — E11.649 HYPOGLYCEMIA ASSOCIATED WITH TYPE 2 DIABETES MELLITUS: ICD-10-CM

## 2024-04-16 PROCEDURE — 99214 OFFICE O/P EST MOD 30 MIN: CPT | Mod: S$GLB,,, | Performed by: NURSE PRACTITIONER

## 2024-04-16 PROCEDURE — 3079F DIAST BP 80-89 MM HG: CPT | Mod: CPTII,S$GLB,, | Performed by: NURSE PRACTITIONER

## 2024-04-16 PROCEDURE — 1160F RVW MEDS BY RX/DR IN RCRD: CPT | Mod: CPTII,S$GLB,, | Performed by: NURSE PRACTITIONER

## 2024-04-16 PROCEDURE — 95251 CONT GLUC MNTR ANALYSIS I&R: CPT | Mod: S$GLB,,, | Performed by: NURSE PRACTITIONER

## 2024-04-16 PROCEDURE — 4010F ACE/ARB THERAPY RXD/TAKEN: CPT | Mod: CPTII,S$GLB,, | Performed by: NURSE PRACTITIONER

## 2024-04-16 PROCEDURE — 3051F HG A1C>EQUAL 7.0%<8.0%: CPT | Mod: CPTII,S$GLB,, | Performed by: NURSE PRACTITIONER

## 2024-04-16 PROCEDURE — 3008F BODY MASS INDEX DOCD: CPT | Mod: CPTII,S$GLB,, | Performed by: NURSE PRACTITIONER

## 2024-04-16 PROCEDURE — 99999 PR PBB SHADOW E&M-EST. PATIENT-LVL V: CPT | Mod: PBBFAC,,, | Performed by: NURSE PRACTITIONER

## 2024-04-16 PROCEDURE — 3074F SYST BP LT 130 MM HG: CPT | Mod: CPTII,S$GLB,, | Performed by: NURSE PRACTITIONER

## 2024-04-16 PROCEDURE — 1159F MED LIST DOCD IN RCRD: CPT | Mod: CPTII,S$GLB,, | Performed by: NURSE PRACTITIONER

## 2024-04-16 RX ORDER — OXYBUTYNIN CHLORIDE 10 MG/1
10 TABLET, EXTENDED RELEASE ORAL DAILY
COMMUNITY
Start: 2024-04-10

## 2024-04-16 RX ORDER — INSULIN GLARGINE 100 [IU]/ML
14 INJECTION, SOLUTION SUBCUTANEOUS NIGHTLY
Start: 2024-04-16

## 2024-04-16 RX ORDER — INSULIN LISPRO 100 [IU]/ML
12 INJECTION, SOLUTION INTRAVENOUS; SUBCUTANEOUS
Start: 2024-04-16

## 2024-04-16 NOTE — PROGRESS NOTES
"CC: Ms. Liza Mesa arrives today for management of Type 2 DM and review of chronic medical conditions, as listed in the Visit Diagnosis section of this encounter.       HPI: Ms. Liza Mesa was diagnosed with Type 2 DM in 2005. She was diagnosed based on lab work. Initial treatment consisted of metformin.  Victoza was later added to her medication regimen in 2016.  Insulin added in 2018. Mounjaro added in 2023. + FH of DM on mother. Denies hospitalizations due to DM.     Patient was last seen by me in December. Humalog dose was decreased at that time.     BG monitoring per Freestyle Vicky 2.    Hypoglycemia: Occasional  Symptoms: jittery, tingling  Treatment: peppermint or jelly beans     Missing Insulin/PO medication doses: rare      Dietary Habits: Eats 2 meal/day. Often skips breakfast. Rare snack. Avoids sugary beverages.      Last DM education appointment:  8/2016      CURRENT DIABETIC MEDS: Jardiance 25 mg daily, Mounjaro 7.5 mg weekly, Lantus 18 units QHS, Humalog 16 units with meals (prescribed 14 units) + correction scale, target 150, ISF 25  Glucometer type: Accucheck Adrienne     Previous DM treatments:  Lantus - not covered by insurance   Glimepiride   Acarbose  Metformin XR -- diarrhea  VGo - didn't like wearing device  Ozempic - reports this was ineffective   Victoza    Last Eye Exam: 2023,  no DR per patient.  Dr. Betancourt in [place]   Last Podiatry Exam: 11/2021, Dr. Olvera    REVIEW OF SYSTEMS  Constitutional: no c/o fatigue, weakness. + 5# weight loss.  Cardiac: no palpitations or chest pain.  Respiratory: no dyspnea. + chronic cough. Uses phenergan w/codeine as needed.   GI: no c/o abdominal pain or nausea. Denies h/o pancreatitis.   Skin: no rashes, lesions   Neuro: no numbness, tingling, paresthesias   Endocrine: denies polyphagia, polydipsia, polyuria.       Personally reviewed Past Medical, Surgical, Social History.    Vital Signs  /80   Pulse 100   Ht 5' 7" (1.702 m)   Wt " 130.2 kg (286 lb 14.9 oz)   LMP 11/21/2019   BMI 44.94 kg/m²     Personally reviewed the below labs:    Hemoglobin A1C   Date Value Ref Range Status   04/09/2024 7.4 (H) 4.0 - 5.6 % Final     Comment:     ADA Screening Guidelines:  5.7-6.4%  Consistent with prediabetes  >or=6.5%  Consistent with diabetes    High levels of fetal hemoglobin interfere with the HbA1C  assay. Heterozygous hemoglobin variants (HbS, HgC, etc)do  not significantly interfere with this assay.   However, presence of multiple variants may affect accuracy.     12/05/2023 7.6 (H) 4.0 - 5.6 % Final     Comment:     ADA Screening Guidelines:  5.7-6.4%  Consistent with prediabetes  >or=6.5%  Consistent with diabetes    High levels of fetal hemoglobin interfere with the HbA1C  assay. Heterozygous hemoglobin variants (HbS, HgC, etc)do  not significantly interfere with this assay.   However, presence of multiple variants may affect accuracy.     08/24/2023 8.1 (H) 4.0 - 5.6 % Final     Comment:     ADA Screening Guidelines:  5.7-6.4%  Consistent with prediabetes  >or=6.5%  Consistent with diabetes    High levels of fetal hemoglobin interfere with the HbA1C  assay. Heterozygous hemoglobin variants (HbS, HgC, etc)do  not significantly interfere with this assay.   However, presence of multiple variants may affect accuracy.     05/03/2012 7.1 (H) 4.8 - 5.9 % Final     Comment:     **In order to standardize %HbA1c results worldwide, as of October 11, 2010,  the %HbA1c is being calculated using the master equation recommended in the  consensus statement adopted by the ADA (American Diabetes Assoc), EASD  (European Assoc for the Study of Diabetes), IFCC (International Federation  of Clinical Chemistry and Laboratory Medicine) and IDF (International  Diabetes Federation). Result units: %HgbA1c (DCCT/NGSP).  In common with other methods, Hb A1C values may not accurately reflect mean  blood glucose in patients with hemoglobin variants (HgbF, HgbS and  "HgbC).  Any cause of shortened erythrocyte survival will reduce exposure of  erythrocytes to glucose with a consequent decrease in HbA1c (%) values, even  though the time-averaged blood glucose level may be elevated. Causes of  shortened erythrocyte lifetime might be hemolytic anemia or other hemolytic  diseases, homozygous sickle cell trait, pregnancy, recent significant or  chronic blood loss, etc. Caution should be used when interpreting the HbA1c  results from patients with these conditions.       Chemistry        Component Value Date/Time     04/09/2024 0657    K 3.9 04/09/2024 0657     04/09/2024 0657    CO2 30 (H) 04/09/2024 0657    BUN 10 04/09/2024 0657    CREATININE 0.8 04/09/2024 0657    CREATININE 1.0 05/06/2012 0431    GLU 95 04/09/2024 0657        Component Value Date/Time    CALCIUM 8.9 04/09/2024 0657    CALCIUM 9.5 05/06/2012 0431    ALKPHOS 76 04/09/2024 0657    ALKPHOS 49 05/03/2012 1635    AST 15 04/09/2024 0657    AST 13 05/03/2012 1635    ALT 15 04/09/2024 0657    BILITOT 0.3 04/09/2024 0657          Lab Results   Component Value Date    CHOL 162 12/05/2023    CHOL 245 (H) 08/24/2023    CHOL 157 10/27/2022     Lab Results   Component Value Date    HDL 44 12/05/2023    HDL 52 08/24/2023    HDL 38 (L) 10/27/2022     Lab Results   Component Value Date    LDLCALC 98.0 12/05/2023    LDLCALC 171.6 (H) 08/24/2023    LDLCALC 95.0 10/27/2022     Lab Results   Component Value Date    TRIG 100 12/05/2023    TRIG 107 08/24/2023    TRIG 120 10/27/2022     Lab Results   Component Value Date    CHOLHDL 27.2 12/05/2023    CHOLHDL 21.2 08/24/2023    CHOLHDL 24.2 10/27/2022       Lab Results   Component Value Date    MICALBCREAT 4.8 05/04/2023     Lab Results   Component Value Date    TSH 0.806 02/02/2023       CrCl cannot be calculated (Patient's most recent lab result is older than the maximum 7 days allowed.).    No results found for: "GYRIVUOE89OP"         PHYSICAL EXAMINATION  Constitutional: " Appears well, no distress.  Respiratory: CTA, even and unlabored.   Cardiovascular: RRR, no murmurs.  GI: active bowel sounds, no hernia  Skin: warm and dry  Neuro: oriented to person, place, time  Feet: appropriate footwear.   Protective Sensation (w/ 10 gram monofilament):  Right: Intact  Left: Intact    Visual Inspection:  Dry Skin -  Bilateral    Pedal Pulses:   Right: Present  Left: Present    Posterior Tibialis Pulses:   Right:Present  Left: Present          FREESTYLE BAKARI 2 DOWNLOAD: Nocturnal hypoglycemia noted. Also having some sporadic hypoglycemia in afternoon/evening. Rare hyperglycemia.              Goals        HEMOGLOBIN A1C < 7               Assessment/Plan  1. Type 2 diabetes mellitus with microalbuminuria, with long-term current use of insulin  -- A1c above goal but average glucose is much lower on CGM report. Having hypoglycemia.   -- Decrease Lantus to 14 units.   -- Decrease Humalog to 12 units before meals + correction scale.   -- Continue Ebenezer Florence   -- continue Freestyle Bakari 2 and notify me if hypoglycemia persists.     -- Discussed diagnosis of DM, A1c goals, progression of disease, long term complications and tx options.  Advised patient to check BG before activities, such as driving or exercise.  -- Reviewed hypoglycemia management: treat with 1/2 glass of juice, 1/2 can regular coke, or 4 glucose tablets. Monitor and repeat treatment every 15 minutes until BG is >70 Then have a snack, which includes a complex carbohydrate and protein.   2. Essential hypertension  -- controlled.   -- continue current meds   3. Mixed hyperlipidemia  -- controlled  -- continue atorvastatin    4. Morbid obesity  -- slightly decreasing   -- worsening insulin resistance   Body mass index is 44.94 kg/m².   5. Hypoglycemia associated with type 2 diabetes mellitus  -- se A/P #1       FOLLOW UP  Follow up in about 4 months (around 8/16/2024).   Patient instructed to bring BG logs to each follow up    Patient encouraged to call for any BG/medication issues, concerns, or questions.      Orders Placed This Encounter   Procedures    Hemoglobin A1C    Microalbumin/Creatinine Ratio, Urine    Basic Metabolic Panel

## 2024-04-16 NOTE — PATIENT INSTRUCTIONS
Decrease Lantus to 14 units.     Decrease Humalog to 12 units before your meals.     Continue Jardiance, Mounjaro

## 2024-04-17 ENCOUNTER — TELEPHONE (OUTPATIENT)
Dept: CARDIOLOGY | Facility: CLINIC | Age: 54
End: 2024-04-17

## 2024-04-17 ENCOUNTER — OFFICE VISIT (OUTPATIENT)
Dept: CARDIOLOGY | Facility: CLINIC | Age: 54
End: 2024-04-17
Payer: COMMERCIAL

## 2024-04-17 VITALS
BODY MASS INDEX: 44.99 KG/M2 | HEART RATE: 105 BPM | DIASTOLIC BLOOD PRESSURE: 89 MMHG | WEIGHT: 286.63 LBS | SYSTOLIC BLOOD PRESSURE: 159 MMHG | HEIGHT: 67 IN

## 2024-04-17 DIAGNOSIS — J45.41 MODERATE PERSISTENT ASTHMA WITH ACUTE EXACERBATION: ICD-10-CM

## 2024-04-17 DIAGNOSIS — Z79.4 TYPE 2 DIABETES MELLITUS WITH DIABETIC POLYNEUROPATHY, WITH LONG-TERM CURRENT USE OF INSULIN: ICD-10-CM

## 2024-04-17 DIAGNOSIS — I10 ESSENTIAL HYPERTENSION: ICD-10-CM

## 2024-04-17 DIAGNOSIS — F50.81 BINGE EATING DISORDER: Primary | ICD-10-CM

## 2024-04-17 DIAGNOSIS — E66.9 OBESITY (BMI 30-39.9): ICD-10-CM

## 2024-04-17 DIAGNOSIS — E11.42 TYPE 2 DIABETES MELLITUS WITH DIABETIC POLYNEUROPATHY, WITH LONG-TERM CURRENT USE OF INSULIN: ICD-10-CM

## 2024-04-17 DIAGNOSIS — R93.1 ABNORMAL FINDINGS ON DIAGNOSTIC IMAGING OF HEART AND CORONARY CIRCULATION: ICD-10-CM

## 2024-04-17 DIAGNOSIS — R94.39 ABNORMAL NUCLEAR STRESS TEST: ICD-10-CM

## 2024-04-17 DIAGNOSIS — F32.A DEPRESSION, UNSPECIFIED DEPRESSION TYPE: ICD-10-CM

## 2024-04-17 PROCEDURE — 99204 OFFICE O/P NEW MOD 45 MIN: CPT | Mod: S$GLB,,, | Performed by: INTERNAL MEDICINE

## 2024-04-17 PROCEDURE — 3051F HG A1C>EQUAL 7.0%<8.0%: CPT | Mod: CPTII,S$GLB,, | Performed by: INTERNAL MEDICINE

## 2024-04-17 PROCEDURE — 1160F RVW MEDS BY RX/DR IN RCRD: CPT | Mod: CPTII,S$GLB,, | Performed by: INTERNAL MEDICINE

## 2024-04-17 PROCEDURE — 99999 PR PBB SHADOW E&M-EST. PATIENT-LVL V: CPT | Mod: PBBFAC,,, | Performed by: INTERNAL MEDICINE

## 2024-04-17 PROCEDURE — 4010F ACE/ARB THERAPY RXD/TAKEN: CPT | Mod: CPTII,S$GLB,, | Performed by: INTERNAL MEDICINE

## 2024-04-17 PROCEDURE — 1159F MED LIST DOCD IN RCRD: CPT | Mod: CPTII,S$GLB,, | Performed by: INTERNAL MEDICINE

## 2024-04-17 PROCEDURE — 3079F DIAST BP 80-89 MM HG: CPT | Mod: CPTII,S$GLB,, | Performed by: INTERNAL MEDICINE

## 2024-04-17 PROCEDURE — 3077F SYST BP >= 140 MM HG: CPT | Mod: CPTII,S$GLB,, | Performed by: INTERNAL MEDICINE

## 2024-04-17 PROCEDURE — 3008F BODY MASS INDEX DOCD: CPT | Mod: CPTII,S$GLB,, | Performed by: INTERNAL MEDICINE

## 2024-04-17 NOTE — TELEPHONE ENCOUNTER
----- Message from Angelina Castillo sent at 4/17/2024  3:14 PM CDT -----  Contact: Self  Pt is calling in regards to the Cardiac Pet Scan Stress test Dr Mathew ordered. Stated she called both [facility] and [facility] and neither locations perform this test and was told to call [facility], which she was told they only do this for pts with cancer. So she is calling back to see if there was any other type of tests that he can order instead, because she does not want to go to [place] to have this done. Can we please check on this and call pt back to advise at [phone]. Thank You.

## 2024-04-17 NOTE — PROGRESS NOTES
Subjective:    Patient ID:  Liza Spain Cousin is a 53 y.o. female patient here for evaluation Consult      History of Present Illness:  Normal nuclear perfusion imaging, symptomatic with SCHILLING progressive x1 year.  Weight gain over 70 lb in the year.  Ongoing risk factors include past tobacco use, quit last year.  Other risk factors include diabetes mellitus/hypertension/dyslipidemia.    Denies pressure chest pain, progressive SCHILLING only.  No PND orthopnea.  No edema.  No prior history of documented ischemic structural arrhythmic heart disease.             Review of patient's allergies indicates:  No Known Allergies    Past Medical History:   Diagnosis Date    Arthritis     Asthma 2012    Blood transfusion     COPD (chronic obstructive pulmonary disease)     Depression 2012    Diabetes 2012    HTN (hypertension) 2012    LBP (low back pain) 2012    Obesity 2013    Tobacco abuse 07/10/2013     Past Surgical History:   Procedure Laterality Date     SECTION, LOW TRANSVERSE      COLONOSCOPY N/A 2023    Procedure: COLONOSCOPY;  Surgeon: Reggie Posey MD;  Location: [facility];  Service: Endoscopy;  Laterality: N/A;    GANGLION CYST EXCISION Right 2020    Dr. Logan     Social History     Tobacco Use    Smoking status: Former     Current packs/day: 0.00     Average packs/day: 0.5 packs/day for 30.0 years (15.0 ttl pk-yrs)     Types: Cigarettes     Start date: 1985     Quit date: 2015     Years since quittin.3    Smokeless tobacco: Never   Substance Use Topics    Alcohol use: Yes     Alcohol/week: 12.0 standard drinks of alcohol     Types: 12 Cans of beer per week    Drug use: No        Review of Systems:    As noted in HPI in addition         REVIEW OF SYSTEMS  Review of Systems   Constitutional: Negative for decreased appetite, diaphoresis, night sweats, weight gain and weight loss.   HENT:  Negative for nosebleeds and odynophagia.    Eyes:  Negative  for double vision and photophobia.   Cardiovascular:  Negative for chest pain, claudication, cyanosis, dyspnea on exertion, irregular heartbeat, leg swelling, near-syncope, orthopnea, palpitations, paroxysmal nocturnal dyspnea and syncope.   Respiratory:  Negative for cough, hemoptysis, shortness of breath and wheezing.    Hematologic/Lymphatic: Negative for adenopathy.   Skin:  Negative for flushing, skin cancer and suspicious lesions.   Musculoskeletal:  Negative for gout, myalgias and neck pain.   Gastrointestinal:  Negative for abdominal pain, heartburn, hematemesis and hematochezia.   Genitourinary:  Negative for bladder incontinence, hesitancy and nocturia.   Neurological:  Negative for focal weakness, headaches, light-headedness and paresthesias.   Psychiatric/Behavioral:  Negative for memory loss and substance abuse.        Objective:        Vitals:    04/17/24 0936   BP: (!) 159/89   Pulse: 105       Lab Results   Component Value Date    WBC 11.25 08/20/2021    HGB 11.5 (L) 08/20/2021    HCT 35.5 (L) 08/20/2021     08/20/2021    CHOL 162 12/05/2023    TRIG 100 12/05/2023    HDL 44 12/05/2023    ALT 15 04/09/2024    AST 15 04/09/2024     04/09/2024    K 3.9 04/09/2024     04/09/2024    CREATININE 0.8 04/09/2024    BUN 10 04/09/2024    CO2 30 (H) 04/09/2024    TSH 0.806 02/02/2023    HGBA1C 7.4 (H) 04/09/2024    MICROALBUR 0.4 04/12/2016      CARDIOGRAM RESULTS  No results found for this or any previous visit.    No results found for this or any previous visit.          CURRENT/PREVIOUS VISIT EKG  Results for orders placed or performed in visit on 11/20/23   EKG 12-lead    Collection Time: 11/20/23 11:37 AM    Narrative    Test Reason : R06.02,I20.89,    Vent. Rate : 096 BPM     Atrial Rate : 096 BPM     P-R Int : 164 ms          QRS Dur : 096 ms      QT Int : 374 ms       P-R-T Axes : 059 -46 038 degrees     QTc Int : 472 ms    Normal sinus rhythm  Left anterior fascicular block  Abnormal  ECG  When compared with ECG of 20-AUG-2021 23:04,  Left anterior fascicular block is now Present  Confirmed by Roderick CHATTERJEE, Clemente ACOSTA ([id number]) on 11/20/2023 3:17:23 PM    Referred By:             Confirmed By:Clemente Vaughn MD     No valid procedures specified.   Results for orders placed during the hospital encounter of 03/05/24    Nuclear Stress - Cardiology Interpreted    Interpretation Summary    Abnormal myocardial perfusion scan.    There is a moderate intensity, moderate sized, reversible perfusion abnormality that is consistent with ischemia in the inferior wall(s).    There are no other significant perfusion abnormalities.    The gated perfusion images showed an ejection fraction of 68% at rest.    There is normal wall motion at rest.    The ECG portion of the study is negative for ischemia.    The patient reported no chest pain during the stress test.    During stress, rare PVCs are noted.    There are no prior studies for comparison.    Positive stress test.    No valid procedures specified.        PHYSICAL EXAM  GENERAL: well built, well nourished, well-developed in no apparent distress alert and oriented.   HEENT: Normocephalic. Pupils normal and conjunctivae normal.  Mucous membranes normal, no cyanosis or icterus, trachea central,no pallor or icterus is noted..   NECK: No JVD. No bruit..   THYROID: Thyroid not enlarged. No nodules present..   CARDIAC:  Normal S1-S2.  No murmur rub click or gallop.  PMI nondisplaced.  CHEST ANATOMY: normal.   LUNGS: Clear to auscultation. No wheezing or rhonchi..   ABDOMEN: Soft no masses or organomegaly.  No abdomen pulsations or bruits.  Normal bowel sounds. No pulsations and no masses felt, No guarding or rebound.   URINARY: No valdez catheter   EXTREMITIES: No cyanosis, clubbing or edema noted at this time., no calf tenderness bilaterally.   PERIPHERAL VASCULAR SYSTEM: Good palpable distal pulses.  2+ femoral, popliteal and pedal pulses.  No bruits    CENTRAL  NERVOUS SYSTEM: No focal motor or sensory deficits noted.   SKIN: Skin without lesions, moist, well perfused.   MUSCLE STRENGTH & TONE: No noteable weakness, atrophy or abnormal movement.     I HAVE REVIEWED :    The vital signs, nurses notes, and all the pertinent radiology and labs.         Current Outpatient Medications   Medication Instructions    albuterol (ACCUNEB) 0.63 mg, Nebulization, Every 6 hours PRN    albuterol (PROVENTIL/VENTOLIN HFA) 90 mcg/actuation inhaler INHALE 2 PUFFS INTO THE LUNGS EVERY 6 HOURS AS NEEDED    ALPRAZolam (XANAX) 1 mg, Oral, 2 times daily PRN    amLODIPine (NORVASC) 10 mg, Oral, 2 times daily    atorvastatin (LIPITOR) 80 mg, Oral, Nightly    blood sugar diagnostic Strp To check BG 4 times daily, to use with insurance preferred meter. Dx code E11.42    blood-glucose meter kit To check BG 4 times daily, to use with insurance preferred meter. Dx code E11.42    chlorzoxazone (PARAFON FORTE) 500 mg, Oral, 4 times daily PRN    dextroamphetamine-amphetamine 30 mg Tab 30 mg, Oral, 2 times daily    diazePAM (VALIUM) 10 MG Tab TAKE 1 TABLET BY MOUTH 30 MINUTES PRIOR TO PROCEDURE    empagliflozin (JARDIANCE) 25 mg, Oral, Daily    flash glucose scanning reader (FREESTYLE BAKARI 2 READER) [facility] Use for continuous glucose monitoring.    flash glucose sensor (FREESTYLE BAKARI 2 SENSOR) Kit USE FOR CONTINUOUS GLUCOSE MONITORING. CHANGE EVERY 14 DAYS    fluticasone-umeclidin-vilanter (TRELEGY ELLIPTA) 200-62.5-25 mcg inhaler 1 puff, Inhalation, Daily    hydroCHLOROthiazide (HYDRODIURIL) 12.5 mg, Oral    HYDROcodone-acetaminophen (NORCO) 7.5-325 mg per tablet 1 tablet, Oral, Every 12 hours PRN    insulin lispro (HUMALOG KWIKPEN INSULIN) 12 Units, Subcutaneous, 3 times daily before meals, Plus correction scale, max TDD 84u    lancets Misc To check BG 4 times daily, to use with insurance preferred meter. Dx code E11.42    LANTUS SOLOSTAR U-100 INSULIN 14 Units, Subcutaneous, Nightly    losartan (COZAAR)  "100 mg, Oral    MOUNJARO 7.5 mg, Subcutaneous, Every 7 days    mupirocin (BACTROBAN) 2 % ointment Nasal, 2 times daily    oxybutynin (DITROPAN-XL) 10 mg, Oral    pen needle, diabetic (TRUEPLUS PEN NEEDLE) 32 gauge x 5/32" Ndle USE 5 TIMES DAILY WITH INSULIN AND VICTOZA.    promethazine-codeine 6.25-10 mg/5 ml (PHENERGAN WITH CODEINE) 6.25-10 mg/5 mL syrup TAKE 5 ML BY MOUTH TWICE A DAY    sumatriptan (IMITREX) 100 mg, Oral, Once as needed    zolpidem (AMBIEN) 10 mg, Nightly PRN          Assessment:   Progressive SCHILLING in the face of numerous cardiovascular risk factors, poorly controlled.  Quit tobacco 1 year ago.  68 lb weight gain in the last year.    Abnormal nuclear perfusion imaging with small to moderate-sized apical inferoseptal reversible defect, rule out attenuation artifact.    Abnormal baseline EKG with normal sinus rhythm.  Left anterior fascicular block.    Plan:   Suggest follow-up cardiac echo as well as nuclear PET perfusion imaging with regadenoson    Return to clinic          No follow-ups on file.       "

## 2024-04-25 ENCOUNTER — TELEPHONE (OUTPATIENT)
Dept: CARDIOLOGY | Facility: CLINIC | Age: 54
End: 2024-04-25
Payer: COMMERCIAL

## 2024-04-25 ENCOUNTER — HOSPITAL ENCOUNTER (OUTPATIENT)
Dept: CARDIOLOGY | Facility: HOSPITAL | Age: 54
Discharge: HOME | End: 2024-04-25
Attending: INTERNAL MEDICINE
Payer: COMMERCIAL

## 2024-04-25 VITALS — BODY MASS INDEX: 44.89 KG/M2 | HEIGHT: 67 IN | WEIGHT: 286 LBS

## 2024-04-25 DIAGNOSIS — E11.42 TYPE 2 DIABETES MELLITUS WITH DIABETIC POLYNEUROPATHY, WITH LONG-TERM CURRENT USE OF INSULIN: ICD-10-CM

## 2024-04-25 DIAGNOSIS — E66.9 OBESITY (BMI 30-39.9): ICD-10-CM

## 2024-04-25 DIAGNOSIS — J45.41 MODERATE PERSISTENT ASTHMA WITH ACUTE EXACERBATION: ICD-10-CM

## 2024-04-25 DIAGNOSIS — I10 ESSENTIAL HYPERTENSION: ICD-10-CM

## 2024-04-25 DIAGNOSIS — Z79.4 TYPE 2 DIABETES MELLITUS WITH DIABETIC POLYNEUROPATHY, WITH LONG-TERM CURRENT USE OF INSULIN: ICD-10-CM

## 2024-04-25 DIAGNOSIS — R94.39 ABNORMAL NUCLEAR STRESS TEST: ICD-10-CM

## 2024-04-25 PROCEDURE — 93306 TTE W/DOPPLER COMPLETE: CPT | Mod: PO

## 2024-04-25 PROCEDURE — 93306 TTE W/DOPPLER COMPLETE: CPT | Mod: 26,,, | Performed by: INTERNAL MEDICINE

## 2024-04-26 LAB
ASCENDING AORTA: 3.18 CM
AV INDEX (PROSTH): 0.86
AV MEAN GRADIENT: 8 MMHG
AV PEAK GRADIENT: 13 MMHG
AV VALVE AREA BY VELOCITY RATIO: 3.02 CM²
AV VALVE AREA: 3.4 CM²
AV VELOCITY RATIO: 0.77
BSA FOR ECHO PROCEDURE: 2.48 M2
CV ECHO LV RWT: 0.49 CM
DOP CALC AO PEAK VEL: 1.81 M/S
DOP CALC AO VTI: 33.5 CM
DOP CALC LVOT AREA: 3.9 CM2
DOP CALC LVOT DIAMETER: 2.24 CM
DOP CALC LVOT PEAK VEL: 1.39 M/S
DOP CALC LVOT STROKE VOLUME: 113.83 CM3
DOP CALCLVOT PEAK VEL VTI: 28.9 CM
E WAVE DECELERATION TIME: 194.3 MSEC
E/A RATIO: 1.01
E/E' RATIO: 19.17 M/S
ECHO LV POSTERIOR WALL: 1.31 CM (ref 0.6–1.1)
FRACTIONAL SHORTENING: 39 % (ref 28–44)
INTERVENTRICULAR SEPTUM: 1.59 CM (ref 0.6–1.1)
IVRT: 60.89 MSEC
LEFT ATRIUM SIZE: 3.96 CM
LEFT ATRIUM VOLUME INDEX MOD: 26.1 ML/M2
LEFT ATRIUM VOLUME MOD: 61.28 CM3
LEFT INTERNAL DIMENSION IN SYSTOLE: 3.27 CM (ref 2.1–4)
LEFT VENTRICLE DIASTOLIC VOLUME INDEX: 58.42 ML/M2
LEFT VENTRICLE DIASTOLIC VOLUME: 137.29 ML
LEFT VENTRICLE MASS INDEX: 144 G/M2
LEFT VENTRICLE SYSTOLIC VOLUME INDEX: 18.4 ML/M2
LEFT VENTRICLE SYSTOLIC VOLUME: 43.22 ML
LEFT VENTRICULAR INTERNAL DIMENSION IN DIASTOLE: 5.33 CM (ref 3.5–6)
LEFT VENTRICULAR MASS: 338.41 G
LV LATERAL E/E' RATIO: 23 M/S
LV SEPTAL E/E' RATIO: 16.43 M/S
LVOT MG: 4.17 MMHG
LVOT MV: 0.96 CM/S
MV PEAK A VEL: 1.14 M/S
MV PEAK E VEL: 1.15 M/S
MV STENOSIS PRESSURE HALF TIME: 56.35 MS
MV VALVE AREA P 1/2 METHOD: 3.9 CM2
OHS CV RV/LV RATIO: 0.67 CM
PISA TR MAX VEL: 3.04 M/S
PULM VEIN S/D RATIO: 1.08
PV PEAK D VEL: 0.48 M/S
PV PEAK S VEL: 0.52 M/S
RA PRESSURE ESTIMATED: 3 MMHG
RIGHT VENTRICULAR END-DIASTOLIC DIMENSION: 3.56 CM
RIGHT VENTRICULAR LENGTH IN DIASTOLE (APICAL 4-CHAMBER VIEW): 5.75 CM
RV MID DIAMA: 2.47 CM
RV TB RVSP: 6 MMHG
RV TISSUE DOPPLER FREE WALL SYSTOLIC VELOCITY 1 (APICAL 4 CHAMBER VIEW): 20 CM/S
SINUS: 3.19 CM
STJ: 2.82 CM
TDI LATERAL: 0.05 M/S
TDI SEPTAL: 0.07 M/S
TDI: 0.06 M/S
TR MAX PG: 37 MMHG
TRICUSPID ANNULAR PLANE SYSTOLIC EXCURSION: 2.21 CM
TV REST PULMONARY ARTERY PRESSURE: 40 MMHG
Z-SCORE OF LEFT VENTRICULAR DIMENSION IN END DIASTOLE: -6.12
Z-SCORE OF LEFT VENTRICULAR DIMENSION IN END SYSTOLE: -4.68

## 2024-04-30 DIAGNOSIS — R94.39 POSITIVE CARDIAC STRESS TEST: Primary | ICD-10-CM

## 2024-04-30 DIAGNOSIS — R06.09 DOE (DYSPNEA ON EXERTION): ICD-10-CM

## 2024-04-30 RX ORDER — SODIUM CHLORIDE 0.9 % (FLUSH) 0.9 %
10 SYRINGE (ML) INJECTION
Status: DISCONTINUED | OUTPATIENT
Start: 2024-04-30 | End: 2024-06-03 | Stop reason: CLARIF

## 2024-04-30 RX ORDER — SODIUM CHLORIDE 9 MG/ML
INJECTION, SOLUTION INTRAVENOUS ONCE
Status: CANCELLED | OUTPATIENT
Start: 2024-04-30 | End: 2024-04-30

## 2024-04-30 NOTE — PROGRESS NOTES
Angiogram    Arrive for procedure at: [facility] on 6/4/24 at 6 am. Your procedure is scheduled for 8 am.    You will receive a phone call from [facility] Pre-Op Department with further instructions and exact arrival time prior to your scheduled procedure.    Notify the nurse if you are ALLERGIC TO IODINE.    FASTING: You MAY NOT have anything to eat or drink AFTER MIDNIGHT the day before your procedure.     MEDICATIONS: You may take your regular morning medications with water. If there are any medications that you should not take, you will be instructed to hold them for that morning.    CARDIOLOGY PRE-PROCEDURE MEDICATION ORDERS:  ** Please hold any medications that are checked below:    HOLD   # OF DAYS TO HOLD  Short acting insulin   Morning of procedure  Please hold GLP-1 Drugs such as Semiglutide, Ozempic, Wegovy, and Monjaro 1 week prior to procedure.    CONTINUE the Following Medications   Please continue all your other medications    WHAT TO EXPECT:    How long will the procedure take?  The procedure will take an average of 1 - 2 hours to perform.  After the procedure, you will need to lay flat for around 4 - 6 hours to minimize bleeding from the puncture site. If the wrist is accessed you will need to keep your arm still as instructed by the nurse.    When can I go home?  You may be able to be discharged home that same afternoon if there were no complications.  If you have one of the following: balloon; stent; pacemaker or defibrillator procedures, you may spend one night for observation.  Your doctor will determine your discharge based upon your progress.  The results of your procedure will be discussed with you before you are discharged.  Any further testing or procedures will be scheduled for you either before you leave or you will be instructed to call for a future appointment.      TRANSPORTATION:  PLEASE ARRANGE TO HAVE SOMEONE DRIVE YOU HOME FOLLOWING YOUR PROCEDURE, YOU WILL NOT BE  ALLOWED TO DRIVE.

## 2024-05-02 ENCOUNTER — TELEPHONE (OUTPATIENT)
Dept: CARDIOLOGY | Facility: CLINIC | Age: 54
End: 2024-05-02
Payer: COMMERCIAL

## 2024-05-02 NOTE — TELEPHONE ENCOUNTER
----- Message from Rand Orellana sent at 5/2/2024  4:35 PM CDT -----  Regarding: Needs Medical Advice  Contact: patient at [phone]  Type: Needs Medical Advice  Who Called:  patient at [phone]    Additional Information: calling to get the date and time for appointment for procedure. Please call and advise. Thank you

## 2024-05-03 ENCOUNTER — TELEPHONE (OUTPATIENT)
Dept: CARDIOLOGY | Facility: CLINIC | Age: 54
End: 2024-05-03
Payer: COMMERCIAL

## 2024-05-03 NOTE — TELEPHONE ENCOUNTER
----- Message from Angelina Castillo sent at 5/3/2024 11:28 AM CDT -----  Contact: Self  Type:  Patient Returning Call    Who Called:  Patient  Who Left Message for Patient:  Lanesha   Does the patient know what this is regarding?:  yes  Best Call Back Number:  [phone]  Additional Information:  Thank You

## 2024-05-03 NOTE — TELEPHONE ENCOUNTER
----- Message from Melyssa Donato sent at 5/3/2024 12:02 PM CDT -----  Contact: Self  Type:  Patient Returning Call    Who Called:  Self  Who Left Message for Patient:  Quin  Does the patient know what this is regarding?:  She set a date for her surgery and was going over pre-op/procedure details she thinks it was about that earlier.  Best Call Back Number:  [phone]  Additional Information:  Please call the patient back at the phone number listed above to advise. Thank you!

## 2024-05-03 NOTE — TELEPHONE ENCOUNTER
Pt called to get additional information regarding medication and instructions for procedures. I reviewed instructions with pt. Pt JOSHUA and satisfied.

## 2024-05-20 DIAGNOSIS — E11.42 TYPE 2 DIABETES MELLITUS WITH DIABETIC POLYNEUROPATHY, WITH LONG-TERM CURRENT USE OF INSULIN: ICD-10-CM

## 2024-05-20 DIAGNOSIS — Z79.4 TYPE 2 DIABETES MELLITUS WITH DIABETIC POLYNEUROPATHY, WITH LONG-TERM CURRENT USE OF INSULIN: ICD-10-CM

## 2024-05-20 NOTE — TELEPHONE ENCOUNTER
No care due was identified.  [facility] Embedded Care Due Messages. Reference number: [id number].   5/20/2024 3:42:37 PM CDT

## 2024-05-21 RX ORDER — TIRZEPATIDE 7.5 MG/.5ML
7.5 INJECTION, SOLUTION SUBCUTANEOUS
Qty: 6 ML | Refills: 1 | Status: SHIPPED | OUTPATIENT
Start: 2024-05-21

## 2024-05-29 ENCOUNTER — TELEPHONE (OUTPATIENT)
Dept: CARDIOLOGY | Facility: CLINIC | Age: 54
End: 2024-05-29
Payer: COMMERCIAL

## 2024-05-29 NOTE — TELEPHONE ENCOUNTER
----- Message from Cookie Blocklin sent at 5/29/2024 10:40 AM CDT -----  Type: Needs Medical Advice  Who Called:  pt  Best Call Back Number: [phone]  Additional Information: pt is calling in regards to needing to speak to the office about her upcoming procedure. Pt says she does not want to wait until the day of to speak to someone. Please call back and advise. Thanks!

## 2024-06-20 ENCOUNTER — OFFICE VISIT (OUTPATIENT)
Dept: FAMILY MEDICINE | Facility: CLINIC | Age: 54
End: 2024-06-20
Payer: COMMERCIAL

## 2024-06-20 VITALS
BODY MASS INDEX: 39.28 KG/M2 | SYSTOLIC BLOOD PRESSURE: 138 MMHG | HEIGHT: 67 IN | WEIGHT: 250.25 LBS | DIASTOLIC BLOOD PRESSURE: 80 MMHG | OXYGEN SATURATION: 96 % | HEART RATE: 89 BPM

## 2024-06-20 DIAGNOSIS — I10 ESSENTIAL HYPERTENSION: ICD-10-CM

## 2024-06-20 DIAGNOSIS — R94.39 ABNORMAL NUCLEAR STRESS TEST: ICD-10-CM

## 2024-06-20 DIAGNOSIS — E11.42 TYPE 2 DIABETES MELLITUS WITH DIABETIC POLYNEUROPATHY, WITH LONG-TERM CURRENT USE OF INSULIN: Primary | ICD-10-CM

## 2024-06-20 DIAGNOSIS — Z79.4 TYPE 2 DIABETES MELLITUS WITH DIABETIC POLYNEUROPATHY, WITH LONG-TERM CURRENT USE OF INSULIN: Primary | ICD-10-CM

## 2024-06-20 PROCEDURE — 99999 PR PBB SHADOW E&M-EST. PATIENT-LVL V: CPT | Mod: PBBFAC,,, | Performed by: FAMILY MEDICINE

## 2024-06-20 NOTE — PROGRESS NOTES
"   THIS DOCUMENT WAS MADE IN PART WITH VOICE RECOGNITION SOFTWARE.  OCCASIONALLY THIS SOFTWARE WILL MISINTERPRET WORDS OR PHRASES.    Assessment and Plan:    1. Type 2 diabetes mellitus with diabetic polyneuropathy, with long-term current use of insulin  Lipid Panel      2. Essential hypertension        3. Abnormal nuclear stress test            PLAN      Patient left heart catheterization unremarkable with the exception of some luminal abnormalities of the LAD   Given these findings will be aggressive with comorbidity modification   Currently LDL at goal, A1c at goal, normotensive   No longer using tobacco   Continues losing weight on G LP medication   Keep up the good work     Follow-up in 6 months    Upcoming Pap smear            ______________________________________________________________________  Subjective:    Chief Complaint:  Chief Complaint   Patient presents with    Follow-up        HPI:  Liza is a 53 y.o. year old       Follow-up exertional shortness a breath   Order nuclear stress test at prior visit   Nuclear stress test positive for reversible ischemia   Had subsequent left heart catheterization revealing some luminal irregularities in the LAD, the rest of the heart catheterization was unremarkable   No interventions required  Maintain follow-up with Cardiology  Reports exertional dyspnea mostly resolved    Follow-up diabetes   Continues Mounjaro   Has lost significant amount of weight, down proximally 50 lb from weight maximum   Tolerating medicine without negative side effect  Recent A1c at goal    Follow-up cervical cancer screening  Has appointment scheduled in the near future      Depression / Anxiety  Rx : Xanax 1 mg TID    CAD / Dyslipidemia  Rx- lipitor 80 mg   LHC : 6/ 2024 : "LUMINAL IRREG OF LAD" , No intervention      Insomnia   Rx: Ambien 10 mg     Allergic rhinitis  No current medications      Moderate persistent asthma + nicotine dependence-smoking  rx :  Albuterol, Trelegy, " promethazine-codeine cough syrup, benzonatate  Prescribed codeine cough syrup by nurse practitioner in [facility] routinely  Still smoking     Essential hypertension  Rx-amlodipine 10 mg + Losartan 100 + HCTZ 12.5   Home BP : 130/80     Binge Eating  rx : Adderall 30 mg BID  Prescriber : Alissa Bowen NP        Migraine headaches  Abortive-sumatriptan 100 mg as needed  HA occur about every 3 months      Type 2 diabetes mellitus complicated by diabetic polyneuropathy  rx : Lantus 18u daily, Novolog 14u TID with meals, Jardiance 25 mg , Mounjaro 7.5 mg  Previous A1c- 7.6 percent  Time in Target : 92% for last 2 weeks      Chronic low back pain  Rx-hydrocodone 7.5 mg b.i.d., chlorzoxazone 500 mg QID prn  Prescribed by previous primary care physician     Overactive bladder + Stress incont.   Prev Rx-oxybutynin   Medication working well      The 10-year ASCVD risk score (Haritha DK, et al., 2019) is: 13.7%    Values used to calculate the score:      Age: 53 years      Sex: Female      Is Non- : Yes      Diabetic: Yes      Tobacco smoker: No      Systolic Blood Pressure: 138 mmHg      Is BP treated: Yes      HDL Cholesterol: 44 mg/dL      Total Cholesterol: 162 mg/dL    Past Medical History:  Past Medical History:   Diagnosis Date    Anticoagulant long-term use     Arthritis     Asthma 2012    Blood transfusion     COPD (chronic obstructive pulmonary disease)     Depression 2012    Diabetes 2012    HTN (hypertension) 2012    LBP (low back pain) 2012    Obesity 2013    Tobacco abuse 07/10/2013       Past Surgical History:  Past Surgical History:   Procedure Laterality Date    ANGIOGRAM, CORONARY, WITH LEFT HEART CATHETERIZATION  2024    Procedure: Left Heart Cath;  Surgeon: Cayetano Mathew MD;  Location: [facility] CATH;  Service: Cardiology;;     SECTION, LOW TRANSVERSE      COLONOSCOPY N/A 2023    Procedure: COLONOSCOPY;  Surgeon: Taty  Reggie ACOSTA MD;  Location: [facility] ENDO;  Service: Endoscopy;  Laterality: N/A;    GANGLION CYST EXCISION Right 2020    Dr. Logan       Family History:  Family History   Problem Relation Name Age of Onset    Diabetes Mother      Breast cancer Mother      Breast cancer Maternal Aunt      Depression Maternal Grandmother          bipolar had to be hospitalized    Allergic rhinitis Neg Hx      Allergies Neg Hx      Angioedema Neg Hx      Asthma Neg Hx      Atopy Neg Hx      Eczema Neg Hx      Immunodeficiency Neg Hx      Rhinitis Neg Hx      Urticaria Neg Hx         Social History:  Social History     Socioeconomic History    Marital status: Single   Tobacco Use    Smoking status: Former     Current packs/day: 0.00     Average packs/day: 0.5 packs/day for 30.0 years (15.0 ttl pk-yrs)     Types: Cigarettes     Start date: 1985     Quit date: 2015     Years since quittin.4     Passive exposure: Past    Smokeless tobacco: Never    Tobacco comments:     Quit 2023   Substance and Sexual Activity    Alcohol use: Not Currently     Comment: drink wine cooler    Drug use: No    Sexual activity: Yes     Partners: Male     Birth control/protection: None       Medications:  Current Outpatient Medications on File Prior to Visit   Medication Sig Dispense Refill    albuterol (ACCUNEB) 0.63 mg/3 mL Nebu Take 3 mLs (0.63 mg total) by nebulization every 6 (six) hours as needed (wheezing). 180 mL 3    albuterol (PROVENTIL/VENTOLIN HFA) 90 mcg/actuation inhaler INHALE 2 PUFFS INTO THE LUNGS EVERY 6 HOURS AS NEEDED (Patient taking differently: Inhale 2 puffs into the lungs every 4 (four) hours as needed. INHALE 2 PUFFS INTO THE LUNGS EVERY 6 HOURS AS NEEDED) 25.5 g 3    ALPRAZolam (XANAX) 1 MG tablet Take 1 mg by mouth 2 (two) times daily as needed for Anxiety.      amLODIPine (NORVASC) 10 MG tablet Take 1 tablet (10 mg total) by mouth 2 (two) times daily. (Patient taking differently: Take 10 mg by mouth 2 (two)  times daily.)      aspirin (ECOTRIN) 81 MG EC tablet Take 81 mg by mouth once daily.      atorvastatin (LIPITOR) 80 MG tablet Take 1 tablet (80 mg total) by mouth every evening. 90 tablet 3    blood sugar diagnostic Strp To check BG 4 times daily, to use with insurance preferred meter. Dx code E11.42 150 each 6    blood-glucose meter kit To check BG 4 times daily, to use with insurance preferred meter. Dx code E11.42 1 each 0    chlorzoxazone (PARAFON FORTE) 500 mg Tab Take 1 tablet (500 mg total) by mouth 4 (four) times daily as needed (muscle spasm). 180 tablet 3    dextroamphetamine-amphetamine 30 mg Tab Take 30 mg by mouth 2 (two) times a day.       empagliflozin (JARDIANCE) 25 mg tablet Take 1 tablet (25 mg total) by mouth once daily. (Patient taking differently: Take 25 mg by mouth nightly.) 30 tablet 6    flash glucose scanning reader (FREESTYLE BAKARI 2 READER) [facility] Use for continuous glucose monitoring. 1 each 0    flash glucose sensor (FREESTYLE BAKARI 2 SENSOR) Kit USE FOR CONTINUOUS GLUCOSE MONITORING. CHANGE EVERY 14 DAYS 2 kit 11    fluticasone-umeclidin-vilanter (TRELEGY ELLIPTA) 200-62.5-25 mcg inhaler Inhale 1 puff into the lungs once daily. 60 each 11    hydroCHLOROthiazide (HYDRODIURIL) 12.5 MG Tab Take 12.5 mg by mouth once daily.      HYDROcodone-acetaminophen (NORCO) 7.5-325 mg per tablet Take 1 tablet by mouth every 12 (twelve) hours as needed for Pain. 60 tablet 0    insulin (LANTUS SOLOSTAR U-100 INSULIN) glargine 100 units/mL SubQ pen Inject 14 Units into the skin every evening.      insulin lispro (HUMALOG KWIKPEN INSULIN) 100 unit/mL pen Inject 12 Units into the skin 3 (three) times daily before meals. Plus correction scale, max TDD 84u      lancets [facility] To check BG 4 times daily, to use with insurance preferred meter. Dx code E11.42 150 each 6    losartan (COZAAR) 100 MG tablet Take 100 mg by mouth nightly.      oxybutynin (DITROPAN-XL) 10 MG 24 hr tablet Take 10 mg by mouth once daily.    "   pen needle, diabetic (TRUEPLUS PEN NEEDLE) 32 gauge x 5/32" Ndle USE 5 TIMES DAILY WITH INSULIN AND VICTOZA. 150 each 6    sumatriptan (IMITREX) 100 MG tablet Take 1 tablet (100 mg total) by mouth once as needed for Migraine. 6 tablet 5    tirzepatide (MOUNJARO) 7.5 mg/0.5 mL PnIj INJECT 7.5 MG INTO THE SKIN EVERY 7 DAYS. (Patient taking differently: Inject 7.5 mg into the skin every Monday.) 6 mL 1    zolpidem (AMBIEN) 10 mg Tab Take 10 mg by mouth nightly as needed.  1    promethazine-codeine 6.25-10 mg/5 ml (PHENERGAN WITH CODEINE) 6.25-10 mg/5 mL syrup Take 5 mLs by mouth every 6 (six) hours as needed. (Patient not taking: Reported on 6/20/2024)       No current facility-administered medications on file prior to visit.       Allergies:  Patient has no known allergies.    Immunizations:  Immunization History   Administered Date(s) Administered    COVID-19, MRNA, LN-S, PF (Pfizer) (Purple Cap) 04/22/2021, 05/13/2021, 12/11/2021    DTaP 1970, 1970, 01/20/1971, 03/15/1972    Influenza - Quadrivalent 10/03/2020    Influenza - Quadrivalent - PF *Preferred* (6 months and older) 10/31/2018, 11/13/2019, 09/16/2021, 10/04/2023    Influenza - Trivalent (ADULT) 10/04/2023    MMR 11/17/1977    Measles / Rubella 01/19/1972    OPV 1970, 01/20/1971, 03/15/1972, 03/21/1974, 04/17/1975, 11/17/1977    Pneumococcal Conjugate - 13 Valent 06/12/2017    Pneumococcal Polysaccharide - 23 Valent 11/13/2019    Td (ADULT) 04/17/1975, 11/17/1977, 07/11/1984, 09/18/2009    Tdap 10/18/2017    Zoster Recombinant 06/16/2021, 09/22/2021       Review of Systems:  Review of Systems   All other systems reviewed and are negative.      Objective:    Vitals:  Vitals:    06/20/24 1358   BP: 138/80   Pulse: 89   SpO2: 96%   Weight: 113.5 kg (250 lb 3.6 oz)   Height: 5' 6.93" (1.7 m)   PainSc:   4       Physical Exam  Vitals reviewed.   Constitutional:       General: She is not in acute distress.  HENT:      Head: Normocephalic and " atraumatic.   Eyes:      Pupils: Pupils are equal, round, and reactive to light.   Cardiovascular:      Rate and Rhythm: Normal rate and regular rhythm.      Heart sounds: No murmur heard.     No friction rub.   Pulmonary:      Effort: Pulmonary effort is normal.      Breath sounds: Normal breath sounds.   Abdominal:      General: Bowel sounds are normal. There is no distension.      Palpations: Abdomen is soft.      Tenderness: There is no abdominal tenderness.   Musculoskeletal:      Cervical back: Neck supple.   Skin:     General: Skin is warm and dry.      Findings: No rash.   Psychiatric:         Behavior: Behavior normal.             oCdy Medrano MD  Family Medicine

## 2024-07-05 DIAGNOSIS — R80.9 TYPE 2 DIABETES MELLITUS WITH MICROALBUMINURIA, WITH LONG-TERM CURRENT USE OF INSULIN: ICD-10-CM

## 2024-07-05 DIAGNOSIS — E11.29 TYPE 2 DIABETES MELLITUS WITH MICROALBUMINURIA, WITH LONG-TERM CURRENT USE OF INSULIN: ICD-10-CM

## 2024-07-05 DIAGNOSIS — Z79.4 TYPE 2 DIABETES MELLITUS WITH MICROALBUMINURIA, WITH LONG-TERM CURRENT USE OF INSULIN: ICD-10-CM

## 2024-07-05 RX ORDER — INSULIN LISPRO 100 [IU]/ML
12 INJECTION, SOLUTION INTRAVENOUS; SUBCUTANEOUS
Qty: 30 ML | Refills: 11 | Status: SHIPPED | OUTPATIENT
Start: 2024-07-05 | End: 2024-07-05 | Stop reason: SDUPTHER

## 2024-07-05 RX ORDER — INSULIN LISPRO 100 [IU]/ML
12 INJECTION, SOLUTION INTRAVENOUS; SUBCUTANEOUS
Qty: 30 ML | Refills: 11 | Status: SHIPPED | OUTPATIENT
Start: 2024-07-05

## 2024-07-18 ENCOUNTER — OFFICE VISIT (OUTPATIENT)
Dept: CARDIOLOGY | Facility: CLINIC | Age: 54
End: 2024-07-18
Payer: COMMERCIAL

## 2024-07-18 VITALS
HEART RATE: 97 BPM | WEIGHT: 289.88 LBS | BODY MASS INDEX: 45.5 KG/M2 | HEIGHT: 67 IN | DIASTOLIC BLOOD PRESSURE: 88 MMHG | SYSTOLIC BLOOD PRESSURE: 152 MMHG

## 2024-07-18 DIAGNOSIS — Z79.4 TYPE 2 DIABETES MELLITUS WITH DIABETIC POLYNEUROPATHY, WITH LONG-TERM CURRENT USE OF INSULIN: ICD-10-CM

## 2024-07-18 DIAGNOSIS — E66.01 MORBID OBESITY: Primary | ICD-10-CM

## 2024-07-18 DIAGNOSIS — R94.39 ABNORMAL NUCLEAR STRESS TEST: ICD-10-CM

## 2024-07-18 DIAGNOSIS — E11.42 TYPE 2 DIABETES MELLITUS WITH DIABETIC POLYNEUROPATHY, WITH LONG-TERM CURRENT USE OF INSULIN: ICD-10-CM

## 2024-07-18 DIAGNOSIS — E78.2 MIXED HYPERLIPIDEMIA: ICD-10-CM

## 2024-07-18 DIAGNOSIS — I10 ESSENTIAL HYPERTENSION: ICD-10-CM

## 2024-07-18 PROCEDURE — 3051F HG A1C>EQUAL 7.0%<8.0%: CPT | Mod: CPTII,S$GLB,, | Performed by: INTERNAL MEDICINE

## 2024-07-18 PROCEDURE — 4010F ACE/ARB THERAPY RXD/TAKEN: CPT | Mod: CPTII,S$GLB,, | Performed by: INTERNAL MEDICINE

## 2024-07-18 PROCEDURE — 99999 PR PBB SHADOW E&M-EST. PATIENT-LVL IV: CPT | Mod: PBBFAC,,, | Performed by: INTERNAL MEDICINE

## 2024-07-18 PROCEDURE — 3079F DIAST BP 80-89 MM HG: CPT | Mod: CPTII,S$GLB,, | Performed by: INTERNAL MEDICINE

## 2024-07-18 PROCEDURE — 1160F RVW MEDS BY RX/DR IN RCRD: CPT | Mod: CPTII,S$GLB,, | Performed by: INTERNAL MEDICINE

## 2024-07-18 PROCEDURE — 1159F MED LIST DOCD IN RCRD: CPT | Mod: CPTII,S$GLB,, | Performed by: INTERNAL MEDICINE

## 2024-07-18 PROCEDURE — 3008F BODY MASS INDEX DOCD: CPT | Mod: CPTII,S$GLB,, | Performed by: INTERNAL MEDICINE

## 2024-07-18 PROCEDURE — 99214 OFFICE O/P EST MOD 30 MIN: CPT | Mod: S$GLB,,, | Performed by: INTERNAL MEDICINE

## 2024-07-18 PROCEDURE — 3077F SYST BP >= 140 MM HG: CPT | Mod: CPTII,S$GLB,, | Performed by: INTERNAL MEDICINE

## 2024-07-18 NOTE — PROGRESS NOTES
Subjective:    Patient ID:  Liza Spain Cousin is a 53 y.o. female patient here for evaluation Follow-up      History of Present Illness:.  Dyspnea.  Patient underwent left heart catheterization 2024 with nonobstructive CAD.  Normal EF.  PA pressure is elevated at 70.  No major weight changes.  Has since quit tobacco, alcohol.    Ongoing problems with diabetes mellitus, hypertension, dyslipidemia.    Stable SCHILLING.  No angina.  No PND orthopnea.  Lower extremity edema without change.             Review of patient's allergies indicates:  No Known Allergies    Past Medical History:   Diagnosis Date    Anticoagulant long-term use     Arthritis     Asthma 2012    Blood transfusion     COPD (chronic obstructive pulmonary disease)     Depression 2012    Diabetes 2012    HTN (hypertension) 2012    LBP (low back pain) 2012    Obesity 2013    Tobacco abuse 07/10/2013     Past Surgical History:   Procedure Laterality Date    ANGIOGRAM, CORONARY, WITH LEFT HEART CATHETERIZATION  2024    Procedure: Left Heart Cath;  Surgeon: Cayetano Mathew MD;  Location: [facility] CATH;  Service: Cardiology;;     SECTION, LOW TRANSVERSE      COLONOSCOPY N/A 2023    Procedure: COLONOSCOPY;  Surgeon: Reggie Posey MD;  Location: [facility] ENDO;  Service: Endoscopy;  Laterality: N/A;    GANGLION CYST EXCISION Right 2020    Dr. Logan     Social History     Tobacco Use    Smoking status: Former     Current packs/day: 0.00     Average packs/day: 0.5 packs/day for 30.0 years (15.0 ttl pk-yrs)     Types: Cigarettes     Start date: 1985     Quit date: 2015     Years since quittin.5     Passive exposure: Past    Smokeless tobacco: Never    Tobacco comments:     Quit 2023   Substance Use Topics    Alcohol use: Not Currently     Comment: drink wine cooler    Drug use: No        Review of Systems:    As noted in HPI in addition      REVIEW OF SYSTEMS  Review of Systems    Constitutional: Negative for decreased appetite, diaphoresis, night sweats, weight gain and weight loss.   HENT:  Negative for nosebleeds and odynophagia.    Eyes:  Negative for double vision and photophobia.   Cardiovascular:  Positive for leg swelling. Negative for chest pain, claudication, cyanosis, dyspnea on exertion, irregular heartbeat, near-syncope, orthopnea, palpitations, paroxysmal nocturnal dyspnea and syncope.   Respiratory:  Negative for cough, hemoptysis, shortness of breath and wheezing.    Hematologic/Lymphatic: Negative for adenopathy.   Skin:  Negative for flushing, skin cancer and suspicious lesions.   Musculoskeletal:  Negative for gout, myalgias and neck pain.   Gastrointestinal:  Negative for abdominal pain, heartburn, hematemesis and hematochezia.   Genitourinary:  Negative for bladder incontinence, hesitancy and nocturia.   Neurological:  Negative for focal weakness, headaches, light-headedness and paresthesias.   Psychiatric/Behavioral:  Negative for memory loss and substance abuse.               Objective:        Vitals:    07/18/24 1156   BP: (!) 152/88   Pulse: 97       Lab Results   Component Value Date    WBC 6.22 06/04/2024    HGB 12.8 06/04/2024    HCT 42.2 06/04/2024     06/04/2024    CHOL 162 12/05/2023    TRIG 100 12/05/2023    HDL 44 12/05/2023    ALT 15 04/09/2024    AST 15 04/09/2024     06/04/2024    K 3.6 06/04/2024     06/04/2024    CREATININE 0.96 06/04/2024    BUN 19 (H) 06/04/2024    CO2 30 06/04/2024    TSH 0.806 02/02/2023    HGBA1C 7.4 (H) 04/09/2024    MICROALBUR 0.4 04/12/2016        ECHOCARDIOGRAM RESULTS  Results for orders placed during the hospital encounter of 04/25/24    Echo    Interpretation Summary    Left Ventricle: The left ventricle is normal in size. Moderately increased wall thickness. There is normal systolic function with a visually estimated ejection fraction of 60 - 65%. Grade I diastolic dysfunction.    Right Ventricle: Normal  right ventricular cavity size. Wall thickness is normal. Systolic function is normal.    Left Atrium: Left atrium is dilated.    Mitral Valve: Moderately calcified posterior leaflet.    Pulmonary Artery: There is mild pulmonary hypertension. The estimated pulmonary artery systolic pressure is 40 mmHg.    IVC/SVC: Normal venous pressure at 3 mmHg.    Results for orders placed during the hospital encounter of 06/04/24    Cardiac catheterization    Conclusion    The ejection fraction was calculated to be 75%.    The pre-procedure left ventricular end diastolic pressure was 24.    The post-procedure left ventricular end diastolic pressure was 24.    The estimated blood loss was none.    Nonobstructive disease involving the LAD with mid segment moderate systolic bridging.  Elevated LVEDP of 24.    The procedure log was documented by Documenter: Collin Feldman RN and verified by Cayetano Mathew MD.    Date: 6/4/2024  Time: 12:26 PM          CURRENT/PREVIOUS VISIT EKG  Results for orders placed or performed during the hospital encounter of 06/04/24   EKG 12-lead    Collection Time: 06/04/24  7:54 AM   Result Value Ref Range    QRS Duration 106 ms    OHS QTC Calculation 471 ms    Narrative    Test Reason : R94.39,    Vent. Rate : 088 BPM     Atrial Rate : 088 BPM     P-R Int : 164 ms          QRS Dur : 106 ms      QT Int : 390 ms       P-R-T Axes : 045 -61 034 degrees     QTc Int : 471 ms    Normal sinus rhythm  Left anterior fascicular block  Minimal voltage criteria for LVH, may be normal variant ( Lee product )  Abnormal ECG  When compared with ECG of 05-MAR-2024 14:07,  Previous ECG has undetermined rhythm, needs review  Criteria for Septal infarct are no longer Present  Non-specific change in ST segment in Anterior leads  Nonspecific T wave abnormality now evident in Inferior leads  Confirmed by Lizandro CHATTERJEE, Cayetano ACOSTA ([id number]) on 6/4/2024 1:10:04 PM    Referred By:  LIZANDRO           Confirmed By:Cayetano Mathew  MD     No valid procedures specified.   Results for orders placed during the hospital encounter of 03/05/24    Nuclear Stress - Cardiology Interpreted    Interpretation Summary    Abnormal myocardial perfusion scan.    There is a moderate intensity, moderate sized, reversible perfusion abnormality that is consistent with ischemia in the inferior wall(s).    There are no other significant perfusion abnormalities.    The gated perfusion images showed an ejection fraction of 68% at rest.    There is normal wall motion at rest.    The ECG portion of the study is negative for ischemia.    The patient reported no chest pain during the stress test.    During stress, rare PVCs are noted.    There are no prior studies for comparison.    Positive stress test.    No valid procedures specified.    PHYSICAL EXAM  GENERAL:  no apparent distress alert and oriented.   HEENT: Normocephalic. Pupils normal and conjunctivae normal.  Mucous membranes normal, no cyanosis or icterus, trachea central,no pallor or icterus is noted..   NECK: No JVD. No bruit..   THYROID: Thyroid not enlarged. No nodules present..   CARDIAC:  Normal S1-S2.  No murmur rub click or gallop.  PMI nondisplaced.  LUNGS: Clear to auscultation. No wheezing or rhonchi..   ABDOMEN: Soft no masses or organomegaly.  No abdomen pulsations or bruits.  Normal bowel sounds. No pulsations and no masses felt, No guarding or rebound.   URINARY: No valdez catheter   EXTREMITIES: No cyanosis, clubbing or edema noted at this time., no calf tenderness bilaterally.   PERIPHERAL VASCULAR SYSTEM: Good palpable distal pulses.  2+ femoral, popliteal and pedal pulses.  No bruits    CENTRAL NERVOUS SYSTEM: No focal motor or sensory deficits noted.   SKIN: Skin without lesions, moist, well perfused.   MUSCLE STRENGTH & TONE: No noteable weakness, atrophy or abnormal movement    I HAVE REVIEWED :    The vital signs, nurses notes, and all the pertinent radiology and labs.         Current  "Outpatient Medications   Medication Instructions    albuterol (ACCUNEB) 0.63 mg, Nebulization, Every 6 hours PRN    albuterol (PROVENTIL/VENTOLIN HFA) 90 mcg/actuation inhaler INHALE 2 PUFFS INTO THE LUNGS EVERY 6 HOURS AS NEEDED    ALPRAZolam (XANAX) 1 mg, Oral, 2 times daily PRN    amLODIPine (NORVASC) 10 mg, Oral, 2 times daily    aspirin (ECOTRIN) 81 mg, Oral, Daily    atorvastatin (LIPITOR) 80 mg, Oral, Nightly    blood sugar diagnostic Strp To check BG 4 times daily, to use with insurance preferred meter. Dx code E11.42    blood-glucose meter kit To check BG 4 times daily, to use with insurance preferred meter. Dx code E11.42    chlorzoxazone (PARAFON FORTE) 500 mg, Oral, 4 times daily PRN    dextroamphetamine-amphetamine 30 mg Tab 30 mg, Oral, 2 times daily    empagliflozin (JARDIANCE) 25 mg, Oral, Daily    flash glucose scanning reader (FREESTYLE BAKARI 2 READER) [facility] Use for continuous glucose monitoring.    flash glucose sensor (FREESTYLE BAKARI 2 SENSOR) Kit USE FOR CONTINUOUS GLUCOSE MONITORING. CHANGE EVERY 14 DAYS    fluticasone-umeclidin-vilanter (TRELEGY ELLIPTA) 200-62.5-25 mcg inhaler 1 puff, Inhalation, Daily    hydroCHLOROthiazide (HYDRODIURIL) 12.5 mg, Oral, Daily    HYDROcodone-acetaminophen (NORCO) 7.5-325 mg per tablet 1 tablet, Oral, Every 12 hours PRN    insulin lispro (HUMALOG KWIKPEN INSULIN) 12 Units, Subcutaneous, 3 times daily before meals, Plus correction scale, max TDD 66u    lancets [facility] To check BG 4 times daily, to use with insurance preferred meter. Dx code E11.42    LANTUS SOLOSTAR U-100 INSULIN 14 Units, Subcutaneous, Nightly    losartan (COZAAR) 100 mg, Oral, Nightly    MOUNJARO 7.5 mg, Subcutaneous, Every 7 days    oxybutynin (DITROPAN-XL) 10 mg, Oral, Daily    pen needle, diabetic (TRUEPLUS PEN NEEDLE) 32 gauge x 5/32" Ndle USE 5 TIMES DAILY WITH INSULIN AND VICTOZA.    promethazine-codeine 6.25-10 mg/5 ml (PHENERGAN WITH CODEINE) 6.25-10 mg/5 mL syrup 5 mLs, Every 6 hours PRN "    sumatriptan (IMITREX) 100 mg, Oral, Once as needed    zolpidem (AMBIEN) 10 mg, Oral, Nightly PRN          Assessment:   Hypertension, diabetes mellitus, dyslipidemia, past tobacco use.  Positive family history.    Left heart catheterization with nonobstructive CAD 06/2024.  Elevated left ventricular end-diastolic pressure at 24.  Echo EF normal with mild PA systolic hypertension at 40.        Plan:     Loss, diet, exercise.  Exam review systems otherwise stable.  Meds reviewed and reconciled.  Recommend no changes.  Labs primary care.  Yearly follow-up cardiology.        No follow-ups on file.

## 2024-08-05 DIAGNOSIS — R80.9 TYPE 2 DIABETES MELLITUS WITH MICROALBUMINURIA, WITH LONG-TERM CURRENT USE OF INSULIN: ICD-10-CM

## 2024-08-05 DIAGNOSIS — Z79.4 TYPE 2 DIABETES MELLITUS WITH MICROALBUMINURIA, WITH LONG-TERM CURRENT USE OF INSULIN: ICD-10-CM

## 2024-08-05 DIAGNOSIS — E11.29 TYPE 2 DIABETES MELLITUS WITH MICROALBUMINURIA, WITH LONG-TERM CURRENT USE OF INSULIN: ICD-10-CM

## 2024-08-05 RX ORDER — INSULIN GLARGINE 100 [IU]/ML
32 INJECTION, SOLUTION SUBCUTANEOUS
Qty: 15 ML | Refills: 6 | Status: SHIPPED | OUTPATIENT
Start: 2024-08-05

## 2024-08-09 DIAGNOSIS — Z79.4 TYPE 2 DIABETES MELLITUS WITH DIABETIC POLYNEUROPATHY, WITH LONG-TERM CURRENT USE OF INSULIN: ICD-10-CM

## 2024-08-09 DIAGNOSIS — E11.42 TYPE 2 DIABETES MELLITUS WITH DIABETIC POLYNEUROPATHY, WITH LONG-TERM CURRENT USE OF INSULIN: ICD-10-CM

## 2024-08-09 RX ORDER — TIRZEPATIDE 7.5 MG/.5ML
7.5 INJECTION, SOLUTION SUBCUTANEOUS
Qty: 6 ML | Refills: 0 | Status: SHIPPED | OUTPATIENT
Start: 2024-08-09

## 2024-08-14 ENCOUNTER — LAB VISIT (OUTPATIENT)
Dept: LAB | Facility: HOSPITAL | Age: 54
End: 2024-08-14
Payer: COMMERCIAL

## 2024-08-14 DIAGNOSIS — R80.9 TYPE 2 DIABETES MELLITUS WITH MICROALBUMINURIA, WITH LONG-TERM CURRENT USE OF INSULIN: ICD-10-CM

## 2024-08-14 DIAGNOSIS — E11.29 TYPE 2 DIABETES MELLITUS WITH MICROALBUMINURIA, WITH LONG-TERM CURRENT USE OF INSULIN: ICD-10-CM

## 2024-08-14 DIAGNOSIS — Z79.4 TYPE 2 DIABETES MELLITUS WITH DIABETIC POLYNEUROPATHY, WITH LONG-TERM CURRENT USE OF INSULIN: ICD-10-CM

## 2024-08-14 DIAGNOSIS — Z79.4 TYPE 2 DIABETES MELLITUS WITH MICROALBUMINURIA, WITH LONG-TERM CURRENT USE OF INSULIN: ICD-10-CM

## 2024-08-14 DIAGNOSIS — E11.42 TYPE 2 DIABETES MELLITUS WITH DIABETIC POLYNEUROPATHY, WITH LONG-TERM CURRENT USE OF INSULIN: ICD-10-CM

## 2024-08-14 LAB
ALBUMIN/CREAT UR: 6.3 UG/MG (ref 0–30)
ANION GAP SERPL CALC-SCNC: 8 MMOL/L (ref 8–16)
BUN SERPL-MCNC: 15 MG/DL (ref 6–20)
CALCIUM SERPL-MCNC: 9.1 MG/DL (ref 8.7–10.5)
CHLORIDE SERPL-SCNC: 100 MMOL/L (ref 95–110)
CHOLEST SERPL-MCNC: 165 MG/DL (ref 120–199)
CHOLEST/HDLC SERPL: 3.9 {RATIO} (ref 2–5)
CO2 SERPL-SCNC: 29 MMOL/L (ref 23–29)
CREAT SERPL-MCNC: 1 MG/DL (ref 0.5–1.4)
CREAT UR-MCNC: 175 MG/DL (ref 15–325)
EST. GFR  (NO RACE VARIABLE): >60 ML/MIN/1.73 M^2
ESTIMATED AVG GLUCOSE: 154 MG/DL (ref 68–131)
GLUCOSE SERPL-MCNC: 104 MG/DL (ref 70–110)
HBA1C MFR BLD: 7 % (ref 4–5.6)
HDLC SERPL-MCNC: 42 MG/DL (ref 40–75)
HDLC SERPL: 25.5 % (ref 20–50)
LDLC SERPL CALC-MCNC: 104.2 MG/DL (ref 63–159)
MICROALBUMIN UR DL<=1MG/L-MCNC: 11 UG/ML
NONHDLC SERPL-MCNC: 123 MG/DL
POTASSIUM SERPL-SCNC: 3.9 MMOL/L (ref 3.5–5.1)
SODIUM SERPL-SCNC: 137 MMOL/L (ref 136–145)
TRIGL SERPL-MCNC: 94 MG/DL (ref 30–150)

## 2024-08-14 PROCEDURE — 82043 UR ALBUMIN QUANTITATIVE: CPT | Performed by: NURSE PRACTITIONER

## 2024-08-14 PROCEDURE — 82570 ASSAY OF URINE CREATININE: CPT | Performed by: NURSE PRACTITIONER

## 2024-08-14 PROCEDURE — 80061 LIPID PANEL: CPT | Performed by: FAMILY MEDICINE

## 2024-08-14 PROCEDURE — 36415 COLL VENOUS BLD VENIPUNCTURE: CPT | Mod: PN | Performed by: NURSE PRACTITIONER

## 2024-08-14 PROCEDURE — 80048 BASIC METABOLIC PNL TOTAL CA: CPT | Performed by: NURSE PRACTITIONER

## 2024-08-14 PROCEDURE — 83036 HEMOGLOBIN GLYCOSYLATED A1C: CPT | Performed by: NURSE PRACTITIONER

## 2024-08-15 ENCOUNTER — TELEPHONE (OUTPATIENT)
Dept: FAMILY MEDICINE | Facility: CLINIC | Age: 54
End: 2024-08-15
Payer: COMMERCIAL

## 2024-08-21 ENCOUNTER — OFFICE VISIT (OUTPATIENT)
Dept: ENDOCRINOLOGY | Facility: CLINIC | Age: 54
End: 2024-08-21
Payer: COMMERCIAL

## 2024-08-21 VITALS
BODY MASS INDEX: 45.56 KG/M2 | DIASTOLIC BLOOD PRESSURE: 70 MMHG | SYSTOLIC BLOOD PRESSURE: 130 MMHG | WEIGHT: 290.25 LBS | HEART RATE: 100 BPM | HEIGHT: 67 IN

## 2024-08-21 DIAGNOSIS — Z79.4 TYPE 2 DIABETES MELLITUS WITH MICROALBUMINURIA, WITH LONG-TERM CURRENT USE OF INSULIN: Primary | ICD-10-CM

## 2024-08-21 DIAGNOSIS — I10 ESSENTIAL HYPERTENSION: ICD-10-CM

## 2024-08-21 DIAGNOSIS — E11.29 TYPE 2 DIABETES MELLITUS WITH MICROALBUMINURIA, WITH LONG-TERM CURRENT USE OF INSULIN: Primary | ICD-10-CM

## 2024-08-21 DIAGNOSIS — E11.649 HYPOGLYCEMIA ASSOCIATED WITH TYPE 2 DIABETES MELLITUS: ICD-10-CM

## 2024-08-21 DIAGNOSIS — E66.01 MORBID OBESITY WITH BMI OF 40.0-44.9, ADULT: ICD-10-CM

## 2024-08-21 DIAGNOSIS — R80.9 TYPE 2 DIABETES MELLITUS WITH MICROALBUMINURIA, WITH LONG-TERM CURRENT USE OF INSULIN: Primary | ICD-10-CM

## 2024-08-21 DIAGNOSIS — E78.2 MIXED HYPERLIPIDEMIA: ICD-10-CM

## 2024-08-21 PROCEDURE — 99999 PR PBB SHADOW E&M-EST. PATIENT-LVL V: CPT | Mod: PBBFAC,,, | Performed by: NURSE PRACTITIONER

## 2024-08-21 RX ORDER — INSULIN GLARGINE 100 [IU]/ML
12 INJECTION, SOLUTION SUBCUTANEOUS NIGHTLY
Start: 2024-08-21

## 2024-08-21 RX ORDER — INSULIN LISPRO 100 [IU]/ML
8 INJECTION, SOLUTION INTRAVENOUS; SUBCUTANEOUS
Start: 2024-08-21

## 2024-08-21 RX ORDER — TIRZEPATIDE 10 MG/.5ML
10 INJECTION, SOLUTION SUBCUTANEOUS
Qty: 2 ML | Refills: 6 | Status: SHIPPED | OUTPATIENT
Start: 2024-08-21

## 2024-08-21 NOTE — PATIENT INSTRUCTIONS
Increase Mounjaro to 10 mg weekly.     Decrease Lantus to 12 units.    Decrease Humalog to 8 units.    Continue Jardiance.     Notify me for frequent blood sugars less than 100 or greater than 180.

## 2024-08-21 NOTE — PROGRESS NOTES
CC: Ms. Liza Mesa arrives today for management of Type 2 DM and review of chronic medical conditions, as listed in the Visit Diagnosis section of this encounter.       HPI: Ms. Liza Mesa was diagnosed with Type 2 DM in 2005. She was diagnosed based on lab work. Initial treatment consisted of metformin.  Victoza was later added to her medication regimen in 2016.  Insulin added in 2018. Mounjaro added in 2023. + FH of DM on mother. Denies hospitalizations due to DM.     Patient was last seen by me in April. Insulin doses were decreased at that time.     BG monitoring per Freestyle Vicky 2.    Hypoglycemia: Rare   Symptoms: jittery, tingling  Treatment: peppermint or jelly beans     Missing Insulin/PO medication doses: Yes - may hold Humalog if eating light.      Dietary Habits: Eats 2 meal/day. Usually skips breakfast. Rare snack. Avoids sugary beverages.      Last DM education appointment:  8/2016      CURRENT DIABETIC MEDS: Jardiance 25 mg daily, Mounjaro 7.5 mg weekly, Lantus 16 units QHS, Humalog 14 units with meals + correction scale, target 150, ISF 25  Glucometer type: Accucheck Adrienne     Previous DM treatments:  Lantus - not covered by insurance   Glimepiride   Acarbose  Metformin XR -- diarrhea  VGo - didn't like wearing device  Ozempic - reports this was ineffective   Victoza    Last Eye Exam: 11/2023, no DR per patient.  Dr. Betancourt in [place]. Has appt 11/19  Last Podiatry Exam: 2024, Dr. Garcia    REVIEW OF SYSTEMS  Constitutional: no c/o fatigue, weakness, weight loss. + 4# weight gain  Cardiac: no palpitations or chest pain. Reports BLE swelling for a couple months. Has improved recently.   Respiratory: no dyspnea. + chronic cough. Uses cough medication   GI: no c/o abdominal pain or nausea. Denies h/o pancreatitis.   Skin: no rashes, lesions   Neuro: no numbness, tingling, paresthesias   Endocrine: denies polyphagia, polydipsia, polyuria.       Personally reviewed Past Medical,  "Surgical, Social History.    Vital Signs  /70   Pulse 100   Ht 5' 7" (1.702 m)   Wt 131.7 kg (290 lb 3.8 oz)   LMP 11/21/2019   BMI 45.46 kg/m²     Personally reviewed the below labs:    Hemoglobin A1C   Date Value Ref Range Status   08/14/2024 7.0 (H) 4.0 - 5.6 % Final     Comment:     ADA Screening Guidelines:  5.7-6.4%  Consistent with prediabetes  >or=6.5%  Consistent with diabetes    High levels of fetal hemoglobin interfere with the HbA1C  assay. Heterozygous hemoglobin variants (HbS, HgC, etc)do  not significantly interfere with this assay.   However, presence of multiple variants may affect accuracy.     04/09/2024 7.4 (H) 4.0 - 5.6 % Final     Comment:     ADA Screening Guidelines:  5.7-6.4%  Consistent with prediabetes  >or=6.5%  Consistent with diabetes    High levels of fetal hemoglobin interfere with the HbA1C  assay. Heterozygous hemoglobin variants (HbS, HgC, etc)do  not significantly interfere with this assay.   However, presence of multiple variants may affect accuracy.     12/05/2023 7.6 (H) 4.0 - 5.6 % Final     Comment:     ADA Screening Guidelines:  5.7-6.4%  Consistent with prediabetes  >or=6.5%  Consistent with diabetes    High levels of fetal hemoglobin interfere with the HbA1C  assay. Heterozygous hemoglobin variants (HbS, HgC, etc)do  not significantly interfere with this assay.   However, presence of multiple variants may affect accuracy.     05/03/2012 7.1 (H) 4.8 - 5.9 % Final     Comment:     **In order to standardize %HbA1c results worldwide, as of October 11, 2010,  the %HbA1c is being calculated using the master equation recommended in the  consensus statement adopted by the ADA (American Diabetes Assoc), EASD  (European Assoc for the Study of Diabetes), IFCC (International Federation  of Clinical Chemistry and Laboratory Medicine) and IDF (International  Diabetes Federation). Result units: %HgbA1c (DCCT/NGSP).  In common with other methods, Hb A1C values may not " accurately reflect mean  blood glucose in patients with hemoglobin variants (HgbF, HgbS and HgbC).  Any cause of shortened erythrocyte survival will reduce exposure of  erythrocytes to glucose with a consequent decrease in HbA1c (%) values, even  though the time-averaged blood glucose level may be elevated. Causes of  shortened erythrocyte lifetime might be hemolytic anemia or other hemolytic  diseases, homozygous sickle cell trait, pregnancy, recent significant or  chronic blood loss, etc. Caution should be used when interpreting the HbA1c  results from patients with these conditions.       Chemistry        Component Value Date/Time     08/14/2024 1022    K 3.9 08/14/2024 1022     08/14/2024 1022    CO2 29 08/14/2024 1022    BUN 15 08/14/2024 1022    CREATININE 1.0 08/14/2024 1022    CREATININE 1.0 05/06/2012 0431     08/14/2024 1022        Component Value Date/Time    CALCIUM 9.1 08/14/2024 1022    CALCIUM 9.5 05/06/2012 0431    ALKPHOS 76 04/09/2024 0657    ALKPHOS 49 05/03/2012 1635    AST 15 04/09/2024 0657    AST 13 05/03/2012 1635    ALT 15 04/09/2024 0657    BILITOT 0.3 04/09/2024 0657          Lab Results   Component Value Date    CHOL 165 08/14/2024    CHOL 162 12/05/2023    CHOL 245 (H) 08/24/2023     Lab Results   Component Value Date    HDL 42 08/14/2024    HDL 44 12/05/2023    HDL 52 08/24/2023     Lab Results   Component Value Date    LDLCALC 104.2 08/14/2024    LDLCALC 98.0 12/05/2023    LDLCALC 171.6 (H) 08/24/2023     Lab Results   Component Value Date    TRIG 94 08/14/2024    TRIG 100 12/05/2023    TRIG 107 08/24/2023     Lab Results   Component Value Date    CHOLHDL 25.5 08/14/2024    CHOLHDL 27.2 12/05/2023    CHOLHDL 21.2 08/24/2023       Lab Results   Component Value Date    MICALBCREAT 6.3 08/14/2024     Lab Results   Component Value Date    TSH 0.806 02/02/2023       CrCl cannot be calculated (Patient's most recent lab result is older than the maximum 7 days  "allowed.).    No results found for: "[id number]"         PHYSICAL EXAMINATION  Constitutional: Appears well, no distress.  Respiratory: CTA, even and unlabored.   Cardiovascular: RRR, no murmurs.  GI: active bowel sounds, no hernia  Skin: warm and dry  Neuro: oriented to person, place, time  Feet: appropriate footwear.           FREESTYLE BAKARI 2 DOWNLOAD: Majority of glucoses are well controlled. Hypoglycemia noted overnight and in afternoon/evening. Rare prandial excursions.             Goals        HEMOGLOBIN A1C < 7               Assessment/Plan  1. Type 2 diabetes mellitus with microalbuminuria, with long-term current use of insulin  -- Controlled. Goal is to continue weaning insulin. Aware of basal/prandial dosing mismatch.   -- increase Mounjaro to 10 mg weekly.   -- Decrease Lantus to 12 units.   -- Decrease Humalog to 8 units before meals + correction scale.   -- Continue Jardiance  -- continue Freestyle Bakari 2 and notify me if hypoglycemia persists.     -- Discussed diagnosis of DM, A1c goals, progression of disease, long term complications and tx options.  Advised patient to check BG before activities, such as driving or exercise.  -- Reviewed hypoglycemia management: treat with 1/2 glass of juice, 1/2 can regular coke, or 4 glucose tablets. Monitor and repeat treatment every 15 minutes until BG is >70 Then have a snack, which includes a complex carbohydrate and protein.   2. Essential hypertension  -- controlled.   -- continue current meds   3. Mixed hyperlipidemia  -- controlled  -- continue atorvastatin    4. Morbid obesity  -- increases insulin resistance   There is no height or weight on file to calculate BMI.   5. Hypoglycemia associated with type 2 diabetes mellitus  -- se A/P #1       FOLLOW UP  Follow up in about 4 months (around 12/21/2024).   Patient instructed to bring BG logs to each follow up   Patient encouraged to call for any BG/medication issues, concerns, or questions.      Orders " Placed This Encounter   Procedures    Hemoglobin A1C    Comprehensive Metabolic Panel

## 2024-09-01 NOTE — TELEPHONE ENCOUNTER
Left voicemail. Attempted to call pt x 2. Average glucose now 173. Having some lows so I wanted to verify that she is taking the correct insulin dose with her meals. She should be giving 4 VGo clicks per meal. Please advise pt to reduce dose by half if eating a lower carb meal. For example if having chicken and green beans (no starch), she would take 2 clicks for that meal instead of 4 clicks.     Please advise her to verify low blood sugar episodes with a fingerstick.     Please have her notify us if readings below 70 continue into next week. Please review hypoglycemia instructions and advise her to check BG before driving.   
Pt dropped off Vicky to be downloaded. Her Nurse visit was for 3/31. I guess she got confused, anyway I downloaded her Vicky for review  
Unable to Lm , VM full  
[phone]

## 2024-09-07 ENCOUNTER — HOSPITAL ENCOUNTER (EMERGENCY)
Facility: HOSPITAL | Age: 54
Discharge: HOME | End: 2024-09-07
Attending: STUDENT IN AN ORGANIZED HEALTH CARE EDUCATION/TRAINING PROGRAM
Payer: COMMERCIAL

## 2024-09-07 VITALS
OXYGEN SATURATION: 94 % | RESPIRATION RATE: 20 BRPM | TEMPERATURE: 97 F | HEART RATE: 84 BPM | DIASTOLIC BLOOD PRESSURE: 60 MMHG | HEIGHT: 67 IN | BODY MASS INDEX: 45.52 KG/M2 | WEIGHT: 290 LBS | SYSTOLIC BLOOD PRESSURE: 127 MMHG

## 2024-09-07 DIAGNOSIS — M79.669 PAIN AND SWELLING OF LOWER LEG, UNSPECIFIED LATERALITY: ICD-10-CM

## 2024-09-07 DIAGNOSIS — L03.90 CELLULITIS, UNSPECIFIED CELLULITIS SITE: Primary | ICD-10-CM

## 2024-09-07 DIAGNOSIS — M79.89 PAIN AND SWELLING OF LOWER LEG, UNSPECIFIED LATERALITY: ICD-10-CM

## 2024-09-07 DIAGNOSIS — M79.89 LEG SWELLING: ICD-10-CM

## 2024-09-07 DIAGNOSIS — I87.8 VENOUS STASIS: ICD-10-CM

## 2024-09-07 LAB
ALBUMIN SERPL BCP-MCNC: 3.7 G/DL (ref 3.5–5.2)
ALP SERPL-CCNC: 82 U/L (ref 55–135)
ALT SERPL W/O P-5'-P-CCNC: 17 U/L (ref 10–44)
ANION GAP SERPL CALC-SCNC: 12 MMOL/L (ref 8–16)
AST SERPL-CCNC: 15 U/L (ref 10–40)
BASOPHILS # BLD AUTO: 0.02 K/UL (ref 0–0.2)
BASOPHILS NFR BLD: 0.4 % (ref 0–1.9)
BILIRUB SERPL-MCNC: 0.4 MG/DL (ref 0.1–1)
BNP SERPL-MCNC: 18 PG/ML (ref 0–99)
BUN SERPL-MCNC: 10 MG/DL (ref 6–20)
CALCIUM SERPL-MCNC: 9.6 MG/DL (ref 8.7–10.5)
CHLORIDE SERPL-SCNC: 103 MMOL/L (ref 95–110)
CO2 SERPL-SCNC: 26 MMOL/L (ref 23–29)
CREAT SERPL-MCNC: 0.8 MG/DL (ref 0.5–1.4)
DIFFERENTIAL METHOD BLD: ABNORMAL
EOSINOPHIL # BLD AUTO: 0.3 K/UL (ref 0–0.5)
EOSINOPHIL NFR BLD: 4.7 % (ref 0–8)
ERYTHROCYTE [DISTWIDTH] IN BLOOD BY AUTOMATED COUNT: 18.5 % (ref 11.5–14.5)
EST. GFR  (NO RACE VARIABLE): >60 ML/MIN/1.73 M^2
GLUCOSE SERPL-MCNC: 130 MG/DL (ref 70–110)
HCT VFR BLD AUTO: 43.4 % (ref 37–48.5)
HGB BLD-MCNC: 13 G/DL (ref 12–16)
IMM GRANULOCYTES # BLD AUTO: 0.01 K/UL (ref 0–0.04)
IMM GRANULOCYTES NFR BLD AUTO: 0.2 % (ref 0–0.5)
LYMPHOCYTES # BLD AUTO: 2.3 K/UL (ref 1–4.8)
LYMPHOCYTES NFR BLD: 43.4 % (ref 18–48)
MCH RBC QN AUTO: 26.1 PG (ref 27–31)
MCHC RBC AUTO-ENTMCNC: 30 G/DL (ref 32–36)
MCV RBC AUTO: 87 FL (ref 82–98)
MONOCYTES # BLD AUTO: 0.6 K/UL (ref 0.3–1)
MONOCYTES NFR BLD: 11.5 % (ref 4–15)
NEUTROPHILS # BLD AUTO: 2.1 K/UL (ref 1.8–7.7)
NEUTROPHILS NFR BLD: 39.8 % (ref 38–73)
NRBC BLD-RTO: 0 /100 WBC
PLATELET # BLD AUTO: 223 K/UL (ref 150–450)
PMV BLD AUTO: 11.2 FL (ref 9.2–12.9)
POTASSIUM SERPL-SCNC: 3.8 MMOL/L (ref 3.5–5.1)
PROT SERPL-MCNC: 8 G/DL (ref 6–8.4)
RBC # BLD AUTO: 4.98 M/UL (ref 4–5.4)
SODIUM SERPL-SCNC: 141 MMOL/L (ref 136–145)
WBC # BLD AUTO: 5.3 K/UL (ref 3.9–12.7)

## 2024-09-07 PROCEDURE — 36415 COLL VENOUS BLD VENIPUNCTURE: CPT | Performed by: NURSE PRACTITIONER

## 2024-09-07 PROCEDURE — 85025 COMPLETE CBC W/AUTO DIFF WBC: CPT | Performed by: NURSE PRACTITIONER

## 2024-09-07 PROCEDURE — 99285 EMERGENCY DEPT VISIT HI MDM: CPT | Mod: 25

## 2024-09-07 PROCEDURE — 63600175 PHARM REV CODE 636 W HCPCS: Performed by: NURSE PRACTITIONER

## 2024-09-07 PROCEDURE — 83880 ASSAY OF NATRIURETIC PEPTIDE: CPT | Performed by: NURSE PRACTITIONER

## 2024-09-07 PROCEDURE — 80053 COMPREHEN METABOLIC PANEL: CPT | Performed by: NURSE PRACTITIONER

## 2024-09-07 PROCEDURE — 96372 THER/PROPH/DIAG INJ SC/IM: CPT | Performed by: NURSE PRACTITIONER

## 2024-09-07 PROCEDURE — 25000003 PHARM REV CODE 250: Performed by: NURSE PRACTITIONER

## 2024-09-07 RX ORDER — KETOROLAC TROMETHAMINE 30 MG/ML
15 INJECTION, SOLUTION INTRAMUSCULAR; INTRAVENOUS
Status: COMPLETED | OUTPATIENT
Start: 2024-09-07 | End: 2024-09-07

## 2024-09-07 RX ORDER — MUPIROCIN 20 MG/G
OINTMENT TOPICAL 2 TIMES DAILY
Qty: 30 G | Refills: 0 | Status: SHIPPED | OUTPATIENT
Start: 2024-09-07 | End: 2024-09-17

## 2024-09-07 RX ORDER — DOXYCYCLINE 100 MG/1
100 CAPSULE ORAL 2 TIMES DAILY
Qty: 14 CAPSULE | Refills: 0 | Status: SHIPPED | OUTPATIENT
Start: 2024-09-07 | End: 2024-09-14

## 2024-09-07 RX ORDER — DOXYCYCLINE HYCLATE 100 MG
100 TABLET ORAL
Status: COMPLETED | OUTPATIENT
Start: 2024-09-07 | End: 2024-09-07

## 2024-09-07 RX ORDER — DOXYCYCLINE 100 MG/10ML
100 INJECTION, POWDER, LYOPHILIZED, FOR SOLUTION INTRAVENOUS
Status: DISCONTINUED | OUTPATIENT
Start: 2024-09-07 | End: 2024-09-07

## 2024-09-07 RX ADMIN — DOXYCYCLINE HYCLATE 100 MG: 100 TABLET, COATED ORAL at 07:09

## 2024-09-07 RX ADMIN — KETOROLAC TROMETHAMINE 15 MG: 30 INJECTION, SOLUTION INTRAMUSCULAR; INTRAVENOUS at 07:09

## 2024-09-08 NOTE — ED PROVIDER NOTES
Encounter Date: 2024       History     Chief Complaint   Patient presents with    Ankle Pain     Rt. Leg swelling / on and off x 4 months      Patient is a 54 y.o. female who presents to the ED 2024 with a chief complaint of Bilateral lower extremity swelling intermittently for 4 months.  She denies any chest pain or shortness of breath.  She states she is on a lot of different medications including blood pressure medication, cholesterol medication, and diabetes medications.  She denies any new or recent medication changes.  She denies any history of blood clots and does not take anticoagulation at this time.  She denies any fever recent injuries or trauma and has been able to ambulate without difficulty.  She denies any numbness or weakness of the extremities.  She states they do throb due to the swelling and are uncomfortable.             Review of patient's allergies indicates:  No Known Allergies  Past Medical History:   Diagnosis Date    Anticoagulant long-term use     Arthritis     Asthma 2012    Blood transfusion     COPD (chronic obstructive pulmonary disease)     Depression 2012    Diabetes 2012    HTN (hypertension) 2012    LBP (low back pain) 2012    Obesity 2013    Tobacco abuse 07/10/2013     Past Surgical History:   Procedure Laterality Date    ANGIOGRAM, CORONARY, WITH LEFT HEART CATHETERIZATION  2024    Procedure: Left Heart Cath;  Surgeon: Cayetano Mathew MD;  Location: [facility] CATH;  Service: Cardiology;;     SECTION, LOW TRANSVERSE      COLONOSCOPY N/A 2023    Procedure: COLONOSCOPY;  Surgeon: Reggie Posey MD;  Location: [facility] ENDO;  Service: Endoscopy;  Laterality: N/A;    GANGLION CYST EXCISION Right 2020    Dr. Logan     Family History   Problem Relation Name Age of Onset    Diabetes Mother      Breast cancer Mother      Breast cancer Maternal Aunt      Depression Maternal Grandmother          bipolar had to be  hospitalized    Allergic rhinitis Neg Hx      Allergies Neg Hx      Angioedema Neg Hx      Asthma Neg Hx      Atopy Neg Hx      Eczema Neg Hx      Immunodeficiency Neg Hx      Rhinitis Neg Hx      Urticaria Neg Hx       Social History     Tobacco Use    Smoking status: Former     Current packs/day: 0.00     Average packs/day: 0.5 packs/day for 30.0 years (15.0 ttl pk-yrs)     Types: Cigarettes     Start date: 1985     Quit date: 2015     Years since quittin.6     Passive exposure: Past    Smokeless tobacco: Never    Tobacco comments:     Quit 2023   Substance Use Topics    Alcohol use: Not Currently     Comment: drink wine cooler    Drug use: No     Review of Systems   Constitutional:  Negative for chills and fever.   Respiratory:  Negative for chest tightness and shortness of breath.    Cardiovascular:  Positive for leg swelling. Negative for chest pain.   Gastrointestinal:  Negative for abdominal pain.   Genitourinary:  Negative for dysuria.   Musculoskeletal:  Negative for arthralgias and myalgias.   Skin:  Negative for rash and wound.   Neurological:  Negative for syncope, weakness and numbness.   Hematological:  Does not bruise/bleed easily.       Physical Exam     Initial Vitals [24 1640]   BP Pulse Resp Temp SpO2   129/65 94 20 97.3 °F (36.3 °C) 97 %      MAP       --         Physical Exam    Nursing note and vitals reviewed.  Constitutional: Vital signs are normal. She appears well-developed and well-nourished.   Obese   HENT:   Head: Normocephalic and atraumatic.   Eyes: Pupils are equal, round, and reactive to light.   Neck: Neck supple.   Cardiovascular:  Normal rate, regular rhythm, normal heart sounds and intact distal pulses.     Exam reveals no gallop and no friction rub.       No murmur heard.  Pulses:       Dorsalis pedis pulses are 2+ on the right side and 2+ on the left side.        Posterior tibial pulses are 2+ on the right side and 2+ on the left side.   +2 pitting  edema to bilateral lower extremities.   Pulmonary/Chest: Breath sounds normal. She has no wheezes. She has no rhonchi. She has no rales.   Abdominal: Abdomen is soft. Bowel sounds are normal. There is no abdominal tenderness.   2 x 2 cm suprapubic area of tenderness without induration or erythema with spontaneous bloody drainage. No palpable mass.    Musculoskeletal:      Cervical back: Neck supple.     Neurological: She is alert and oriented to person, place, and time. She has normal strength.   Skin: Skin is warm, dry and intact.   Psychiatric: She has a normal mood and affect. Her speech is normal and behavior is normal.         ED Course   Procedures  Labs Reviewed   COMPREHENSIVE METABOLIC PANEL - Abnormal       Result Value    Sodium 141      Potassium 3.8      Chloride 103      CO2 26      Glucose 130 (*)     BUN 10      Creatinine 0.8      Calcium 9.6      Total Protein 8.0      Albumin 3.7      Total Bilirubin 0.4      Alkaline Phosphatase 82      AST 15      ALT 17      eGFR >60      Anion Gap 12     CBC W/ AUTO DIFFERENTIAL - Abnormal    WBC 5.30      RBC 4.98      Hemoglobin 13.0      Hematocrit 43.4      MCV 87      MCH 26.1 (*)     MCHC 30.0 (*)     RDW 18.5 (*)     Platelets 223      MPV 11.2      Immature Granulocytes 0.2      Gran # (ANC) 2.1      Immature Grans (Abs) 0.01      Lymph # 2.3      Mono # 0.6      Eos # 0.3      Baso # 0.02      nRBC 0      Gran % 39.8      Lymph % 43.4      Mono % 11.5      Eosinophil % 4.7      Basophil % 0.4      Differential Method Automated     B-TYPE NATRIURETIC PEPTIDE    BNP 18            Imaging Results              US Lower Extremity Veins Bilateral (In process)  Result time 09/07/24 18:33:38   Procedure changed from US Lower Extremity Veins Right                    X-Ray Chest AP Portable (In process)  Result time 09/07/24 17:46:53                     Medications   ketorolac injection 15 mg (15 mg Intramuscular Given 9/7/24 1932)   doxycycline tablet 100 mg  (100 mg Oral Given 9/7/24 1932)     Medical Decision Making  Amount and/or Complexity of Data Reviewed  Labs: ordered.  Radiology: ordered.    Risk  Prescription drug management.         APC / Resident Notes:   Patient is a 54 y.o. female who presents to the ED 09/07/2024 Who underwent emergent evaluation for chronic intermittent bilateral lower extremity swelling.  +2 DP and PT pulses to bi lower extremities with no evidence of distal neurovascular compromise and I do not think any acute arterial occlusion present.  Ultrasound of vein without evidence of any DVT and I do not think acute DVT.  No erythema or warmth to extremities and patient has no signs of infection I do not think cellulitis.  She denies any injury or trauma to the extremities.  There is some hyperpigmentation to the calf consistent with chronic venous insufficiency and stasis.  I believe this is likely the etiology of her chronic intermittent edema.  No evidence of ZAHEER or acute heart failure at this time.  Bilateral breath sounds clear respirations even nonlabored.  No evidence of pulmonary edema on x-ray.  She has a small area of skin irritation along her suprapubic area where her underwear elastic sits.  She is requesting antibiotics for this and does reports some purulent drainage earlier today.  Will give doxycycline for possible early localized cellulitis.  No evidence of abscess or deep space infection.  Do not think further emergent imaging at this time indicated.  Systemic signs of infection patient well-appearing.  Discussed compression stockings and low-salt diet as well as close follow-up with her primary care to discussed low-dose diuretic for her chronic lower extremity edema.  Will not start today as patient is unable to provide her current medication list and I believe she would benefit from close follow-up and labs if/when these medications are initiated and pt agrees as her symptoms are chronic and stable at this time. Based on my  clinical evaluation, I do not appreciate any immediate, emergent, or life threatening condition or etiology that warrants additional workup today and feel that the patient can be discharged with close follow up care. Case discussed with Dr. Rodriguez who is agreeable to plan of care. Follow up and return precautions discussed; patient verbalized understanding and is agreeable to plan of care. Patient discharged home in stable condition.           ED Course as of 09/07/24 2029   Sat Sep 07, 2024   1904 IMPRESSION: No evidence of deep vein thrombosis. Dictated and Authenticated by: Dylan Wilder,    [JK]   1904 IMPRESSION: Given the patient's body habitus/portable technique probably cannot exclude out areas of consolidation/atelectasis in portions of the lower lobes. Upper limits of normal to mildly prominent heart size versus appearance accentuated by portable technique.   [JK]      ED Course User Index  [JK] Cassidy Machado NP               Medical Decision Making:   Differential Diagnosis:   CHF  ZAHEER  Chronic venous insufficiency             Clinical Impression:  Final diagnoses:  [M79.89] Leg swelling  [M79.669, M79.89] Pain and swelling of lower leg, unspecified laterality  [L03.90] Cellulitis, unspecified cellulitis site (Primary)  [I87.8] Venous stasis          ED Disposition Condition    Discharge Stable          ED Prescriptions       Medication Sig Dispense Start Date End Date Auth. Provider    doxycycline (VIBRAMYCIN) 100 MG Cap Take 1 capsule (100 mg total) by mouth 2 (two) times daily. for 7 days 14 capsule 9/7/2024 9/14/2024 Cassidy Machado NP    mupirocin (BACTROBAN) 2 % ointment Apply topically 2 (two) times daily. for 10 days 30 g 9/7/2024 9/17/2024 Cassidy Machado NP          Follow-up Information       Follow up With Specialties Details Why Contact Info Additional Information    Cody Medrano MD Family Medicine In 3 days  [address]  [place] [address]  [phone]       [facility] -  Emergency Medicine  As needed, If symptoms worsen [address] [place] [address] [address] [place] [address] 1st floor             Cassidy Machado, [place]  09/07/24 2029

## 2024-09-09 ENCOUNTER — TELEPHONE (OUTPATIENT)
Dept: FAMILY MEDICINE | Facility: CLINIC | Age: 54
End: 2024-09-09
Payer: MEDICAID

## 2024-09-09 NOTE — TELEPHONE ENCOUNTER
Called pt and scheduled hosp follow up, she also is needing fluid pills. Sts feet are swollen. The Dr she was under care with in hospital did not give her enough.

## 2024-09-09 NOTE — TELEPHONE ENCOUNTER
----- Message from Rl Laws sent at 9/9/2024 11:27 AM CDT -----  Type: Needs Medical Advice  Who Called:  pt  Best Call Back Number: [phone]    Additional Information: Pt is calling the office needs a hospital follow up.Please call back and advise.

## 2024-09-09 NOTE — TELEPHONE ENCOUNTER
Doxycycline is an antibiotic, not a fluid pill.  Patient probably needs to be seen.  See if she can get an appointment with Lorie Vang if not me

## 2024-09-12 ENCOUNTER — OFFICE VISIT (OUTPATIENT)
Dept: FAMILY MEDICINE | Facility: CLINIC | Age: 54
End: 2024-09-12
Payer: MEDICAID

## 2024-09-12 VITALS
DIASTOLIC BLOOD PRESSURE: 76 MMHG | HEART RATE: 84 BPM | WEIGHT: 293 LBS | BODY MASS INDEX: 45.99 KG/M2 | OXYGEN SATURATION: 95 % | SYSTOLIC BLOOD PRESSURE: 126 MMHG | HEIGHT: 67 IN

## 2024-09-12 DIAGNOSIS — I10 ESSENTIAL HYPERTENSION: ICD-10-CM

## 2024-09-12 DIAGNOSIS — Z12.4 CERVICAL CANCER SCREENING: Primary | ICD-10-CM

## 2024-09-12 DIAGNOSIS — L03.119 CELLULITIS OF LOWER EXTREMITY, UNSPECIFIED LATERALITY: ICD-10-CM

## 2024-09-12 DIAGNOSIS — E78.2 MIXED HYPERLIPIDEMIA: ICD-10-CM

## 2024-09-12 DIAGNOSIS — Z23 IMMUNIZATION DUE: ICD-10-CM

## 2024-09-12 DIAGNOSIS — E11.42 TYPE 2 DIABETES MELLITUS WITH DIABETIC POLYNEUROPATHY, WITH LONG-TERM CURRENT USE OF INSULIN: ICD-10-CM

## 2024-09-12 DIAGNOSIS — Z79.4 TYPE 2 DIABETES MELLITUS WITH DIABETIC POLYNEUROPATHY, WITH LONG-TERM CURRENT USE OF INSULIN: ICD-10-CM

## 2024-09-12 DIAGNOSIS — R60.0 BILATERAL EDEMA OF LOWER EXTREMITY: ICD-10-CM

## 2024-09-12 PROCEDURE — 99999 PR PBB SHADOW E&M-EST. PATIENT-LVL V: CPT | Mod: PBBFAC,,, | Performed by: FAMILY MEDICINE

## 2024-09-12 RX ORDER — CARVEDILOL 6.25 MG/1
6.25 TABLET ORAL 2 TIMES DAILY WITH MEALS
Qty: 180 TABLET | Refills: 3 | Status: SHIPPED | OUTPATIENT
Start: 2024-09-12 | End: 2025-09-12

## 2024-09-12 RX ORDER — ATORVASTATIN CALCIUM 80 MG/1
80 TABLET, FILM COATED ORAL NIGHTLY
Qty: 90 TABLET | Refills: 3 | Status: SHIPPED | OUTPATIENT
Start: 2024-09-12 | End: 2025-09-12

## 2024-09-12 RX ORDER — HYDROCHLOROTHIAZIDE 25 MG/1
25 TABLET ORAL DAILY
Qty: 30 TABLET | Refills: 11 | Status: SHIPPED | OUTPATIENT
Start: 2024-09-12 | End: 2025-09-12

## 2024-10-22 ENCOUNTER — OFFICE VISIT (OUTPATIENT)
Dept: FAMILY MEDICINE | Facility: CLINIC | Age: 54
End: 2024-10-22
Payer: COMMERCIAL

## 2024-10-22 VITALS
WEIGHT: 293 LBS | BODY MASS INDEX: 46.08 KG/M2 | DIASTOLIC BLOOD PRESSURE: 76 MMHG | SYSTOLIC BLOOD PRESSURE: 128 MMHG | RESPIRATION RATE: 18 BRPM | HEART RATE: 90 BPM

## 2024-10-22 DIAGNOSIS — J45.40 MODERATE PERSISTENT ASTHMA WITHOUT COMPLICATION: Primary | ICD-10-CM

## 2024-10-22 DIAGNOSIS — E11.42 TYPE 2 DIABETES MELLITUS WITH DIABETIC POLYNEUROPATHY, WITH LONG-TERM CURRENT USE OF INSULIN: ICD-10-CM

## 2024-10-22 DIAGNOSIS — I10 ESSENTIAL HYPERTENSION: ICD-10-CM

## 2024-10-22 DIAGNOSIS — Z79.4 TYPE 2 DIABETES MELLITUS WITH DIABETIC POLYNEUROPATHY, WITH LONG-TERM CURRENT USE OF INSULIN: ICD-10-CM

## 2024-10-22 PROCEDURE — 99999 PR PBB SHADOW E&M-EST. PATIENT-LVL V: CPT | Mod: PBBFAC,,, | Performed by: FAMILY MEDICINE

## 2024-10-22 PROCEDURE — 3066F NEPHROPATHY DOC TX: CPT | Mod: CPTII,S$GLB,, | Performed by: FAMILY MEDICINE

## 2024-10-22 PROCEDURE — 3078F DIAST BP <80 MM HG: CPT | Mod: CPTII,S$GLB,, | Performed by: FAMILY MEDICINE

## 2024-10-22 PROCEDURE — 1159F MED LIST DOCD IN RCRD: CPT | Mod: CPTII,S$GLB,, | Performed by: FAMILY MEDICINE

## 2024-10-22 PROCEDURE — 3074F SYST BP LT 130 MM HG: CPT | Mod: CPTII,S$GLB,, | Performed by: FAMILY MEDICINE

## 2024-10-22 PROCEDURE — 3008F BODY MASS INDEX DOCD: CPT | Mod: CPTII,S$GLB,, | Performed by: FAMILY MEDICINE

## 2024-10-22 PROCEDURE — 3061F NEG MICROALBUMINURIA REV: CPT | Mod: CPTII,S$GLB,, | Performed by: FAMILY MEDICINE

## 2024-10-22 PROCEDURE — 3051F HG A1C>EQUAL 7.0%<8.0%: CPT | Mod: CPTII,S$GLB,, | Performed by: FAMILY MEDICINE

## 2024-10-22 PROCEDURE — 4010F ACE/ARB THERAPY RXD/TAKEN: CPT | Mod: CPTII,S$GLB,, | Performed by: FAMILY MEDICINE

## 2024-10-22 PROCEDURE — 99214 OFFICE O/P EST MOD 30 MIN: CPT | Mod: S$GLB,,, | Performed by: FAMILY MEDICINE

## 2024-10-22 NOTE — PROGRESS NOTES
THIS DOCUMENT WAS MADE IN PART WITH VOICE RECOGNITION SOFTWARE.  OCCASIONALLY THIS SOFTWARE WILL MISINTERPRET WORDS OR PHRASES.    Assessment and Plan:    1. Moderate persistent asthma without complication  albuterol-budesonide (AIRSUPRA) 90-80 mcg/actuation      2. Type 2 diabetes mellitus with diabetic polyneuropathy, with long-term current use of insulin        3. Essential hypertension                PLAN    Assessment & Plan    ASTHMA:   Explained the new approach to asthma treatment using Air Supra, which combines albuterol with budesonide for more effective exacerbation prevention.   Started Air Supra inhaler to replace current albuterol inhaler for asthma management.    DIABETES:   Continued Lantus 12 units and mealtime insulin 8 units for diabetes management.   Continued Mounjaro 10 mg for diabetes management.    HYPERTENSION:   Continued amlodipine, losartan, and hydrochlorothiazide for blood pressure control.    MEDICATIONS/SUPPLEMENTS:   Continued cholesterol medication.   Continued aspirin.    FOLLOW UP:   Follow up in 6 months.   Follow up on April 2025 as scheduled.   Continue appointments with [facility] as scheduled.   Contact the office if any issues arise before next appointment.                 ______________________________________________________________________  Subjective:    Chief Complaint:  Chief Complaint   Patient presents with    [facility] Maintenance     wellness        HPI:  Liza is a 54 y.o. year old           History of Present Illness    CHIEF COMPLAINT:  Liza presents today for a wellness visit.    MEDICATIONS:  She takes amlodipine, losartan, and hydrochlorothiazide for hypertension. She uses Ambien for sleep issues, last prescribed on August 19th by Dr. Grace Amaro. Xanax is prescribed as needed for anxiety, though it appears to be infrequently used. She uses Air Supra as a rescue inhaler for asthma. Her diabetes is managed with 12 units of Lantus, 8 units of mealtime  "insulin, and Mounjaro 10 mg. She also has migraine medication, which she uses approximately every three months.    ASTHMA:  She reports using her rescue inhaler twice daily, particularly during physical activities or household chores. Asthma triggers include strong chemicals such as pine oil and bleach, as well as cologne.    DIABETES:  Her last A1C was 7.0.    HYPERTENSION:  She confirms adherence to her antihypertensive regimen.    SLEEP ISSUES:  She expresses satisfaction with Ambien for sleep management.    ANXIETY:  Her anxiety is currently managed by Dr. Grace Amaro.    MIGRAINES:  She reports using her migraine medication about once every three months, with the last use approximately one week ago.    PREVENTIVE CARE:  Her last Pap smear was performed by Dr. Phani Bethea in [place] last year. She has a diabetic eye exam scheduled for November 19th.    CARDIOVASCULAR:  She denies experiencing any chest discomfort or shortness of breath. Her cholesterol levels were checked in August and reported as good.      ROS:  ROS as indicated in HPI.           Depression / Anxiety  Rx : Xanax 1 mg TID  Prescriber : Grace Amaro     CAD / Dyslipidemia  Rx- lipitor 80 mg   LHC : 6/ 2024 : "LUMINAL IRREG OF LAD" , No intervention      Insomnia   Rx: Ambien 10 mg  Prescriber : Grace Amaro      Allergic rhinitis  No current medications      Moderate persistent asthma + nicotine dependence-smoking  rx :  Albuterol, Trelegy, promethazine-codeine cough syrup, benzonatate  Prescribed codeine cough syrup by nurse practitioner in [place] quite routinely  Still smoking     Essential hypertension  Rx-amlodipine 10 mg + Losartan 100 + HCTZ 12.5 + coreg 6.25   Home BP : 130/80     Binge Eating  rx : Adderall 30 mg BID  Prescriber : Grace Amaro         Migraine headaches  Abortive-sumatriptan 100 mg as needed  HA occur about every 3 months      Type 2 diabetes mellitus complicated by diabetic polyneuropathy  rx : Lantus 12u " daily, Novolog 8u TID with meals, Jardiance 25 mg , Mounjaro 10 mg  Previous A1c- 7.0 percent  Time in Target : 92% for last 2 weeks      Chronic low back pain  Rx-hydrocodone 7.5 mg b.i.d., chlorzoxazone 500 mg QID prn  Prescribed by Maribel Hickman MD      Overactive bladder + Stress incont.   Rx-oxybutynin 10 mg  Medication working well      The 10-year ASCVD risk score (Haritha DK, et al., 2019) is: 11.8%    Values used to calculate the score:      Age: 54 years      Sex: Female      Is Non- : Yes      Diabetic: Yes      Tobacco smoker: No      Systolic Blood Pressure: 128 mmHg      Is BP treated: Yes      HDL Cholesterol: 42 mg/dL      Total Cholesterol: 165 mg/dL    Past Medical History:  Past Medical History:   Diagnosis Date    Anticoagulant long-term use     Arthritis     Asthma 2012    Blood transfusion     COPD (chronic obstructive pulmonary disease)     Depression 2012    Diabetes 2012    HTN (hypertension) 2012    LBP (low back pain) 2012    Obesity 2013    Tobacco abuse 07/10/2013       Past Surgical History:  Past Surgical History:   Procedure Laterality Date    ANGIOGRAM, CORONARY, WITH LEFT HEART CATHETERIZATION  2024    Procedure: Left Heart Cath;  Surgeon: Cayetano Mathew MD;  Location: [facility] CATH;  Service: Cardiology;;     SECTION, LOW TRANSVERSE      COLONOSCOPY N/A 2023    Procedure: COLONOSCOPY;  Surgeon: Reggie Posey MD;  [facility] ENDO;  Service: Endoscopy;  Laterality: N/A;    GANGLION CYST EXCISION Right 2020    Dr. Logan       Family History:  Family History   Problem Relation Name Age of Onset    Diabetes Mother      Breast cancer Mother      Breast cancer Maternal Aunt      Depression Maternal Grandmother          bipolar had to be hospitalized    Allergic rhinitis Neg Hx      Allergies Neg Hx      Angioedema Neg Hx      Asthma Neg Hx      Atopy Neg Hx      Eczema Neg Hx       Immunodeficiency Neg Hx      Rhinitis Neg Hx      Urticaria Neg Hx         Social History:  Social History     Socioeconomic History    Marital status: Single   Tobacco Use    Smoking status: Former     Current packs/day: 0.00     Average packs/day: 0.5 packs/day for 30.0 years (15.0 ttl pk-yrs)     Types: Cigarettes     Start date: 1985     Quit date: 2015     Years since quittin.8     Passive exposure: Past    Smokeless tobacco: Never    Tobacco comments:     Quit 2023   Substance and Sexual Activity    Alcohol use: Not Currently     Comment: drink wine cooler    Drug use: No    Sexual activity: Yes     Partners: Male     Birth control/protection: None       Medications:  Current Outpatient Medications on File Prior to Visit   Medication Sig Dispense Refill    albuterol (ACCUNEB) 0.63 mg/3 mL Nebu Take 3 mLs (0.63 mg total) by nebulization every 6 (six) hours as needed (wheezing). 180 mL 3    ALPRAZolam (XANAX) 1 MG tablet Take 1 mg by mouth 2 (two) times daily as needed for Anxiety.      amLODIPine (NORVASC) 10 MG tablet Take 1 tablet (10 mg total) by mouth 2 (two) times daily.      aspirin (ECOTRIN) 81 MG EC tablet Take 81 mg by mouth once daily.      atorvastatin (LIPITOR) 80 MG tablet Take 1 tablet (80 mg total) by mouth every evening. 90 tablet 3    blood sugar diagnostic Strp To check BG 4 times daily, to use with insurance preferred meter. Dx code E11.42 150 each 6    carvediloL (COREG) 6.25 MG tablet Take 1 tablet (6.25 mg total) by mouth 2 (two) times daily with meals. 180 tablet 3    chlorzoxazone (PARAFON FORTE) 500 mg Tab Take 1 tablet (500 mg total) by mouth 4 (four) times daily as needed (muscle spasm). 180 tablet 3    dextroamphetamine-amphetamine 30 mg Tab Take 30 mg by mouth 2 (two) times a day.       empagliflozin (JARDIANCE) 25 mg tablet Take 1 tablet (25 mg total) by mouth once daily. 30 tablet 6    flash glucose scanning reader (StorSimple BAKARI 2 READER) [facility] Use for  "continuous glucose monitoring. 1 each 0    flash glucose sensor (FREESTYLE BAKARI 2 SENSOR) Kit USE FOR CONTINUOUS GLUCOSE MONITORING. CHANGE EVERY 14 DAYS 2 kit 11    fluticasone-umeclidin-vilanter (TRELEGY ELLIPTA) 200-62.5-25 mcg inhaler Inhale 1 puff into the lungs once daily. 60 each 11    hydroCHLOROthiazide (HYDRODIURIL) 25 MG tablet Take 1 tablet (25 mg total) by mouth once daily. 30 tablet 11    HYDROcodone-acetaminophen (NORCO) 7.5-325 mg per tablet Take 1 tablet by mouth every 12 (twelve) hours as needed for Pain. 60 tablet 0    insulin lispro (HUMALOG KWIKPEN INSULIN) 100 unit/mL pen Inject 8 Units into the skin 3 (three) times daily before meals. Plus correction scale, max TDD 66u      lancets Misc To check BG 4 times daily, to use with insurance preferred meter. Dx code E11.42 150 each 6    LANTUS SOLOSTAR U-100 INSULIN 100 unit/mL (3 mL) InPn pen Inject 12 Units into the skin every evening.      losartan (COZAAR) 100 MG tablet Take 100 mg by mouth nightly.      oxybutynin (DITROPAN-XL) 10 MG 24 hr tablet Take 10 mg by mouth once daily.      pen needle, diabetic (TRUEPLUS PEN NEEDLE) 32 gauge x 5/32" Ndle USE 5 TIMES DAILY WITH INSULIN AND VICTOZA. 150 each 6    promethazine-codeine 6.25-10 mg/5 ml (PHENERGAN WITH CODEINE) 6.25-10 mg/5 mL syrup Take 5 mLs by mouth every 6 (six) hours as needed.      tirzepatide (MOUNJARO) 10 mg/0.5 mL PnIj Inject 10 mg into the skin every 7 days. 2 mL 6    zolpidem (AMBIEN) 10 mg Tab Take 10 mg by mouth nightly as needed.  1    [DISCONTINUED] albuterol (PROVENTIL/VENTOLIN HFA) 90 mcg/actuation inhaler INHALE 2 PUFFS INTO THE LUNGS EVERY 6 HOURS AS NEEDED 25.5 g 3    blood-glucose meter kit To check BG 4 times daily, to use with insurance preferred meter. Dx code E11.42 1 each 0    sumatriptan (IMITREX) 100 MG tablet Take 1 tablet (100 mg total) by mouth once as needed for Migraine. 6 tablet 5     No current facility-administered medications on file prior to visit. "       Allergies:  Patient has no known allergies.    Immunizations:  Immunization History   Administered Date(s) Administered    COVID-19, MRNA, LN-S, PF (Pfizer) (Purple Cap) 04/22/2021, 05/13/2021, 12/11/2021    DTaP 1970, 1970, 01/20/1971, 03/15/1972    Influenza - Quadrivalent 10/03/2020    Influenza - Quadrivalent - PF *Preferred* (6 months and older) 10/31/2018, 11/13/2019, 09/16/2021, 10/04/2023    Influenza - Trivalent - Afluria, Fluzone MDV 10/04/2023    Influenza - Trivalent - Fluarix, Flulaval, Fluzone, Afluria - PF 09/12/2024    MMR 11/17/1977    Measles / Rubella 01/19/1972    OPV 1970, 01/20/1971, 03/15/1972, 03/21/1974, 04/17/1975, 11/17/1977    Pneumococcal Conjugate - 13 Valent 06/12/2017    Pneumococcal Polysaccharide - 23 Valent 11/13/2019    Td (ADULT) 04/17/1975, 11/17/1977, 07/11/1984, 09/18/2009    Tdap 10/18/2017    Zoster Recombinant 06/16/2021, 09/22/2021       Review of Systems:  Review of Systems   All other systems reviewed and are negative.      Objective:    Vitals:  Vitals:    10/22/24 1408   BP: 128/76   Pulse: 90   Resp: 18   SpO2: (P) 95%   Weight: 133.4 kg (294 lb 3.3 oz)   PainSc: 0-No pain         Physical Exam  Vitals reviewed.   Constitutional:       General: She is not in acute distress.  HENT:      Head: Normocephalic and atraumatic.   Eyes:      Pupils: Pupils are equal, round, and reactive to light.   Cardiovascular:      Rate and Rhythm: Normal rate and regular rhythm.      Heart sounds: No murmur heard.     No friction rub.   Pulmonary:      Effort: Pulmonary effort is normal.      Breath sounds: Normal breath sounds.   Abdominal:      General: Bowel sounds are normal. There is no distension.      Palpations: Abdomen is soft.      Tenderness: There is no abdominal tenderness.   Musculoskeletal:      Cervical back: Neck supple.   Skin:     General: Skin is warm and dry.      Findings: No rash.   Psychiatric:         Behavior: Behavior normal.              Cody Medrano MD  Family Medicine

## 2024-10-23 ENCOUNTER — PATIENT OUTREACH (OUTPATIENT)
Dept: ADMINISTRATIVE | Facility: HOSPITAL | Age: 54
End: 2024-10-23
Payer: COMMERCIAL

## 2024-10-23 NOTE — PROGRESS NOTES
Population Health Chart Review & Patient Outreach Details      Additional Pop Health Notes:               Updates Requested / Reviewed:      Updated Care Coordination Note, , and External Sources: [facility] and [facility]         Health Maintenance Topics Overdue:      VBHM Score: 0     Patient is not due for any topics at this time.                       Health Maintenance Topic(s) Outreach Outcomes & Actions Taken:    Cervical Cancer Screening - Outreach Outcomes & Actions Taken  : External Records Uploaded & Care Team Updated if Applicable

## 2024-11-19 LAB
LEFT EYE DM RETINOPATHY: POSITIVE
RIGHT EYE DM RETINOPATHY: POSITIVE

## 2024-11-22 ENCOUNTER — PATIENT OUTREACH (OUTPATIENT)
Dept: ADMINISTRATIVE | Facility: HOSPITAL | Age: 54
End: 2024-11-22
Payer: COMMERCIAL

## 2024-11-29 ENCOUNTER — LAB VISIT (OUTPATIENT)
Dept: LAB | Facility: HOSPITAL | Age: 54
End: 2024-11-29
Attending: NURSE PRACTITIONER
Payer: COMMERCIAL

## 2024-11-29 DIAGNOSIS — R80.9 TYPE 2 DIABETES MELLITUS WITH MICROALBUMINURIA, WITH LONG-TERM CURRENT USE OF INSULIN: ICD-10-CM

## 2024-11-29 DIAGNOSIS — Z79.4 TYPE 2 DIABETES MELLITUS WITH MICROALBUMINURIA, WITH LONG-TERM CURRENT USE OF INSULIN: ICD-10-CM

## 2024-11-29 DIAGNOSIS — E11.29 TYPE 2 DIABETES MELLITUS WITH MICROALBUMINURIA, WITH LONG-TERM CURRENT USE OF INSULIN: ICD-10-CM

## 2024-11-29 LAB
ALBUMIN SERPL BCP-MCNC: 3.6 G/DL (ref 3.5–5.2)
ALBUMIN/CREAT UR: NORMAL UG/MG (ref 0–30)
ALP SERPL-CCNC: 84 U/L (ref 40–150)
ALT SERPL W/O P-5'-P-CCNC: 15 U/L (ref 10–44)
ANION GAP SERPL CALC-SCNC: 6 MMOL/L (ref 8–16)
AST SERPL-CCNC: 14 U/L (ref 10–40)
BILIRUB SERPL-MCNC: 0.3 MG/DL (ref 0.1–1)
BUN SERPL-MCNC: 19 MG/DL (ref 6–20)
CALCIUM SERPL-MCNC: 9.7 MG/DL (ref 8.7–10.5)
CHLORIDE SERPL-SCNC: 105 MMOL/L (ref 95–110)
CO2 SERPL-SCNC: 28 MMOL/L (ref 23–29)
CREAT SERPL-MCNC: 1.2 MG/DL (ref 0.5–1.4)
CREAT UR-MCNC: 75 MG/DL (ref 15–325)
EST. GFR  (NO RACE VARIABLE): 53.8 ML/MIN/1.73 M^2
ESTIMATED AVG GLUCOSE: 177 MG/DL (ref 68–131)
GLUCOSE SERPL-MCNC: 175 MG/DL (ref 70–110)
HBA1C MFR BLD: 7.8 % (ref 4–5.6)
MICROALBUMIN UR DL<=1MG/L-MCNC: <5 UG/ML
POTASSIUM SERPL-SCNC: 3.9 MMOL/L (ref 3.5–5.1)
PROT SERPL-MCNC: 7.9 G/DL (ref 6–8.4)
SODIUM SERPL-SCNC: 139 MMOL/L (ref 136–145)

## 2024-11-29 PROCEDURE — 36415 COLL VENOUS BLD VENIPUNCTURE: CPT | Mod: PN | Performed by: NURSE PRACTITIONER

## 2024-11-29 PROCEDURE — 83036 HEMOGLOBIN GLYCOSYLATED A1C: CPT | Performed by: NURSE PRACTITIONER

## 2024-11-29 PROCEDURE — 80053 COMPREHEN METABOLIC PANEL: CPT | Performed by: NURSE PRACTITIONER

## 2024-11-29 PROCEDURE — 82570 ASSAY OF URINE CREATININE: CPT | Performed by: NURSE PRACTITIONER

## 2024-12-05 ENCOUNTER — OFFICE VISIT (OUTPATIENT)
Dept: ENDOCRINOLOGY | Facility: CLINIC | Age: 54
End: 2024-12-05
Payer: COMMERCIAL

## 2024-12-05 VITALS
WEIGHT: 293 LBS | DIASTOLIC BLOOD PRESSURE: 80 MMHG | BODY MASS INDEX: 45.99 KG/M2 | SYSTOLIC BLOOD PRESSURE: 110 MMHG | HEART RATE: 88 BPM | HEIGHT: 67 IN

## 2024-12-05 DIAGNOSIS — E66.01 MORBID OBESITY WITH BMI OF 40.0-44.9, ADULT: ICD-10-CM

## 2024-12-05 DIAGNOSIS — E78.2 MIXED HYPERLIPIDEMIA: ICD-10-CM

## 2024-12-05 DIAGNOSIS — Z79.4 TYPE 2 DIABETES MELLITUS WITH MICROALBUMINURIA, WITH LONG-TERM CURRENT USE OF INSULIN: Primary | ICD-10-CM

## 2024-12-05 DIAGNOSIS — E11.649 HYPOGLYCEMIA ASSOCIATED WITH TYPE 2 DIABETES MELLITUS: ICD-10-CM

## 2024-12-05 DIAGNOSIS — E11.29 TYPE 2 DIABETES MELLITUS WITH MICROALBUMINURIA, WITH LONG-TERM CURRENT USE OF INSULIN: Primary | ICD-10-CM

## 2024-12-05 DIAGNOSIS — I10 ESSENTIAL HYPERTENSION: ICD-10-CM

## 2024-12-05 DIAGNOSIS — R80.9 TYPE 2 DIABETES MELLITUS WITH MICROALBUMINURIA, WITH LONG-TERM CURRENT USE OF INSULIN: Primary | ICD-10-CM

## 2024-12-05 PROCEDURE — 99999 PR PBB SHADOW E&M-EST. PATIENT-LVL IV: CPT | Mod: PBBFAC,,, | Performed by: NURSE PRACTITIONER

## 2024-12-05 RX ORDER — TIRZEPATIDE 12.5 MG/.5ML
12.5 INJECTION, SOLUTION SUBCUTANEOUS
Qty: 2 ML | Refills: 5 | Status: SHIPPED | OUTPATIENT
Start: 2024-12-05

## 2024-12-05 NOTE — PROGRESS NOTES
CC: Ms. Liza Mesa arrives today for management of Type 2 DM and review of chronic medical conditions, as listed in the Visit Diagnosis section of this encounter.       HPI: Ms. Liza Mesa was diagnosed with Type 2 DM in 2005. She was diagnosed based on lab work. Initial treatment consisted of metformin.  Victoza was later added to her medication regimen in 2016.  Insulin added in 2018. Mounjaro added in 2023. + FH of DM on mother. Denies hospitalizations due to DM.     Pt is 15 min late today. Only urgent needs will be addressed.     Patient was last seen by me in August. Mounjaro dose was increased then and insulin doses were decreased.    She states that she has had stress with her granddaughter. She has been stress eating. Eating larger portions.     BG monitoring per Freestyle Vicky 2.    Hypoglycemia: Rare   Symptoms: jittery, tingling  Treatment: peppermint or jelly beans     Missing Insulin/PO medication doses: Rare     Dietary Habits: Eats 2 meal/day. Usually skips breakfast. Rare snack. Avoids sugary beverages.      Last DM education appointment:  8/2016      CURRENT DIABETIC MEDS: Jardiance 25 mg daily, Mounjaro 10 mg weekly, Lantus 12 units QHS, Humalog 10 units with meals + correction scale, target 150, ISF 25  Glucometer type: Accucheck Adrienne     Previous DM treatments:  Lantus - not covered by insurance   Glimepiride   Acarbose  Metformin XR -- diarrhea  VGo - didn't like wearing device  Ozempic - reports this was ineffective   Victoza    Last Eye Exam: 11/19/2023, no DR per patient.  Dr. Betancourt in [place].  Last Podiatry Exam: 2024, Dr. Garcia    REVIEW OF SYSTEMS  Constitutional: no c/o fatigue, weakness, weight loss. + 4# weight gain  Cardiac: no palpitations or chest pain. Reports ongoing BLE swelling   Respiratory: no dyspnea, cough.   GI: no c/o abdominal pain or nausea. Denies h/o pancreatitis.   Skin: no rashes, lesions   Neuro: no numbness, tingling, paresthesias  "  Endocrine: denies polyphagia, polydipsia, polyuria.       Personally reviewed Past Medical, Surgical, Social History.    Vital Signs  /80   Pulse 88   Ht 5' 7" (1.702 m)   Wt 133.6 kg (294 lb 6.8 oz)   LMP 11/21/2019   BMI 46.11 kg/m²     Personally reviewed the below labs:    Hemoglobin A1C   Date Value Ref Range Status   11/29/2024 7.8 (H) 4.0 - 5.6 % Final     Comment:     ADA Screening Guidelines:  5.7-6.4%  Consistent with prediabetes  >or=6.5%  Consistent with diabetes    High levels of fetal hemoglobin interfere with the HbA1C  assay. Heterozygous hemoglobin variants (HbS, HgC, etc)do  not significantly interfere with this assay.   However, presence of multiple variants may affect accuracy.     08/14/2024 7.0 (H) 4.0 - 5.6 % Final     Comment:     ADA Screening Guidelines:  5.7-6.4%  Consistent with prediabetes  >or=6.5%  Consistent with diabetes    High levels of fetal hemoglobin interfere with the HbA1C  assay. Heterozygous hemoglobin variants (HbS, HgC, etc)do  not significantly interfere with this assay.   However, presence of multiple variants may affect accuracy.     04/09/2024 7.4 (H) 4.0 - 5.6 % Final     Comment:     ADA Screening Guidelines:  5.7-6.4%  Consistent with prediabetes  >or=6.5%  Consistent with diabetes    High levels of fetal hemoglobin interfere with the HbA1C  assay. Heterozygous hemoglobin variants (HbS, HgC, etc)do  not significantly interfere with this assay.   However, presence of multiple variants may affect accuracy.     05/03/2012 7.1 (H) 4.8 - 5.9 % Final     Comment:     **In order to standardize %HbA1c results worldwide, as of October 11, 2010,  the %HbA1c is being calculated using the master equation recommended in the  consensus statement adopted by the ADA (American Diabetes Assoc), EASD  (European Assoc for the Study of Diabetes), IFCC (International Federation  of Clinical Chemistry and Laboratory Medicine) and IDF (International  Diabetes Federation). " Result units: %HgbA1c (DCCT/NGSP).  In common with other methods, Hb A1C values may not accurately reflect mean  blood glucose in patients with hemoglobin variants (HgbF, HgbS and HgbC).  Any cause of shortened erythrocyte survival will reduce exposure of  erythrocytes to glucose with a consequent decrease in HbA1c (%) values, even  though the time-averaged blood glucose level may be elevated. Causes of  shortened erythrocyte lifetime might be hemolytic anemia or other hemolytic  diseases, homozygous sickle cell trait, pregnancy, recent significant or  chronic blood loss, etc. Caution should be used when interpreting the HbA1c  results from patients with these conditions.       Chemistry        Component Value Date/Time     11/29/2024 1208    K 3.9 11/29/2024 1208     11/29/2024 1208    CO2 28 11/29/2024 1208    BUN 19 11/29/2024 1208    CREATININE 1.2 11/29/2024 1208    CREATININE 1.0 05/06/2012 0431     (H) 11/29/2024 1208        Component Value Date/Time    CALCIUM 9.7 11/29/2024 1208    CALCIUM 9.5 05/06/2012 0431    ALKPHOS 84 11/29/2024 1208    ALKPHOS 49 05/03/2012 1635    AST 14 11/29/2024 1208    AST 13 05/03/2012 1635    ALT 15 11/29/2024 1208    BILITOT 0.3 11/29/2024 1208          Lab Results   Component Value Date    CHOL 165 08/14/2024    CHOL 162 12/05/2023    CHOL 245 (H) 08/24/2023     Lab Results   Component Value Date    HDL 42 08/14/2024    HDL 44 12/05/2023    HDL 52 08/24/2023     Lab Results   Component Value Date    LDLCALC 104.2 08/14/2024    LDLCALC 98.0 12/05/2023    LDLCALC 171.6 (H) 08/24/2023     Lab Results   Component Value Date    TRIG 94 08/14/2024    TRIG 100 12/05/2023    TRIG 107 08/24/2023     Lab Results   Component Value Date    CHOLHDL 25.5 08/14/2024    CHOLHDL 27.2 12/05/2023    CHOLHDL 21.2 08/24/2023       Lab Results   Component Value Date    MICALBCREAT Unable to calculate 11/29/2024     Lab Results   Component Value Date    TSH 0.806 02/02/2023  "      CrCl cannot be calculated (Unknown ideal weight.).    No results found for: "[id number]"         PHYSICAL EXAMINATION  Deferred due to time constraint          FREESTYLE BAKARI 2 DOWNLOAD: Majority of glucoses are well controlled. Fasting glucoses reasonable. Sporadic prandial excursions. Occasional hypoglycemia noted between meals.              Goals        HEMOGLOBIN A1C < 7               Assessment/Plan  1. Type 2 diabetes mellitus with microalbuminuria, with long-term current use of insulin  -- Average glucose on CGM report is 136, which doesn't correlate with A1c of 7.8%.   -- increase Mounjaro to 12.5 mg weekly.   -- Decrease Humalog to 8 units before meals + correction scale.   -- continue Lantus 12 units.   -- Continue Jardiance  -- continue Freestyle Bakari 2. Notify me if hypoglycemia persists.     -- Discussed diagnosis of DM, A1c goals, progression of disease, long term complications and tx options.  Advised patient to check BG before activities, such as driving or exercise.  -- Reviewed hypoglycemia management: treat with 1/2 glass of juice, 1/2 can regular coke, or 4 glucose tablets. Monitor and repeat treatment every 15 minutes until BG is >70 Then have a snack, which includes a complex carbohydrate and protein.   2. Essential hypertension  -- controlled.   -- continue current meds   3. Mixed hyperlipidemia  -- controlled  -- continue atorvastatin    4. Morbid obesity  -- increases insulin resistance   Body mass index is 46.11 kg/m².   5. Hypoglycemia associated with type 2 diabetes mellitus  -- se A/P #1       FOLLOW UP  Follow up in about 4 months (around 4/5/2025).   Patient instructed to bring BG logs to each follow up   Patient encouraged to call for any BG/medication issues, concerns, or questions.      Orders Placed This Encounter   Procedures    Hemoglobin A1C    Comprehensive Metabolic Panel    TSH                                   "

## 2024-12-10 ENCOUNTER — TELEPHONE (OUTPATIENT)
Dept: ENDOCRINOLOGY | Facility: CLINIC | Age: 54
End: 2024-12-10
Payer: COMMERCIAL

## 2025-01-17 DIAGNOSIS — Z79.4 TYPE 2 DIABETES MELLITUS WITH DIABETIC POLYNEUROPATHY, WITH LONG-TERM CURRENT USE OF INSULIN: ICD-10-CM

## 2025-01-17 DIAGNOSIS — E11.42 TYPE 2 DIABETES MELLITUS WITH DIABETIC POLYNEUROPATHY, WITH LONG-TERM CURRENT USE OF INSULIN: ICD-10-CM

## 2025-01-17 RX ORDER — FLASH GLUCOSE SENSOR
KIT MISCELLANEOUS
Qty: 2 KIT | Refills: 11 | Status: SHIPPED | OUTPATIENT
Start: 2025-01-17

## 2025-01-24 ENCOUNTER — OFFICE VISIT (OUTPATIENT)
Dept: CARDIOLOGY | Facility: CLINIC | Age: 55
End: 2025-01-24
Payer: COMMERCIAL

## 2025-01-24 VITALS
DIASTOLIC BLOOD PRESSURE: 80 MMHG | HEART RATE: 97 BPM | WEIGHT: 293 LBS | SYSTOLIC BLOOD PRESSURE: 118 MMHG | HEIGHT: 67 IN | BODY MASS INDEX: 45.99 KG/M2

## 2025-01-24 DIAGNOSIS — E66.01 MORBID OBESITY: ICD-10-CM

## 2025-01-24 DIAGNOSIS — E78.2 MIXED HYPERLIPIDEMIA: ICD-10-CM

## 2025-01-24 DIAGNOSIS — I10 ESSENTIAL HYPERTENSION: ICD-10-CM

## 2025-01-24 DIAGNOSIS — Z79.4 TYPE 2 DIABETES MELLITUS WITH DIABETIC POLYNEUROPATHY, WITH LONG-TERM CURRENT USE OF INSULIN: ICD-10-CM

## 2025-01-24 DIAGNOSIS — E11.42 TYPE 2 DIABETES MELLITUS WITH DIABETIC POLYNEUROPATHY, WITH LONG-TERM CURRENT USE OF INSULIN: ICD-10-CM

## 2025-01-24 DIAGNOSIS — R94.39 ABNORMAL NUCLEAR STRESS TEST: Primary | ICD-10-CM

## 2025-01-24 DIAGNOSIS — E66.9 OBESITY (BMI 30-39.9): ICD-10-CM

## 2025-01-24 PROCEDURE — 1159F MED LIST DOCD IN RCRD: CPT | Mod: CPTII,S$GLB,, | Performed by: INTERNAL MEDICINE

## 2025-01-24 PROCEDURE — 4010F ACE/ARB THERAPY RXD/TAKEN: CPT | Mod: CPTII,S$GLB,, | Performed by: INTERNAL MEDICINE

## 2025-01-24 PROCEDURE — 99214 OFFICE O/P EST MOD 30 MIN: CPT | Mod: S$GLB,,, | Performed by: INTERNAL MEDICINE

## 2025-01-24 PROCEDURE — 3074F SYST BP LT 130 MM HG: CPT | Mod: CPTII,S$GLB,, | Performed by: INTERNAL MEDICINE

## 2025-01-24 PROCEDURE — 3008F BODY MASS INDEX DOCD: CPT | Mod: CPTII,S$GLB,, | Performed by: INTERNAL MEDICINE

## 2025-01-24 PROCEDURE — 3079F DIAST BP 80-89 MM HG: CPT | Mod: CPTII,S$GLB,, | Performed by: INTERNAL MEDICINE

## 2025-01-24 PROCEDURE — 99999 PR PBB SHADOW E&M-EST. PATIENT-LVL IV: CPT | Mod: PBBFAC,,, | Performed by: INTERNAL MEDICINE

## 2025-01-24 NOTE — PROGRESS NOTES
Subjective:    Patient ID:  Liza Spain Cousin is a 54 y.o. female patient here for evaluation Follow-up      History of Present Illness:  Follow-up.  Multiple CV risk factors including past tobacco use, hypertension, diabetes mellitus dyslipidemia.  Past noninvasive/invasive workup via left heart catheterization revealed nonobstructive CAD 2024.  Left ventricular end-diastolic pressure 24.  Echo EF normal with PA systolic pressures 40     Lower extremity edema improved.  Stable orthopnea.  No PND no angina.  No arrhythmia.    Patient doing well with risk factor modification, blood pressure and glucose control.    Review of patient's allergies indicates:  No Known Allergies    Past Medical History:   Diagnosis Date    Anticoagulant long-term use     Arthritis     Asthma 2012    Blood transfusion     COPD (chronic obstructive pulmonary disease)     Depression 2012    Diabetes 2012    HTN (hypertension) 2012    LBP (low back pain) 2012    Mild nonproliferative diabetic retinopathy 2024    Obesity 2013    Tobacco abuse 07/10/2013     Past Surgical History:   Procedure Laterality Date    ANGIOGRAM, CORONARY, WITH LEFT HEART CATHETERIZATION  2024    Procedure: Left Heart Cath;  Surgeon: Cayetano Mathew MD;  Location: [facility] CATH;  Service: Cardiology;;     SECTION, LOW TRANSVERSE      COLONOSCOPY N/A 2023    Procedure: COLONOSCOPY;  Surgeon: Reggie Posey MD;  Location: [facility] ENDO;  Service: Endoscopy;  Laterality: N/A;    GANGLION CYST EXCISION Right 2020    Dr. Logan     Social History     Tobacco Use    Smoking status: Former     Current packs/day: 0.00     Average packs/day: 0.5 packs/day for 30.0 years (15.0 ttl pk-yrs)     Types: Cigarettes     Start date: 1985     Quit date: 2015     Years since quittin.0     Passive exposure: Past    Smokeless tobacco: Never    Tobacco comments:     Quit 2023   Substance Use Topics     Alcohol use: Not Currently     Comment: drink wine cooler    Drug use: No        Review of Systems:    As noted in HPI in addition      REVIEW OF SYSTEMS  Review of Systems   Constitutional: Negative for decreased appetite, diaphoresis, night sweats, weight gain and weight loss.   HENT:  Negative for nosebleeds and odynophagia.    Eyes:  Negative for double vision and photophobia.   Cardiovascular:  Negative for chest pain, claudication, cyanosis, dyspnea on exertion, irregular heartbeat, leg swelling, near-syncope, orthopnea, palpitations, paroxysmal nocturnal dyspnea and syncope.   Respiratory:  Negative for cough, hemoptysis, shortness of breath and wheezing.    Hematologic/Lymphatic: Negative for adenopathy.   Skin:  Negative for flushing, skin cancer and suspicious lesions.   Musculoskeletal:  Negative for gout, myalgias and neck pain.   Gastrointestinal:  Negative for abdominal pain, heartburn, hematemesis and hematochezia.   Genitourinary:  Negative for bladder incontinence, hesitancy and nocturia.   Neurological:  Negative for focal weakness, headaches, light-headedness and paresthesias.   Psychiatric/Behavioral:  Negative for memory loss and substance abuse.               Objective:        Vitals:    01/24/25 1524   BP: 118/80   Pulse: 97       Lab Results   Component Value Date    WBC 5.30 09/07/2024    HGB 13.0 09/07/2024    HCT 43.4 09/07/2024     09/07/2024    CHOL 165 08/14/2024    TRIG 94 08/14/2024    HDL 42 08/14/2024    ALT 15 11/29/2024    AST 14 11/29/2024     11/29/2024    K 3.9 11/29/2024     11/29/2024    CREATININE 1.2 11/29/2024    BUN 19 11/29/2024    CO2 28 11/29/2024    TSH 0.806 02/02/2023    HGBA1C 7.8 (H) 11/29/2024    MICROALBUR 0.4 04/12/2016        ECHOCARDIOGRAM RESULTS  Results for orders placed during the hospital encounter of 04/25/24    Echo    Interpretation Summary    Left Ventricle: The left ventricle is normal in size. Moderately increased wall  thickness. There is normal systolic function with a visually estimated ejection fraction of 60 - 65%. Grade I diastolic dysfunction.    Right Ventricle: Normal right ventricular cavity size. Wall thickness is normal. Systolic function is normal.    Left Atrium: Left atrium is dilated.    Mitral Valve: Moderately calcified posterior leaflet.    Pulmonary Artery: There is mild pulmonary hypertension. The estimated pulmonary artery systolic pressure is 40 mmHg.    IVC/SVC: Normal venous pressure at 3 mmHg.    Results for orders placed during the hospital encounter of 06/04/24    Cardiac catheterization    Conclusion    The ejection fraction was calculated to be 75%.    The pre-procedure left ventricular end diastolic pressure was 24.    The post-procedure left ventricular end diastolic pressure was 24.    The estimated blood loss was none.    Nonobstructive disease involving the LAD with mid segment moderate systolic bridging.  Elevated LVEDP of 24.    The procedure log was documented by Documenter: Collin Feldman RN and verified by Cayetano Mathew MD.    Date: 6/4/2024  Time: 12:26 PM          CURRENT/PREVIOUS VISIT EKG  Results for orders placed or performed during the hospital encounter of 06/04/24   EKG 12-lead    Collection Time: 06/04/24  7:54 AM   Result Value Ref Range    QRS Duration 106 ms    OHS QTC Calculation 471 ms    Narrative    Test Reason : R94.39,    Vent. Rate : 088 BPM     Atrial Rate : 088 BPM     P-R Int : 164 ms          QRS Dur : 106 ms      QT Int : 390 ms       P-R-T Axes : 045 -61 034 degrees     QTc Int : 471 ms    Normal sinus rhythm  Left anterior fascicular block  Minimal voltage criteria for LVH, may be normal variant ( [employer] product )  Abnormal ECG  When compared with ECG of 05-MAR-2024 14:07,  Previous ECG has undetermined rhythm, needs review  Criteria for Septal infarct are no longer Present  Non-specific change in ST segment in Anterior leads  Nonspecific T wave abnormality  now evident in Inferior leads  Confirmed by Cayetano Mathew MD ([id number]) on 6/4/2024 1:10:04 PM    Referred By:  LIZANDRO           Confirmed By:Cayetano Mathew MD     No valid procedures specified.   Results for orders placed during the hospital encounter of 03/05/24    Nuclear Stress - Cardiology Interpreted    Interpretation Summary    Abnormal myocardial perfusion scan.    There is a moderate intensity, moderate sized, reversible perfusion abnormality that is consistent with ischemia in the inferior wall(s).    There are no other significant perfusion abnormalities.    The gated perfusion images showed an ejection fraction of 68% at rest.    There is normal wall motion at rest.    The ECG portion of the study is negative for ischemia.    The patient reported no chest pain during the stress test.    During stress, rare PVCs are noted.    There are no prior studies for comparison.    Positive stress test.    No valid procedures specified.    PHYSICAL EXAM  GENERAL: well built, well nourished, well-developed in no apparent distress alert and oriented.   HEENT: Normocephalic. Pupils normal and conjunctivae normal.  Mucous membranes normal, no cyanosis or icterus, trachea central,no pallor or icterus is noted..   NECK: No JVD. No bruit..   THYROID: Thyroid not enlarged. No nodules present..   CARDIAC:  Normal S1-S2.  No murmur rub click or gallop.  PMI nondisplaced.    LUNGS: Clear to auscultation. No wheezing or rhonchi..   ABDOMEN: Soft no masses or organomegaly.  No abdomen pulsations or bruits.  Normal bowel sounds. No pulsations and no masses felt, No guarding or rebound.   URINARY: No valdez catheter   EXTREMITIES: No cyanosis, clubbing or edema noted at this time., no calf tenderness bilaterally.   PERIPHERAL VASCULAR SYSTEM: Good palpable distal pulses.  2+ femoral, popliteal and pedal pulses.  No bruits    CENTRAL NERVOUS SYSTEM: No focal motor or sensory deficits noted.   SKIN: Skin without lesions, moist,  well perfused.   MUSCLE STRENGTH & TONE: No noteable weakness, atrophy or abnormal movement    I HAVE REVIEWED :    The vital signs, nurses notes, and all the pertinent radiology and labs.         Current Outpatient Medications   Medication Instructions    albuterol (ACCUNEB) 0.63 mg, Nebulization, Every 6 hours PRN    albuterol-budesonide (AIRSUPRA) 90-80 mcg/actuation 2 puffs, Inhalation, Every 4 hours PRN    ALPRAZolam (XANAX) 1 mg, 2 times daily PRN    amLODIPine (NORVASC) 10 mg, Oral, 2 times daily    aspirin (ECOTRIN) 81 mg, Daily    atorvastatin (LIPITOR) 80 mg, Oral, Nightly    blood sugar diagnostic Strp To check BG 4 times daily, to use with insurance preferred meter. Dx code E11.42    blood-glucose meter kit To check BG 4 times daily, to use with insurance preferred meter. Dx code E11.42    carvediloL (COREG) 6.25 mg, Oral, 2 times daily with meals    chlorzoxazone (PARAFON FORTE) 500 mg, Oral, 4 times daily PRN    dextroamphetamine-amphetamine 30 mg Tab 30 mg, 2 times daily    empagliflozin (JARDIANCE) 25 mg, Oral, Daily    flash glucose scanning reader (FREESTYLE BAKARI 2 READER) [facility] Use for continuous glucose monitoring.    fluticasone-umeclidin-vilanter (TRELEGY ELLIPTA) 200-62.5-25 mcg inhaler 1 puff, Inhalation, Daily    FREESTYLE BAKARI 2 SENSOR Kit USE FOR CONTINUOUS GLUCOSE MONITORING. CHANGE EVERY 14 DAYS    hydroCHLOROthiazide (HYDRODIURIL) 25 mg, Oral, Daily    HYDROcodone-acetaminophen (NORCO) 7.5-325 mg per tablet 1 tablet, Oral, Every 12 hours PRN    insulin lispro (HUMALOG KWIKPEN INSULIN) 8 Units, Subcutaneous, 3 times daily before meals, Plus correction scale, max TDD 66u    lancets [facility] To check BG 4 times daily, to use with insurance preferred meter. Dx code E11.42    LANTUS SOLOSTAR U-100 INSULIN 12 Units, Subcutaneous, Nightly    losartan (COZAAR) 100 mg, Nightly    MOUNJARO 12.5 mg, Subcutaneous, Every 7 days    oxybutynin (DITROPAN-XL) 10 mg, Daily    pen needle, diabetic (TRUEPLUS  "PEN NEEDLE) 32 gauge x 5/32" Ndle USE 5 TIMES DAILY WITH INSULIN AND VICTOZA.    promethazine-codeine 6.25-10 mg/5 ml (PHENERGAN WITH CODEINE) 6.25-10 mg/5 mL syrup 5 mLs, Every 6 hours PRN    sumatriptan (IMITREX) 100 mg, Oral, Once as needed    zolpidem (AMBIEN) 10 mg, Nightly PRN          Assessment:       Hypertension, diabetes mellitus, dyslipidemia, past tobacco use.  Positive family history.     Left heart catheterization with nonobstructive CAD 06/2024.  Elevated left ventricular end-diastolic pressure at 24.  Echo EF normal with mild PA systolic hypertension at 40.       Plan:   Meds reviewed and reconciled.  No changes.  Labs follow-up primary care.  Patient doing well managing blood pressure and blood glucose levels.    , weight loss, exercise.    Six-month    No follow-ups on file.       "

## 2025-03-29 ENCOUNTER — HOSPITAL ENCOUNTER (OUTPATIENT)
Dept: RADIOLOGY | Facility: HOSPITAL | Age: 55
Discharge: HOME | End: 2025-03-29
Attending: OBSTETRICS & GYNECOLOGY
Payer: COMMERCIAL

## 2025-03-29 VITALS — WEIGHT: 293 LBS | BODY MASS INDEX: 45.99 KG/M2 | HEIGHT: 67 IN

## 2025-03-29 DIAGNOSIS — Z12.31 ENCOUNTER FOR SCREENING MAMMOGRAM FOR MALIGNANT NEOPLASM OF BREAST: ICD-10-CM

## 2025-03-29 PROCEDURE — 77063 BREAST TOMOSYNTHESIS BI: CPT | Mod: 26,,, | Performed by: RADIOLOGY

## 2025-03-29 PROCEDURE — 77067 SCR MAMMO BI INCL CAD: CPT | Mod: 26,,, | Performed by: RADIOLOGY

## 2025-03-29 PROCEDURE — 77067 SCR MAMMO BI INCL CAD: CPT | Mod: TC,PO

## 2025-04-02 ENCOUNTER — LAB VISIT (OUTPATIENT)
Dept: LAB | Facility: HOSPITAL | Age: 55
End: 2025-04-02
Attending: NURSE PRACTITIONER
Payer: COMMERCIAL

## 2025-04-02 DIAGNOSIS — Z79.4 TYPE 2 DIABETES MELLITUS WITH MICROALBUMINURIA, WITH LONG-TERM CURRENT USE OF INSULIN: ICD-10-CM

## 2025-04-02 DIAGNOSIS — R80.9 TYPE 2 DIABETES MELLITUS WITH MICROALBUMINURIA, WITH LONG-TERM CURRENT USE OF INSULIN: ICD-10-CM

## 2025-04-02 DIAGNOSIS — E11.29 TYPE 2 DIABETES MELLITUS WITH MICROALBUMINURIA, WITH LONG-TERM CURRENT USE OF INSULIN: ICD-10-CM

## 2025-04-02 LAB
ALBUMIN SERPL BCP-MCNC: 3.5 G/DL (ref 3.5–5.2)
ALP SERPL-CCNC: 83 UNIT/L (ref 40–150)
ALT SERPL W/O P-5'-P-CCNC: 15 UNIT/L (ref 10–44)
ANION GAP (OHS): 6 MMOL/L (ref 8–16)
AST SERPL-CCNC: 17 UNIT/L (ref 11–45)
BILIRUB SERPL-MCNC: 0.3 MG/DL (ref 0.1–1)
BUN SERPL-MCNC: 14 MG/DL (ref 6–20)
CALCIUM SERPL-MCNC: 9.7 MG/DL (ref 8.7–10.5)
CHLORIDE SERPL-SCNC: 103 MMOL/L (ref 95–110)
CO2 SERPL-SCNC: 28 MMOL/L (ref 23–29)
CREAT SERPL-MCNC: 0.9 MG/DL (ref 0.5–1.4)
GFR SERPLBLD CREATININE-BSD FMLA CKD-EPI: >60 ML/MIN/1.73/M2
GLUCOSE SERPL-MCNC: 120 MG/DL (ref 70–110)
POTASSIUM SERPL-SCNC: 3.7 MMOL/L (ref 3.5–5.1)
PROT SERPL-MCNC: 7.5 GM/DL (ref 6–8.4)
SODIUM SERPL-SCNC: 137 MMOL/L (ref 136–145)
TSH SERPL-ACNC: 0.87 UIU/ML (ref 0.4–4)

## 2025-04-02 PROCEDURE — 83036 HEMOGLOBIN GLYCOSYLATED A1C: CPT

## 2025-04-02 PROCEDURE — 84443 ASSAY THYROID STIM HORMONE: CPT

## 2025-04-02 PROCEDURE — 36415 COLL VENOUS BLD VENIPUNCTURE: CPT | Mod: PN

## 2025-04-02 PROCEDURE — 80053 COMPREHEN METABOLIC PANEL: CPT

## 2025-04-03 ENCOUNTER — RESULTS FOLLOW-UP (OUTPATIENT)
Dept: ENDOCRINOLOGY | Facility: CLINIC | Age: 55
End: 2025-04-03
Payer: COMMERCIAL

## 2025-04-03 LAB
EAG (OHS): 180 MG/DL (ref 68–131)
HBA1C MFR BLD: 7.9 % (ref 4–5.6)

## 2025-04-09 ENCOUNTER — OFFICE VISIT (OUTPATIENT)
Dept: ENDOCRINOLOGY | Facility: CLINIC | Age: 55
End: 2025-04-09
Payer: COMMERCIAL

## 2025-04-09 VITALS
WEIGHT: 288.56 LBS | SYSTOLIC BLOOD PRESSURE: 114 MMHG | BODY MASS INDEX: 45.29 KG/M2 | HEART RATE: 81 BPM | HEIGHT: 67 IN | DIASTOLIC BLOOD PRESSURE: 70 MMHG

## 2025-04-09 DIAGNOSIS — E78.2 MIXED HYPERLIPIDEMIA: ICD-10-CM

## 2025-04-09 DIAGNOSIS — I10 ESSENTIAL HYPERTENSION: ICD-10-CM

## 2025-04-09 DIAGNOSIS — Z79.4 TYPE 2 DIABETES MELLITUS WITH BOTH EYES AFFECTED BY MILD NONPROLIFERATIVE RETINOPATHY WITHOUT MACULAR EDEMA, WITH LONG-TERM CURRENT USE OF INSULIN: ICD-10-CM

## 2025-04-09 DIAGNOSIS — Z79.4 TYPE 2 DIABETES MELLITUS WITH DIABETIC POLYNEUROPATHY, WITH LONG-TERM CURRENT USE OF INSULIN: Primary | ICD-10-CM

## 2025-04-09 DIAGNOSIS — E11.3293 TYPE 2 DIABETES MELLITUS WITH BOTH EYES AFFECTED BY MILD NONPROLIFERATIVE RETINOPATHY WITHOUT MACULAR EDEMA, WITH LONG-TERM CURRENT USE OF INSULIN: ICD-10-CM

## 2025-04-09 DIAGNOSIS — E66.01 MORBID OBESITY WITH BMI OF 45.0-49.9, ADULT: ICD-10-CM

## 2025-04-09 DIAGNOSIS — E11.42 TYPE 2 DIABETES MELLITUS WITH DIABETIC POLYNEUROPATHY, WITH LONG-TERM CURRENT USE OF INSULIN: Primary | ICD-10-CM

## 2025-04-09 PROCEDURE — 1159F MED LIST DOCD IN RCRD: CPT | Mod: CPTII,S$GLB,, | Performed by: NURSE PRACTITIONER

## 2025-04-09 PROCEDURE — 1160F RVW MEDS BY RX/DR IN RCRD: CPT | Mod: CPTII,S$GLB,, | Performed by: NURSE PRACTITIONER

## 2025-04-09 PROCEDURE — 99214 OFFICE O/P EST MOD 30 MIN: CPT | Mod: S$GLB,,, | Performed by: NURSE PRACTITIONER

## 2025-04-09 PROCEDURE — 4010F ACE/ARB THERAPY RXD/TAKEN: CPT | Mod: CPTII,S$GLB,, | Performed by: NURSE PRACTITIONER

## 2025-04-09 PROCEDURE — G2211 COMPLEX E/M VISIT ADD ON: HCPCS | Mod: S$GLB,,, | Performed by: NURSE PRACTITIONER

## 2025-04-09 PROCEDURE — 3008F BODY MASS INDEX DOCD: CPT | Mod: CPTII,S$GLB,, | Performed by: NURSE PRACTITIONER

## 2025-04-09 PROCEDURE — 3051F HG A1C>EQUAL 7.0%<8.0%: CPT | Mod: CPTII,S$GLB,, | Performed by: NURSE PRACTITIONER

## 2025-04-09 PROCEDURE — 3078F DIAST BP <80 MM HG: CPT | Mod: CPTII,S$GLB,, | Performed by: NURSE PRACTITIONER

## 2025-04-09 PROCEDURE — 99999 PR PBB SHADOW E&M-EST. PATIENT-LVL IV: CPT | Mod: PBBFAC,,, | Performed by: NURSE PRACTITIONER

## 2025-04-09 PROCEDURE — 3074F SYST BP LT 130 MM HG: CPT | Mod: CPTII,S$GLB,, | Performed by: NURSE PRACTITIONER

## 2025-04-09 RX ORDER — CARISOPRODOL 350 MG/1
350 TABLET ORAL DAILY PRN
COMMUNITY
Start: 2025-02-10

## 2025-04-09 RX ORDER — BLOOD-GLUCOSE SENSOR
EACH MISCELLANEOUS
Qty: 2 EACH | Refills: 6 | Status: SHIPPED | OUTPATIENT
Start: 2025-04-09

## 2025-04-09 RX ORDER — BLOOD-GLUCOSE,RECEIVER,CONT
EACH MISCELLANEOUS
Qty: 1 EACH | Refills: 0 | Status: SHIPPED | OUTPATIENT
Start: 2025-04-09

## 2025-04-09 RX ORDER — MUPIROCIN 20 MG/G
OINTMENT TOPICAL 3 TIMES DAILY
COMMUNITY
Start: 2025-03-20

## 2025-04-09 NOTE — PROGRESS NOTES
CC: Ms. Liza Mesa arrives today for management of Type 2 DM and review of chronic medical conditions, as listed in the Visit Diagnosis section of this encounter.       HPI: Ms. Liza Mesa was diagnosed with Type 2 DM in 2005. She was diagnosed based on lab work. Initial treatment consisted of metformin.  Victoza was later added to her medication regimen in 2016.  Insulin added in 2018. Mounjaro added in 2023. + FH of DM on mother. Denies hospitalizations due to DM.     Patient was last seen by me in December. Mounjaro dose was increased then and Humalog dose was decreased.    BG monitoring per Freestyle Vicky 2. Sisters will not download today. Readings range 87-160s upon review, outlier of 200    Hypoglycemia: Rare  Symptoms: jittery, tingling  Treatment: peppermint or jelly beans     Missing Insulin/PO medication doses: Rare     Dietary Habits: Eats 2 meal/day - lunch and dinner. Rare snack. Avoids sugary beverages.      Last DM education appointment:  8/2016      CURRENT DIABETIC MEDS: Jardiance 25 mg daily, Mounjaro 12.5 mg weekly, Lantus 12 units QHS, Humalog 8 units with meals + correction scale, target 150, ISF 25  Glucometer type: Accucheck Adrienne     Previous DM treatments:  Lantus - not covered by insurance   Glimepiride   Acarbose  Metformin XR -- diarrhea  VGo - didn't like wearing device  Ozempic - reports this was ineffective   Victoza    Last Eye Exam: 11/19/2024, + DR.. Dr. Betancourt in [place].  Last Podiatry Exam: 2024, Dr. Garcia    REVIEW OF SYSTEMS  Constitutional: no c/o fatigue, weakness. + 6# weight loss since last visit  Cardiac: no palpitations or chest pain.  Respiratory: no dyspnea, cough.   GI: no c/o abdominal pain or nausea. Denies h/o pancreatitis.   Skin: no rashes, lesions   Neuro: no numbness, tingling, paresthesias   Endocrine: denies polyphagia, polydipsia, polyuria.       Personally reviewed Past Medical, Surgical, Social History.    Vital Signs  /70    "Pulse 81   Ht 5' 7" (1.702 m)   Wt 130.9 kg (288 lb 9.3 oz)   LMP 11/21/2019   BMI 45.20 kg/m²     Personally reviewed the below labs:    Hemoglobin A1C   Date Value Ref Range Status   11/29/2024 7.8 (H) 4.0 - 5.6 % Final     Comment:     ADA Screening Guidelines:  5.7-6.4%  Consistent with prediabetes  >or=6.5%  Consistent with diabetes    High levels of fetal hemoglobin interfere with the HbA1C  assay. Heterozygous hemoglobin variants (HbS, HgC, etc)do  not significantly interfere with this assay.   However, presence of multiple variants may affect accuracy.     08/14/2024 7.0 (H) 4.0 - 5.6 % Final     Comment:     ADA Screening Guidelines:  5.7-6.4%  Consistent with prediabetes  >or=6.5%  Consistent with diabetes    High levels of fetal hemoglobin interfere with the HbA1C  assay. Heterozygous hemoglobin variants (HbS, HgC, etc)do  not significantly interfere with this assay.   However, presence of multiple variants may affect accuracy.     04/09/2024 7.4 (H) 4.0 - 5.6 % Final     Comment:     ADA Screening Guidelines:  5.7-6.4%  Consistent with prediabetes  >or=6.5%  Consistent with diabetes    High levels of fetal hemoglobin interfere with the HbA1C  assay. Heterozygous hemoglobin variants (HbS, HgC, etc)do  not significantly interfere with this assay.   However, presence of multiple variants may affect accuracy.     05/03/2012 7.1 (H) 4.8 - 5.9 % Final     Comment:     **In order to standardize %HbA1c results worldwide, as of October 11, 2010,  the %HbA1c is being calculated using the master equation recommended in the  consensus statement adopted by the ADA (American Diabetes Assoc), EASD  (European Assoc for the Study of Diabetes), IFCC (International Federation  of Clinical Chemistry and Laboratory Medicine) and IDF (International  Diabetes Federation). Result units: %HgbA1c (DCCT/NGSP).  In common with other methods, Hb A1C values may not accurately reflect mean  blood glucose in patients with " hemoglobin variants (HgbF, HgbS and HgbC).  Any cause of shortened erythrocyte survival will reduce exposure of  erythrocytes to glucose with a consequent decrease in HbA1c (%) values, even  though the time-averaged blood glucose level may be elevated. Causes of  shortened erythrocyte lifetime might be hemolytic anemia or other hemolytic  diseases, homozygous sickle cell trait, pregnancy, recent significant or  chronic blood loss, etc. Caution should be used when interpreting the HbA1c  results from patients with these conditions.     Hemoglobin A1c   Date Value Ref Range Status   04/02/2025 7.9 (H) 4.0 - 5.6 % Final     Comment:     ADA Screening Guidelines:  5.7-6.4%  Consistent with prediabetes  >=6.5%  Consistent with diabetes    High levels of fetal hemoglobin interfere with the HbA1C  assay. Heterozygous hemoglobin variants (HbS, HgC, etc)do  not significantly interfere with this assay.   However, presence of multiple variants may affect accuracy.       Chemistry        Component Value Date/Time     04/02/2025 1321     11/29/2024 1208    K 3.7 04/02/2025 1321    K 3.9 11/29/2024 1208     04/02/2025 1321     11/29/2024 1208    CO2 28 04/02/2025 1321    CO2 28 11/29/2024 1208    BUN 14 04/02/2025 1321    CREATININE 0.9 04/02/2025 1321    CREATININE 1.0 05/06/2012 0431     (H) 11/29/2024 1208        Component Value Date/Time    CALCIUM 9.7 04/02/2025 1321    CALCIUM 9.7 11/29/2024 1208    CALCIUM 9.5 05/06/2012 0431    ALKPHOS 83 04/02/2025 1321    ALKPHOS 84 11/29/2024 1208    ALKPHOS 49 05/03/2012 1635    AST 17 04/02/2025 1321    AST 14 11/29/2024 1208    AST 13 05/03/2012 1635    ALT 15 04/02/2025 1321    ALT 15 11/29/2024 1208    BILITOT 0.3 04/02/2025 1321    BILITOT 0.3 11/29/2024 1208          Lab Results   Component Value Date    CHOL 165 08/14/2024    CHOL 162 12/05/2023    CHOL 245 (H) 08/24/2023     Lab Results   Component Value Date    HDL 42 08/14/2024    HDL 44  "12/05/2023    HDL 52 08/24/2023     Lab Results   Component Value Date    LDLCALC 104.2 08/14/2024    LDLCALC 98.0 12/05/2023    LDLCALC 171.6 (H) 08/24/2023     Lab Results   Component Value Date    TRIG 94 08/14/2024    TRIG 100 12/05/2023    TRIG 107 08/24/2023     Lab Results   Component Value Date    CHOLHDL 25.5 08/14/2024    CHOLHDL 27.2 12/05/2023    CHOLHDL 21.2 08/24/2023       Lab Results   Component Value Date    MICALBCREAT Unable to calculate 11/29/2024     Lab Results   Component Value Date    TSH 0.870 04/02/2025       CrCl cannot be calculated (Patient's most recent lab result is older than the maximum 7 days allowed.).    No results found for: "[id number]"         PHYSICAL EXAMINATION  Constitutional: Appears well, no distress  Respiratory: CTA, even and unlabored.  Cardiovascular: RRR, no murmurs, no carotid bruits  GI: active bowel sounds, no hernia noted.  Skin: warm and dry; no visible wounds  Neuro: oriented to person, place, time  Feet: appropriate footwear.        FREESTYLE BAKARI 2 DOWNLOAD: Unable to download today.             Goals        HEMOGLOBIN A1C < 7               Assessment/Plan  1. Type 2 diabetes mellitus with microalbuminuria, with long-term current use of insulin  -- Historically, glucoses appear better controlled on CGM than according to A1c. She is now starting to lose weight. Will upgrade CGM to Bakari 3 PLUS for more continuous monitoring.   -- continue current meds  -- rx for Freestyle Bakari 3 PLUS sent to pharmacy.    -- Discussed diagnosis of DM, A1c goals, progression of disease, long term complications and tx options.  Advised patient to check BG before activities, such as driving or exercise.  -- Reviewed hypoglycemia management: treat with 1/2 glass of juice, 1/2 can regular coke, or 4 glucose tablets. Monitor and repeat treatment every 15 minutes until BG is >70 Then have a snack, which includes a complex carbohydrate and protein.   2. Type 2 diabetes mellitus " with both eyes affected by mild nonproliferative retinopathy without macular edema, with long-term current use of insulin  -- keep follow ups   3. Essential hypertension  -- controlled.   -- continue current meds   4. Mixed hyperlipidemia  -- controlled  -- continue atorvastatin   -- lipid panel with RTC   5. Morbid obesity  -- increases insulin resistance   Body mass index is 45.2 kg/m².       FOLLOW UP  Follow up in about 4 months (around 8/9/2025).   Patient instructed to bring BG logs to each follow up   Patient encouraged to call for any BG/medication issues, concerns, or questions.      Orders Placed This Encounter   Procedures    Hemoglobin A1C    Comprehensive Metabolic Panel    Lipid Panel

## 2025-04-16 ENCOUNTER — TELEPHONE (OUTPATIENT)
Dept: FAMILY MEDICINE | Facility: CLINIC | Age: 55
End: 2025-04-16
Payer: COMMERCIAL

## 2025-04-16 NOTE — TELEPHONE ENCOUNTER
Spoke w/ pt. Pt is going to be in the area earlier in the AM and was wanting to know if she could be seen sooner on 4/22/25. Reviewed MD mai, nothing earlier. Offered to sched the following day, pt is going to keep current appt at 2pm

## 2025-04-16 NOTE — TELEPHONE ENCOUNTER
----- Message from Bruna sent at 4/16/2025  9:38 AM CDT -----  Regarding: Consult/Advisory  Contact: pt @ [phone] (home)  Consult/Advisory Name Of Caller: Liza Mesa Contact Preference: [phone] (home)  Nature of call: pt is calling to see if she can come in for 11am. Asking for a call back

## 2025-04-22 ENCOUNTER — OFFICE VISIT (OUTPATIENT)
Dept: FAMILY MEDICINE | Facility: CLINIC | Age: 55
End: 2025-04-22
Payer: COMMERCIAL

## 2025-04-22 VITALS
BODY MASS INDEX: 45.99 KG/M2 | OXYGEN SATURATION: 99 % | WEIGHT: 293 LBS | HEART RATE: 82 BPM | DIASTOLIC BLOOD PRESSURE: 74 MMHG | SYSTOLIC BLOOD PRESSURE: 128 MMHG | HEIGHT: 67 IN

## 2025-04-22 DIAGNOSIS — G43.709 CHRONIC MIGRAINE WITHOUT AURA WITHOUT STATUS MIGRAINOSUS, NOT INTRACTABLE: ICD-10-CM

## 2025-04-22 DIAGNOSIS — J45.50 SEVERE PERSISTENT ASTHMA WITHOUT COMPLICATION: ICD-10-CM

## 2025-04-22 DIAGNOSIS — Z00.00 WELLNESS EXAMINATION: Primary | ICD-10-CM

## 2025-04-22 DIAGNOSIS — Z23 IMMUNIZATION DUE: ICD-10-CM

## 2025-04-22 DIAGNOSIS — J45.40 MODERATE PERSISTENT ASTHMA WITHOUT COMPLICATION: ICD-10-CM

## 2025-04-22 PROCEDURE — 3078F DIAST BP <80 MM HG: CPT | Mod: CPTII,S$GLB,, | Performed by: FAMILY MEDICINE

## 2025-04-22 PROCEDURE — 3074F SYST BP LT 130 MM HG: CPT | Mod: CPTII,S$GLB,, | Performed by: FAMILY MEDICINE

## 2025-04-22 PROCEDURE — 3008F BODY MASS INDEX DOCD: CPT | Mod: CPTII,S$GLB,, | Performed by: FAMILY MEDICINE

## 2025-04-22 PROCEDURE — 1160F RVW MEDS BY RX/DR IN RCRD: CPT | Mod: CPTII,S$GLB,, | Performed by: FAMILY MEDICINE

## 2025-04-22 PROCEDURE — 1159F MED LIST DOCD IN RCRD: CPT | Mod: CPTII,S$GLB,, | Performed by: FAMILY MEDICINE

## 2025-04-22 PROCEDURE — 90677 PCV20 VACCINE IM: CPT | Mod: S$GLB,,, | Performed by: FAMILY MEDICINE

## 2025-04-22 PROCEDURE — 4010F ACE/ARB THERAPY RXD/TAKEN: CPT | Mod: CPTII,S$GLB,, | Performed by: FAMILY MEDICINE

## 2025-04-22 PROCEDURE — 99396 PREV VISIT EST AGE 40-64: CPT | Mod: S$GLB,,, | Performed by: FAMILY MEDICINE

## 2025-04-22 PROCEDURE — 99999 PR PBB SHADOW E&M-EST. PATIENT-LVL III: CPT | Mod: PBBFAC,,, | Performed by: FAMILY MEDICINE

## 2025-04-22 PROCEDURE — 3051F HG A1C>EQUAL 7.0%<8.0%: CPT | Mod: CPTII,S$GLB,, | Performed by: FAMILY MEDICINE

## 2025-04-22 PROCEDURE — G0009 ADMIN PNEUMOCOCCAL VACCINE: HCPCS | Mod: S$GLB,,, | Performed by: FAMILY MEDICINE

## 2025-04-22 RX ORDER — FLUTICASONE FUROATE, UMECLIDINIUM BROMIDE AND VILANTEROL TRIFENATATE 200; 62.5; 25 UG/1; UG/1; UG/1
1 POWDER RESPIRATORY (INHALATION) DAILY
Qty: 30 EACH | Refills: 11 | Status: SHIPPED | OUTPATIENT
Start: 2025-04-22

## 2025-04-22 RX ORDER — SUMATRIPTAN SUCCINATE 100 MG/1
100 TABLET ORAL ONCE AS NEEDED
Qty: 6 TABLET | Refills: 5 | Status: SHIPPED | OUTPATIENT
Start: 2025-04-22 | End: 2025-04-22

## 2025-04-22 NOTE — PROGRESS NOTES
THIS DOCUMENT WAS MADE IN PART WITH VOICE RECOGNITION SOFTWARE.  OCCASIONALLY THIS SOFTWARE WILL MISINTERPRET WORDS OR PHRASES.    Assessment and Plan:    1. Wellness examination        2. Chronic migraine without aura without status migrainosus, not intractable  sumatriptan (IMITREX) 100 MG tablet    refilled sumatriptan, known to tolerate       3. Moderate persistent asthma without complication  albuterol-budesonide (AIRSUPRA) 90-80 mcg/actuation      4. Severe persistent asthma without complication  fluticasone-umeclidin-vilanter (TRELEGY ELLIPTA) 200-62.5-25 mcg inhaler      5. Immunization due  pneumoc 20-pedro conj-dip cr(PF) (PREVNAR-20 (PF)) injection Syrg 0.5 mL                PLAN    Assessment & Plan    PLAN SUMMARY:   Continue current antihypertensive regimen: amlodipine, losartan, hydrochlorothiazide, and carvedilol   Refill sumatriptan for as-needed migraine management   Administer final dose of pneumococcal vaccine series   Prescribe Airsupra inhaler for as-needed use   Continue current pain management regimen: hydrocodone and carisoprodol 350mg   Continue oxybutynin for overactive bladder   Maintain current diabetes management: Mounjaro, Lantus 12 units nightly, Humalog 8 units before meals, and Jardiance   Refill Trelegy inhaler for daily COPD management    TYPE 2 DIABETES MELLITUS:   Noted the patient's improved A1C reading of 7.9 two weeks ago, with the goal to maintain it under 8.   Emphasized the importance of medication adherence in preventing complications such as myocardial infarction, cerebrovascular accident, and renal failure requiring dialysis.   Continued the patient's current diabetes management regimen: Mounjaro, Lantus 12 units nightly, Humalog 8 units before meals, and Jardiance.   Instructed the patient to maintain strict adherence to the prescribed medication regimen.    LOW BACK PAIN:   Noted the patient's report of severe back pain affecting mobility and requiring constant  "medication.   Continued the current pain management regimen prescribed by Dr. Grace Amaro (previously Dr. Lorenz), including hydrocodone and carisoprodol (Soma) 350mg.   Advised the patient to monitor pain levels and report any changes or concerns.    ESSENTIAL HYPERTENSION:   Noted the patient's report of favorable home blood pressure readings.   Assessed the patient's blood pressure during the visit, which was reported as "beautiful."   Continued the current antihypertensive regimen: amlodipine, losartan, hydrochlorothiazide, and carvedilol (Coreg).   Encouraged the patient to maintain regular blood pressure monitoring at home.    CHRONIC OBSTRUCTIVE PULMONARY DISEASE (COPD):   Refilled Trelegy inhaler with instructions for daily use to manage COPD symptoms.   Prescribed Airsupra inhaler for as-needed use.   Emphasized the importance of daily Trelegy use for optimal effectiveness in COPD management.   Administered the final dose of pneumococcal vaccine series.    MIGRAINE:   Noted the patient's report of no recent migraines, with previous occurrences approximately every 3 months.   Refilled sumatriptan prescription for as-needed migraine management.   Advised the patient to monitor migraine frequency and severity.    OVERACTIVE BLADDER:   Noted the patient's report that the current medication for overactive bladder is effective.   Continued oxybutynin for overactive bladder management.   Instructed the patient to report any changes in bladder symptoms or medication effectiveness.    LONG-TERM USE OF OPIATE ANALGESIC:   Noted the patient's current use of hydrocodone for pain management.   Advised the patient to use the medication as prescribed and report any concerns or side effects.    FAMILY HISTORY OF CONGENITAL   MALFORMATIONS:   Noted the patient's report of her great-grandchild's kidney cysts that are increasing in size.   Advised the patient to encourage regular follow-ups and monitoring for her " "great-grandchild's condition.                 ______________________________________________________________________  Subjective:    Chief Complaint:  Chief Complaint   Patient presents with    Annual Exam        HPI:  Liza is a 54 y.o. year old           History of Present Illness    CHIEF COMPLAINT:  Liza presents today for follow up.    DIABETES:  She reports recent A1C of 7.9 from appointment with Dr. Agarwal two weeks ago. Current diabetes medications include Mounjaro, Lantus 12 units nightly, Humalog 8 units before meals, and Jardiance.    BACK PAIN:  She reports severe back pain requiring medication to be scattered throughout her house due to limited mobility. Pain occurs while driving and sitting, and is described as shocking and comparable to labor pains.    MEDICATIONS:  Current medications include amlodipine, losartan, hydrochlorothiazide, Coreg, oxybutynin, Trelegy inhaler, Airsupra inhaler, and sumatriptan.    HYPERTENSION:  She performs regular blood pressure checks at home with good readings.    RESPIRATORY:  She uses Trelegy inhaler every three days instead of daily as prescribed, and Airsupra inhaler for respiratory symptoms.    OTHER MEDICAL CONDITIONS:  She has migraines occurring approximately every three months but reports not using prescribed sumatriptan. She reports good symptom control of overactive bladder with oxybutynin.    FAMILY HISTORY:  Great-grandchild has two enlarging kidney cysts with associated swelling, being followed at [facility] and managed with low sodium diet.      ROS:  ROS as indicated in HPI.           Depression / Anxiety  Rx : Xanax 1 mg TID  Prescriber : Grace Amaro     CAD / Dyslipidemia  Rx- lipitor 80 mg   LHC : 6/ 2024 : "LUMINAL IRREG OF LAD" , No intervention      Insomnia   Rx: Ambien 10 mg  Prescriber : Grace Amaro      Allergic rhinitis  No current medications      Moderate persistent asthma + nicotine dependence-smoking  rx :  Albuterol, " Trelegy, promethazine-codeine cough syrup, benzonatate  Prescribed codeine cough syrup by nurse practitioner in [facility] routinely  Still smoking     Essential hypertension  Rx-amlodipine 10 mg + Losartan 100 + HCTZ 12.5 + coreg 6.25   Home BP : 130/80     Binge Eating  rx : Adderall 30 mg BID  Prescriber : Grace Amaro         Migraine headaches  Abortive-sumatriptan 100 mg as needed  HA occur about every 3 months or less      Type 2 diabetes mellitus complicated by diabetic polyneuropathy  rx : Lantus 12u daily, Novolog 8u TID with meals, Jardiance 25 mg , Mounjaro 10 mg  Previous A1c- 7.9   Time in [employer] : 92% for last 2 weeks      Chronic low back pain  Rx-hydrocodone 7.5 mg b.i.d., Soma (prescribed by Grace Amaro)   Prescribed by Maribel Hickman MD      Overactive bladder + Stress incont.   Rx-oxybutynin 10 mg  Medication working well      The 10-year ASCVD risk score (Haritha DK, et al., 2019) is: 11.8%    Values used to calculate the score:      Age: 54 years      Sex: Female      Is Non- : Yes      Diabetic: Yes      Tobacco smoker: No      Systolic Blood Pressure: 128 mmHg      Is BP treated: Yes      HDL Cholesterol: 42 mg/dL      Total Cholesterol: 165 mg/dL    Past Medical History:  Past Medical History:   Diagnosis Date    Anticoagulant long-term use     Arthritis     Asthma 2012    Blood transfusion     COPD (chronic obstructive pulmonary disease)     Depression 2012    Diabetes 2012    HTN (hypertension) 2012    LBP (low back pain) 2012    Mild nonproliferative diabetic retinopathy 2024    Obesity 2013    Tobacco abuse 07/10/2013       Past Surgical History:  Past Surgical History:   Procedure Laterality Date    ANGIOGRAM, CORONARY, WITH LEFT HEART CATHETERIZATION  2024    Procedure: Left Heart Cath;  Surgeon: Cayetano Mathew MD;  Location: [facility] CATH;  Service: Cardiology;;     SECTION, LOW TRANSVERSE       COLONOSCOPY N/A 2023    Procedure: COLONOSCOPY;  Surgeon: Reggie Posey MD;  Location: [facility] ENDO;  Service: Endoscopy;  Laterality: N/A;    GANGLION CYST EXCISION Right 2020    Dr. Logan       Family History:  Family History   Problem Relation Name Age of Onset    Diabetes Mother      Breast cancer Mother      No Known Problems Father      No Known Problems Sister      No Known Problems Daughter      Breast cancer Maternal Aunt      No Known Problems Maternal Uncle      No Known Problems Paternal Aunt      No Known Problems Paternal Uncle      Depression Maternal Grandmother          bipolar had to be hospitalized    No Known Problems Maternal Grandfather      No Known Problems Paternal Grandmother      No Known Problems Paternal Grandfather      No Known Problems Other      Allergic rhinitis Neg Hx      Allergies Neg Hx      Angioedema Neg Hx      Asthma Neg Hx      Atopy Neg Hx      Eczema Neg Hx      Immunodeficiency Neg Hx      Rhinitis Neg Hx      Urticaria Neg Hx      Ovarian cancer Neg Hx      BRCA 1/2 Neg Hx         Social History:  Social History     Socioeconomic History    Marital status: Single   Tobacco Use    Smoking status: Former     Current packs/day: 0.00     Average packs/day: 0.5 packs/day for 30.0 years (15.0 ttl pk-yrs)     Types: Cigarettes     Start date: 1985     Quit date: 2015     Years since quittin.3     Passive exposure: Past    Smokeless tobacco: Never    Tobacco comments:     Quit 2023   Substance and Sexual Activity    Alcohol use: Not Currently     Comment: drink wine cooler    Drug use: No    Sexual activity: Yes     Partners: Male     Birth control/protection: None       Medications:  Current Outpatient Medications on File Prior to Visit   Medication Sig Dispense Refill    albuterol (ACCUNEB) 0.63 mg/3 mL Nebu Take 3 mLs (0.63 mg total) by nebulization every 6 (six) hours as needed (wheezing). 180 mL 3    ALPRAZolam (XANAX) 1 MG tablet  Take 1 mg by mouth 2 (two) times daily as needed for Anxiety.      amLODIPine (NORVASC) 10 MG tablet Take 1 tablet (10 mg total) by mouth 2 (two) times daily.      aspirin (ECOTRIN) 81 MG EC tablet Take 81 mg by mouth once daily.      atorvastatin (LIPITOR) 80 MG tablet Take 1 tablet (80 mg total) by mouth every evening. 90 tablet 3    blood sugar diagnostic Strp To check BG 4 times daily, to use with insurance preferred meter. Dx code E11.42 150 each 6    carisoprodoL (SOMA) 350 MG tablet Take 350 mg by mouth daily as needed.      carvediloL (COREG) 6.25 MG tablet Take 1 tablet (6.25 mg total) by mouth 2 (two) times daily with meals. 180 tablet 3    chlorzoxazone (PARAFON FORTE) 500 mg Tab Take 1 tablet (500 mg total) by mouth 4 (four) times daily as needed (muscle spasm). 180 tablet 3    dextroamphetamine-amphetamine 30 mg Tab Take 30 mg by mouth 2 (two) times a day.       empagliflozin (JARDIANCE) 25 mg tablet Take 1 tablet (25 mg total) by mouth once daily. 30 tablet 6    flash glucose scanning reader (FREESTYLE BAKARI 2 READER) Misc Use for continuous glucose monitoring. 1 each 0    FREESTYLE BAKARI 2 SENSOR Kit USE FOR CONTINUOUS GLUCOSE MONITORING. CHANGE EVERY 14 DAYS 2 kit 11    FREESTYLE BAKARI 3 PLUS SENSOR Tamiko Change device every 15 days 2 each 6    FREESTYLE BAKARI 3 READER [facility] Use continuously for blood sugar monitoring 1 each 0    hydroCHLOROthiazide (HYDRODIURIL) 25 MG tablet Take 1 tablet (25 mg total) by mouth once daily. 30 tablet 11    HYDROcodone-acetaminophen (NORCO) 7.5-325 mg per tablet Take 1 tablet by mouth every 12 (twelve) hours as needed for Pain. 60 tablet 0    insulin lispro (HUMALOG KWIKPEN INSULIN) 100 unit/mL pen Inject 8 Units into the skin 3 (three) times daily before meals. Plus correction scale, max TDD 66u      lancets Misc To check BG 4 times daily, to use with insurance preferred meter. Dx code E11.42 150 each 6    LANTUS SOLOSTAR U-100 INSULIN 100 unit/mL (3 mL) InPn pen  "Inject 12 Units into the skin every evening.      losartan (COZAAR) 100 MG tablet Take 100 mg by mouth nightly.      MOUNJARO 12.5 mg/0.5 mL PnIj Inject 12.5 mg into the skin every 7 days. 2 mL 5    mupirocin (BACTROBAN) 2 % ointment Apply topically 3 (three) times daily.      oxybutynin (DITROPAN-XL) 10 MG 24 hr tablet Take 10 mg by mouth once daily.      pen needle, diabetic (TRUEPLUS PEN NEEDLE) 32 gauge x 5/32" Ndle USE 5 TIMES DAILY WITH INSULIN AND VICTOZA. 150 each 6    promethazine-codeine 6.25-10 mg/5 ml (PHENERGAN WITH CODEINE) 6.25-10 mg/5 mL syrup Take 5 mLs by mouth every 6 (six) hours as needed.      zolpidem (AMBIEN) 10 mg Tab Take 10 mg by mouth nightly as needed.  1    [DISCONTINUED] albuterol-budesonide (AIRSUPRA) 90-80 mcg/actuation Inhale 2 puffs into the lungs every 4 (four) hours as needed (wheeze and cough). 10.7 g 11    [DISCONTINUED] fluticasone-umeclidin-vilanter (TRELEGY ELLIPTA) 200-62.5-25 mcg inhaler Inhale 1 puff into the lungs once daily. 60 each 11    blood-glucose meter kit To check BG 4 times daily, to use with insurance preferred meter. Dx code E11.42 1 each 0    [DISCONTINUED] sumatriptan (IMITREX) 100 MG tablet Take 1 tablet (100 mg total) by mouth once as needed for Migraine. 6 tablet 5     No current facility-administered medications on file prior to visit.       Allergies:  Patient has no known allergies.    Immunizations:  Immunization History   Administered Date(s) Administered    COVID-19, MRNA, LN-S, PF (Pfizer) (Purple Cap) 04/22/2021, 05/13/2021, 12/11/2021    DTaP 1970, 1970, 01/20/1971, 03/15/1972    Influenza - Quadrivalent 10/03/2020    Influenza - Quadrivalent - PF *Preferred* (6 months and older) 10/31/2018, 11/13/2019, 09/16/2021, 10/04/2023    Influenza - Trivalent - Nayely Mercado MDV 10/04/2023    Influenza - Trivalent - Fluarix, Flulaval, Fluzone, Afluria - PF 09/12/2024    MMR 11/17/1977    Measles / Rubella 01/19/1972    OPV 1970, " "01/20/1971, 03/15/1972, 03/21/1974, 04/17/1975, 11/17/1977    Pneumococcal Conjugate - 13 Valent 06/12/2017    Pneumococcal Polysaccharide - 23 Valent 11/13/2019    Td (ADULT) 04/17/1975, 11/17/1977, 07/11/1984, 09/18/2009    Tdap 10/18/2017    Zoster Recombinant 06/16/2021, 09/22/2021       Review of Systems:  Review of Systems   All other systems reviewed and are negative.      Objective:    Vitals:  Vitals:    04/22/25 1317   BP: 128/74   Pulse: 82   SpO2: 99%   Weight: 134.8 kg (297 lb 2.9 oz)   Height: 5' 7" (1.702 m)   PainSc: 0-No pain           Physical Exam  Vitals reviewed.   Constitutional:       General: She is not in acute distress.  HENT:      Head: Normocephalic and atraumatic.   Eyes:      Pupils: Pupils are equal, round, and reactive to light.   Cardiovascular:      Rate and Rhythm: Normal rate and regular rhythm.      Heart sounds: No murmur heard.     No friction rub.   Pulmonary:      Effort: Pulmonary effort is normal.      Breath sounds: Normal breath sounds.   Abdominal:      General: Bowel sounds are normal. There is no distension.      Palpations: Abdomen is soft.      Tenderness: There is no abdominal tenderness.   Musculoskeletal:      Cervical back: Neck supple.   Skin:     General: Skin is warm and dry.      Findings: No rash.   Psychiatric:         Behavior: Behavior normal.             Cody Medrano MD  Family Medicine            "

## 2025-05-09 DIAGNOSIS — E11.42 TYPE 2 DIABETES MELLITUS WITH DIABETIC POLYNEUROPATHY, WITH LONG-TERM CURRENT USE OF INSULIN: ICD-10-CM

## 2025-05-09 DIAGNOSIS — Z79.4 TYPE 2 DIABETES MELLITUS WITH DIABETIC POLYNEUROPATHY, WITH LONG-TERM CURRENT USE OF INSULIN: ICD-10-CM

## 2025-05-09 RX ORDER — BLOOD-GLUCOSE,RECEIVER,CONT
EACH MISCELLANEOUS
Qty: 1 EACH | Refills: 0 | Status: SHIPPED | OUTPATIENT
Start: 2025-05-09

## 2025-05-09 NOTE — TELEPHONE ENCOUNTER
----- Message from Vielka sent at 5/9/2025  1:59 PM CDT -----  Type:  Needs Medical Advice Who Called: Pt Would the patient rather a call back or a response via [facility]? Call Back Best Call Back Number: [phone] Additional Information: Pt called in to get prescription for the monitor nellie 3      Please call Back to advise. Thanks!

## 2025-07-01 DIAGNOSIS — E11.29 TYPE 2 DIABETES MELLITUS WITH MICROALBUMINURIA, WITH LONG-TERM CURRENT USE OF INSULIN: ICD-10-CM

## 2025-07-01 DIAGNOSIS — R80.9 TYPE 2 DIABETES MELLITUS WITH MICROALBUMINURIA, WITH LONG-TERM CURRENT USE OF INSULIN: ICD-10-CM

## 2025-07-01 DIAGNOSIS — Z79.4 TYPE 2 DIABETES MELLITUS WITH MICROALBUMINURIA, WITH LONG-TERM CURRENT USE OF INSULIN: ICD-10-CM

## 2025-07-01 RX ORDER — TIRZEPATIDE 12.5 MG/.5ML
12.5 INJECTION, SOLUTION SUBCUTANEOUS
Qty: 2 ML | Refills: 6 | Status: SHIPPED | OUTPATIENT
Start: 2025-07-01

## 2025-07-02 RX ORDER — LOSARTAN POTASSIUM 100 MG/1
100 TABLET ORAL
Qty: 90 TABLET | Refills: 2 | Status: SHIPPED | OUTPATIENT
Start: 2025-07-02

## 2025-07-02 NOTE — TELEPHONE ENCOUNTER
Care Due:                  Date            Visit Type   Department     Provider  --------------------------------------------------------------------------------                                EP -                              PRIMARY      [facility] FAMILY  Last Visit: 04-      CARE ([facility])   MEDICINE       Cody Medrano  Next Visit: None Scheduled  None         None Found                                                            Last  Test          Frequency    Reason                     Performed    Due Date  --------------------------------------------------------------------------------    Lipid Panel.  12 months..  atorvastatin.............  08- 08-    [facility] Embedded Care Due Messages. Reference number: [id number].   7/02/2025 10:39:48 AM CDT

## 2025-07-02 NOTE — TELEPHONE ENCOUNTER
Refill Routing Note   Medication(s) are not appropriate for processing by [facility] for the following reason(s):        No active prescription written by provider    [facility] action(s):  Defer        Medication Therapy Plan: FLOS 08/27/25      Appointments  past 12m or future 3m with PCP    Date Provider   Last Visit   4/22/2025 Cody Medrano MD   Next Visit   Visit date not found Cody Medrano MD   ED visits in past 90 days: 0        Note composed:1:26 PM 07/02/2025

## 2025-07-24 ENCOUNTER — OFFICE VISIT (OUTPATIENT)
Dept: CARDIOLOGY | Facility: CLINIC | Age: 55
End: 2025-07-24
Payer: COMMERCIAL

## 2025-07-24 VITALS
HEIGHT: 67 IN | WEIGHT: 293 LBS | SYSTOLIC BLOOD PRESSURE: 132 MMHG | BODY MASS INDEX: 45.99 KG/M2 | HEART RATE: 90 BPM | DIASTOLIC BLOOD PRESSURE: 78 MMHG

## 2025-07-24 DIAGNOSIS — I10 ESSENTIAL HYPERTENSION: Primary | ICD-10-CM

## 2025-07-24 DIAGNOSIS — E66.01 MORBID OBESITY: ICD-10-CM

## 2025-07-24 DIAGNOSIS — Z79.4 TYPE 2 DIABETES MELLITUS WITH DIABETIC POLYNEUROPATHY, WITH LONG-TERM CURRENT USE OF INSULIN: ICD-10-CM

## 2025-07-24 DIAGNOSIS — E78.2 MIXED HYPERLIPIDEMIA: ICD-10-CM

## 2025-07-24 DIAGNOSIS — E11.42 TYPE 2 DIABETES MELLITUS WITH DIABETIC POLYNEUROPATHY, WITH LONG-TERM CURRENT USE OF INSULIN: ICD-10-CM

## 2025-07-24 PROCEDURE — 3078F DIAST BP <80 MM HG: CPT | Mod: CPTII,S$GLB,, | Performed by: INTERNAL MEDICINE

## 2025-07-24 PROCEDURE — 1160F RVW MEDS BY RX/DR IN RCRD: CPT | Mod: CPTII,S$GLB,, | Performed by: INTERNAL MEDICINE

## 2025-07-24 PROCEDURE — 99214 OFFICE O/P EST MOD 30 MIN: CPT | Mod: S$GLB,,, | Performed by: INTERNAL MEDICINE

## 2025-07-24 PROCEDURE — 3051F HG A1C>EQUAL 7.0%<8.0%: CPT | Mod: CPTII,S$GLB,, | Performed by: INTERNAL MEDICINE

## 2025-07-24 PROCEDURE — 99999 PR PBB SHADOW E&M-EST. PATIENT-LVL IV: CPT | Mod: PBBFAC,,, | Performed by: INTERNAL MEDICINE

## 2025-07-24 PROCEDURE — 3008F BODY MASS INDEX DOCD: CPT | Mod: CPTII,S$GLB,, | Performed by: INTERNAL MEDICINE

## 2025-07-24 PROCEDURE — 4010F ACE/ARB THERAPY RXD/TAKEN: CPT | Mod: CPTII,S$GLB,, | Performed by: INTERNAL MEDICINE

## 2025-07-24 PROCEDURE — 3075F SYST BP GE 130 - 139MM HG: CPT | Mod: CPTII,S$GLB,, | Performed by: INTERNAL MEDICINE

## 2025-07-24 PROCEDURE — 1159F MED LIST DOCD IN RCRD: CPT | Mod: CPTII,S$GLB,, | Performed by: INTERNAL MEDICINE

## 2025-07-24 NOTE — PROGRESS NOTES
Subjective:    Patient ID:  Liza Spain Cousin is a 54 y.o. female patient here for evaluation Follow-up and Abnormal Stress Test      History of Present Illness:  No new interval issues since last visit.       Follow-up.  Multiple CV risk factors including past tobacco use, hypertension, diabetes mellitus dyslipidemia.  Past noninvasive/invasive workup via left heart catheterization revealed nonobstructive CAD 2024.  Left ventricular end-diastolic pressure 24.  Echo EF normal with PA systolic pressures 40     Lower extremity edema improved.  Stable orthopnea.  No PND no angina.  No arrhythmia.     Patient doing well with risk factor modification, blood pressure and glucose control.         Review of patient's allergies indicates:  No Known Allergies    Past Medical History:   Diagnosis Date    Anticoagulant long-term use     Arthritis     Asthma 2012    Blood transfusion     COPD (chronic obstructive pulmonary disease)     Depression 2012    Diabetes 2012    HTN (hypertension) 2012    LBP (low back pain) 2012    Mild nonproliferative diabetic retinopathy 2024    Obesity 2013    Tobacco abuse 07/10/2013     Past Surgical History:   Procedure Laterality Date    ANGIOGRAM, CORONARY, WITH LEFT HEART CATHETERIZATION  2024    Procedure: Left Heart Cath;  Surgeon: Cayetano Mathew MD;  Location: [facility] CATH;  Service: Cardiology;;     SECTION, LOW TRANSVERSE      COLONOSCOPY N/A 2023    Procedure: COLONOSCOPY;  Surgeon: Reggie Posey MD;  Location: [facility] ENDO;  Service: Endoscopy;  Laterality: N/A;    GANGLION CYST EXCISION Right 2020    Dr. Logan     Social History[1]     Review of Systems:    As noted in HPI in addition      REVIEW OF SYSTEMS  Review of Systems   Constitutional: Negative for decreased appetite, diaphoresis, night sweats, weight gain and weight loss.   HENT:  Negative for nosebleeds and odynophagia.    Eyes:  Negative for double  vision and photophobia.   Cardiovascular:  Negative for chest pain, claudication, cyanosis, dyspnea on exertion, irregular heartbeat, leg swelling, near-syncope, orthopnea, palpitations, paroxysmal nocturnal dyspnea and syncope.   Respiratory:  Negative for cough, hemoptysis, shortness of breath and wheezing.    Hematologic/Lymphatic: Negative for adenopathy.   Skin:  Negative for flushing, skin cancer and suspicious lesions.   Musculoskeletal:  Negative for gout, myalgias and neck pain.   Gastrointestinal:  Negative for abdominal pain, heartburn, hematemesis and hematochezia.   Genitourinary:  Negative for bladder incontinence, hesitancy and nocturia.   Neurological:  Negative for focal weakness, headaches, light-headedness and paresthesias.   Psychiatric/Behavioral:  Negative for memory loss and substance abuse.               Objective:        Vitals:    07/24/25 1424   BP: 132/78   Pulse: 90       Lab Results   Component Value Date    WBC 5.30 09/07/2024    HGB 13.0 09/07/2024    HCT 43.4 09/07/2024     09/07/2024    CHOL 165 08/14/2024    TRIG 94 08/14/2024    HDL 42 08/14/2024    ALT 15 04/02/2025    AST 17 04/02/2025     04/02/2025    K 3.7 04/02/2025     04/02/2025    CREATININE 0.9 04/02/2025    BUN 14 04/02/2025    CO2 28 04/02/2025    TSH 0.870 04/02/2025    HGBA1C 7.9 (H) 04/02/2025    MICROALBUR 0.4 04/12/2016        ECHOCARDIOGRAM RESULTS  Results for orders placed during the hospital encounter of 04/25/24    Echo    Interpretation Summary    Left Ventricle: The left ventricle is normal in size. Moderately increased wall thickness. There is normal systolic function with a visually estimated ejection fraction of 60 - 65%. Grade I diastolic dysfunction.    Right Ventricle: Normal right ventricular cavity size. Wall thickness is normal. Systolic function is normal.    Left Atrium: Left atrium is dilated.    Mitral Valve: Moderately calcified posterior leaflet.    Pulmonary Artery: There  is mild pulmonary hypertension. The estimated pulmonary artery systolic pressure is 40 mmHg.    IVC/SVC: Normal venous pressure at 3 mmHg.    Results for orders placed during the hospital encounter of 06/04/24    Cardiac catheterization    Conclusion    The ejection fraction was calculated to be 75%.    The pre-procedure left ventricular end diastolic pressure was 24.    The post-procedure left ventricular end diastolic pressure was 24.    The estimated blood loss was none.    Nonobstructive disease involving the LAD with mid segment moderate systolic bridging.  Elevated LVEDP of 24.    The procedure log was documented by Documenter: Collin Feldman RN and verified by Cayetano Mathew MD.    Date: 6/4/2024  Time: 12:26 PM          CURRENT/PREVIOUS VISIT EKG  Results for orders placed or performed during the hospital encounter of 06/04/24   EKG 12-lead    Collection Time: 06/04/24  7:54 AM   Result Value Ref Range    QRS Duration 106 ms    [facility] QTC Calculation 471 ms    Narrative    Test Reason : R94.39,    Vent. Rate : 088 BPM     Atrial Rate : 088 BPM     P-R Int : 164 ms          QRS Dur : 106 ms      QT Int : 390 ms       P-R-T Axes : 045 -61 034 degrees     QTc Int : 471 ms    Normal sinus rhythm  Left anterior fascicular block  Minimal voltage criteria for LVH, may be normal variant ( Raoul product )  Abnormal ECG  When compared with ECG of 05-MAR-2024 14:07,  Previous ECG has undetermined rhythm, needs review  Criteria for Septal infarct are no longer Present  Non-specific change in ST segment in Anterior leads  Nonspecific T wave abnormality now evident in Inferior leads  Confirmed by Priyanka CHATTERJEE, Cayetano ACOSTA ([id number]) on 6/4/2024 1:10:04 PM    Referred By:  PRIYANKA           Confirmed By:Cayetano Mathew MD     No valid procedures specified.   Results for orders placed during the hospital encounter of 03/05/24    Nuclear Stress - Cardiology Interpreted    Interpretation Summary    Abnormal myocardial perfusion  scan.    There is a moderate intensity, moderate sized, reversible perfusion abnormality that is consistent with ischemia in the inferior wall(s).    There are no other significant perfusion abnormalities.    The gated perfusion images showed an ejection fraction of 68% at rest.    There is normal wall motion at rest.    The ECG portion of the study is negative for ischemia.    The patient reported no chest pain during the stress test.    During stress, rare PVCs are noted.    There are no prior studies for comparison.    Positive stress test.    No valid procedures specified.    PHYSICAL EXAM  GENERAL: well built, well nourished, well-developed in no apparent distress alert and oriented.   HEENT: Normocephalic. Pupils normal and conjunctivae normal.  Mucous membranes normal, no cyanosis or icterus, trachea central,no pallor or icterus is noted..   NECK: No JVD. No bruit..   THYROID: Thyroid not enlarged. No nodules present..   CARDIAC:  Normal S1-S2.  No murmur rub click or gallop.  PMI nondisplaced.    LUNGS: Clear to auscultation. No wheezing or rhonchi..   ABDOMEN: Soft no masses or organomegaly.  No abdomen pulsations or bruits.  Normal bowel sounds. No pulsations and no masses felt, No guarding or rebound.   URINARY: No valdez catheter   EXTREMITIES: No cyanosis, clubbing or edema noted at this time., no calf tenderness bilaterally.   PERIPHERAL VASCULAR SYSTEM: Good palpable distal pulses.  2+ femoral, popliteal and pedal pulses.  No bruits    CENTRAL NERVOUS SYSTEM: No focal motor or sensory deficits noted.   SKIN: Skin without lesions, moist, well perfused.   MUSCLE STRENGTH & TONE: No noteable weakness, atrophy or abnormal movement    I HAVE REVIEWED :    The vital signs, nurses notes, and all the pertinent radiology and labs.         Current Outpatient Medications   Medication Instructions    albuterol (ACCUNEB) 0.63 mg, Nebulization, Every 6 hours PRN    albuterol-budesonide (AIRSUPRA) 90-80 mcg/actuation  "2 puffs, Inhalation, Every 4 hours PRN    ALPRAZolam (XANAX) 1 mg, 2 times daily PRN    amLODIPine (NORVASC) 10 mg, Oral, 2 times daily    aspirin (ECOTRIN) 81 mg, Daily    atorvastatin (LIPITOR) 80 mg, Oral, Nightly    blood sugar diagnostic Strp To check BG 4 times daily, to use with insurance preferred meter. Dx code E11.42    blood-glucose meter kit To check BG 4 times daily, to use with insurance preferred meter. Dx code E11.42    carisoprodoL (SOMA) 350 mg, Daily PRN    carvediloL (COREG) 6.25 mg, Oral, 2 times daily with meals    chlorzoxazone (PARAFON FORTE) 500 mg, Oral, 4 times daily PRN    dextroamphetamine-amphetamine 30 mg Tab 30 mg, 2 times daily    empagliflozin (JARDIANCE) 25 mg, Oral, Daily    flash glucose scanning reader (FREESTYLE BAKARI 2 READER) [facility] Use for continuous glucose monitoring.    fluticasone-umeclidin-vilanter (TRELEGY ELLIPTA) 200-62.5-25 mcg inhaler 1 puff, Inhalation, Daily    FREESTYLE BAKARI 2 SENSOR Kit USE FOR CONTINUOUS GLUCOSE MONITORING. CHANGE EVERY 14 DAYS    FREESTYLE BAKARI 3 PLUS SENSOR Tamiko Change device every 15 days    FREESTYLE BAKARI 3 READER [facility] Use continuously for blood sugar monitoring    hydroCHLOROthiazide (HYDRODIURIL) 25 mg, Oral, Daily    HYDROcodone-acetaminophen (NORCO) 7.5-325 mg per tablet 1 tablet, Oral, Every 12 hours PRN    insulin lispro (HUMALOG KWIKPEN INSULIN) 8 Units, Subcutaneous, 3 times daily before meals, Plus correction scale, max TDD 66u    lancets [facility] To check BG 4 times daily, to use with insurance preferred meter. Dx code E11.42    LANTUS SOLOSTAR U-100 INSULIN 12 Units, Subcutaneous, Nightly    losartan (COZAAR) 100 mg, Oral    MOUNJARO 12.5 mg, Subcutaneous, Every 7 days    mupirocin (BACTROBAN) 2 % ointment 3 times daily    oxybutynin (DITROPAN-XL) 10 mg, Daily    pen needle, diabetic (TRUEPLUS PEN NEEDLE) 32 gauge x 5/32" Ndle USE 5 TIMES DAILY WITH INSULIN AND VICTOZA.    promethazine-codeine 6.25-10 mg/5 ml (PHENERGAN WITH " CODEINE) 6.25-10 mg/5 mL syrup 5 mLs, Every 6 hours PRN    sumatriptan (IMITREX) 100 mg, Oral, Once as needed    zolpidem (AMBIEN) 10 mg, Nightly PRN          Assessment:         Hypertension, diabetes mellitus, dyslipidemia, past tobacco use.  Positive family history.     Left heart catheterization with nonobstructive CAD 2024.  Elevated left ventricular end-diastolic pressure at 24.  Echo EF normal with mild PA systolic hypertension at 40.     Plan:     Meds reviewed and reconciled.  Recommend changes.  Labs follow-up primary care.  Compliant with medical follow up, medications.  Continue weight loss, diet, exercise.  Six-month.        No follow-ups on file.            [1]   Social History  Tobacco Use    Smoking status: Former     Current packs/day: 0.00     Average packs/day: 0.5 packs/day for 30.0 years (15.0 ttl pk-yrs)     Types: Cigarettes     Start date: 1985     Quit date: 2015     Years since quittin.5     Passive exposure: Past    Smokeless tobacco: Never    Tobacco comments:     Quit 2023   Substance Use Topics    Alcohol use: Not Currently     Comment: drink wine cooler    Drug use: No

## 2025-08-11 ENCOUNTER — TELEPHONE (OUTPATIENT)
Dept: ENDOCRINOLOGY | Facility: CLINIC | Age: 55
End: 2025-08-11
Payer: COMMERCIAL

## 2025-08-19 ENCOUNTER — OFFICE VISIT (OUTPATIENT)
Dept: FAMILY MEDICINE | Facility: CLINIC | Age: 55
End: 2025-08-19
Payer: COMMERCIAL

## 2025-08-19 VITALS
DIASTOLIC BLOOD PRESSURE: 86 MMHG | BODY MASS INDEX: 45.99 KG/M2 | OXYGEN SATURATION: 95 % | HEIGHT: 67 IN | SYSTOLIC BLOOD PRESSURE: 134 MMHG | HEART RATE: 98 BPM | WEIGHT: 293 LBS

## 2025-08-19 DIAGNOSIS — G57.10 MERALGIA PARESTHETICA, UNSPECIFIED LATERALITY: ICD-10-CM

## 2025-08-19 DIAGNOSIS — M67.479 GANGLION CYST OF FOOT: Primary | ICD-10-CM

## 2025-08-19 PROCEDURE — 4010F ACE/ARB THERAPY RXD/TAKEN: CPT | Mod: CPTII,S$GLB,,

## 2025-08-19 PROCEDURE — 1159F MED LIST DOCD IN RCRD: CPT | Mod: CPTII,S$GLB,,

## 2025-08-19 PROCEDURE — 99999 PR PBB SHADOW E&M-EST. PATIENT-LVL V: CPT | Mod: PBBFAC,,,

## 2025-08-19 PROCEDURE — 3051F HG A1C>EQUAL 7.0%<8.0%: CPT | Mod: CPTII,S$GLB,,

## 2025-08-19 PROCEDURE — 3079F DIAST BP 80-89 MM HG: CPT | Mod: CPTII,S$GLB,,

## 2025-08-19 PROCEDURE — 3008F BODY MASS INDEX DOCD: CPT | Mod: CPTII,S$GLB,,

## 2025-08-19 PROCEDURE — 99214 OFFICE O/P EST MOD 30 MIN: CPT | Mod: S$GLB,,,

## 2025-08-19 PROCEDURE — 3075F SYST BP GE 130 - 139MM HG: CPT | Mod: CPTII,S$GLB,,

## 2025-08-19 RX ORDER — HYDROCODONE BITARTRATE AND ACETAMINOPHEN 10; 325 MG/1; MG/1
1 TABLET ORAL 2 TIMES DAILY PRN
COMMUNITY
Start: 2025-07-18

## 2025-08-27 ENCOUNTER — LAB VISIT (OUTPATIENT)
Dept: LAB | Facility: HOSPITAL | Age: 55
End: 2025-08-27
Payer: COMMERCIAL

## 2025-08-27 DIAGNOSIS — Z79.4 TYPE 2 DIABETES MELLITUS WITH DIABETIC POLYNEUROPATHY, WITH LONG-TERM CURRENT USE OF INSULIN: ICD-10-CM

## 2025-08-27 DIAGNOSIS — E11.42 TYPE 2 DIABETES MELLITUS WITH DIABETIC POLYNEUROPATHY, WITH LONG-TERM CURRENT USE OF INSULIN: ICD-10-CM

## 2025-08-27 LAB
ALBUMIN SERPL BCP-MCNC: 3.7 G/DL (ref 3.5–5.2)
ALP SERPL-CCNC: 74 UNIT/L (ref 40–150)
ALT SERPL W/O P-5'-P-CCNC: 18 UNIT/L (ref 0–55)
ANION GAP (OHS): 11 MMOL/L (ref 8–16)
AST SERPL-CCNC: 23 UNIT/L (ref 0–50)
BILIRUB SERPL-MCNC: 0.6 MG/DL (ref 0.1–1)
BUN SERPL-MCNC: 14 MG/DL (ref 6–20)
CALCIUM SERPL-MCNC: 9.2 MG/DL (ref 8.7–10.5)
CHLORIDE SERPL-SCNC: 100 MMOL/L (ref 95–110)
CHOLEST SERPL-MCNC: 169 MG/DL (ref 120–199)
CHOLEST/HDLC SERPL: 3.8 {RATIO} (ref 2–5)
CO2 SERPL-SCNC: 26 MMOL/L (ref 23–29)
CREAT SERPL-MCNC: 1 MG/DL (ref 0.5–1.4)
EAG (OHS): 166 MG/DL (ref 68–131)
GFR SERPLBLD CREATININE-BSD FMLA CKD-EPI: >60 ML/MIN/1.73/M2
GLUCOSE SERPL-MCNC: 100 MG/DL (ref 70–110)
HBA1C MFR BLD: 7.4 % (ref 4–5.6)
HDLC SERPL-MCNC: 45 MG/DL (ref 40–75)
HDLC SERPL: 26.6 % (ref 20–50)
LDLC SERPL CALC-MCNC: 104.4 MG/DL (ref 63–159)
NONHDLC SERPL-MCNC: 124 MG/DL
POTASSIUM SERPL-SCNC: 3.7 MMOL/L (ref 3.5–5.1)
PROT SERPL-MCNC: 7.5 GM/DL (ref 6–8.4)
SODIUM SERPL-SCNC: 137 MMOL/L (ref 136–145)
TRIGL SERPL-MCNC: 98 MG/DL (ref 30–150)

## 2025-08-27 PROCEDURE — 83036 HEMOGLOBIN GLYCOSYLATED A1C: CPT

## 2025-08-27 PROCEDURE — 80061 LIPID PANEL: CPT

## 2025-08-27 PROCEDURE — 82040 ASSAY OF SERUM ALBUMIN: CPT

## 2025-08-27 PROCEDURE — 36415 COLL VENOUS BLD VENIPUNCTURE: CPT | Mod: PN

## 2025-08-28 ENCOUNTER — RESULTS FOLLOW-UP (OUTPATIENT)
Dept: ENDOCRINOLOGY | Facility: CLINIC | Age: 55
End: 2025-08-28
Payer: COMMERCIAL

## 2025-09-03 ENCOUNTER — OFFICE VISIT (OUTPATIENT)
Dept: ENDOCRINOLOGY | Facility: CLINIC | Age: 55
End: 2025-09-03
Payer: COMMERCIAL

## 2025-09-03 VITALS
SYSTOLIC BLOOD PRESSURE: 112 MMHG | HEIGHT: 67 IN | BODY MASS INDEX: 45.99 KG/M2 | HEART RATE: 99 BPM | DIASTOLIC BLOOD PRESSURE: 70 MMHG | WEIGHT: 293 LBS

## 2025-09-03 DIAGNOSIS — Z79.4 TYPE 2 DIABETES MELLITUS WITH MICROALBUMINURIA, WITH LONG-TERM CURRENT USE OF INSULIN: Primary | ICD-10-CM

## 2025-09-03 DIAGNOSIS — E66.01 MORBID OBESITY WITH BMI OF 45.0-49.9, ADULT: ICD-10-CM

## 2025-09-03 DIAGNOSIS — Z79.4 TYPE 2 DIABETES MELLITUS WITH BOTH EYES AFFECTED BY MILD NONPROLIFERATIVE RETINOPATHY WITHOUT MACULAR EDEMA, WITH LONG-TERM CURRENT USE OF INSULIN: ICD-10-CM

## 2025-09-03 DIAGNOSIS — Z79.899 OTHER LONG TERM (CURRENT) DRUG THERAPY: ICD-10-CM

## 2025-09-03 DIAGNOSIS — R80.9 TYPE 2 DIABETES MELLITUS WITH MICROALBUMINURIA, WITH LONG-TERM CURRENT USE OF INSULIN: Primary | ICD-10-CM

## 2025-09-03 DIAGNOSIS — E11.29 TYPE 2 DIABETES MELLITUS WITH MICROALBUMINURIA, WITH LONG-TERM CURRENT USE OF INSULIN: Primary | ICD-10-CM

## 2025-09-03 DIAGNOSIS — E11.3293 TYPE 2 DIABETES MELLITUS WITH BOTH EYES AFFECTED BY MILD NONPROLIFERATIVE RETINOPATHY WITHOUT MACULAR EDEMA, WITH LONG-TERM CURRENT USE OF INSULIN: ICD-10-CM

## 2025-09-03 DIAGNOSIS — I10 ESSENTIAL HYPERTENSION: ICD-10-CM

## 2025-09-03 DIAGNOSIS — R79.89 ABNORMAL CBC: ICD-10-CM

## 2025-09-03 DIAGNOSIS — E78.2 MIXED HYPERLIPIDEMIA: ICD-10-CM

## 2025-09-03 LAB — GLUCOSE SERPL-MCNC: 117 MG/DL (ref 70–110)

## 2025-09-03 PROCEDURE — 82962 GLUCOSE BLOOD TEST: CPT | Mod: S$GLB,,, | Performed by: NURSE PRACTITIONER

## 2025-09-03 PROCEDURE — 99999 PR PBB SHADOW E&M-EST. PATIENT-LVL III: CPT | Mod: PBBFAC,,, | Performed by: NURSE PRACTITIONER

## 2025-09-03 RX ORDER — INSULIN LISPRO 100 [IU]/ML
INJECTION, SOLUTION INTRAVENOUS; SUBCUTANEOUS
Start: 2025-09-03

## 2025-09-03 RX ORDER — TIRZEPATIDE 15 MG/.5ML
15 INJECTION, SOLUTION SUBCUTANEOUS
Qty: 2 ML | Refills: 6 | Status: SHIPPED | OUTPATIENT
Start: 2025-09-03

## 2025-09-03 RX ORDER — INSULIN GLARGINE 100 [IU]/ML
10 INJECTION, SOLUTION SUBCUTANEOUS NIGHTLY
Start: 2025-09-03